# Patient Record
Sex: FEMALE | Race: WHITE | Employment: OTHER | URBAN - METROPOLITAN AREA
[De-identification: names, ages, dates, MRNs, and addresses within clinical notes are randomized per-mention and may not be internally consistent; named-entity substitution may affect disease eponyms.]

---

## 2017-02-01 ENCOUNTER — OFFICE VISIT (OUTPATIENT)
Dept: FAMILY MEDICINE CLINIC | Facility: CLINIC | Age: 62
End: 2017-02-01

## 2017-02-01 ENCOUNTER — APPOINTMENT (OUTPATIENT)
Dept: LAB | Age: 62
End: 2017-02-01
Attending: PHYSICIAN ASSISTANT
Payer: COMMERCIAL

## 2017-02-01 VITALS
HEIGHT: 65 IN | HEART RATE: 81 BPM | WEIGHT: 188 LBS | TEMPERATURE: 98 F | DIASTOLIC BLOOD PRESSURE: 66 MMHG | SYSTOLIC BLOOD PRESSURE: 101 MMHG | BODY MASS INDEX: 31.32 KG/M2

## 2017-02-01 DIAGNOSIS — M54.50 ACUTE LEFT-SIDED LOW BACK PAIN WITHOUT SCIATICA: Primary | ICD-10-CM

## 2017-02-01 DIAGNOSIS — Z86.018 HISTORY OF ADRENAL ADENOMA: ICD-10-CM

## 2017-02-01 DIAGNOSIS — B35.1 ONYCHOMYCOSIS: ICD-10-CM

## 2017-02-01 LAB
ALBUMIN SERPL BCP-MCNC: 4 G/DL (ref 3.5–4.8)
ALBUMIN/GLOB SERPL: 1.3 {RATIO} (ref 1–2)
ALP SERPL-CCNC: 61 U/L (ref 32–100)
ALT SERPL-CCNC: 48 U/L (ref 14–54)
ANION GAP SERPL CALC-SCNC: 10 MMOL/L (ref 0–18)
APPEARANCE: CLEAR
AST SERPL-CCNC: 40 U/L (ref 15–41)
BILIRUB SERPL-MCNC: 0.4 MG/DL (ref 0.3–1.2)
BILIRUB UR QL: NEGATIVE
BILIRUBIN: NEGATIVE
BUN SERPL-MCNC: 10 MG/DL (ref 8–20)
BUN/CREAT SERPL: 12.2 (ref 10–20)
CALCIUM SERPL-MCNC: 9.5 MG/DL (ref 8.5–10.5)
CHLORIDE SERPL-SCNC: 105 MMOL/L (ref 95–110)
CLARITY UR: CLEAR
CO2 SERPL-SCNC: 28 MMOL/L (ref 22–32)
COLOR UR: YELLOW
CREAT SERPL-MCNC: 0.82 MG/DL (ref 0.5–1.5)
GLOBULIN PLAS-MCNC: 3.1 G/DL (ref 2.5–3.7)
GLUCOSE (URINE DIPSTICK): NEGATIVE MG/DL
GLUCOSE SERPL-MCNC: 108 MG/DL (ref 70–99)
GLUCOSE UR-MCNC: NEGATIVE MG/DL
HGB UR QL STRIP.AUTO: NEGATIVE
KETONES (URINE DIPSTICK): NEGATIVE MG/DL
KETONES UR-MCNC: NEGATIVE MG/DL
LEUKOCYTE ESTERASE UR QL STRIP.AUTO: NEGATIVE
LEUKOCYTES: NEGATIVE
MULTISTIX LOT#: NORMAL NUMERIC
NITRITE UR QL STRIP.AUTO: NEGATIVE
NITRITE, URINE: NEGATIVE
OSMOLALITY UR CALC.SUM OF ELEC: 296 MOSM/KG (ref 275–295)
PH UR: 6 [PH] (ref 5–8)
PH, URINE: 6.5 (ref 4.5–8)
POTASSIUM SERPL-SCNC: 4 MMOL/L (ref 3.3–5.1)
PROT SERPL-MCNC: 7.1 G/DL (ref 5.9–8.4)
PROT UR-MCNC: NEGATIVE MG/DL
PROTEIN (URINE DIPSTICK): NEGATIVE MG/DL
SODIUM SERPL-SCNC: 143 MMOL/L (ref 136–144)
SP GR UR STRIP: 1.02 (ref 1–1.03)
SPECIFIC GRAVITY: 1.01 (ref 1–1.03)
UROBILINOGEN UR STRIP-ACNC: <2
UROBILINOGEN,SEMI-QN: 0.2 MG/DL (ref 0–1.9)
VIT C UR-MCNC: NEGATIVE MG/DL

## 2017-02-01 PROCEDURE — G0463 HOSPITAL OUTPT CLINIC VISIT: HCPCS | Performed by: FAMILY MEDICINE

## 2017-02-01 PROCEDURE — 36415 COLL VENOUS BLD VENIPUNCTURE: CPT

## 2017-02-01 PROCEDURE — 80053 COMPREHEN METABOLIC PANEL: CPT

## 2017-02-01 PROCEDURE — 81002 URINALYSIS NONAUTO W/O SCOPE: CPT | Performed by: FAMILY MEDICINE

## 2017-02-01 PROCEDURE — 99214 OFFICE O/P EST MOD 30 MIN: CPT | Performed by: FAMILY MEDICINE

## 2017-02-01 NOTE — PROGRESS NOTES
HPI:    Patient ID: Halley Hanna is a 64year old female. HPI Comments: Pt reports left flank pain Sunday evening pain than migrated to left low back. Reports urine appears darker.  Describes pain as sharp sometimes 10/10, aggravated with movements, Rfl:    BOOSTRIX 5-2.5-18.5 Intramuscular Suspension ADM 0.5ML IM UTD Disp:  Rfl: 0   Levothyroxine Sodium 125 MCG Oral Tab Take 1 tablet (125 mcg total) by mouth before breakfast. Disp: 90 tablet Rfl: 3   carvedilol (COREG) 3.125 MG Oral Tab Take 1 tablet Urinalysis, Routine [E]; Future    2. History of adrenal adenoma  CT to follow adrenal adenoma    - CT ABDOMEN (CPT=74150);  Future    Pt to follow up for worsening of symptoms    Meds This Visit:  No prescriptions requested or ordered in this encounter

## 2017-02-03 RX ORDER — ERGOCALCIFEROL 1.25 MG/1
CAPSULE ORAL
Qty: 12 CAPSULE | Refills: 0 | Status: SHIPPED | OUTPATIENT
Start: 2017-02-03 | End: 2017-05-04

## 2017-02-03 RX ORDER — TERBINAFINE HYDROCHLORIDE 250 MG/1
TABLET ORAL
Qty: 30 TABLET | Refills: 0 | Status: SHIPPED | OUTPATIENT
Start: 2017-02-03 | End: 2017-03-01

## 2017-02-03 RX ORDER — TERBINAFINE HYDROCHLORIDE 250 MG/1
250 TABLET ORAL DAILY
Qty: 30 TABLET | Refills: 0 | Status: CANCELLED | OUTPATIENT
Start: 2017-02-03

## 2017-02-03 NOTE — TELEPHONE ENCOUNTER
Patient is requesting refill for Terbinafine 250 mg daily. LOV 12/27/16 . Last refilled on 12/27/16 for 30 tablets / 0 refills. Please advise.

## 2017-02-03 NOTE — TELEPHONE ENCOUNTER
Pt states she was told she would get a refill of terbinafine after labs for liver function. Refill pended along with vit D. AA please advise.

## 2017-02-03 NOTE — TELEPHONE ENCOUNTER
Rx sent to pharmacy. She had CMP drawn as directed and liver enzymes are normal.  She can follow up in one month.

## 2017-02-03 NOTE — TELEPHONE ENCOUNTER
Refill Protocol Appointment Criteria  · Appointment scheduled in the past 12 months or in the next 3 months  Recent Visits       Provider Department Primary Dx    2 days ago Elizabeth Guaman MD [facility], [facility], [facility] Acute left-sided low b

## 2017-02-13 ENCOUNTER — TELEPHONE (OUTPATIENT)
Dept: FAMILY MEDICINE CLINIC | Facility: CLINIC | Age: 62
End: 2017-02-13

## 2017-02-13 NOTE — TELEPHONE ENCOUNTER
Pt states having a lot of diarrhea  Declined apt-wants to see Dr Cade Valdes, have colonoscopy  Secondary ins is HMO

## 2017-02-13 NOTE — TELEPHONE ENCOUNTER
Recommendations reviewed per Dr. Kyle Tafoya. Pt verbalized understanding and denied any further questions at this time.

## 2017-02-13 NOTE — TELEPHONE ENCOUNTER
Reason for Call/Chief Complaint: Patient states has been having diarrhea for a few days   Onset: 5 days   Nursing Assessment/Associated Symptoms: Patient states for 5 days has been having issues with bowels.  States was constipated last week and took [place] and will call back if needed. Patient verbalizes understanding and agrees with plan.

## 2017-02-15 ENCOUNTER — APPOINTMENT (OUTPATIENT)
Dept: LAB | Age: 62
End: 2017-02-15
Attending: PEDIATRICS
Payer: COMMERCIAL

## 2017-02-15 DIAGNOSIS — R19.7 DIARRHEA, UNSPECIFIED TYPE: ICD-10-CM

## 2017-02-16 ENCOUNTER — HOSPITAL ENCOUNTER (OUTPATIENT)
Dept: CT IMAGING | Facility: HOSPITAL | Age: 62
Discharge: HOME | End: 2017-02-16
Attending: FAMILY MEDICINE
Payer: MEDICARE

## 2017-02-16 ENCOUNTER — APPOINTMENT (OUTPATIENT)
Dept: LAB | Age: 62
End: 2017-02-16
Attending: FAMILY MEDICINE
Payer: MEDICARE

## 2017-02-16 DIAGNOSIS — Z86.018 HISTORY OF ADRENAL ADENOMA: ICD-10-CM

## 2017-02-16 DIAGNOSIS — M54.50 ACUTE LEFT-SIDED LOW BACK PAIN WITHOUT SCIATICA: ICD-10-CM

## 2017-02-16 LAB — C DIFF TOX B STL QL: NEGATIVE

## 2017-02-16 PROCEDURE — 74150 CT ABDOMEN W/O CONTRAST: CPT

## 2017-02-16 PROCEDURE — 87493 C DIFF AMPLIFIED PROBE: CPT

## 2017-03-06 RX ORDER — TERBINAFINE HYDROCHLORIDE 250 MG/1
TABLET ORAL
Qty: 30 TABLET | Refills: 0 | Status: SHIPPED | OUTPATIENT
Start: 2017-03-06 | End: 2017-04-27

## 2017-03-27 ENCOUNTER — TELEPHONE (OUTPATIENT)
Dept: FAMILY MEDICINE CLINIC | Facility: CLINIC | Age: 62
End: 2017-03-27

## 2017-03-27 DIAGNOSIS — G89.29 CHRONIC LOW BACK PAIN WITH SCIATICA, SCIATICA LATERALITY UNSPECIFIED, UNSPECIFIED BACK PAIN LATERALITY: ICD-10-CM

## 2017-03-27 DIAGNOSIS — M51.26 LUMBAR DISC HERNIATION: Primary | ICD-10-CM

## 2017-03-27 DIAGNOSIS — M54.40 CHRONIC LOW BACK PAIN WITH SCIATICA, SCIATICA LATERALITY UNSPECIFIED, UNSPECIFIED BACK PAIN LATERALITY: ICD-10-CM

## 2017-03-27 DIAGNOSIS — Z98.1 S/P LUMBAR FUSION: ICD-10-CM

## 2017-03-27 NOTE — TELEPHONE ENCOUNTER
Pt called in requesting a referral to see Dr. Jose Antonio Grissom at [facility], [facility] for a one year evaluation since her infusion. Pt states he is a neuro surgeon.   Pt requesting a call back once referral is done, she has a scheduled appt to see this doctor on Ap

## 2017-03-31 ENCOUNTER — TELEPHONE (OUTPATIENT)
Dept: ENDOCRINOLOGY CLINIC | Facility: CLINIC | Age: 62
End: 2017-03-31

## 2017-03-31 ENCOUNTER — OFFICE VISIT (OUTPATIENT)
Dept: ENDOCRINOLOGY CLINIC | Facility: CLINIC | Age: 62
End: 2017-03-31

## 2017-03-31 ENCOUNTER — LAB ENCOUNTER (OUTPATIENT)
Dept: LAB | Facility: HOSPITAL | Age: 62
End: 2017-03-31
Attending: INTERNAL MEDICINE
Payer: MEDICARE

## 2017-03-31 VITALS
HEIGHT: 64 IN | BODY MASS INDEX: 31.92 KG/M2 | WEIGHT: 187 LBS | SYSTOLIC BLOOD PRESSURE: 100 MMHG | DIASTOLIC BLOOD PRESSURE: 66 MMHG | HEART RATE: 68 BPM

## 2017-03-31 DIAGNOSIS — E04.2 MULTIPLE THYROID NODULES: ICD-10-CM

## 2017-03-31 DIAGNOSIS — E03.9 HYPOTHYROIDISM, UNSPECIFIED TYPE: Primary | ICD-10-CM

## 2017-03-31 DIAGNOSIS — E66.9 OBESITY (BMI 30.0-34.9): ICD-10-CM

## 2017-03-31 DIAGNOSIS — Z32.00 ENCOUNTER FOR PREGNANCY TEST, RESULT UNKNOWN: ICD-10-CM

## 2017-03-31 DIAGNOSIS — E03.9 HYPOTHYROIDISM, UNSPECIFIED TYPE: ICD-10-CM

## 2017-03-31 PROCEDURE — 84443 ASSAY THYROID STIM HORMONE: CPT

## 2017-03-31 PROCEDURE — 81025 URINE PREGNANCY TEST: CPT

## 2017-03-31 PROCEDURE — G0463 HOSPITAL OUTPT CLINIC VISIT: HCPCS | Performed by: INTERNAL MEDICINE

## 2017-03-31 PROCEDURE — 36415 COLL VENOUS BLD VENIPUNCTURE: CPT

## 2017-03-31 PROCEDURE — 99204 OFFICE O/P NEW MOD 45 MIN: CPT | Performed by: INTERNAL MEDICINE

## 2017-03-31 PROCEDURE — 84439 ASSAY OF FREE THYROXINE: CPT

## 2017-03-31 NOTE — H&P
New Patient Evaluation - History and Physical    CONSULT - Reason for Visit:  Post RENTERIA hypothyroidism, Thyroid nodules, Obesity  Requesting Physician: Fantasma Meng MD    CHIEF COMPLAINT:    Post RENTERIA hypothyroidism  Thyroid nodules  Obesity     HISTORY OF MA arrow angle glaucoma OU; PI Laser OS    OTHER SURGICAL HISTORY      Comment Per NG: Arthrocentesis    OTHER SURGICAL HISTORY      Comment Per NG: arthrocentesis of the knee joint    COLONOSCOPY  10/2005    Comment normal, non precancerous polyps    OSCAR NEE vomiting  Hydrocodone-Ibuprof*        Comment:Other reaction(s): Vomiting  Pregabalin                  Comment:Other reaction(s): Stomach discomforts  Vicodin Tuss [[place]*    Nausea and vomiting, Nausea only    Comment:Other reaction(s): Vomiting    SOCIAL depression, anxiety  Hematology/Lymphatics: Negative for: bruising, lower extremity edema  Endocrine: Negative for: polyuria, polydypsia  Skin: Negative for: rash, blister, cellulitis,      PHYSICAL EXAM:    03/31/17  1013   Height: 5' 4\" (1.626 m)   Celine Niece Increase fiber intake  d) Fat intake that emphasizes on mono unsaturated and poly unsaturated fats and less on saturated and trans fatty acid and cholesterol.    -Exercise  a) Equal to or more than 150 hours of moderate intensity (conversational) exercise.

## 2017-03-31 NOTE — PROGRESS NOTES
New Patient Evaluation - History and Physical    CONSULT - Reason for Visit:  Post RENTERIA hypothyroidism  Requesting Physician: Madelaine Millard MD    CHIEF COMPLAINT:    Post RENTERIA hypothyroidism     HISTORY OF PRESENT ILLNESS:   Susanna Matos is a 64 year o H PRN P Disp:  Rfl: 2   Rosuvastatin Calcium 40 MG Oral Tab Take 1 tablet (40 mg total) by mouth nightly. Disp: 90 tablet Rfl: 1   docusate sodium 100 MG Oral Cap Take 100 mg by mouth. Disp:  Rfl:    ibuprofen 600 MG Oral Tab Take 600 mg by mouth.  Disp:  R Alcohol Use: No              Drug Use: No            Other Topics            Concern  Caffeine Concern        Yes    Comment:3 cups of coffee daily  Exercise                No        FAMILY HISTORY:   Family History   Problem Relation Age of Onset   • Depr affect  EYES:  No proptosis, no ptosis, conjunctiva normal  ENT:  Normocephalic, atraumatic  NECK:  Supple, symmetrical, trachea midline, no adenopathy, thyroid symmetric, not enlarged and no tenderness  HEMATOLOGIC/LYMPHATICS:  no cervical lymphadenopathy

## 2017-04-06 ENCOUNTER — TELEPHONE (OUTPATIENT)
Dept: ENDOCRINOLOGY CLINIC | Facility: CLINIC | Age: 62
End: 2017-04-06

## 2017-04-06 NOTE — TELEPHONE ENCOUNTER
Pt states that her Phychiatrist Dr Jung Bethea has given pt., ok to take Phentermine medication. Pt. States that the doctors name is Dr Jung Bethea phone # [phone],  [facility].

## 2017-04-07 ENCOUNTER — HOSPITAL ENCOUNTER (OUTPATIENT)
Dept: CT IMAGING | Facility: HOSPITAL | Age: 62
Discharge: HOME | End: 2017-04-07
Attending: NEUROLOGICAL SURGERY
Payer: MEDICARE

## 2017-04-07 DIAGNOSIS — M54.9 BACK PAIN: ICD-10-CM

## 2017-04-07 PROCEDURE — 72131 CT LUMBAR SPINE W/O DYE: CPT

## 2017-04-07 RX ORDER — TOPIRAMATE 50 MG/1
50 TABLET, FILM COATED ORAL DAILY
Qty: 90 TABLET | Refills: 0 | Status: SHIPPED | OUTPATIENT
Start: 2017-04-07 | End: 2017-07-07

## 2017-04-07 RX ORDER — PHENTERMINE HYDROCHLORIDE 15 MG/1
15 CAPSULE ORAL DAILY
Qty: 90 CAPSULE | Refills: 0 | Status: SHIPPED | OUTPATIENT
Start: 2017-04-07 | End: 2017-07-07

## 2017-04-07 NOTE — TELEPHONE ENCOUNTER
Topiramate sent to pharmacy. Phentermine prescription on your desk. She should schedule FU in three months. She should call us if she experiences side effects as discussed with her during the apt.    Thx.

## 2017-04-07 NOTE — TELEPHONE ENCOUNTER
Spoke with Cori Allen. Informed her of Dr. Eden Currie message below. She verbalized her understanding and booked an appt 3 months from now. Called in Rx for phentermine to the pharmacy. Pharmacist verbalized his understanding.  Patient will call if she experien

## 2017-04-27 ENCOUNTER — OFFICE VISIT (OUTPATIENT)
Dept: FAMILY MEDICINE CLINIC | Facility: CLINIC | Age: 62
End: 2017-04-27

## 2017-04-27 VITALS
HEART RATE: 83 BPM | BODY MASS INDEX: 30.16 KG/M2 | TEMPERATURE: 98 F | DIASTOLIC BLOOD PRESSURE: 60 MMHG | SYSTOLIC BLOOD PRESSURE: 95 MMHG | WEIGHT: 181 LBS | HEIGHT: 65 IN

## 2017-04-27 DIAGNOSIS — M54.2 NECK PAIN ON LEFT SIDE: ICD-10-CM

## 2017-04-27 DIAGNOSIS — M54.2 CHRONIC NECK AND BACK PAIN: Primary | ICD-10-CM

## 2017-04-27 DIAGNOSIS — I42.9 PRIMARY CARDIOMYOPATHY (HCC): ICD-10-CM

## 2017-04-27 DIAGNOSIS — G89.29 CHRONIC NECK AND BACK PAIN: Primary | ICD-10-CM

## 2017-04-27 DIAGNOSIS — E78.00 HYPERCHOLESTEREMIA: ICD-10-CM

## 2017-04-27 DIAGNOSIS — M54.9 CHRONIC NECK AND BACK PAIN: Primary | ICD-10-CM

## 2017-04-27 PROCEDURE — G0463 HOSPITAL OUTPT CLINIC VISIT: HCPCS | Performed by: FAMILY MEDICINE

## 2017-04-27 PROCEDURE — 99214 OFFICE O/P EST MOD 30 MIN: CPT | Performed by: FAMILY MEDICINE

## 2017-04-27 RX ORDER — TOPIRAMATE 50 MG/1
TABLET, FILM COATED ORAL
COMMUNITY
Start: 2017-04-07 | End: 2017-04-27

## 2017-04-27 RX ORDER — CARVEDILOL 3.12 MG/1
3.12 TABLET ORAL 2 TIMES DAILY WITH MEALS
Qty: 60 TABLET | Refills: 0 | Status: SHIPPED | OUTPATIENT
Start: 2017-04-27 | End: 2017-05-11

## 2017-04-27 RX ORDER — PHENTERMINE HYDROCHLORIDE 15 MG/1
CAPSULE ORAL
COMMUNITY
Start: 2017-04-07 | End: 2017-04-27

## 2017-04-27 RX ORDER — ERGOCALCIFEROL 1.25 MG/1
CAPSULE ORAL
COMMUNITY
Start: 2017-02-03 | End: 2017-04-27

## 2017-04-27 NOTE — PROGRESS NOTES
HPI:    Patient ID: Fady Rizo is a 64year old female. HPI     HX OF CHRONIC BACK AND NECK PAIN. HAS HAD NECK INJECTIONS.   PAIN HAS BEEN HAVING WORSENING OF NECK PAIN SINCE 2-3 MONTHS  TAKING IBUPROFEN 600MG THREE TIMES A DAY AS NEEDED FOR YEARS Oral Tab TAKE 1 TABLET(3.125 MG) BY MOUTH TWICE DAILY Disp: 60 tablet Rfl: 0   Phentermine HCl 15 MG Oral Cap Take 1 capsule (15 mg total) by mouth daily. Disp: 90 capsule Rfl: 0   diazepam 10 MG Oral Tab as needed.    Disp:  Rfl: 0     Allergies:  Demerol Imaging & Referrals:  OP REFERRAL PAIN MANGEMENT  CARDIO - INTERNAL       SB#[id number]

## 2017-05-05 RX ORDER — ERGOCALCIFEROL 1.25 MG/1
CAPSULE ORAL
Qty: 12 CAPSULE | Refills: 0 | Status: SHIPPED | OUTPATIENT
Start: 2017-05-05 | End: 2017-07-28

## 2017-05-05 NOTE — TELEPHONE ENCOUNTER
Refill Protocol Appointment Criteria  · Appointment scheduled in the past 12 months or in the next 3 months  Recent Visits       Provider Department Primary Dx    1 week ago Bryson Khan MD [facility], [facility], [address] [place] Chronic neck and back

## 2017-05-19 NOTE — PROGRESS NOTES
HPI   HX OF CHRONIC BACK AND NECK PAIN. HAS HAD NECK INJECTIONS. PAIN HAS BEEN HAVING WORSENING OF NECK PAIN SINCE 2-3 MONTHS  TAKING IBUPROFEN 600MG THREE TIMES A DAY AS NEEDED FOR YEARS. TRAMADOL HAS NOT WORKED.   FEELS NECK TIGHTNESS WHEN TURNING TO TH

## 2017-05-24 ENCOUNTER — OFFICE VISIT (OUTPATIENT)
Dept: PAIN CLINIC | Facility: HOSPITAL | Age: 62
End: 2017-05-24
Attending: FAMILY MEDICINE
Payer: MEDICARE

## 2017-05-24 VITALS
DIASTOLIC BLOOD PRESSURE: 68 MMHG | HEIGHT: 65 IN | BODY MASS INDEX: 29.49 KG/M2 | WEIGHT: 177 LBS | SYSTOLIC BLOOD PRESSURE: 100 MMHG

## 2017-05-24 DIAGNOSIS — M54.2 CHRONIC NECK AND BACK PAIN: Primary | ICD-10-CM

## 2017-05-24 DIAGNOSIS — M79.10 MYALGIA: ICD-10-CM

## 2017-05-24 DIAGNOSIS — M54.9 CHRONIC NECK AND BACK PAIN: Primary | ICD-10-CM

## 2017-05-24 DIAGNOSIS — M54.2 NECK PAIN ON LEFT SIDE: ICD-10-CM

## 2017-05-24 DIAGNOSIS — G89.29 CHRONIC NECK AND BACK PAIN: Primary | ICD-10-CM

## 2017-05-24 PROCEDURE — 99201 HC OUTPT EVAL AND MGNT NEW PT LEVEL 1: CPT

## 2017-05-24 NOTE — CHRONIC PAIN
[facility]  Pain Clinic  Follow Up Visit Report       Patient name: Jennifer Irizarry 64year old female  : 10/30/1955  MRN: [id number]  Referring MD: Sue Seen:  Neck pain radiating to left shoulder.     [facility] THE [facility] PAIN SCORE/MED Depression Mother    • Musculo-skelatal Disorder Mother      Per NG: Rheumatoid   • Neurological Disorder Mother      Per NG: Hypothyrodism   • Other[other] [OTHER] Mother    • Arthritis Mother      Per NG: Osteoarthritis   • Depression Maternal Grandfathe mcg total) by mouth before breakfast., Disp: 90 tablet, Rfl: 3  •  Omeprazole 40 MG Oral Capsule Delayed Release, Take 1 capsule (40 mg total) by mouth daily. , Disp: 90 capsule, Rfl: 0  •  gabapentin (NEURONTIN) 800 MG Oral Tab, Take one twice daily, Disp: injected. Tolerated procedure well.

## 2017-05-31 ENCOUNTER — TELEPHONE (OUTPATIENT)
Dept: FAMILY MEDICINE CLINIC | Facility: CLINIC | Age: 62
End: 2017-05-31

## 2017-05-31 NOTE — TELEPHONE ENCOUNTER
Pt was sent to do echo 2d doppler by dr Jaja Gruber and also an MRI by the pain clinic patient was told need referrals for both orders please contact patient when ready

## 2017-06-06 NOTE — TELEPHONE ENCOUNTER
Pt stts this request should be going to the Dr. Dwight Landers   Pt is stilling to get a referral for an MRI that was ordered from Pain clinic   Please advise once approved

## 2017-06-06 NOTE — TELEPHONE ENCOUNTER
In order to generate order, need what kind of MRI is exactly needed and dx and codes.  Who is the ordering doc at the pain clinic so the results go to them

## 2017-06-12 ENCOUNTER — LAB ENCOUNTER (OUTPATIENT)
Dept: LAB | Age: 62
End: 2017-06-12
Attending: FAMILY MEDICINE
Payer: MEDICARE

## 2017-06-12 DIAGNOSIS — E78.00 PURE HYPERCHOLESTEROLEMIA: ICD-10-CM

## 2017-06-12 DIAGNOSIS — I42.9 PRIMARY CARDIOMYOPATHY (HCC): ICD-10-CM

## 2017-06-12 DIAGNOSIS — E78.00 HYPERCHOLESTEREMIA: ICD-10-CM

## 2017-06-12 PROCEDURE — 82248 BILIRUBIN DIRECT: CPT

## 2017-06-12 PROCEDURE — 80061 LIPID PANEL: CPT

## 2017-06-12 PROCEDURE — 36415 COLL VENOUS BLD VENIPUNCTURE: CPT

## 2017-06-12 PROCEDURE — 80053 COMPREHEN METABOLIC PANEL: CPT

## 2017-06-12 PROCEDURE — 85025 COMPLETE CBC W/AUTO DIFF WBC: CPT

## 2017-06-14 ENCOUNTER — OFFICE VISIT (OUTPATIENT)
Dept: FAMILY MEDICINE CLINIC | Facility: CLINIC | Age: 62
End: 2017-06-14

## 2017-06-14 VITALS
TEMPERATURE: 98 F | BODY MASS INDEX: 29.99 KG/M2 | SYSTOLIC BLOOD PRESSURE: 95 MMHG | HEART RATE: 70 BPM | WEIGHT: 180 LBS | DIASTOLIC BLOOD PRESSURE: 66 MMHG | HEIGHT: 65 IN

## 2017-06-14 DIAGNOSIS — M54.16 LUMBAR RADICULAR SYNDROME: ICD-10-CM

## 2017-06-14 DIAGNOSIS — E78.00 PURE HYPERCHOLESTEROLEMIA: ICD-10-CM

## 2017-06-14 DIAGNOSIS — G89.29 CHRONIC BILATERAL THORACIC BACK PAIN: Primary | ICD-10-CM

## 2017-06-14 DIAGNOSIS — E03.9 HYPOTHYROIDISM, UNSPECIFIED TYPE: ICD-10-CM

## 2017-06-14 DIAGNOSIS — M54.6 CHRONIC BILATERAL THORACIC BACK PAIN: Primary | ICD-10-CM

## 2017-06-14 PROCEDURE — G0463 HOSPITAL OUTPT CLINIC VISIT: HCPCS | Performed by: FAMILY MEDICINE

## 2017-06-14 PROCEDURE — 99214 OFFICE O/P EST MOD 30 MIN: CPT | Performed by: FAMILY MEDICINE

## 2017-06-14 RX ORDER — OMEPRAZOLE 40 MG/1
40 CAPSULE, DELAYED RELEASE ORAL DAILY
Qty: 90 CAPSULE | Refills: 0 | Status: SHIPPED | OUTPATIENT
Start: 2017-06-14 | End: 2018-01-12

## 2017-06-14 RX ORDER — DIAZEPAM 5 MG/1
TABLET ORAL
Refills: 0 | COMMUNITY
Start: 2017-06-12 | End: 2017-10-02

## 2017-06-14 RX ORDER — GABAPENTIN 800 MG/1
TABLET ORAL
Qty: 180 TABLET | Refills: 3 | Status: SHIPPED | OUTPATIENT
Start: 2017-06-14 | End: 2018-06-23

## 2017-06-14 RX ORDER — ROSUVASTATIN CALCIUM 40 MG/1
20 TABLET, COATED ORAL DAILY
Qty: 90 TABLET | Refills: 1 | Status: SHIPPED | OUTPATIENT
Start: 2017-06-14 | End: 2018-06-18

## 2017-06-14 NOTE — PROGRESS NOTES
HPI:    Patient ID: Dre Gallagher is a 64year old female. HPI     Patient here for follow up  Chronic low back pain and thoracic back pain. Patient has had back surgery and has had therapy as well for her back.   She also had had multiple injectio mouth before breakfast. Disp: 90 tablet Rfl: 3   Omeprazole 40 MG Oral Capsule Delayed Release Take 1 capsule (40 mg total) by mouth daily.  Disp: 90 capsule Rfl: 0   gabapentin (NEURONTIN) 800 MG Oral Tab Take one twice daily Disp: 180 tablet Rfl: 3   Desv type  Stable      No orders of the defined types were placed in this encounter.        Meds This Visit:  No prescriptions requested or ordered in this encounter       Imaging & Referrals:  None       [id number]

## 2017-06-27 ENCOUNTER — HOSPITAL ENCOUNTER (OUTPATIENT)
Dept: CV DIAGNOSTICS | Facility: HOSPITAL | Age: 62
Discharge: HOME | End: 2017-06-27
Attending: INTERNAL MEDICINE
Payer: MEDICARE

## 2017-06-27 DIAGNOSIS — E78.00 PURE HYPERCHOLESTEROLEMIA: ICD-10-CM

## 2017-06-27 DIAGNOSIS — I42.9 PRIMARY CARDIOMYOPATHY (HCC): ICD-10-CM

## 2017-06-27 PROCEDURE — 93306 TTE W/DOPPLER COMPLETE: CPT | Performed by: INTERNAL MEDICINE

## 2017-06-28 ENCOUNTER — HOSPITAL ENCOUNTER (OUTPATIENT)
Dept: MRI IMAGING | Facility: HOSPITAL | Age: 62
Discharge: HOME | End: 2017-06-28
Attending: ANESTHESIOLOGY
Payer: MEDICARE

## 2017-06-28 DIAGNOSIS — M54.2 NECK PAIN: ICD-10-CM

## 2017-06-28 PROCEDURE — 72141 MRI NECK SPINE W/O DYE: CPT | Performed by: ANESTHESIOLOGY

## 2017-07-07 ENCOUNTER — OFFICE VISIT (OUTPATIENT)
Dept: ENDOCRINOLOGY CLINIC | Facility: CLINIC | Age: 62
End: 2017-07-07

## 2017-07-07 VITALS
HEART RATE: 68 BPM | DIASTOLIC BLOOD PRESSURE: 72 MMHG | HEIGHT: 64 IN | WEIGHT: 178.38 LBS | SYSTOLIC BLOOD PRESSURE: 90 MMHG | BODY MASS INDEX: 30.45 KG/M2

## 2017-07-07 DIAGNOSIS — E66.09 OBESITY DUE TO EXCESS CALORIES, UNSPECIFIED OBESITY SEVERITY: Primary | ICD-10-CM

## 2017-07-07 DIAGNOSIS — E03.9 HYPOTHYROIDISM, UNSPECIFIED TYPE: ICD-10-CM

## 2017-07-07 DIAGNOSIS — E04.2 MULTIPLE THYROID NODULES: ICD-10-CM

## 2017-07-07 PROCEDURE — 99213 OFFICE O/P EST LOW 20 MIN: CPT | Performed by: INTERNAL MEDICINE

## 2017-07-07 PROCEDURE — G0463 HOSPITAL OUTPT CLINIC VISIT: HCPCS | Performed by: INTERNAL MEDICINE

## 2017-07-07 RX ORDER — TOPIRAMATE 50 MG/1
50 TABLET, FILM COATED ORAL DAILY
Qty: 90 TABLET | Refills: 0 | Status: SHIPPED | OUTPATIENT
Start: 2017-07-07 | End: 2018-02-27

## 2017-07-07 RX ORDER — PHENTERMINE HYDROCHLORIDE 30 MG/1
30 CAPSULE ORAL EVERY MORNING
Qty: 30 CAPSULE | Refills: 2 | Status: SHIPPED | OUTPATIENT
Start: 2017-07-07 | End: 2017-08-17

## 2017-07-07 NOTE — PROGRESS NOTES
Return Office Visit     CHIEF COMPLAINT:     Hypothyroidism  Thyroid nodules  Obesity    HISTORY OF PRESENT ILLNESS:  Dre Gallagher is a 64year old female who presents for follow up for for post ablative hypothyroidism.    She was diagnosed with hyperth before breakfast. Disp: 90 tablet Rfl: 3   Desvenlafaxine Succinate ER (PRISTIQ) 100 MG Oral Tablet 24 Hr Take 100 mg by mouth daily. Disp:  Rfl:    temazepam (RESTORIL) 15 MG Oral Cap Take 30 mg by mouth nightly as needed for Sleep.    Disp:  Rfl: appears stated age  PSYCH: normal affect  EYES:  No proptosis, no ptosis, conjunctiva normal  ENT:  Normocephalic, atraumatic  NECK:  Supple, symmetrical, trachea midline, no adenopathy, thyroid symmetric, not enlarged and no tenderness  HEMATOLOGIC/LYMPHA topiramate. Increase phentermine to 30 mg daily. She denies any SE from this medication.    The following was discussed:     Patient understands side effects ( paresthesias, dry mouth, constipation, dysguesia, nasopharyngitis, insomnia) and Contraindica

## 2017-07-19 ENCOUNTER — TELEPHONE (OUTPATIENT)
Dept: PAIN CLINIC | Facility: HOSPITAL | Age: 62
End: 2017-07-19

## 2017-08-01 RX ORDER — ERGOCALCIFEROL 1.25 MG/1
CAPSULE ORAL
Qty: 12 CAPSULE | Refills: 3 | Status: SHIPPED | OUTPATIENT
Start: 2017-08-01 | End: 2018-07-02

## 2017-08-03 ENCOUNTER — TELEPHONE (OUTPATIENT)
Dept: FAMILY MEDICINE CLINIC | Facility: CLINIC | Age: 62
End: 2017-08-03

## 2017-08-03 NOTE — TELEPHONE ENCOUNTER
Pt. wants to know if she is able to get injection for Shingles? Will her Medicare/BC [employer] pay for the inj.?  Pt. States that she is very concerned as she was around her [employer] members and 3 members are positive for having Shingles.  She states that it i

## 2017-08-04 NOTE — TELEPHONE ENCOUNTER
Please advise regarding pt would like to receive shingles vaccine because of exposure at [employer].  Pt is 64 y

## 2017-08-05 NOTE — TELEPHONE ENCOUNTER
Spoke to pt notified of below. Per pt she checked with insurance and is able to get injections in office.    Pt transferred to schedule appt for nurse visit Shingles vaccine

## 2017-08-05 NOTE — TELEPHONE ENCOUNTER
Pt stts that her insurance will cover this vaccination at our office.  Pt is scheduled for this on Monday 08/07

## 2017-08-06 RX ORDER — LEVOTHYROXINE SODIUM 0.12 MG/1
TABLET ORAL
Qty: 90 TABLET | Refills: 0 | Status: CANCELLED | OUTPATIENT
Start: 2017-08-06

## 2017-08-09 RX ORDER — LEVOTHYROXINE SODIUM 0.12 MG/1
125 TABLET ORAL
Qty: 90 TABLET | Refills: 0 | Status: SHIPPED | OUTPATIENT
Start: 2017-08-09 | End: 2017-11-01

## 2017-08-09 NOTE — TELEPHONE ENCOUNTER
Pt called for refill/requesting 3 mos supply:       Current Outpatient Prescriptions:   •  Levothyroxine Sodium 125 MCG Oral Tab, Take 1 tablet (125 mcg total) by mouth before breakfast., Disp: 90 tablet, Rfl: 3

## 2017-08-14 ENCOUNTER — TELEPHONE (OUTPATIENT)
Dept: FAMILY MEDICINE CLINIC | Facility: CLINIC | Age: 62
End: 2017-08-14

## 2017-08-14 DIAGNOSIS — M54.2 NECK PAIN: Primary | ICD-10-CM

## 2017-08-14 NOTE — TELEPHONE ENCOUNTER
Actions Requested: Referral for Dr. Kimani Loja, pain management for epidural injection for neck pain. Problem: Severe ongoing neck pain  Onset and Timing: ongoing  Associated Symptoms: Some weakness of her left arm. Aggravating by:    Alleviated

## 2017-08-14 NOTE — TELEPHONE ENCOUNTER
Patient is requesting for an injection for her neck - for pain clinic - she states she is having a lot of pain on her neck. Please advise.

## 2017-08-17 ENCOUNTER — OFFICE VISIT (OUTPATIENT)
Dept: PAIN CLINIC | Facility: HOSPITAL | Age: 62
End: 2017-08-17
Attending: NURSE PRACTITIONER
Payer: MEDICARE

## 2017-08-17 DIAGNOSIS — M54.2 CHRONIC NECK AND BACK PAIN: Primary | ICD-10-CM

## 2017-08-17 DIAGNOSIS — G89.29 CHRONIC NECK AND BACK PAIN: Primary | ICD-10-CM

## 2017-08-17 DIAGNOSIS — M54.9 CHRONIC NECK AND BACK PAIN: Primary | ICD-10-CM

## 2017-08-17 PROBLEM — M50.30 DDD (DEGENERATIVE DISC DISEASE), CERVICAL: Status: ACTIVE | Noted: 2017-08-17

## 2017-08-17 PROCEDURE — 99211 OFF/OP EST MAY X REQ PHY/QHP: CPT

## 2017-08-17 NOTE — CHRONIC PAIN
Follow-up Note  HISTORY OF PRESENT ILLNESS:  Clara Gonzalez is a 64year old old female, originally referred to the pain clinic by  No ref. provider found, returns to the clinic for follow up.  She reports that she had 70% improvement in neck pain foll Desvenlafaxine Succinate ER (PRISTIQ) 100 MG Oral Tablet 24 Hr Take 100 mg by mouth daily. Disp:  Rfl:    topiramate 50 MG Oral Tab Take 1 tablet (50 mg total) by mouth daily.  Disp: 90 tablet Rfl: 0        REVIEW OF SYSTEMS:   Bowel/Bladder Incontinenc NG: epidral injection x5 per pain mgmt       SURGICAL HISTORY:  Past Surgical History:  No date: APPENDECTOMY  : CATARACT Right  : CATARACT Left  No date:  DELIVERY ONLY  No date: CHOLECYSTECTOMY  10/2005: COLONOSCOPY      Comment: normal, maker.  PHYSICAL EXAMINATION:  There were no vitals filed for this visit.    General: Alert and oriented x3  Affect:  NAD  Eyes: anicteric; no injection  CV: RRR, no m/r/g  Pulmonary: CTAB   Abdomen: soft, non-tender  Gait: Normal;  cane user - No  Spine: N

## 2017-08-17 NOTE — PROGRESS NOTES
Patient presents to [facility] ambulatory for follow up [place] done 7-11. She reports that she had 80% relief for about a month but in the last few weeks the pain has returned in her neck.   Her pain is a 6/10 today and she took motrin this morning and a muscle rel

## 2017-08-18 ENCOUNTER — MED REC SCAN ONLY (OUTPATIENT)
Dept: FAMILY MEDICINE CLINIC | Facility: CLINIC | Age: 62
End: 2017-08-18

## 2017-09-15 ENCOUNTER — OFFICE VISIT (OUTPATIENT)
Dept: PAIN CLINIC | Facility: HOSPITAL | Age: 62
End: 2017-09-15
Attending: ANESTHESIOLOGY
Payer: MEDICARE

## 2017-09-15 VITALS
DIASTOLIC BLOOD PRESSURE: 60 MMHG | HEIGHT: 65 IN | BODY MASS INDEX: 29.49 KG/M2 | SYSTOLIC BLOOD PRESSURE: 98 MMHG | WEIGHT: 177 LBS

## 2017-09-15 DIAGNOSIS — M50.30 DDD (DEGENERATIVE DISC DISEASE), CERVICAL: Primary | ICD-10-CM

## 2017-09-15 DIAGNOSIS — M47.816 LUMBAR FACET ARTHROPATHY: ICD-10-CM

## 2017-09-15 PROCEDURE — 99211 OFF/OP EST MAY X REQ PHY/QHP: CPT

## 2017-09-15 NOTE — CHRONIC PAIN
[facility]  Pain Clinic  Follow Up Visit Report       Patient name: Robert Hale 64year old female  : 10/30/1955  MRN: [id number]  Referring MD: No ref. provider found     COMPLAINT:  Patient presents with:   Follow - Up: POST [place] C Problem Relation Age of Onset   • Depression Mother    • Musculo-skelatal Disorder Mother      Per NG: Rheumatoid   • Neurological Disorder Mother      Per NG: Hypothyrodism   • Arthritis Mother      Per NG: Osteoarthritis   • Other Gwenevere Cooper Mother    • D PLACED, Disp: 180 tablet, Rfl: 3  •  ibuprofen 600 MG Oral Tab, Take 600 mg by mouth as needed. , Disp: , Rfl:   •  Desvenlafaxine Succinate ER (PRISTIQ) 100 MG Oral Tablet 24 Hr, Take 100 mg by mouth daily.   , Disp: , Rfl:   REVIEW OF SYSTEMS:   Bowel/Bl

## 2017-09-18 ENCOUNTER — APPOINTMENT (OUTPATIENT)
Dept: INTEGRATIVE MEDICINE | Facility: HOSPITAL | Age: 62
End: 2017-09-18
Attending: ANESTHESIOLOGY

## 2017-09-19 ENCOUNTER — TELEPHONE (OUTPATIENT)
Dept: OTHER | Age: 62
End: 2017-09-19

## 2017-09-19 ENCOUNTER — TELEPHONE (OUTPATIENT)
Dept: PAIN CLINIC | Facility: HOSPITAL | Age: 62
End: 2017-09-19

## 2017-09-19 DIAGNOSIS — M50.30 DDD (DEGENERATIVE DISC DISEASE), CERVICAL: Primary | ICD-10-CM

## 2017-09-19 NOTE — TELEPHONE ENCOUNTER
Patient calling inquiring about a referral for physical therapy. Patient would like a call back once request is complete.

## 2017-09-19 NOTE — TELEPHONE ENCOUNTER
Dr. Chuy Thompson,    I spoke with pt, she states that she went to PT back in June and has completed those visits. Are you okay with her to continue PT? Pt states that you advise her to have Pain doctor to sign off on additional visits. Please advise.       Thank y

## 2017-10-02 ENCOUNTER — HOSPITAL ENCOUNTER (OUTPATIENT)
Dept: CT IMAGING | Facility: HOSPITAL | Age: 62
Discharge: HOME | End: 2017-10-02
Attending: FAMILY MEDICINE | Admitting: FAMILY MEDICINE
Payer: MEDICARE

## 2017-10-02 ENCOUNTER — OFFICE VISIT (OUTPATIENT)
Dept: FAMILY MEDICINE CLINIC | Facility: CLINIC | Age: 62
End: 2017-10-02

## 2017-10-02 VITALS
HEART RATE: 66 BPM | HEIGHT: 65 IN | DIASTOLIC BLOOD PRESSURE: 64 MMHG | SYSTOLIC BLOOD PRESSURE: 96 MMHG | WEIGHT: 179 LBS | BODY MASS INDEX: 29.82 KG/M2 | TEMPERATURE: 99 F

## 2017-10-02 DIAGNOSIS — M54.2 CHRONIC NECK AND BACK PAIN: ICD-10-CM

## 2017-10-02 DIAGNOSIS — G89.29 CHRONIC NECK AND BACK PAIN: ICD-10-CM

## 2017-10-02 DIAGNOSIS — R51.9 OCCIPITAL HEADACHE: ICD-10-CM

## 2017-10-02 DIAGNOSIS — R51.9 OCCIPITAL HEADACHE: Primary | ICD-10-CM

## 2017-10-02 DIAGNOSIS — M54.9 CHRONIC NECK AND BACK PAIN: ICD-10-CM

## 2017-10-02 PROCEDURE — G0463 HOSPITAL OUTPT CLINIC VISIT: HCPCS | Performed by: FAMILY MEDICINE

## 2017-10-02 PROCEDURE — 99214 OFFICE O/P EST MOD 30 MIN: CPT | Performed by: FAMILY MEDICINE

## 2017-10-02 PROCEDURE — 70450 CT HEAD/BRAIN W/O DYE: CPT | Performed by: FAMILY MEDICINE

## 2017-10-02 RX ORDER — CYCLOBENZAPRINE HCL 5 MG
5 TABLET ORAL 3 TIMES DAILY PRN
Qty: 30 TABLET | Refills: 0 | Status: SHIPPED | OUTPATIENT
Start: 2017-10-02 | End: 2017-11-02

## 2017-10-02 NOTE — PROGRESS NOTES
HPI:    Patient ID: Andrés Thrasher is a 64year old female. HPI    Patient with complains of pain in the back of her head. She states she has had this pain for the last 3 days and feels like it is the worst headache of her life.   She does have a hist Delayed Release Take 1 capsule (40 mg total) by mouth daily.  Disp: 90 capsule Rfl: 0   gabapentin 800 MG Oral Tab Take one twice daily Disp: 180 tablet Rfl: 3   carvedilol 3.125 MG Oral Tab Take 1 tablet (3.125 mg total) by mouth 2 (two) times daily with m experienced this before I would recommend getting a CT of her brain. This will be ordered stat. Patient agrees with the plan. She is also requesting medication for pain. Reviewed medication and she cannot tolerate any medication with codeine.   She has

## 2017-11-01 RX ORDER — LEVOTHYROXINE SODIUM 0.12 MG/1
TABLET ORAL
Qty: 30 TABLET | Refills: 0 | Status: SHIPPED | OUTPATIENT
Start: 2017-11-01 | End: 2017-11-02

## 2017-11-01 NOTE — TELEPHONE ENCOUNTER
LOV 7/7/2017. RTC in 3 months. No appt scheduled yet. Sent letter to [facility] asking patient to make appt and 1 month pended.

## 2017-11-02 ENCOUNTER — OFFICE VISIT (OUTPATIENT)
Dept: FAMILY MEDICINE CLINIC | Facility: CLINIC | Age: 62
End: 2017-11-02

## 2017-11-02 VITALS
SYSTOLIC BLOOD PRESSURE: 100 MMHG | BODY MASS INDEX: 29.99 KG/M2 | DIASTOLIC BLOOD PRESSURE: 66 MMHG | TEMPERATURE: 98 F | HEIGHT: 65 IN | HEART RATE: 80 BPM | WEIGHT: 180 LBS

## 2017-11-02 DIAGNOSIS — Z92.3 H/O RADIOACTIVE IODINE THYROID ABLATION: ICD-10-CM

## 2017-11-02 DIAGNOSIS — E66.9 OBESITY (BMI 30.0-34.9): ICD-10-CM

## 2017-11-02 DIAGNOSIS — F32.A ANXIETY AND DEPRESSION: ICD-10-CM

## 2017-11-02 DIAGNOSIS — G89.29 CHRONIC NECK AND BACK PAIN: ICD-10-CM

## 2017-11-02 DIAGNOSIS — M54.2 CHRONIC NECK AND BACK PAIN: ICD-10-CM

## 2017-11-02 DIAGNOSIS — E55.9 VITAMIN D DEFICIENCY: ICD-10-CM

## 2017-11-02 DIAGNOSIS — E04.1 THYROID NODULE: ICD-10-CM

## 2017-11-02 DIAGNOSIS — M54.16 LUMBAR RADICULAR SYNDROME: ICD-10-CM

## 2017-11-02 DIAGNOSIS — M54.9 CHRONIC NECK AND BACK PAIN: ICD-10-CM

## 2017-11-02 DIAGNOSIS — Z12.11 COLON CANCER SCREENING: ICD-10-CM

## 2017-11-02 DIAGNOSIS — Z23 NEED FOR INFLUENZA VACCINATION: ICD-10-CM

## 2017-11-02 DIAGNOSIS — F41.9 ANXIETY AND DEPRESSION: ICD-10-CM

## 2017-11-02 DIAGNOSIS — I42.9 PRIMARY CARDIOMYOPATHY (HCC): ICD-10-CM

## 2017-11-02 DIAGNOSIS — Z00.00 WELL ADULT EXAM: Primary | ICD-10-CM

## 2017-11-02 DIAGNOSIS — Z12.39 BREAST CANCER SCREENING: ICD-10-CM

## 2017-11-02 DIAGNOSIS — E78.2 MIXED HYPERLIPIDEMIA: ICD-10-CM

## 2017-11-02 DIAGNOSIS — E03.9 ACQUIRED HYPOTHYROIDISM: ICD-10-CM

## 2017-11-02 DIAGNOSIS — M15.9 PRIMARY OSTEOARTHRITIS INVOLVING MULTIPLE JOINTS: ICD-10-CM

## 2017-11-02 PROCEDURE — G0008 ADMIN INFLUENZA VIRUS VAC: HCPCS | Performed by: FAMILY MEDICINE

## 2017-11-02 PROCEDURE — 90686 IIV4 VACC NO PRSV 0.5 ML IM: CPT | Performed by: FAMILY MEDICINE

## 2017-11-02 PROCEDURE — G0438 PPPS, INITIAL VISIT: HCPCS | Performed by: FAMILY MEDICINE

## 2017-11-02 RX ORDER — CYCLOBENZAPRINE HCL 5 MG
5 TABLET ORAL 3 TIMES DAILY PRN
Qty: 30 TABLET | Refills: 3 | Status: SHIPPED | OUTPATIENT
Start: 2017-11-02 | End: 2018-05-07

## 2017-11-02 RX ORDER — LEVOTHYROXINE SODIUM 0.12 MG/1
125 TABLET ORAL
Qty: 90 TABLET | Refills: 1 | Status: SHIPPED | OUTPATIENT
Start: 2017-11-02 | End: 2018-05-01

## 2017-11-02 NOTE — PROGRESS NOTES
HPI:   Adore Lee is a 58year old female who presents for a complete physical exam. Symptoms: is menopausal.   Was busy last few months  Her son and step daughter as well got .   Has lost few lbs but has regained    Flexeril helps with her vincent daily. Disp: 90 capsule Rfl: 0   gabapentin 800 MG Oral Tab Take one twice daily Disp: 180 tablet Rfl: 3   carvedilol 3.125 MG Oral Tab Take 1 tablet (3.125 mg total) by mouth 2 (two) times daily with meals.  WILL FOLLOW UP WITH DR Lyssa To, REFERRAL PLACED Dis Mother      Per NG: Osteoarthritis   • Other Wilhemena Fuel Mother    • Depression Maternal Grandfather    • Other [OTHER] Maternal Grandfather    • Heart Disorder Father 72   • Heart Attack Neg    • Diabetes Neg      Per NG; No diabetes   • Glaucoma Neg      Per (1.651 m)   Wt 180 lb (81.6 kg)   BMI 29.95 kg/m²   Body mass index is 29.95 kg/m².    GENERAL: well developed, well nourished,in no apparent distress  SKIN: no rashes,no suspicious lesions  HEENT: atraumatic, normocephalic,ears and throat are clear  EYES:P skipping meals. Making healthy choices for snacks and also limiting sugary beverages. 5. Lumbar radicular syndrome      6. Primary cardiomyopathy (HCC)  Seeing Dr Dominique Yuan yearly    7. Primary osteoarthritis involving multiple joints      8.  Need for i

## 2017-11-07 ENCOUNTER — TELEPHONE (OUTPATIENT)
Dept: GASTROENTEROLOGY | Facility: CLINIC | Age: 62
End: 2017-11-07

## 2017-11-07 DIAGNOSIS — Z12.11 COLON CANCER SCREENING: Primary | ICD-10-CM

## 2017-11-08 NOTE — TELEPHONE ENCOUNTER
Last seen in office in 2013 by PL    Last Procedure:  6/2/2006  Last Diagnosis:  Screening for colon cancer  Recalled for (years): 10 years  Sedation used previously:  IV sedation  Last Prep Used (if known):    Quality of prep (if known):     Anticoagulants

## 2017-11-08 NOTE — TELEPHONE ENCOUNTER
The patient's chart has been reviewed. Okay to schedule pt for 10 year colonoscopy recall r/t screening for colon cancer with Dr. Christa Alegre. Advise IV [place] with split dose Suprep (eRx) preparation.    May substitute Colyte/TriLyte as necessary     May c

## 2017-11-13 NOTE — TELEPHONE ENCOUNTER
Scheduled for:  Colonoscopy 58[address]  Provider Name: Dr. Carley Orta  Date:  1/25/18  Location:  [facility]  Sedation:  IV  Time:  0800 (pt is aware to arrive at 0700)   Prep:  Suprep, mailed 11/13/17  Meds/Allergies Reconciled?:  Michelle/ILA reviewed     Diagnosis with co

## 2017-11-13 NOTE — TELEPHONE ENCOUNTER
Pt calling for an update regarding britney/cln, pt requesting to speak to RNgary call at:[phone],thanks.

## 2017-12-06 ENCOUNTER — TELEPHONE (OUTPATIENT)
Dept: GASTROENTEROLOGY | Facility: CLINIC | Age: 62
End: 2017-12-06

## 2017-12-06 DIAGNOSIS — Z12.11 SCREENING FOR COLON CANCER: Primary | ICD-10-CM

## 2017-12-06 NOTE — TELEPHONE ENCOUNTER
Scheduled for:  Colonoscopy [address]  Provider Name: Dr. Alonzo Castillo  Date:  FROM: 1/25/18              TO: 2-1-18  Location:  [facility]  Sedation:  IV  Time:  FROM 0800                    TO: 7:30am, arrival 6:30  Prep:  Suprep, mailed 12-6-17 with new date and time  Me

## 2017-12-14 ENCOUNTER — HOSPITAL ENCOUNTER (OUTPATIENT)
Dept: MAMMOGRAPHY | Age: 62
Discharge: HOME | End: 2017-12-14
Attending: FAMILY MEDICINE
Payer: MEDICARE

## 2017-12-14 DIAGNOSIS — Z12.39 BREAST CANCER SCREENING: ICD-10-CM

## 2017-12-14 PROCEDURE — 77067 SCR MAMMO BI INCL CAD: CPT | Performed by: FAMILY MEDICINE

## 2018-01-05 ENCOUNTER — HOSPITAL ENCOUNTER (OUTPATIENT)
Dept: ULTRASOUND IMAGING | Facility: HOSPITAL | Age: 63
Discharge: HOME | End: 2018-01-05
Attending: FAMILY MEDICINE
Payer: MEDICARE

## 2018-01-05 DIAGNOSIS — E03.9 ACQUIRED HYPOTHYROIDISM: ICD-10-CM

## 2018-01-05 DIAGNOSIS — E04.1 THYROID NODULE: ICD-10-CM

## 2018-01-05 PROCEDURE — 76536 US EXAM OF HEAD AND NECK: CPT | Performed by: FAMILY MEDICINE

## 2018-01-16 RX ORDER — OMEPRAZOLE 40 MG/1
CAPSULE, DELAYED RELEASE ORAL
Qty: 90 CAPSULE | Refills: 0 | Status: SHIPPED | OUTPATIENT
Start: 2018-01-16 | End: 2018-11-12

## 2018-01-17 NOTE — TELEPHONE ENCOUNTER
Gastrointestional Medication:  Refill Protocol Appointment Criteria  · Appointment scheduled in the past 6 months or in the next 3 months  Recent Outpatient Visits            2 months ago Well adult exam    [address], [address],

## 2018-01-23 ENCOUNTER — TELEPHONE (OUTPATIENT)
Dept: GASTROENTEROLOGY | Facility: CLINIC | Age: 63
End: 2018-01-23

## 2018-01-23 NOTE — TELEPHONE ENCOUNTER
Using 12/6 encounter, pt contacted. Given 6:30am arrival at [facility] on Feb 1, with directions to [facility] endoscopy. Pt has no further questions.

## 2018-02-01 ENCOUNTER — SURGERY (OUTPATIENT)
Age: 63
End: 2018-02-01

## 2018-02-01 ENCOUNTER — HOSPITAL ENCOUNTER (OUTPATIENT)
Facility: HOSPITAL | Age: 63
Setting detail: HOSPITAL OUTPATIENT SURGERY
Discharge: HOME | End: 2018-02-01
Attending: INTERNAL MEDICINE | Admitting: INTERNAL MEDICINE
Payer: MEDICARE

## 2018-02-01 DIAGNOSIS — Z12.11 COLON CANCER SCREENING: ICD-10-CM

## 2018-02-01 PROCEDURE — 0DBK8ZX EXCISION OF ASCENDING COLON, VIA NATURAL OR ARTIFICIAL OPENING ENDOSCOPIC, DIAGNOSTIC: ICD-10-PCS | Performed by: INTERNAL MEDICINE

## 2018-02-01 PROCEDURE — 45385 COLONOSCOPY W/LESION REMOVAL: CPT | Performed by: INTERNAL MEDICINE

## 2018-02-01 PROCEDURE — G0500 MOD SEDAT ENDO SERVICE >5YRS: HCPCS | Performed by: INTERNAL MEDICINE

## 2018-02-01 RX ORDER — SODIUM CHLORIDE, SODIUM LACTATE, POTASSIUM CHLORIDE, CALCIUM CHLORIDE 600; 310; 30; 20 MG/100ML; MG/100ML; MG/100ML; MG/100ML
INJECTION, SOLUTION INTRAVENOUS CONTINUOUS
Status: DISCONTINUED | OUTPATIENT
Start: 2018-02-01 | End: 2018-02-01

## 2018-02-01 RX ORDER — MIDAZOLAM HYDROCHLORIDE 1 MG/ML
1 INJECTION INTRAMUSCULAR; INTRAVENOUS EVERY 5 MIN PRN
Status: DISCONTINUED | OUTPATIENT
Start: 2018-02-01 | End: 2018-02-01

## 2018-02-01 RX ORDER — SODIUM CHLORIDE 0.9 % (FLUSH) 0.9 %
10 SYRINGE (ML) INJECTION AS NEEDED
Status: DISCONTINUED | OUTPATIENT
Start: 2018-02-01 | End: 2018-02-01

## 2018-02-01 RX ORDER — MIDAZOLAM HYDROCHLORIDE 1 MG/ML
INJECTION INTRAMUSCULAR; INTRAVENOUS
Status: DISCONTINUED | OUTPATIENT
Start: 2018-02-01 | End: 2018-02-01

## 2018-02-01 NOTE — H&P
History & Physical Examination    Patient Name: Andrés Thrasher  MRN: [id number]  [facility]: [id number]  YOB: 1955    Diagnosis: colon screening    Facility-Administered Medications Prior to Admission:  triamcinolone acetonide (KENALOG-10) 10 MG/ (VERSED) 2 MG/2ML injection 1 mg 1 mg Intravenous Q5 Min PRN   fentaNYL citrate (SUBLIMAZE) 0.05 MG/ML injection 25 mcg 25 mcg Intravenous Q5 Min PRN       Allergies:   Demerol [Meperidine]    Nausea and vomiting, Nausea only    Comment:Other reaction(s): the knee joint  Family History   Problem Relation Age of Onset   • Depression Mother    • Neurological Disorder Mother      Per NG: Hypothyrodism   • Arthritis Mother      Per NG: Osteoarthritis   • Other [OTHER] Mother    • Depression Maternal Grandfather

## 2018-02-01 NOTE — OPERATIVE REPORT
[facility] HOSP - [facility] Endoscopy Report      Preoperative Diagnosis:  - colon screening      Postoperative Diagnosis:  - colon polyp x 1  - internal hemorrhoids       Procedure:    Colonoscopy       Surgeon:  Barber Luis M.D.     Anesthesia:  Daniel Kato

## 2018-02-02 VITALS
BODY MASS INDEX: 30.82 KG/M2 | WEIGHT: 185 LBS | OXYGEN SATURATION: 100 % | SYSTOLIC BLOOD PRESSURE: 129 MMHG | HEIGHT: 65 IN | DIASTOLIC BLOOD PRESSURE: 78 MMHG | HEART RATE: 63 BPM | RESPIRATION RATE: 12 BRPM

## 2018-02-05 NOTE — PROGRESS NOTES
I wanted to get back to you with your colonoscopy results. You had one colon polyp removed which was benign. I would advise a repeat colonoscopy in 5 years to make sure no new polyps are forming. You also have internal hemorrhoids.   Please call with

## 2018-02-06 ENCOUNTER — PATIENT MESSAGE (OUTPATIENT)
Dept: FAMILY MEDICINE CLINIC | Facility: CLINIC | Age: 63
End: 2018-02-06

## 2018-02-06 ENCOUNTER — TELEPHONE (OUTPATIENT)
Dept: GASTROENTEROLOGY | Facility: CLINIC | Age: 63
End: 2018-02-06

## 2018-02-06 NOTE — TELEPHONE ENCOUNTER
Pt contacted and given results biopsy/polyps and recall from Dr Ramirez Tapia path report. Dr Rui James, pt states she got an email re CT of abd /pelvis. I cannot see that you or any other of our Dr's have ordered. Please advise. Thanks.      Pt confused, thinking

## 2018-02-06 NOTE — TELEPHONE ENCOUNTER
Spoke to patient explained that I cannot look at her MyChart gave her the phone number and advised to call and discuss issue .

## 2018-02-07 NOTE — TELEPHONE ENCOUNTER
GI, please see Pt's MyChart message. Pt is referring to pathology results. She can see Interpretation was noted as Abnormal, however, Dr. Patrick CokerClearKarma message states benign. Please call Pt and explain results further. Thanks.       Forwarded to GI    Dr. Belén Avila

## 2018-02-07 NOTE — TELEPHONE ENCOUNTER
The patient was contacted and results of the colonoscopy were reviewed. She had questions about a possible CAT scan, I do not know of any CT scan and this was not ordered by myself. I do not see any orders in the patient's chart for a CT scan.   I have ad

## 2018-02-08 NOTE — TELEPHONE ENCOUNTER
msg from Dr. Daniela Mesa noted. The pt seems confused as to the meaning of polyp, adenoma and benign. The pathologist always puts abnormal on adenomatous polyp reports - these polyps are not cancerous and considered \"benign\".   The 5 year recall is because of t

## 2018-02-08 NOTE — TELEPHONE ENCOUNTER
I contacted the pt this morning and all questions answered to her satisfaction. Explained the polyp /adenoma and need for 5 year recall. She understands and thanked me for clarifying. No need for RN to contact pt.

## 2018-02-22 ENCOUNTER — NURSE TRIAGE (OUTPATIENT)
Dept: OTHER | Age: 63
End: 2018-02-22

## 2018-02-22 NOTE — TELEPHONE ENCOUNTER
Action Requested: Summary for Provider     []  Critical Lab, Recommendations Needed  [x] Need Additional Advice  [x]   FYI    []   Need Orders  [] Need Medications Sent to Pharmacy  []  Other     SUMMARY: Note:  Had to use pediatric protocol as no adult pr

## 2018-02-27 ENCOUNTER — TELEPHONE (OUTPATIENT)
Dept: OTHER | Age: 63
End: 2018-02-27

## 2018-02-27 ENCOUNTER — OFFICE VISIT (OUTPATIENT)
Dept: FAMILY MEDICINE CLINIC | Facility: CLINIC | Age: 63
End: 2018-02-27

## 2018-02-27 ENCOUNTER — APPOINTMENT (OUTPATIENT)
Dept: LAB | Age: 63
End: 2018-02-27
Attending: FAMILY MEDICINE
Payer: MEDICARE

## 2018-02-27 VITALS
SYSTOLIC BLOOD PRESSURE: 119 MMHG | HEIGHT: 65 IN | BODY MASS INDEX: 31.2 KG/M2 | WEIGHT: 187.25 LBS | TEMPERATURE: 98 F | HEART RATE: 88 BPM | DIASTOLIC BLOOD PRESSURE: 79 MMHG

## 2018-02-27 DIAGNOSIS — E78.2 MIXED HYPERLIPIDEMIA: ICD-10-CM

## 2018-02-27 DIAGNOSIS — N64.4 BREAST PAIN, LEFT: Primary | ICD-10-CM

## 2018-02-27 DIAGNOSIS — E55.9 VITAMIN D DEFICIENCY: ICD-10-CM

## 2018-02-27 DIAGNOSIS — L65.9 HAIR LOSS: ICD-10-CM

## 2018-02-27 DIAGNOSIS — Z92.3 H/O RADIOACTIVE IODINE THYROID ABLATION: ICD-10-CM

## 2018-02-27 DIAGNOSIS — E03.9 ACQUIRED HYPOTHYROIDISM: ICD-10-CM

## 2018-02-27 LAB
ALBUMIN SERPL BCP-MCNC: 4 G/DL (ref 3.5–4.8)
ALBUMIN/GLOB SERPL: 1.3 {RATIO} (ref 1–2)
ALP SERPL-CCNC: 66 U/L (ref 32–100)
ALT SERPL-CCNC: 24 U/L (ref 14–54)
ANION GAP SERPL CALC-SCNC: 8 MMOL/L (ref 0–18)
AST SERPL-CCNC: 22 U/L (ref 15–41)
BILIRUB SERPL-MCNC: 0.5 MG/DL (ref 0.3–1.2)
BUN SERPL-MCNC: 11 MG/DL (ref 8–20)
BUN/CREAT SERPL: 14.1 (ref 10–20)
CALCIUM SERPL-MCNC: 9.3 MG/DL (ref 8.5–10.5)
CHLORIDE SERPL-SCNC: 99 MMOL/L (ref 95–110)
CHOLEST SERPL-MCNC: 193 MG/DL (ref 110–200)
CO2 SERPL-SCNC: 27 MMOL/L (ref 22–32)
CREAT SERPL-MCNC: 0.78 MG/DL (ref 0.5–1.5)
GLOBULIN PLAS-MCNC: 3.1 G/DL (ref 2.5–3.7)
GLUCOSE SERPL-MCNC: 107 MG/DL (ref 70–99)
HDLC SERPL-MCNC: 68 MG/DL
LDLC SERPL CALC-MCNC: 106 MG/DL (ref 0–99)
NONHDLC SERPL-MCNC: 125 MG/DL
OSMOLALITY UR CALC.SUM OF ELEC: 278 MOSM/KG (ref 275–295)
PATIENT FASTING: YES
POTASSIUM SERPL-SCNC: 3.9 MMOL/L (ref 3.3–5.1)
PROT SERPL-MCNC: 7.1 G/DL (ref 5.9–8.4)
SODIUM SERPL-SCNC: 134 MMOL/L (ref 136–144)
TRIGL SERPL-MCNC: 97 MG/DL (ref 1–149)
TSH SERPL-ACNC: 2.58 UIU/ML (ref 0.45–5.33)

## 2018-02-27 PROCEDURE — 82306 VITAMIN D 25 HYDROXY: CPT

## 2018-02-27 PROCEDURE — 80053 COMPREHEN METABOLIC PANEL: CPT

## 2018-02-27 PROCEDURE — G0463 HOSPITAL OUTPT CLINIC VISIT: HCPCS | Performed by: FAMILY MEDICINE

## 2018-02-27 PROCEDURE — 84443 ASSAY THYROID STIM HORMONE: CPT

## 2018-02-27 PROCEDURE — 99213 OFFICE O/P EST LOW 20 MIN: CPT | Performed by: FAMILY MEDICINE

## 2018-02-27 PROCEDURE — 36415 COLL VENOUS BLD VENIPUNCTURE: CPT

## 2018-02-27 PROCEDURE — 80061 LIPID PANEL: CPT

## 2018-02-27 RX ORDER — LORAZEPAM 1 MG/1
1 TABLET ORAL 2 TIMES DAILY
Refills: 1 | COMMUNITY
Start: 2018-02-25 | End: 2021-12-15

## 2018-02-27 RX ORDER — TEMAZEPAM 15 MG/1
30 CAPSULE ORAL
Refills: 1 | COMMUNITY
Start: 2018-02-25 | End: 2021-12-01

## 2018-02-27 NOTE — PROGRESS NOTES
HPI:    Patient ID: Clara Gonzalez is a 58year old female. HPI    Patient here with complains of having left breast pain for the last 1 month. She states that she thought nothing of it initially. She did have a normal mammogram in December 2017. Allergies:  Demerol [Meperidine]    Nausea and vomiting, Nausea only    Comment:Other reaction(s): Vomiting  Hydrocodone             Nausea and vomiting  Hydrocodone-Ibuprof*        Comment:Other reaction(s): Vomiting  Pregabalin                  Comme

## 2018-02-27 NOTE — TELEPHONE ENCOUNTER
Pt stts central scheduling states she advised she was not allowed to schedule her u/s, stts they told her she needs to see a specialist first.  Stts she was transferred to the ultrasound department.   They told her the u/s will not be beneficial.  Stts the

## 2018-02-28 LAB — 25(OH)D3 SERPL-MCNC: 63.7 NG/ML

## 2018-03-26 NOTE — TELEPHONE ENCOUNTER
Pt called to follow up on referral, states she hasn't received any information yet. Informed pt referral approved, mailed to pt at home address on file.  Pt also asked about referral requested for knee pain, that referral also mailed to pt per her request.

## 2018-04-09 ENCOUNTER — PATIENT MESSAGE (OUTPATIENT)
Dept: RHEUMATOLOGY | Facility: CLINIC | Age: 63
End: 2018-04-09

## 2018-04-10 NOTE — TELEPHONE ENCOUNTER
From: Albino Flores  To: Irene Majano MD  Sent: 4/9/2018 6:57 PM CDT  Subject: Other    Hello . I need an appointment regarding pain on my knees. The left one is worse . Thanks .  Chapo De La Cruz    Phone

## 2018-04-11 NOTE — PROGRESS NOTES
Deedee Thomas is a 20-year-old woman who I last saw May 18th of 2015, with diffuse myofascial pain syndrome, osteoarthritis, tennis elbow, chronic low back pain. Years ago, her left hip was injected for trochanteric bursitis, with success.   An injection of her ri for Pain. Disp:  Rfl:    Omeprazole 40 MG Oral Capsule Delayed Release Take 1 capsule by mouth daily. Disp:  Rfl:    Levothyroxine Sodium (SYNTHROID) 100 MCG Oral Tab Take 1 tablet by mouth daily.  Disp:  Rfl: 1   Desvenlafaxine Succinate ER (PRISTIQ) 100 M arthrocentesis of the knee joint       Social History   Marital Status:   Spouse Name: N/A    Years of Education: N/A  Number of Children: N/A     Occupational History  None on file     Social History Main Topics   Smoking status: Former Smoker adenopathy. Neurological: She is alert and oriented to person, place, and time. Skin: No rash noted. Psychiatric: She has a normal mood and affect. ASSESSMENT/PLAN:     1. Chronic diffuse myofascial pain. 2. Recurrent left knee pain.  Afte

## 2018-04-13 ENCOUNTER — OFFICE VISIT (OUTPATIENT)
Dept: RHEUMATOLOGY | Facility: CLINIC | Age: 63
End: 2018-04-13

## 2018-04-13 VITALS
WEIGHT: 192 LBS | BODY MASS INDEX: 31.99 KG/M2 | HEART RATE: 71 BPM | HEIGHT: 65 IN | DIASTOLIC BLOOD PRESSURE: 70 MMHG | SYSTOLIC BLOOD PRESSURE: 104 MMHG

## 2018-04-13 DIAGNOSIS — G89.29 CHRONIC PAIN OF BOTH KNEES: ICD-10-CM

## 2018-04-13 DIAGNOSIS — M15.9 PRIMARY OSTEOARTHRITIS INVOLVING MULTIPLE JOINTS: Primary | ICD-10-CM

## 2018-04-13 DIAGNOSIS — M25.562 CHRONIC PAIN OF BOTH KNEES: ICD-10-CM

## 2018-04-13 DIAGNOSIS — M25.561 CHRONIC PAIN OF BOTH KNEES: ICD-10-CM

## 2018-04-13 PROCEDURE — 20610 DRAIN/INJ JOINT/BURSA W/O US: CPT | Performed by: INTERNAL MEDICINE

## 2018-04-13 PROCEDURE — 99213 OFFICE O/P EST LOW 20 MIN: CPT | Performed by: INTERNAL MEDICINE

## 2018-04-13 RX ORDER — METHYLPREDNISOLONE ACETATE 40 MG/ML
40 INJECTION, SUSPENSION INTRA-ARTICULAR; INTRALESIONAL; INTRAMUSCULAR; SOFT TISSUE ONCE
Status: COMPLETED | OUTPATIENT
Start: 2018-04-13 | End: 2018-04-13

## 2018-04-13 RX ADMIN — METHYLPREDNISOLONE ACETATE 40 MG: 40 INJECTION, SUSPENSION INTRA-ARTICULAR; INTRALESIONAL; INTRAMUSCULAR; SOFT TISSUE at 13:31:00

## 2018-05-01 RX ORDER — LEVOTHYROXINE SODIUM 0.12 MG/1
TABLET ORAL
Qty: 90 TABLET | Refills: 0 | Status: SHIPPED | OUTPATIENT
Start: 2018-05-01 | End: 2018-06-05 | Stop reason: DRUGHIGH

## 2018-05-10 RX ORDER — CYCLOBENZAPRINE HCL 5 MG
TABLET ORAL
Qty: 30 TABLET | Refills: 2 | Status: SHIPPED | OUTPATIENT
Start: 2018-05-10 | End: 2018-11-12

## 2018-05-10 NOTE — TELEPHONE ENCOUNTER
Please review pended refill request as does not fall under a protocol.      Recent Outpatient Visits            3 weeks ago Primary osteoarthritis involving multiple joints    [facility] for Srinivas Laureano, Oren Alejandro, [place]

## 2018-05-21 ENCOUNTER — OFFICE VISIT (OUTPATIENT)
Dept: DERMATOLOGY CLINIC | Facility: CLINIC | Age: 63
End: 2018-05-21

## 2018-05-21 DIAGNOSIS — L65.8 FEMALE PATTERN ALOPECIA: Primary | ICD-10-CM

## 2018-05-21 PROCEDURE — G0463 HOSPITAL OUTPT CLINIC VISIT: HCPCS | Performed by: DERMATOLOGY

## 2018-05-21 PROCEDURE — 99202 OFFICE O/P NEW SF 15 MIN: CPT | Performed by: DERMATOLOGY

## 2018-05-21 NOTE — PROGRESS NOTES
HPI:     Chief Complaint     Derm Problem        HPI     Derm Problem    Additional comments: NEW PT here for hairloss in different spots on her scalp states that she had some thinning for the last year but at the end of last year she noticed bigger areas TABLET(125 MCG) BY MOUTH BEFORE BREAKFAST Disp: 90 tablet Rfl: 0   LORazepam 1 MG Oral Tab  Disp:  Rfl: 1   temazepam 15 MG Oral Cap  Disp:  Rfl: 1   OMEPRAZOLE 40 MG Oral Capsule Delayed Release TAKE 1 CAPSULE(40 MG) BY MOUTH DAILY Disp: 90 capsule Rfl: 0 DELIVERY ONLY  No date: CHOLECYSTECTOMY  10/2005: COLONOSCOPY      Comment: normal, non precancerous polyps  2/1/2018: COLONOSCOPY N/A      Comment: Procedure: COLONOSCOPY;  Surgeon: Farhana Mcbride MD;  Location: [address] [place] [address] ENDOSCOPY  03/2007: C and oriented and appears their stated age. They are well-nourished. Exam is remarkable for the following-  1. There is some diffuse part widening over the frontal scalp and crown. Small fringe of hair retaining frontal hairline.   2.  Hair more dense at

## 2018-06-04 ENCOUNTER — NURSE TRIAGE (OUTPATIENT)
Dept: OTHER | Age: 63
End: 2018-06-04

## 2018-06-04 NOTE — TELEPHONE ENCOUNTER
Action Requested: Summary for Provider     []  Critical Lab, Recommendations Needed  [x] Need Additional Advice  []   FYI    []   Need Orders  [] Need Medications Sent to Pharmacy  []  Other     SUMMARY: pt states her dentist told her today that her face w

## 2018-06-05 ENCOUNTER — PATIENT MESSAGE (OUTPATIENT)
Dept: FAMILY MEDICINE CLINIC | Facility: CLINIC | Age: 63
End: 2018-06-05

## 2018-06-05 ENCOUNTER — APPOINTMENT (OUTPATIENT)
Dept: LAB | Age: 63
End: 2018-06-05
Attending: FAMILY MEDICINE
Payer: MEDICARE

## 2018-06-05 ENCOUNTER — OFFICE VISIT (OUTPATIENT)
Dept: FAMILY MEDICINE CLINIC | Facility: CLINIC | Age: 63
End: 2018-06-05

## 2018-06-05 ENCOUNTER — LAB ENCOUNTER (OUTPATIENT)
Dept: LAB | Age: 63
End: 2018-06-05
Attending: FAMILY MEDICINE
Payer: MEDICARE

## 2018-06-05 ENCOUNTER — HOSPITAL ENCOUNTER (OUTPATIENT)
Dept: GENERAL RADIOLOGY | Age: 63
Discharge: HOME | End: 2018-06-05
Attending: FAMILY MEDICINE
Payer: MEDICARE

## 2018-06-05 VITALS
BODY MASS INDEX: 32.15 KG/M2 | HEIGHT: 65 IN | DIASTOLIC BLOOD PRESSURE: 68 MMHG | WEIGHT: 193 LBS | TEMPERATURE: 98 F | SYSTOLIC BLOOD PRESSURE: 101 MMHG | HEART RATE: 78 BPM

## 2018-06-05 DIAGNOSIS — L65.9 HAIR LOSS: ICD-10-CM

## 2018-06-05 DIAGNOSIS — R63.5 WEIGHT GAIN: ICD-10-CM

## 2018-06-05 DIAGNOSIS — R06.00 DYSPNEA ON EXERTION: ICD-10-CM

## 2018-06-05 DIAGNOSIS — E66.9 OBESITY (BMI 30.0-34.9): ICD-10-CM

## 2018-06-05 DIAGNOSIS — M79.18 MYOFACIAL MUSCLE PAIN: Primary | ICD-10-CM

## 2018-06-05 DIAGNOSIS — E03.9 ACQUIRED HYPOTHYROIDISM: ICD-10-CM

## 2018-06-05 PROCEDURE — 71046 X-RAY EXAM CHEST 2 VIEWS: CPT | Performed by: FAMILY MEDICINE

## 2018-06-05 PROCEDURE — 84439 ASSAY OF FREE THYROXINE: CPT

## 2018-06-05 PROCEDURE — 36415 COLL VENOUS BLD VENIPUNCTURE: CPT

## 2018-06-05 PROCEDURE — 82728 ASSAY OF FERRITIN: CPT

## 2018-06-05 PROCEDURE — 93010 ELECTROCARDIOGRAM REPORT: CPT | Performed by: FAMILY MEDICINE

## 2018-06-05 PROCEDURE — 85025 COMPLETE CBC W/AUTO DIFF WBC: CPT

## 2018-06-05 PROCEDURE — 99214 OFFICE O/P EST MOD 30 MIN: CPT | Performed by: FAMILY MEDICINE

## 2018-06-05 PROCEDURE — 83880 ASSAY OF NATRIURETIC PEPTIDE: CPT

## 2018-06-05 PROCEDURE — G0463 HOSPITAL OUTPT CLINIC VISIT: HCPCS | Performed by: FAMILY MEDICINE

## 2018-06-05 PROCEDURE — 80053 COMPREHEN METABOLIC PANEL: CPT

## 2018-06-05 PROCEDURE — 93005 ELECTROCARDIOGRAM TRACING: CPT

## 2018-06-05 PROCEDURE — 84480 ASSAY TRIIODOTHYRONINE (T3): CPT

## 2018-06-05 PROCEDURE — 84443 ASSAY THYROID STIM HORMONE: CPT

## 2018-06-05 RX ORDER — LEVOTHYROXINE SODIUM 112 UG/1
112 TABLET ORAL
Qty: 90 TABLET | Refills: 0 | Status: CANCELLED | OUTPATIENT
Start: 2018-06-05

## 2018-06-05 NOTE — PROGRESS NOTES
HPI:    Patient ID: Charity Hameed is a 58year old female.     HPI     Patient presents with:  Swelling: both feet and ankles X 1 month also states she was in the dentist yesterday and was told her face was swollen     Patient states she is here for foll temazepam 15 MG Oral Cap  Disp:  Rfl: 1   OMEPRAZOLE 40 MG Oral Capsule Delayed Release TAKE 1 CAPSULE(40 MG) BY MOUTH DAILY Disp: 90 capsule Rfl: 0   ERGOCALCIFEROL 54466 units Oral Cap TAKE 1 CAPSULE BY MOUTH EVERY WEEK Disp: 12 capsule Rfl: 3   Rosu[place] exercising at least 30-40 minutes 5-6 days a week. Avoid skipping meals. Making healthy choices for snacks and also limiting sugary beverages. 4. Dyspnea on exertion    - XR CHEST PA + LAT CHEST (CPT=71046); Future  - EKG 12-LEAD;  Future  - CBC WI

## 2018-06-05 NOTE — TELEPHONE ENCOUNTER
Make sure this was sent to Dr. Prudy Peabody. I have not seen her since 2013. I am not sure why this came into my in box.

## 2018-06-06 NOTE — TELEPHONE ENCOUNTER
From: Jeannie Simmons  To: Carol Cabrera MD  Sent: 6/5/2018 5:19 PM CDT  Subject: Other    Dr Sarah Handy , could you please sent me the thyroid medication , Farhan flying to [place] tomorrow night . [address] [place] [address] .  Thanks

## 2018-06-17 ENCOUNTER — PATIENT MESSAGE (OUTPATIENT)
Dept: FAMILY MEDICINE CLINIC | Facility: CLINIC | Age: 63
End: 2018-06-17

## 2018-06-17 DIAGNOSIS — M54.5 CHRONIC LOW BACK PAIN, UNSPECIFIED BACK PAIN LATERALITY, WITH SCIATICA PRESENCE UNSPECIFIED: ICD-10-CM

## 2018-06-17 DIAGNOSIS — M54.16 LUMBAR RADICULAR SYNDROME: Primary | ICD-10-CM

## 2018-06-17 DIAGNOSIS — G89.29 CHRONIC LOW BACK PAIN, UNSPECIFIED BACK PAIN LATERALITY, WITH SCIATICA PRESENCE UNSPECIFIED: ICD-10-CM

## 2018-06-19 ENCOUNTER — TELEPHONE (OUTPATIENT)
Dept: FAMILY MEDICINE CLINIC | Facility: CLINIC | Age: 63
End: 2018-06-19

## 2018-06-19 NOTE — TELEPHONE ENCOUNTER
Called pt but no answer wanted to inform her that her referral for physical therapy has been placed.  Wanted to ask her if she wants to pick it up or to be mailed out

## 2018-06-19 NOTE — TELEPHONE ENCOUNTER
From: [facility]  To: Ai Neri MD  Sent: 6/17/2018 4:46 PM CDT  Subject: Other    Dear Dr Pedro Brown . I came from [place] last Wednesday and Thursday morning I went to get up my back cracked and went off .  I have a severe pain and my body is shift

## 2018-06-19 NOTE — TELEPHONE ENCOUNTER
----- Message -----     From: Anthony Barragan Sent: 6/17/2018  4:46 PM CDT       To: Melany Gregory. MD aDmaris  Subject: Other    Dear Dr Dwight Landers . I came from [place] last Wednesday and Thursday morning I went to get up my back cracked and went off .  I have

## 2018-06-19 NOTE — TELEPHONE ENCOUNTER
Per pt, she would like the referral/Order for her Physical Therapy fax to [facility] Tel# [phone] Fax#[phone], Pls advise.

## 2018-06-23 RX ORDER — GABAPENTIN 800 MG/1
TABLET ORAL
Qty: 180 TABLET | Refills: 0 | Status: SHIPPED | OUTPATIENT
Start: 2018-06-23 | End: 2018-08-06

## 2018-06-23 NOTE — TELEPHONE ENCOUNTER
Refill Protocol Appointment Criteria  · Appointment scheduled in the past 6 months or in the next 3 months  Recent Outpatient Visits            5 days ago Primary cardiomyopathy [facility])    Davi Quinones MD    Office Visit

## 2018-06-27 ENCOUNTER — TELEPHONE (OUTPATIENT)
Dept: PAIN CLINIC | Facility: HOSPITAL | Age: 63
End: 2018-06-27

## 2018-06-27 ENCOUNTER — OFFICE VISIT (OUTPATIENT)
Dept: PAIN CLINIC | Facility: HOSPITAL | Age: 63
End: 2018-06-27
Attending: FAMILY MEDICINE
Payer: MEDICARE

## 2018-06-27 DIAGNOSIS — M51.16 LUMBAR DISC HERNIATION WITH RADICULOPATHY: ICD-10-CM

## 2018-06-27 DIAGNOSIS — M79.18 MYOFACIAL MUSCLE PAIN: ICD-10-CM

## 2018-06-27 DIAGNOSIS — M54.16 LUMBAR RADICULAR SYNDROME: ICD-10-CM

## 2018-06-27 DIAGNOSIS — M54.40 LOW BACK PAIN WITH SCIATICA, SCIATICA LATERALITY UNSPECIFIED, UNSPECIFIED BACK PAIN LATERALITY, UNSPECIFIED CHRONICITY: Primary | ICD-10-CM

## 2018-06-27 PROCEDURE — 99213 OFFICE O/P EST LOW 20 MIN: CPT | Performed by: NURSE PRACTITIONER

## 2018-06-27 PROCEDURE — 99211 OFF/OP EST MAY X REQ PHY/QHP: CPT | Performed by: NURSE PRACTITIONER

## 2018-06-27 PROCEDURE — 20552 NJX 1/MLT TRIGGER POINT 1/2: CPT | Performed by: NURSE PRACTITIONER

## 2018-06-27 RX ORDER — METHYLPREDNISOLONE ACETATE 40 MG/ML
40 INJECTION, SUSPENSION INTRA-ARTICULAR; INTRALESIONAL; INTRAMUSCULAR; SOFT TISSUE ONCE
Status: COMPLETED | OUTPATIENT
Start: 2018-06-27 | End: 2018-06-28

## 2018-06-27 RX ORDER — BUPIVACAINE HYDROCHLORIDE 2.5 MG/ML
10 INJECTION, SOLUTION EPIDURAL; INFILTRATION; INTRACAUDAL ONCE
Status: COMPLETED | OUTPATIENT
Start: 2018-06-27 | End: 2018-06-28

## 2018-06-27 NOTE — PROGRESS NOTES
Pt states her back has been hurting for a while again and she was taking ibuprofen but when she came home from [place] about two weeks ago she must have twisted funny and was in terrible pain. She is hoping to get another injections.  States her pain right now i

## 2018-06-27 NOTE — CHRONIC PAIN
[facility]  Pain Clinic  Follow Up Visit Report       Patient name: Dre Gallagher 58year old female  : 10/30/1955  MRN: [id number]  Referring MD: Huan Silver:  Complaining of low back pain radiating down to both buttock  DELIVERY ONLY  No date: CHOLECYSTECTOMY  10/2005: COLONOSCOPY      Comment: normal, non precancerous polyps  2018: COLONOSCOPY N/A      Comment: Procedure: COLONOSCOPY;  Surgeon: Gabriele Biggs MD;  Location: [facility] ENDOSCOPY  0 Outpatient Prescriptions:   •  GABAPENTIN 800 MG Oral Tab, TAKE 1 TABLET BY MOUTH TWICE DAILY, Disp: 180 tablet, Rfl: 0  •  carvedilol 3.125 MG Oral Tab, Take 1 tablet (3.125 mg total) by mouth 2 (two) times daily with meals. , Disp: 180 tablet, Rfl: 0  • obvious deformities noted   Nose: externally grossly within normal limits, no unusual discharge or rhinorrhea   Throat: lips grossly within normal limits by visual exam externally  Neck: supple, trachea midline, no obvious JVD  Chest: S1, S2, RRR, no obvio spent with counseling (nature of discussion centered around pain, therapy, and treatment options), face to face time, time spent reviewing data, obtaining patient information and discussing the care with the patients health care providers.        Procedure

## 2018-06-28 RX ADMIN — BUPIVACAINE HYDROCHLORIDE 10 ML: 2.5 INJECTION, SOLUTION EPIDURAL; INFILTRATION; INTRACAUDAL at 06:53:00

## 2018-06-28 RX ADMIN — METHYLPREDNISOLONE ACETATE 40 MG: 40 INJECTION, SUSPENSION INTRA-ARTICULAR; INTRALESIONAL; INTRAMUSCULAR; SOFT TISSUE at 06:54:00

## 2018-07-02 ENCOUNTER — DOCUMENTATION ONLY (OUTPATIENT)
Dept: PAIN CLINIC | Facility: HOSPITAL | Age: 63
End: 2018-07-02

## 2018-07-02 RX ORDER — ERGOCALCIFEROL 1.25 MG/1
CAPSULE ORAL
Qty: 12 CAPSULE | Refills: 0 | Status: SHIPPED | OUTPATIENT
Start: 2018-07-02 | End: 2018-12-29

## 2018-07-02 NOTE — TELEPHONE ENCOUNTER
Dr Juan Ramon Puckett to advise on pended vit d 50,000 units refill request. What is dose pt should be taking now?   Please reply to pool: EM RN TRIAGE      Refill Protocol Appointment Criteria  · Appointment scheduled in the past 6 months or in the next 3 months  Recent

## 2018-08-06 RX ORDER — GABAPENTIN 800 MG/1
TABLET ORAL
Qty: 180 TABLET | Refills: 0 | Status: SHIPPED | OUTPATIENT
Start: 2018-08-06 | End: 2019-03-15

## 2018-08-06 NOTE — TELEPHONE ENCOUNTER
rx refilled as per written protocol.   Refill Protocol Appointment Criteria  · Appointment scheduled in the past 6 months or in the next 3 months  Recent Outpatient Visits            1 month ago Low back pain with sciatica, sciatica laterality unspecified,

## 2018-08-15 ENCOUNTER — PATIENT MESSAGE (OUTPATIENT)
Dept: FAMILY MEDICINE CLINIC | Facility: CLINIC | Age: 63
End: 2018-08-15

## 2018-08-15 ENCOUNTER — TELEPHONE (OUTPATIENT)
Dept: PAIN CLINIC | Facility: HOSPITAL | Age: 63
End: 2018-08-15

## 2018-08-15 NOTE — TELEPHONE ENCOUNTER
Patient called stating that she would like to be seen because she has severe acute pain on chronic low back pain. She was traveling home from [place] and now notices a very painful \"bump 2 inches above my waist\".   She has been icing and taking ibupro

## 2018-08-16 ENCOUNTER — TELEPHONE (OUTPATIENT)
Dept: OTHER | Age: 63
End: 2018-08-16

## 2018-08-16 NOTE — TELEPHONE ENCOUNTER
----- Message from Girma Howard sent at 8/15/2018 12:14 PM CDT -----  Regarding: Other  Contact: [phone]  Dr Humaira Gonzalez , I need and order for thyroid blood .   Also I have a severe pain on my spine , about 2 inches above the waist. I have been calling

## 2018-08-16 NOTE — TELEPHONE ENCOUNTER
From: [facility]  To: Ai Neri MD  Sent: 8/15/2018 12:14 PM CDT  Subject: Other    Dr Aliana Garcia , I need and order for thyroid blood .   Also I have a severe pain on my spine , about 2 inches above the waist. I have been calling the pain clinic trying

## 2018-08-17 ENCOUNTER — OFFICE VISIT (OUTPATIENT)
Dept: PAIN CLINIC | Facility: HOSPITAL | Age: 63
End: 2018-08-17
Attending: ANESTHESIOLOGY
Payer: MEDICARE

## 2018-08-17 ENCOUNTER — DOCUMENTATION ONLY (OUTPATIENT)
Dept: PAIN CLINIC | Facility: HOSPITAL | Age: 63
End: 2018-08-17

## 2018-08-17 VITALS
HEIGHT: 65 IN | HEART RATE: 75 BPM | RESPIRATION RATE: 18 BRPM | WEIGHT: 199 LBS | DIASTOLIC BLOOD PRESSURE: 75 MMHG | SYSTOLIC BLOOD PRESSURE: 108 MMHG | BODY MASS INDEX: 33.15 KG/M2

## 2018-08-17 DIAGNOSIS — M54.5 CHRONIC LOW BACK PAIN, UNSPECIFIED BACK PAIN LATERALITY, WITH SCIATICA PRESENCE UNSPECIFIED: ICD-10-CM

## 2018-08-17 DIAGNOSIS — G89.29 CHRONIC LOW BACK PAIN, UNSPECIFIED BACK PAIN LATERALITY, WITH SCIATICA PRESENCE UNSPECIFIED: ICD-10-CM

## 2018-08-17 DIAGNOSIS — M51.16 LUMBAR DISC HERNIATION WITH RADICULOPATHY: Primary | ICD-10-CM

## 2018-08-17 DIAGNOSIS — M96.1 FAILED BACK SYNDROME OF LUMBAR SPINE: ICD-10-CM

## 2018-08-17 PROCEDURE — 99211 OFF/OP EST MAY X REQ PHY/QHP: CPT

## 2018-08-17 RX ORDER — MELOXICAM 15 MG/1
15 TABLET ORAL DAILY
Qty: 30 TABLET | Refills: 0 | Status: SHIPPED | OUTPATIENT
Start: 2018-08-17 | End: 2018-09-16

## 2018-08-17 RX ORDER — TRAMADOL HYDROCHLORIDE 50 MG/1
50 TABLET ORAL EVERY 6 HOURS PRN
Qty: 90 TABLET | Refills: 0 | Status: SHIPPED | OUTPATIENT
Start: 2018-08-17 | End: 2018-09-16

## 2018-08-17 NOTE — CHRONIC PAIN
[facility]  Pain Clinic  Follow Up Visit Report       Patient name: John Knight 58year old female  : 10/30/1955  MRN: [id number]  Referring MD:  Emmanuelle Slater MD    COMPLAINT:  Patient presents with:   Other: new area of back pain r APPENDECTOMY  2016: BACK SURGERY      Comment: lumbar L4-L5 fusion  1979:       Comment: 1  : CATARACT Right  : CATARACT Left  No date:  DELIVERY ONLY  No date: CHOLECYSTECTOMY  10/2005: COLONOSCOPY      Comment: normal, non [Hydro*    NAUSEA AND VOMITING, NAUSEA ONLY    Comment:Other reaction(s): Vomiting  Tramadol                NAUSEA ONLY  CURRENT MEDICATIONS:    Current Outpatient Prescriptions:   •  GABAPENTIN 800 MG Oral Tab, TAKE 1 TABLET BY MOUTH TWICE DAILY, Disp: 18 General: Alert and oriented x3, NAD, appears stated age, appropriate disposition and demeanor, answers questions appropriately   Head: normocephalic, atraumatic  Eyes: anicteric; no injection  Ears: no obvious deformities noted   Nose: externally grossly vs. Aggressive measures were discussed at length including pharmacotherapy (eg. Anti- inflammatories, muscle relaxants, neuropathic medications, oral steroids, analgesics), injections, and further testing.  Risks and benefits of all options were discussed a

## 2018-08-17 NOTE — TELEPHONE ENCOUNTER
Spoke with patient and informed her that there is a lab order in the system for the TSH.  Patient confirmed that Dr. Lizzie Che is aware of the pain and has referred her to follow-up with the pain specialist. Patient confirmed she has an appointment pending for t

## 2018-08-18 ENCOUNTER — HOSPITAL ENCOUNTER (OUTPATIENT)
Dept: CT IMAGING | Facility: HOSPITAL | Age: 63
Discharge: HOME | End: 2018-08-18
Attending: ANESTHESIOLOGY
Payer: COMMERCIAL

## 2018-08-18 DIAGNOSIS — G89.29 CHRONIC LOW BACK PAIN, UNSPECIFIED BACK PAIN LATERALITY, WITH SCIATICA PRESENCE UNSPECIFIED: ICD-10-CM

## 2018-08-18 DIAGNOSIS — M54.5 CHRONIC LOW BACK PAIN, UNSPECIFIED BACK PAIN LATERALITY, WITH SCIATICA PRESENCE UNSPECIFIED: ICD-10-CM

## 2018-08-18 PROCEDURE — 72131 CT LUMBAR SPINE W/O DYE: CPT | Performed by: ANESTHESIOLOGY

## 2018-08-22 ENCOUNTER — APPOINTMENT (OUTPATIENT)
Dept: LAB | Facility: HOSPITAL | Age: 63
End: 2018-08-22
Attending: FAMILY MEDICINE
Payer: MEDICARE

## 2018-08-22 DIAGNOSIS — E03.9 ACQUIRED HYPOTHYROIDISM: ICD-10-CM

## 2018-08-22 LAB — TSH SERPL-ACNC: 0.72 UIU/ML (ref 0.45–5.33)

## 2018-08-22 PROCEDURE — 84443 ASSAY THYROID STIM HORMONE: CPT

## 2018-08-22 PROCEDURE — 36415 COLL VENOUS BLD VENIPUNCTURE: CPT

## 2018-08-29 ENCOUNTER — TELEPHONE (OUTPATIENT)
Dept: PAIN CLINIC | Facility: HOSPITAL | Age: 63
End: 2018-08-29

## 2018-08-29 NOTE — TELEPHONE ENCOUNTER
Spoke with pt and advised that appt for procedure scheduled for 9/12 needs to be rescheduled  To 9/18/18. Pt agreeable to date and time change no further needs.

## 2018-09-26 ENCOUNTER — PATIENT MESSAGE (OUTPATIENT)
Dept: FAMILY MEDICINE CLINIC | Facility: CLINIC | Age: 63
End: 2018-09-26

## 2018-09-26 DIAGNOSIS — M51.26 LUMBAR HERNIATED DISC: Primary | ICD-10-CM

## 2018-09-27 ENCOUNTER — PATIENT MESSAGE (OUTPATIENT)
Dept: FAMILY MEDICINE CLINIC | Facility: CLINIC | Age: 63
End: 2018-09-27

## 2018-09-27 ENCOUNTER — TELEPHONE (OUTPATIENT)
Dept: OTHER | Age: 63
End: 2018-09-27

## 2018-09-27 DIAGNOSIS — M51.26 LUMBAR DISC HERNIATION: Primary | ICD-10-CM

## 2018-09-27 NOTE — TELEPHONE ENCOUNTER
Dr Ocampo=Referral pended for your approval,mpleases\check the diagnosis before signing.       Regarding: Other  Contact: [phone]  ----- Message from [employer] sent at 9/27/2018  9:28 AM CDT -----    Dr Case Leach, my appointment at the Pain Clinic is

## 2018-09-27 NOTE — TELEPHONE ENCOUNTER
From: Gurpreet Castillo  To: Mary Rodriguez MD  Sent: 9/26/2018 7:32 PM CDT  Subject: Other    Dr Chuy Thompson , now every time I get an epidural injection, I have to go back to the Pain Center for evaluation.  I had one epidural last week and I most like I have to g

## 2018-10-02 ENCOUNTER — OFFICE VISIT (OUTPATIENT)
Dept: PAIN CLINIC | Facility: HOSPITAL | Age: 63
End: 2018-10-02
Attending: ANESTHESIOLOGY
Payer: MEDICARE

## 2018-10-02 VITALS — BODY MASS INDEX: 29.49 KG/M2 | HEIGHT: 65 IN | WEIGHT: 177 LBS

## 2018-10-02 DIAGNOSIS — M96.1 FAILED BACK SYNDROME OF LUMBAR SPINE: Primary | ICD-10-CM

## 2018-10-02 PROCEDURE — 99211 OFF/OP EST MAY X REQ PHY/QHP: CPT

## 2018-10-02 NOTE — CHRONIC PAIN
[facility]  Pain Clinic  Follow Up Visit Report       Patient name: Rashaun Ruiz 58year old female  : 10/30/1955  MRN: [id number]  Referring MD:  Ana Luisa Foote. MD Damaris    COMPLAINT:  Patient presents with:   Follow - Up: POST LESI 18 date:  DELIVERY ONLY  No date: CHOLECYSTECTOMY  10/2005: COLONOSCOPY      Comment:  normal, non precancerous polyps  2018: COLONOSCOPY; N/A      Comment:  Procedure: COLONOSCOPY;  Surgeon: Patti Campo MD;                Location: [address] [place] [address] Comment:Other reaction(s): Stomach discomforts  Vicodin Tuss [[place]*    NAUSEA AND VOMITING, NAUSEA ONLY    Comment:Other reaction(s): Vomiting  Tramadol                NAUSEA ONLY  CURRENT MEDICATIONS:    Current Outpatient Medications:   •  Le 177 lb (80.3 kg)   BMI 29.45 kg/m²   General: Alert and oriented x3, NAD, appears stated age, appropriate disposition and demeanor, answers questions appropriately   Head: normocephalic, atraumatic  Eyes: anicteric; no injection  Ears: no obvious deformiti

## 2018-10-08 ENCOUNTER — DOCUMENTATION ONLY (OUTPATIENT)
Dept: PAIN CLINIC | Facility: HOSPITAL | Age: 63
End: 2018-10-08

## 2018-10-12 ENCOUNTER — IMMUNIZATION (OUTPATIENT)
Dept: FAMILY MEDICINE CLINIC | Facility: CLINIC | Age: 63
End: 2018-10-12
Payer: MEDICARE

## 2018-10-12 DIAGNOSIS — Z23 NEED FOR VACCINATION: ICD-10-CM

## 2018-10-12 PROCEDURE — G0008 ADMIN INFLUENZA VIRUS VAC: HCPCS | Performed by: FAMILY MEDICINE

## 2018-10-12 PROCEDURE — 90686 IIV4 VACC NO PRSV 0.5 ML IM: CPT | Performed by: FAMILY MEDICINE

## 2018-10-15 ENCOUNTER — DOCUMENTATION ONLY (OUTPATIENT)
Dept: PAIN CLINIC | Facility: HOSPITAL | Age: 63
End: 2018-10-15

## 2018-11-12 ENCOUNTER — TELEPHONE (OUTPATIENT)
Dept: OTHER | Age: 63
End: 2018-11-12

## 2018-11-12 ENCOUNTER — OFFICE VISIT (OUTPATIENT)
Dept: FAMILY MEDICINE CLINIC | Facility: CLINIC | Age: 63
End: 2018-11-12
Payer: MEDICARE

## 2018-11-12 VITALS
DIASTOLIC BLOOD PRESSURE: 75 MMHG | TEMPERATURE: 98 F | BODY MASS INDEX: 30.82 KG/M2 | HEIGHT: 65 IN | WEIGHT: 185 LBS | HEART RATE: 80 BPM | SYSTOLIC BLOOD PRESSURE: 110 MMHG

## 2018-11-12 DIAGNOSIS — M54.2 CHRONIC NECK AND BACK PAIN: Primary | ICD-10-CM

## 2018-11-12 DIAGNOSIS — F32.A ANXIETY AND DEPRESSION: ICD-10-CM

## 2018-11-12 DIAGNOSIS — M54.5 CHRONIC LOW BACK PAIN, UNSPECIFIED BACK PAIN LATERALITY, WITH SCIATICA PRESENCE UNSPECIFIED: ICD-10-CM

## 2018-11-12 DIAGNOSIS — Z01.419 ENCOUNTER FOR GYNECOLOGICAL EXAMINATION: ICD-10-CM

## 2018-11-12 DIAGNOSIS — E03.9 ACQUIRED HYPOTHYROIDISM: ICD-10-CM

## 2018-11-12 DIAGNOSIS — M51.16 LUMBAR DISC HERNIATION WITH RADICULOPATHY: ICD-10-CM

## 2018-11-12 DIAGNOSIS — Z00.00 ENCOUNTER FOR ANNUAL HEALTH EXAMINATION: ICD-10-CM

## 2018-11-12 DIAGNOSIS — Z00.00 MEDICARE ANNUAL WELLNESS VISIT, SUBSEQUENT: Primary | ICD-10-CM

## 2018-11-12 DIAGNOSIS — Z11.59 NEED FOR HEPATITIS C SCREENING TEST: ICD-10-CM

## 2018-11-12 DIAGNOSIS — Z01.411 ENCOUNTER FOR GYNECOLOGICAL EXAMINATION WITH ABNORMAL FINDING: ICD-10-CM

## 2018-11-12 DIAGNOSIS — N89.8 VAGINAL DISCHARGE: ICD-10-CM

## 2018-11-12 DIAGNOSIS — E78.49 OTHER HYPERLIPIDEMIA: ICD-10-CM

## 2018-11-12 DIAGNOSIS — G89.29 CHRONIC LOW BACK PAIN, UNSPECIFIED BACK PAIN LATERALITY, WITH SCIATICA PRESENCE UNSPECIFIED: ICD-10-CM

## 2018-11-12 DIAGNOSIS — E04.1 THYROID NODULE: ICD-10-CM

## 2018-11-12 DIAGNOSIS — F41.9 ANXIETY AND DEPRESSION: ICD-10-CM

## 2018-11-12 DIAGNOSIS — G89.29 CHRONIC NECK AND BACK PAIN: Primary | ICD-10-CM

## 2018-11-12 DIAGNOSIS — M54.9 CHRONIC NECK AND BACK PAIN: Primary | ICD-10-CM

## 2018-11-12 DIAGNOSIS — Z12.31 ENCOUNTER FOR SCREENING MAMMOGRAM FOR BREAST CANCER: ICD-10-CM

## 2018-11-12 PROCEDURE — G0439 PPPS, SUBSEQ VISIT: HCPCS | Performed by: FAMILY MEDICINE

## 2018-11-12 PROCEDURE — G0101 CA SCREEN;PELVIC/BREAST EXAM: HCPCS | Performed by: FAMILY MEDICINE

## 2018-11-12 RX ORDER — CYCLOBENZAPRINE HCL 5 MG
5 TABLET ORAL NIGHTLY PRN
Qty: 30 TABLET | Refills: 2 | Status: SHIPPED | OUTPATIENT
Start: 2018-11-12 | End: 2019-05-03

## 2018-11-12 RX ORDER — OMEPRAZOLE 40 MG/1
40 CAPSULE, DELAYED RELEASE ORAL DAILY
Qty: 90 CAPSULE | Refills: 1 | Status: SHIPPED | OUTPATIENT
Start: 2018-11-12 | End: 2020-01-06

## 2018-11-12 NOTE — PROGRESS NOTES
HPI:   Manny Sampson is a 61year old female who presents for a Medicare Subsequent Annual Wellness visit (Pt already had Initial Annual Wellness).       Her last annual assessment has been over 1 year: Annual Physical due on 11/02/2018     last pap 20 Family/surrogate (if present), and forms available to patient in AVS       She does NOT have a Power of  for [employer] on file in [place].    Advance care planning including the explanation and discussion of advance directives standard forms performe Lab Results   Component Value Date    AST 22 06/05/2018    ALT 18 06/05/2018    CA 9.6 06/05/2018    ALB 3.9 06/05/2018    TSH 0.72 08/22/2018    CREATSERUM 0.70 06/05/2018     (H) 06/05/2018        CBC  (most recent labs)   Lab Results   Componen (Right, ); cataract (Left, ); other surgical history (); other surgical history (); other surgical history; other surgical history; colonoscopy (10/2005);  (); cyst aspiration left (Left, 2007); [facility] needle localization w/ sp 185 lb (83.9 kg). Medicare Hearing Assessment  (Required for AWV/SWV)    Whispered Voice        Visual Acuity                           Physical Exam    Constitutional: She is oriented to person, place, and time. She appears well-developed.    HENT:   Ri ASSESSMENT AND OTHER RELEVANT CHRONIC CONDITIONS:   Lucero Delarosa is a 61year old female who presents for a Medicare Assessment.      PLAN SUMMARY:   Diagnoses and all orders for this visit:    Medicare annual wellness visit, subsequent    Encounter REFLEX TO FREE T4; Future    7. Thyroid nodule  Recheck thyroid us Jan 2019  - US THYROID (WBJ=55773); Future    8. Encounter for screening mammogram for breast cancer    - [facility] SCREENING BILAT (CPT=77067); Future    9.  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 Glaucoma Screening      Ophthalmology Visit Annually: Diabetics, FHx Glaucoma, AA>50, > 65 No flowsheet data found.     Bone Density Screening      Dexascan Every two years Last Dexa Scan:   XR DEXA BONE DENSITOMETRY (CPT=77080) 11/19 anticonvulsants.)    Potassium  Annually Potassium (mmol/L)   Date Value   06/05/2018 4.0     POTASSIUM (P) (mmol/L)   Date Value   08/10/2016 3.9    No flowsheet data found.     Creatinine  Annually Creatinine (mg/dL)   Date Value   06/05/2018 0.70     CRE

## 2018-11-12 NOTE — PATIENT INSTRUCTIONS
[facility] SCREENING SCHEDULE   Tests on this list are recommended by your physician but may not be covered, or covered at this frequency, by your insurer. Please check with your insurance carrier before scheduling to verify coverage.    CHUY Colorectal Cancer Screening  Covered up to Age 76     Colonoscopy Screen   Covered every 10 years- more often if abnormal Colonoscopy due on 02/01/2023 Update Health Maintenance if applicable    Flex Sigmoidoscopy Screen  Covered every 5 years No results any previous visit. Please get once after your 65th birthday    Pneumococcal 23 (Pneumovax)  Covered Once after 65 No orders found for this or any previous visit.  Please get once after your 65th birthday    Hepatitis B for Moderate/High Risk       No order

## 2018-11-26 ENCOUNTER — NURSE TRIAGE (OUTPATIENT)
Dept: FAMILY MEDICINE CLINIC | Facility: CLINIC | Age: 63
End: 2018-11-26

## 2018-11-26 NOTE — TELEPHONE ENCOUNTER
Action Requested: Summary for Provider     []  Critical Lab, Recommendations Needed  [x] Need Additional Advice  [x]   FYI    []   Need Orders  [] Need Medications Sent to Pharmacy  []  Other     SUMMARY: Kayce has pain left shoulder began on Saturday.

## 2018-11-27 ENCOUNTER — OFFICE VISIT (OUTPATIENT)
Dept: FAMILY MEDICINE CLINIC | Facility: CLINIC | Age: 63
End: 2018-11-27
Payer: MEDICARE

## 2018-11-27 VITALS
DIASTOLIC BLOOD PRESSURE: 66 MMHG | HEART RATE: 67 BPM | RESPIRATION RATE: 16 BRPM | SYSTOLIC BLOOD PRESSURE: 98 MMHG | BODY MASS INDEX: 30.66 KG/M2 | HEIGHT: 65 IN | WEIGHT: 184 LBS | TEMPERATURE: 98 F

## 2018-11-27 DIAGNOSIS — G89.29 CHRONIC LEFT SHOULDER PAIN: Primary | ICD-10-CM

## 2018-11-27 DIAGNOSIS — M25.512 CHRONIC LEFT SHOULDER PAIN: Primary | ICD-10-CM

## 2018-11-27 PROCEDURE — G0463 HOSPITAL OUTPT CLINIC VISIT: HCPCS | Performed by: PHYSICIAN ASSISTANT

## 2018-11-27 PROCEDURE — 99213 OFFICE O/P EST LOW 20 MIN: CPT | Performed by: PHYSICIAN ASSISTANT

## 2018-11-27 RX ORDER — METHYLPREDNISOLONE 4 MG/1
TABLET ORAL
Qty: 1 KIT | Refills: 0 | Status: SHIPPED | OUTPATIENT
Start: 2018-11-27 | End: 2019-02-18 | Stop reason: ALTCHOICE

## 2018-11-27 NOTE — PROGRESS NOTES
HPI:     HPI    61year-old female is here in the office complaining of chronic left shoulder pain, it is getting worse for 7 days. Patient has tried Ibuprofen 600 mg with mild relief. Patient has an appointment with Pain Management in 3 days.  Patient rate NAUSEA AND VOMITING, NAUSEA ONLY    Comment:Other reaction(s): Vomiting  Tramadol                NAUSEA ONLY    History:   Mammogram due on 12/14/2018  Annual Depression Screen due on 10/02/2019  Annual Physical due on 11/12/2019  Pap Smear,3 Years due on arthrocentesis of the knee joint       Family History:     Family History   Problem Relation Age of Onset   • Depression Mother    • Neurological Disorder Mother         Per NG: Hypothyrodism   • Arthritis Mother         Per NG: Osteoarthritis   • Other (O a farm: Not Asked        History of tanning: Not Asked        Outdoor occupation: Not Asked        Pt has a pacemaker: No        Pt has a defibrillator: No        Breast feeding: Not Asked        Reaction to local anesthetic: No    Social History Narrative in rotator cuff, biceps tendon, or acromioclavicular joint. Full ROM and strength in shoulder upon adduction, abduction, internal and external rotation. Left shoulder: decreased ROM, and tenderness when ROM.       Assessment and Plan[de-identified]     Problem List Ite

## 2018-11-30 ENCOUNTER — OFFICE VISIT (OUTPATIENT)
Dept: PAIN CLINIC | Facility: HOSPITAL | Age: 63
End: 2018-11-30
Attending: ANESTHESIOLOGY
Payer: MEDICARE

## 2018-11-30 ENCOUNTER — DOCUMENTATION ONLY (OUTPATIENT)
Dept: PAIN CLINIC | Facility: HOSPITAL | Age: 63
End: 2018-11-30

## 2018-11-30 VITALS — SYSTOLIC BLOOD PRESSURE: 107 MMHG | DIASTOLIC BLOOD PRESSURE: 72 MMHG | HEART RATE: 71 BPM

## 2018-11-30 DIAGNOSIS — M54.9 CHRONIC NECK AND BACK PAIN: ICD-10-CM

## 2018-11-30 DIAGNOSIS — M54.2 CHRONIC NECK AND BACK PAIN: ICD-10-CM

## 2018-11-30 DIAGNOSIS — M25.512 CHRONIC LEFT SHOULDER PAIN: Primary | ICD-10-CM

## 2018-11-30 DIAGNOSIS — G89.29 CHRONIC LEFT SHOULDER PAIN: Primary | ICD-10-CM

## 2018-11-30 DIAGNOSIS — G89.29 CHRONIC NECK AND BACK PAIN: ICD-10-CM

## 2018-11-30 DIAGNOSIS — M96.1 FAILED BACK SYNDROME OF LUMBAR SPINE: ICD-10-CM

## 2018-11-30 PROCEDURE — 99211 OFF/OP EST MAY X REQ PHY/QHP: CPT

## 2018-11-30 NOTE — PROGRESS NOTES
11/30/18: Med rene; pt had injection on 10/26 and pt stated \"is feeling 50% better than prior to injection\"

## 2018-12-03 ENCOUNTER — HOSPITAL ENCOUNTER (OUTPATIENT)
Dept: MRI IMAGING | Age: 63
Discharge: HOME | End: 2018-12-03
Attending: PHYSICIAN ASSISTANT
Payer: MEDICARE

## 2018-12-03 ENCOUNTER — HOSPITAL ENCOUNTER (OUTPATIENT)
Dept: MRI IMAGING | Age: 63
Discharge: HOME | End: 2018-12-03
Attending: ANESTHESIOLOGY
Payer: MEDICARE

## 2018-12-03 DIAGNOSIS — G89.29 CHRONIC LEFT SHOULDER PAIN: ICD-10-CM

## 2018-12-03 DIAGNOSIS — M25.512 CHRONIC LEFT SHOULDER PAIN: ICD-10-CM

## 2018-12-03 DIAGNOSIS — G89.29 CHRONIC NECK AND BACK PAIN: ICD-10-CM

## 2018-12-03 DIAGNOSIS — M54.2 CHRONIC NECK AND BACK PAIN: ICD-10-CM

## 2018-12-03 DIAGNOSIS — M54.9 CHRONIC NECK AND BACK PAIN: ICD-10-CM

## 2018-12-03 PROCEDURE — 72141 MRI NECK SPINE W/O DYE: CPT | Performed by: ANESTHESIOLOGY

## 2018-12-03 PROCEDURE — 73221 MRI JOINT UPR EXTREM W/O DYE: CPT | Performed by: PHYSICIAN ASSISTANT

## 2018-12-06 ENCOUNTER — TELEPHONE (OUTPATIENT)
Dept: PAIN CLINIC | Facility: HOSPITAL | Age: 63
End: 2018-12-06

## 2018-12-06 NOTE — TELEPHONE ENCOUNTER
12/6/18 @ 10:03am Frank Toussaint from [facility], [phone] Ref#[id number], Frank Toussaint called to inform that [facility] Referral #[id number] was approved for Epidural Injection for inner neck.

## 2018-12-07 ENCOUNTER — TELEPHONE (OUTPATIENT)
Dept: ORTHOPEDICS CLINIC | Facility: CLINIC | Age: 63
End: 2018-12-07

## 2018-12-07 NOTE — TELEPHONE ENCOUNTER
Call to patient about MRI of shoulder results. Offered to make an appointment with Dr. Evelyn Araiza. Pt refused states she has too much going on Will call back at a later time. Given phone number for call back.

## 2018-12-29 RX ORDER — ERGOCALCIFEROL 1.25 MG/1
CAPSULE ORAL
Qty: 12 CAPSULE | Refills: 0 | Status: SHIPPED | OUTPATIENT
Start: 2018-12-29 | End: 2019-03-15

## 2018-12-29 NOTE — TELEPHONE ENCOUNTER
AMA, pls confirm ok to decline vitD3 rx? Due for lab recheck or OTC supplement?         Notes Recorded by Emmanuelle Slater MD on 3/5/2018 at 8:54 PM CST  Labs including cholesterol good.  Thyroid is good.  Vitamin D level is excellent but would recommend decr

## 2019-01-07 ENCOUNTER — HOSPITAL ENCOUNTER (OUTPATIENT)
Dept: MAMMOGRAPHY | Age: 64
Discharge: HOME | End: 2019-01-07
Attending: FAMILY MEDICINE
Payer: MEDICARE

## 2019-01-07 DIAGNOSIS — Z12.31 ENCOUNTER FOR SCREENING MAMMOGRAM FOR BREAST CANCER: ICD-10-CM

## 2019-01-07 PROCEDURE — 77067 SCR MAMMO BI INCL CAD: CPT | Performed by: FAMILY MEDICINE

## 2019-01-07 PROCEDURE — 77063 BREAST TOMOSYNTHESIS BI: CPT | Performed by: FAMILY MEDICINE

## 2019-01-16 ENCOUNTER — OFFICE VISIT (OUTPATIENT)
Dept: PAIN CLINIC | Facility: HOSPITAL | Age: 64
End: 2019-01-16
Attending: ANESTHESIOLOGY
Payer: MEDICARE

## 2019-01-16 VITALS
SYSTOLIC BLOOD PRESSURE: 101 MMHG | RESPIRATION RATE: 18 BRPM | WEIGHT: 184 LBS | DIASTOLIC BLOOD PRESSURE: 68 MMHG | HEIGHT: 65 IN | BODY MASS INDEX: 30.66 KG/M2

## 2019-01-16 DIAGNOSIS — M96.1 FAILED BACK SYNDROME OF LUMBAR SPINE: Primary | ICD-10-CM

## 2019-01-16 DIAGNOSIS — M79.18 MYOFASCIAL PAIN ON RIGHT SIDE: ICD-10-CM

## 2019-01-16 DIAGNOSIS — M51.37 DDD (DEGENERATIVE DISC DISEASE), LUMBOSACRAL: ICD-10-CM

## 2019-01-16 PROCEDURE — 20552 NJX 1/MLT TRIGGER POINT 1/2: CPT

## 2019-01-16 PROCEDURE — 99213 OFFICE O/P EST LOW 20 MIN: CPT

## 2019-01-16 PROCEDURE — 99211 OFF/OP EST MAY X REQ PHY/QHP: CPT

## 2019-01-16 RX ORDER — BUPIVACAINE HYDROCHLORIDE 2.5 MG/ML
10 INJECTION, SOLUTION EPIDURAL; INFILTRATION; INTRACAUDAL ONCE
Status: ACTIVE | OUTPATIENT
Start: 2019-01-16

## 2019-01-16 RX ORDER — LIDOCAINE HYDROCHLORIDE 10 MG/ML
1 INJECTION, SOLUTION EPIDURAL; INFILTRATION; INTRACAUDAL; PERINEURAL ONCE
Status: ACTIVE | OUTPATIENT
Start: 2019-01-16

## 2019-01-16 RX ORDER — METHYLPREDNISOLONE ACETATE 40 MG/ML
40 INJECTION, SUSPENSION INTRA-ARTICULAR; INTRALESIONAL; INTRAMUSCULAR; SOFT TISSUE ONCE
Status: ACTIVE | OUTPATIENT
Start: 2019-01-16

## 2019-01-16 NOTE — CHRONIC PAIN
Follow-up Note    HISTORY OF PRESENT ILLNESS:  Halley Hanna is a 61year old old female returns to the clinic for follow up of her chronic low back pain.   Pt reports having a twisting injury at home around new years and began having intense pain in the Take 0.5 tablets (20 mg total) by mouth daily. Disp: 90 tablet Rfl: 1   LORazepam 1 MG Oral Tab  Disp:  Rfl: 1   temazepam 15 MG Oral Cap  Disp:  Rfl: 1   ibuprofen 600 MG Oral Tab Take 600 mg by mouth as needed.    Disp:  Rfl:    Desvenlafaxine Succinate E dysfunction 2007   • Musculoskeletal problem     Per NG: injection tendon origin/insertion   • Musculoskeletal problem     Per NG: Arthrocentesis of the left knee joint   • Osteoarthritis    • Radiation 10/23/2010    Per NG: Radiation to thyroid    • Statu level: Not on file    Social Needs      Financial resource strain: Not on file      Food insecurity - worry: Not on file      Food insecurity - inability: Not on file      Transportation needs - medical: Not on file      Transportation needs - non-medical: epidural at this time  - Will offer trigger point injections today  - Continue Gabapentin 800mg po BID     Pt will return to clinic for followup in 4 -6 weeks.     Trigger point Injection  After informed consent was obtained, area of the trigger point ident

## 2019-01-21 NOTE — TELEPHONE ENCOUNTER
Please let the patient know that thyroid labs are normal.   She should c/w same thyroid medication.    We will wait to hear from her once she speaks with her psychiatrist about the weight loss medication ( Phentermine - topiramate)    Thx. room air

## 2019-01-29 ENCOUNTER — NURSE TRIAGE (OUTPATIENT)
Dept: OTHER | Age: 64
End: 2019-01-29

## 2019-01-29 RX ORDER — AZITHROMYCIN 250 MG/1
TABLET, FILM COATED ORAL
Qty: 6 TABLET | Refills: 0 | Status: SHIPPED | OUTPATIENT
Start: 2019-01-29 | End: 2019-02-18 | Stop reason: ALTCHOICE

## 2019-01-29 NOTE — TELEPHONE ENCOUNTER
Please inform patient Z-Joseph sent in. Push fluids. If no better should follow-up in the office when she returns.

## 2019-01-29 NOTE — TELEPHONE ENCOUNTER
Action Requested: Summary for Provider     []  Critical Lab, Recommendations Needed  [x] Need Additional Advice  []   FYI    []   Need Orders  [x] Need Medications Sent to Pharmacy  []  Other     SUMMARY: Patient calling with complaint of cough, sinus pain

## 2019-02-05 ENCOUNTER — TELEPHONE (OUTPATIENT)
Dept: FAMILY MEDICINE CLINIC | Facility: CLINIC | Age: 64
End: 2019-02-05

## 2019-02-05 NOTE — TELEPHONE ENCOUNTER
Dr Cole Curry, please advise. Patient in [place], last week called about sinus issue, finished antibiotic Saturday.  Now, has dry cough with little wheezing, feels chest congestion, little chest tightness, short of breath on stairs and little at rest. Still

## 2019-02-05 NOTE — TELEPHONE ENCOUNTER
Spoke with patient and advised Dr Gina Stewart note and verbalized understanding.       Would recommend going to UC there  May need chest xray

## 2019-02-18 ENCOUNTER — OFFICE VISIT (OUTPATIENT)
Dept: FAMILY MEDICINE CLINIC | Facility: CLINIC | Age: 64
End: 2019-02-18
Payer: MEDICARE

## 2019-02-18 VITALS
OXYGEN SATURATION: 95 % | SYSTOLIC BLOOD PRESSURE: 111 MMHG | TEMPERATURE: 98 F | BODY MASS INDEX: 31 KG/M2 | DIASTOLIC BLOOD PRESSURE: 76 MMHG | HEART RATE: 80 BPM | HEIGHT: 65 IN

## 2019-02-18 DIAGNOSIS — R05.9 COUGH: Primary | ICD-10-CM

## 2019-02-18 DIAGNOSIS — M51.16 LUMBAR DISC HERNIATION WITH RADICULOPATHY: ICD-10-CM

## 2019-02-18 DIAGNOSIS — J01.01 ACUTE RECURRENT MAXILLARY SINUSITIS: ICD-10-CM

## 2019-02-18 PROCEDURE — G0463 HOSPITAL OUTPT CLINIC VISIT: HCPCS | Performed by: FAMILY MEDICINE

## 2019-02-18 PROCEDURE — 99214 OFFICE O/P EST MOD 30 MIN: CPT | Performed by: FAMILY MEDICINE

## 2019-02-18 RX ORDER — FLUTICASONE PROPIONATE 50 MCG
1 SPRAY, SUSPENSION (ML) NASAL 2 TIMES DAILY
Qty: 1 BOTTLE | Refills: 0 | Status: SHIPPED | OUTPATIENT
Start: 2019-02-18 | End: 2019-10-10

## 2019-02-18 RX ORDER — CLARITHROMYCIN 500 MG/1
500 TABLET, COATED ORAL 2 TIMES DAILY
Qty: 28 TABLET | Refills: 0 | Status: SHIPPED | OUTPATIENT
Start: 2019-02-18 | End: 2019-03-04

## 2019-02-18 NOTE — PROGRESS NOTES
HPI:    Patient ID: Clara Gonzalez is a 61year old female. HPI  Patient presents with:  Chest Congestion  Cough: X 3 weeks   Hoarseness    Started with symptoms about 3 1/2 weeks ago  Had cough, sinus pain/ pressure.   Had left ear pain and pressure i Take 0.5 tablets (20 mg total) by mouth daily. Disp: 90 tablet Rfl: 1   LORazepam 1 MG Oral Tab  Disp:  Rfl: 1   temazepam 15 MG Oral Cap  Disp:  Rfl: 1   Desvenlafaxine Succinate ER (PRISTIQ) 100 MG Oral Tablet 24 Hr Take 100 mg by mouth daily.    Disp:  R tablet (500 mg total) by mouth 2 (two) times daily for 14 days. • Fluticasone Propionate 50 MCG/ACT Nasal Suspension 1 Bottle 0     Si spray by Each Nare route 2 (two) times daily.        Imaging & Referrals:  ORTHOPEDIC - INTERNAL       YN#[id number]

## 2019-03-16 RX ORDER — ERGOCALCIFEROL 1.25 MG/1
CAPSULE ORAL
Qty: 12 CAPSULE | Refills: 0 | Status: SHIPPED | OUTPATIENT
Start: 2019-03-16 | End: 2019-06-05

## 2019-03-16 RX ORDER — GABAPENTIN 800 MG/1
TABLET ORAL
Qty: 180 TABLET | Refills: 0 | Status: SHIPPED | OUTPATIENT
Start: 2019-03-16 | End: 2019-06-13

## 2019-03-16 NOTE — TELEPHONE ENCOUNTER
Review pended refill request as it does not fall under a protocol.     Last Rx: 12-19-18  LOV: 2-18-19    Refill Protocol Appointment Criteria  · Appointment scheduled in the past 6 months or in the next 3 months  Recent Outpatient Visits            3 weeks

## 2019-03-21 ENCOUNTER — HOSPITAL ENCOUNTER (OUTPATIENT)
Dept: ULTRASOUND IMAGING | Age: 64
Discharge: HOME | End: 2019-03-21
Attending: FAMILY MEDICINE
Payer: MEDICARE

## 2019-03-21 DIAGNOSIS — E04.1 THYROID NODULE: ICD-10-CM

## 2019-03-21 PROCEDURE — 76536 US EXAM OF HEAD AND NECK: CPT | Performed by: FAMILY MEDICINE

## 2019-04-19 ENCOUNTER — OFFICE VISIT (OUTPATIENT)
Dept: PAIN CLINIC | Facility: HOSPITAL | Age: 64
End: 2019-04-19
Attending: ANESTHESIOLOGY
Payer: MEDICARE

## 2019-04-19 ENCOUNTER — DOCUMENTATION ONLY (OUTPATIENT)
Dept: PAIN CLINIC | Facility: HOSPITAL | Age: 64
End: 2019-04-19

## 2019-04-19 VITALS
DIASTOLIC BLOOD PRESSURE: 62 MMHG | WEIGHT: 184 LBS | HEIGHT: 65 IN | SYSTOLIC BLOOD PRESSURE: 104 MMHG | RESPIRATION RATE: 18 BRPM | HEART RATE: 64 BPM | BODY MASS INDEX: 30.66 KG/M2

## 2019-04-19 DIAGNOSIS — M50.30 DDD (DEGENERATIVE DISC DISEASE), CERVICAL: ICD-10-CM

## 2019-04-19 DIAGNOSIS — G89.29 CHRONIC NECK AND BACK PAIN: ICD-10-CM

## 2019-04-19 DIAGNOSIS — M96.1 FAILED BACK SYNDROME OF LUMBAR SPINE: ICD-10-CM

## 2019-04-19 DIAGNOSIS — M54.9 CHRONIC NECK AND BACK PAIN: ICD-10-CM

## 2019-04-19 DIAGNOSIS — M79.18 MYALGIA OF MUSCLE OF NECK: Primary | ICD-10-CM

## 2019-04-19 DIAGNOSIS — M54.2 CHRONIC NECK AND BACK PAIN: ICD-10-CM

## 2019-04-19 PROCEDURE — 99211 OFF/OP EST MAY X REQ PHY/QHP: CPT

## 2019-04-19 PROCEDURE — 20552 NJX 1/MLT TRIGGER POINT 1/2: CPT

## 2019-04-19 PROCEDURE — 99213 OFFICE O/P EST LOW 20 MIN: CPT

## 2019-04-19 RX ORDER — TRAMADOL HYDROCHLORIDE 50 MG/1
50 TABLET ORAL EVERY 8 HOURS PRN
COMMUNITY
End: 2019-04-19

## 2019-04-19 RX ORDER — METHYLPREDNISOLONE ACETATE 40 MG/ML
40 INJECTION, SUSPENSION INTRA-ARTICULAR; INTRALESIONAL; INTRAMUSCULAR; SOFT TISSUE ONCE
Status: ACTIVE | OUTPATIENT
Start: 2019-04-19

## 2019-04-19 RX ORDER — TRAMADOL HYDROCHLORIDE 50 MG/1
50 TABLET ORAL EVERY 8 HOURS PRN
Qty: 90 TABLET | Refills: 2 | Status: SHIPPED | OUTPATIENT
Start: 2019-04-19 | End: 2019-05-19

## 2019-04-19 RX ORDER — BUPIVACAINE HYDROCHLORIDE 2.5 MG/ML
10 INJECTION, SOLUTION EPIDURAL; INFILTRATION; INTRACAUDAL ONCE
Status: ACTIVE | OUTPATIENT
Start: 2019-04-19

## 2019-04-19 NOTE — PROGRESS NOTES
Submitted PA to [facility] for [address] cervical epidural.  Ref #[id number]. Patient is scheduled for 4-29 with Dr Vandana Carranza. Awaiting response.

## 2019-04-19 NOTE — CHRONIC PAIN
[facility]  Pain Clinic  Follow Up Visit Report       Patient name: Carmine Washburn 61year old female  : 10/30/1955  MRN: [id number]  Referring MD: Ppai Kenny MD    COMPLAINT:  Patient presents with:   Follow - Up: neck & back pain NG:arrow angle glaucoma OD; PI Laser OU   • OTHER SURGICAL HISTORY  1997    Per NG: arrow angle glaucoma OU; PI Laser OS   • OTHER SURGICAL HISTORY      Per NG: Arthrocentesis   • OTHER SURGICAL HISTORY      Per NG: arthrocentesis of the knee joint     FAM times daily. , Disp: 1 Bottle, Rfl: 0  •  Cyclobenzaprine HCl 5 MG Oral Tab, Take 1 tablet (5 mg total) by mouth nightly as needed for Muscle spasms. , Disp: 30 tablet, Rfl: 2  •  Omeprazole 40 MG Oral Capsule Delayed Release, Take 1 capsule (40 mg total) by normal limits by visual exam externally  Neck: supple, trachea midline, no obvious JVD  Chest: S1, S2, RRR, no obvious visual deformity  Respiratory: CTAB  Abdomen: soft, non-tender, bowel sounds present,   Gait: Walking without limping  Spine: Cervical ky of Depo-Medrol were used. Tolerated procedure well post procedure patient had significant improvement in pain. Postprocedure instructions were given.

## 2019-04-26 ENCOUNTER — DOCUMENTATION ONLY (OUTPATIENT)
Dept: PAIN CLINIC | Facility: HOSPITAL | Age: 64
End: 2019-04-26

## 2019-04-26 NOTE — PROGRESS NOTES
Pt left message stating she would like to Cx her procedure for 04/29/19. Pt states she got trigger points at her last office visit and is feeling much better. Pt will call us if she feels like she needs an injection.  This writer advised the surgery center

## 2019-05-03 ENCOUNTER — APPOINTMENT (OUTPATIENT)
Dept: LAB | Age: 64
End: 2019-05-03
Attending: FAMILY MEDICINE
Payer: MEDICARE

## 2019-05-03 DIAGNOSIS — E78.49 OTHER HYPERLIPIDEMIA: ICD-10-CM

## 2019-05-03 DIAGNOSIS — Z11.59 NEED FOR HEPATITIS C SCREENING TEST: ICD-10-CM

## 2019-05-03 DIAGNOSIS — E03.9 ACQUIRED HYPOTHYROIDISM: ICD-10-CM

## 2019-05-03 PROCEDURE — 84443 ASSAY THYROID STIM HORMONE: CPT

## 2019-05-03 PROCEDURE — 80053 COMPREHEN METABOLIC PANEL: CPT

## 2019-05-03 PROCEDURE — 80061 LIPID PANEL: CPT

## 2019-05-03 PROCEDURE — 36415 COLL VENOUS BLD VENIPUNCTURE: CPT

## 2019-05-03 PROCEDURE — 86803 HEPATITIS C AB TEST: CPT

## 2019-05-04 RX ORDER — CYCLOBENZAPRINE HCL 5 MG
TABLET ORAL
Qty: 30 TABLET | Refills: 0 | Status: SHIPPED | OUTPATIENT
Start: 2019-05-04 | End: 2019-06-05

## 2019-05-04 NOTE — TELEPHONE ENCOUNTER
Review pended refill request as it does not fall under a protocol.     Requested Prescriptions     Pending Prescriptions Disp Refills   • CYCLOBENZAPRINE HCL 5 MG Oral Tab [Pharmacy Med Name: CYCLOBENZAPRINE 5MG TABLETS] 30 tablet 0     Sig: TAKE 1 TABLET(5

## 2019-05-06 ENCOUNTER — OFFICE VISIT (OUTPATIENT)
Dept: FAMILY MEDICINE CLINIC | Facility: CLINIC | Age: 64
End: 2019-05-06
Payer: MEDICARE

## 2019-05-06 VITALS
DIASTOLIC BLOOD PRESSURE: 72 MMHG | HEIGHT: 65 IN | TEMPERATURE: 98 F | SYSTOLIC BLOOD PRESSURE: 111 MMHG | BODY MASS INDEX: 33.66 KG/M2 | HEART RATE: 89 BPM | WEIGHT: 202 LBS

## 2019-05-06 DIAGNOSIS — E66.9 OBESITY (BMI 30.0-34.9): ICD-10-CM

## 2019-05-06 DIAGNOSIS — E78.00 HYPERCHOLESTEREMIA: ICD-10-CM

## 2019-05-06 DIAGNOSIS — R22.1 MASS OF LEFT SIDE OF NECK: Primary | ICD-10-CM

## 2019-05-06 PROCEDURE — G0463 HOSPITAL OUTPT CLINIC VISIT: HCPCS | Performed by: FAMILY MEDICINE

## 2019-05-06 PROCEDURE — 99214 OFFICE O/P EST MOD 30 MIN: CPT | Performed by: FAMILY MEDICINE

## 2019-05-06 RX ORDER — ROSUVASTATIN CALCIUM 20 MG/1
20 TABLET, COATED ORAL NIGHTLY
Qty: 90 TABLET | Refills: 1 | Status: SHIPPED | OUTPATIENT
Start: 2019-05-06 | End: 2019-10-21

## 2019-05-06 NOTE — PROGRESS NOTES
HPI:    Patient ID: Sadie Jeong is a 61year old female.     HPI  Patient presents with:  Lump: on left side of neck X 10 years ago started to hurt 2 months ago  comes and goes    She registered for weight watchers today due to weight gain  Will start TABLET(3.125 MG) BY MOUTH TWICE DAILY WITH MEALS Disp: 180 tablet Rfl: 3   Levothyroxine Sodium 112 MCG Oral Tab Take 1 tablet (112 mcg total) by mouth before breakfast. Disp: 90 tablet Rfl: 3   LORazepam 1 MG Oral Tab  Disp:  Rfl: 1   temazepam 15 MG Oral in half so we will prescribe 20 mg to be taken daily. Recheck cholesterol in 3 months. 3. Obesity  Starting weight watchers      No orders of the defined types were placed in this encounter.       Meds This Visit:  Requested Prescriptions     Signed Pre

## 2019-06-04 ENCOUNTER — OFFICE VISIT (OUTPATIENT)
Dept: FAMILY MEDICINE CLINIC | Facility: CLINIC | Age: 64
End: 2019-06-04
Payer: MEDICARE

## 2019-06-04 VITALS
SYSTOLIC BLOOD PRESSURE: 97 MMHG | HEART RATE: 82 BPM | TEMPERATURE: 98 F | WEIGHT: 190 LBS | OXYGEN SATURATION: 95 % | HEIGHT: 65 IN | BODY MASS INDEX: 31.65 KG/M2 | DIASTOLIC BLOOD PRESSURE: 65 MMHG

## 2019-06-04 DIAGNOSIS — J02.9 SORE THROAT: Primary | ICD-10-CM

## 2019-06-04 DIAGNOSIS — R05.9 COUGH: ICD-10-CM

## 2019-06-04 DIAGNOSIS — H92.02 OTALGIA, LEFT: ICD-10-CM

## 2019-06-04 PROCEDURE — 99213 OFFICE O/P EST LOW 20 MIN: CPT | Performed by: PHYSICIAN ASSISTANT

## 2019-06-04 PROCEDURE — G0463 HOSPITAL OUTPT CLINIC VISIT: HCPCS | Performed by: PHYSICIAN ASSISTANT

## 2019-06-04 RX ORDER — AZITHROMYCIN 250 MG/1
TABLET, FILM COATED ORAL
Qty: 6 TABLET | Refills: 0 | Status: SHIPPED | OUTPATIENT
Start: 2019-06-04 | End: 2019-08-26 | Stop reason: ALTCHOICE

## 2019-06-04 NOTE — PROGRESS NOTES
HPI:    Patient ID: Lucero Delarosa is a 61year old female. Patient presents for sore throat times 3 days. She has lost her voice. She also complains of cough, chest congestion, body aches/chills and left ear pain.  Slight sinus pressure and congestion Sodium 112 MCG Oral Tab Take 1 tablet (112 mcg total) by mouth before breakfast. Disp: 90 tablet Rfl: 3   LORazepam 1 MG Oral Tab  Disp:  Rfl: 1   temazepam 15 MG Oral Cap  Disp:  Rfl: 1   Desvenlafaxine Succinate ER (PRISTIQ) 100 MG Oral Tablet 24 Hr Take Skin: Skin is warm and dry. Psychiatric: She has a normal mood and affect. Her behavior is normal. Judgment and thought content normal.              ASSESSMENT/PLAN:   1.  Sore throat  -After discussion with patient, started pt on Z-pack.  -Educated pt

## 2019-06-04 NOTE — PATIENT INSTRUCTIONS
Z-pack  Take 2 tablets together today, then 1 tablet for the next 4 days. You take it for 5 days, but it lasts in your system for 10 days. Hydrate  Steam  Mucinex  Salt gargles. Take warm water, and put as much salt as you can tolerate in it.  Gargle 3-

## 2019-06-06 RX ORDER — ERGOCALCIFEROL 1.25 MG/1
CAPSULE ORAL
Qty: 12 CAPSULE | Refills: 1 | Status: SHIPPED | OUTPATIENT
Start: 2019-06-06 | End: 2019-12-06

## 2019-06-06 RX ORDER — CYCLOBENZAPRINE HCL 5 MG
TABLET ORAL
Qty: 90 TABLET | Refills: 1 | Status: SHIPPED | OUTPATIENT
Start: 2019-06-06 | End: 2021-02-15

## 2019-06-06 NOTE — TELEPHONE ENCOUNTER
Review pended refill request as it does not fall under a protocol.   Requested Prescriptions     Pending Prescriptions Disp Refills   • ERGOCALCIFEROL 94946 units Oral Cap [Pharmacy Med Name: VITAMIN D2 50,000IU (ERGO) CAP RX] 12 capsule 0     Sig: TAKE 1 C

## 2019-06-14 RX ORDER — GABAPENTIN 800 MG/1
TABLET ORAL
Qty: 180 TABLET | Refills: 1 | Status: SHIPPED | OUTPATIENT
Start: 2019-06-14 | End: 2019-09-11

## 2019-08-26 ENCOUNTER — OFFICE VISIT (OUTPATIENT)
Dept: FAMILY MEDICINE CLINIC | Facility: CLINIC | Age: 64
End: 2019-08-26
Payer: MEDICARE

## 2019-08-26 VITALS
SYSTOLIC BLOOD PRESSURE: 115 MMHG | BODY MASS INDEX: 31.65 KG/M2 | TEMPERATURE: 98 F | HEART RATE: 74 BPM | HEIGHT: 65 IN | DIASTOLIC BLOOD PRESSURE: 79 MMHG | WEIGHT: 190 LBS

## 2019-08-26 DIAGNOSIS — E03.9 ACQUIRED HYPOTHYROIDISM: ICD-10-CM

## 2019-08-26 DIAGNOSIS — R10.13 EPIGASTRIC PAIN: ICD-10-CM

## 2019-08-26 DIAGNOSIS — L30.9 ECZEMA, UNSPECIFIED TYPE: ICD-10-CM

## 2019-08-26 DIAGNOSIS — R11.0 NAUSEA: Primary | ICD-10-CM

## 2019-08-26 PROCEDURE — G0463 HOSPITAL OUTPT CLINIC VISIT: HCPCS | Performed by: FAMILY MEDICINE

## 2019-08-26 PROCEDURE — 99214 OFFICE O/P EST MOD 30 MIN: CPT | Performed by: FAMILY MEDICINE

## 2019-08-26 RX ORDER — LEVOTHYROXINE SODIUM 112 UG/1
112 TABLET ORAL
Qty: 90 TABLET | Refills: 3 | Status: SHIPPED | OUTPATIENT
Start: 2019-08-26 | End: 2020-08-24

## 2019-08-26 RX ORDER — CURCUMIN 100 %
POWDER (GRAM) MISCELLANEOUS
COMMUNITY

## 2019-08-26 RX ORDER — TRAMADOL HYDROCHLORIDE 50 MG/1
TABLET ORAL
Refills: 2 | COMMUNITY
Start: 2019-06-13 | End: 2019-11-07

## 2019-08-26 RX ORDER — ONDANSETRON 4 MG/1
4 TABLET, FILM COATED ORAL EVERY 8 HOURS PRN
Qty: 30 TABLET | Refills: 0 | Status: SHIPPED | OUTPATIENT
Start: 2019-08-26 | End: 2021-06-16

## 2019-08-26 NOTE — PROGRESS NOTES
HPI:    Patient ID: Sinan Adamson is a 61year old female.     HPI  Patient presents with:  ER F/U: went to [facility] last Sunday for chest pain and vomiting still feeling upper stomach ache   patient started vomiting last week, and then started CYCLOBENZAPRINE HCL 5 MG Oral Tab TAKE 1 TABLET(5 MG) BY MOUTH EVERY NIGHT AS NEEDED FOR MUSCLE SPASMS Disp: 90 tablet Rfl: 1   Rosuvastatin Calcium (CRESTOR) 20 MG Oral Tab Take 1 tablet (20 mg total) by mouth nightly.  Disp: 90 tablet Rfl: 1   Fluticaso Levothyroxine Sodium 112 MCG Oral Tab; Take 1 tablet (112 mcg total) by mouth before breakfast.  Dispense: 90 tablet; Refill: 3    2. Nausea    - GASTRO - INTERNAL  - Ondansetron HCl (ZOFRAN) 4 mg tablet;  Take 1 tablet (4 mg total) by mouth every 8 (eight)

## 2019-09-11 ENCOUNTER — OFFICE VISIT (OUTPATIENT)
Dept: PAIN CLINIC | Facility: HOSPITAL | Age: 64
End: 2019-09-11
Attending: ANESTHESIOLOGY
Payer: MEDICARE

## 2019-09-11 DIAGNOSIS — M79.10 MYALGIA, UNSPECIFIED SITE: ICD-10-CM

## 2019-09-11 DIAGNOSIS — M96.1 FAILED BACK SYNDROME OF LUMBAR SPINE: Primary | ICD-10-CM

## 2019-09-11 PROCEDURE — 99213 OFFICE O/P EST LOW 20 MIN: CPT

## 2019-09-11 PROCEDURE — 20552 NJX 1/MLT TRIGGER POINT 1/2: CPT

## 2019-09-11 NOTE — CHRONIC PAIN
[facility]  Pain Clinic  Follow Up Visit Report       Patient name: Fady Rizo 61year old female  : 10/30/1955  MRN: [id number]  Referring MD: Mala Walker MD    COMPLAINT:  Patient presents with:   Follow - Up: low back pain   Co Left 03/2007    FNA   • GLAUCOMA SURGERY     • LAMINECTOMY      Per NG: Disc  displacement   • OSCAR NEEDLE LOCALIZATION 1 SITE LEFT (CPT=19281)  03/2007   • OTHER SURGICAL HISTORY  1997    Per NG:arrow angle glaucoma OD; PI Laser OU   • OTHER SURGICAL HISTO Tab, Take 1 tablet (112 mcg total) by mouth before breakfast., Disp: 90 tablet, Rfl: 3  •  Ondansetron HCl (ZOFRAN) 4 mg tablet, Take 1 tablet (4 mg total) by mouth every 8 (eight) hours as needed for Nausea., Disp: 30 tablet, Rfl: 0  •  triamcinolone acet vitals taken for this visit.   General: Alert and oriented x3, NAD, appears stated age, appropriate disposition and demeanor, answers questions appropriately   Head: normocephalic, atraumatic  Eyes: anicteric; no injection  Ears: no obvious deformities note point on the right side little muscle area total 10 cc of 0.25% bupivacaine with the 20 mg of Depo-Medrol were injected.   2 other trigger points were injected using 25-gauge needle one on the right side paraspinal lumbar muscles and lumbar on the left side

## 2019-09-11 NOTE — PROGRESS NOTES
Pt presents to [facility] ambulatory for for low back pain.  Pt started to have pain after being in patient at [facility], she was on bed rest for about 3 days and when she got up from bed she immediately had low back pain mainly on her right buttock with tingling s

## 2019-09-12 RX ORDER — GABAPENTIN 800 MG/1
TABLET ORAL
Qty: 180 TABLET | Refills: 1 | Status: SHIPPED | OUTPATIENT
Start: 2019-09-12 | End: 2020-06-08

## 2019-09-13 NOTE — TELEPHONE ENCOUNTER
Refill passed per [facility], [facility] protocol.   Refill Protocol Appointment Criteria  · Appointment scheduled in the past 6 months or in the next 3 months  Recent Outpatient Visits            Yesterday Failed back syndrome of lumbar spine    [facility] AND [facility]

## 2019-09-19 ENCOUNTER — IMMUNIZATION (OUTPATIENT)
Dept: FAMILY MEDICINE CLINIC | Facility: CLINIC | Age: 64
End: 2019-09-19
Payer: MEDICARE

## 2019-09-19 ENCOUNTER — MED REC SCAN ONLY (OUTPATIENT)
Dept: FAMILY MEDICINE CLINIC | Facility: CLINIC | Age: 64
End: 2019-09-19

## 2019-09-19 DIAGNOSIS — Z23 NEED FOR VACCINATION: ICD-10-CM

## 2019-09-19 PROCEDURE — G0008 ADMIN INFLUENZA VIRUS VAC: HCPCS | Performed by: FAMILY MEDICINE

## 2019-09-19 PROCEDURE — 90686 IIV4 VACC NO PRSV 0.5 ML IM: CPT | Performed by: FAMILY MEDICINE

## 2019-10-01 ENCOUNTER — TELEPHONE (OUTPATIENT)
Dept: GASTROENTEROLOGY | Facility: CLINIC | Age: 64
End: 2019-10-01

## 2019-10-01 ENCOUNTER — OFFICE VISIT (OUTPATIENT)
Dept: GASTROENTEROLOGY | Facility: CLINIC | Age: 64
End: 2019-10-01
Payer: MEDICARE

## 2019-10-01 VITALS
BODY MASS INDEX: 32.32 KG/M2 | DIASTOLIC BLOOD PRESSURE: 66 MMHG | WEIGHT: 194 LBS | HEIGHT: 65 IN | SYSTOLIC BLOOD PRESSURE: 100 MMHG | HEART RATE: 81 BPM

## 2019-10-01 DIAGNOSIS — R10.13 EPIGASTRIC PAIN: Primary | ICD-10-CM

## 2019-10-01 PROCEDURE — 99213 OFFICE O/P EST LOW 20 MIN: CPT | Performed by: INTERNAL MEDICINE

## 2019-10-01 NOTE — TELEPHONE ENCOUNTER
Scheduled for:  EGD - 57961  Provider Name:  Dr. Rehana Walls  Date:  11/14/19  Location:  [facility]  Sedation:  MAC  Time:  10:45 am (pt is aware to arrive at 9:45 am)  Prep:  NPO after midnight, Prep instructions were given to pt in the office, pt verbalized Susanne

## 2019-10-01 NOTE — PATIENT INSTRUCTIONS
Epigastric abdominal pain  - change omeprazole to the morning  - EGD with MAC sedation  - avoid spicy foods and cut down on espresso

## 2019-10-08 ENCOUNTER — PATIENT MESSAGE (OUTPATIENT)
Dept: FAMILY MEDICINE CLINIC | Facility: CLINIC | Age: 64
End: 2019-10-08

## 2019-10-08 DIAGNOSIS — M51.16 LUMBAR DISC HERNIATION WITH RADICULOPATHY: Primary | ICD-10-CM

## 2019-10-08 NOTE — TELEPHONE ENCOUNTER
From: Brooklyn Kong  To: Juno Arndt MD  Sent: 10/8/2019 10:48 AM CDT  Subject: Other    Dear Dr Jaqueline Mann , I need a few referal for the Pain Clinic with Dr Terra Baumgarten , I am having a serie of epidural injections and every time I get one , I have to go b

## 2019-10-08 NOTE — TELEPHONE ENCOUNTER
Dr. Cooley So please see Typo Keyboardshart message.   Attempted to pend referral but unable to find \"anesthesiology\" referral.

## 2019-10-10 RX ORDER — MELOXICAM 15 MG/1
TABLET ORAL
Qty: 30 TABLET | Refills: 0 | OUTPATIENT
Start: 2019-10-10

## 2019-10-11 RX ORDER — FLUTICASONE PROPIONATE 50 MCG
SPRAY, SUSPENSION (ML) NASAL
Qty: 3 BOTTLE | Refills: 1 | Status: SHIPPED | OUTPATIENT
Start: 2019-10-11 | End: 2019-10-16

## 2019-10-11 NOTE — PROGRESS NOTES
Bree Mims is a 61year old female. HPI:   No chief complaint on file. The patient is a 70-year-old female who has a history of cardiomyopathy, anxiety, fibromyalgia who presents in follow-up today.   She was evaluated at [facility] for a Family History   Problem Relation Age of Onset   • Depression Mother    • Neurological Disorder Mother         Per NG: Hypothyrodism   • Arthritis Mother         Per NG: Osteoarthritis   • Other (Other) Mother    • Depression Maternal Grandfather    • Ot Tablet 24 Hr Take 100 mg by mouth daily.    Disp:  Rfl:    ROSUVASTATIN CALCIUM 40 MG Oral Tab TAKE 1/2 TABLET BY MOUTH DAILY Disp: 15 tablet Rfl: 0   CARVEDILOL 3.125 MG Oral Tab TAKE 1 TABLET(3.125 MG) BY MOUTH TWICE DAILY WITH MEALS Disp: 60 tablet Rfl: patient and she agrees to proceed.     Plan  Epigastric abdominal pain  - change omeprazole to the morning  - EGD with MAC sedation  - avoid spicy foods and cut down on espresso          Orders This Visit:  No orders of the defined types were placed in this

## 2019-10-16 ENCOUNTER — OFFICE VISIT (OUTPATIENT)
Dept: FAMILY MEDICINE CLINIC | Facility: CLINIC | Age: 64
End: 2019-10-16
Payer: MEDICARE

## 2019-10-16 ENCOUNTER — LAB ENCOUNTER (OUTPATIENT)
Dept: LAB | Age: 64
End: 2019-10-16
Attending: FAMILY MEDICINE
Payer: COMMERCIAL

## 2019-10-16 VITALS
WEIGHT: 194 LBS | SYSTOLIC BLOOD PRESSURE: 116 MMHG | HEIGHT: 65 IN | HEART RATE: 76 BPM | TEMPERATURE: 98 F | DIASTOLIC BLOOD PRESSURE: 83 MMHG | BODY MASS INDEX: 32.32 KG/M2

## 2019-10-16 DIAGNOSIS — R20.2 BILATERAL LEG PARESTHESIA: ICD-10-CM

## 2019-10-16 DIAGNOSIS — R79.89 ELEVATED D-DIMER: ICD-10-CM

## 2019-10-16 DIAGNOSIS — R20.2 BILATERAL LEG PARESTHESIA: Primary | ICD-10-CM

## 2019-10-16 DIAGNOSIS — M79.605 PAIN IN BOTH LOWER EXTREMITIES: ICD-10-CM

## 2019-10-16 DIAGNOSIS — R25.2 LEG CRAMP: ICD-10-CM

## 2019-10-16 DIAGNOSIS — M51.16 LUMBAR DISC HERNIATION WITH RADICULOPATHY: ICD-10-CM

## 2019-10-16 DIAGNOSIS — M79.604 PAIN IN BOTH LOWER EXTREMITIES: ICD-10-CM

## 2019-10-16 PROCEDURE — 99214 OFFICE O/P EST MOD 30 MIN: CPT | Performed by: FAMILY MEDICINE

## 2019-10-16 PROCEDURE — G0463 HOSPITAL OUTPT CLINIC VISIT: HCPCS | Performed by: FAMILY MEDICINE

## 2019-10-16 PROCEDURE — 82607 VITAMIN B-12: CPT

## 2019-10-16 PROCEDURE — 80053 COMPREHEN METABOLIC PANEL: CPT

## 2019-10-16 PROCEDURE — 36415 COLL VENOUS BLD VENIPUNCTURE: CPT

## 2019-10-16 PROCEDURE — 85379 FIBRIN DEGRADATION QUANT: CPT

## 2019-10-16 RX ORDER — METHYLPREDNISOLONE 4 MG/1
TABLET ORAL
Refills: 0 | COMMUNITY
Start: 2019-07-16 | End: 2019-10-16

## 2019-10-16 RX ORDER — DESVENLAFAXINE 100 MG/1
100 TABLET, EXTENDED RELEASE ORAL
COMMUNITY
End: 2019-10-16

## 2019-10-16 NOTE — PROGRESS NOTES
HPI:    Patient ID: Jeannie Simmons is a 61year old female.     HPI  Patient presents with:  Leg Pain: starts from the bottom and travels thru  the calfs also feels a burning and tingling sensation and worst while sleeping   Sweats: pt states she sweats a WEEK, Disp: 12 capsule, Rfl: 1  CYCLOBENZAPRINE HCL 5 MG Oral Tab, TAKE 1 TABLET(5 MG) BY MOUTH EVERY NIGHT AS NEEDED FOR MUSCLE SPASMS, Disp: 90 tablet, Rfl: 1  Rosuvastatin Calcium (CRESTOR) 20 MG Oral Tab, Take 1 tablet (20 mg total) by mouth nightly. , herniation with radiculopathy    - EMG ([facility] [place])    3. Leg cramp    - D-DIMER;  Future      Orders Placed This Encounter      Comp Metabolic Panel (14) [E]      Vitamin B12 [E]      D-Dimer [E]      Meds This Visit:  Requested Pr

## 2019-10-17 ENCOUNTER — HOSPITAL ENCOUNTER (OUTPATIENT)
Dept: ULTRASOUND IMAGING | Facility: HOSPITAL | Age: 64
Discharge: HOME | End: 2019-10-17
Attending: FAMILY MEDICINE
Payer: MEDICARE

## 2019-10-17 DIAGNOSIS — R79.89 ELEVATED D-DIMER: ICD-10-CM

## 2019-10-17 DIAGNOSIS — M79.604 PAIN IN BOTH LOWER EXTREMITIES: ICD-10-CM

## 2019-10-17 DIAGNOSIS — M79.605 PAIN IN BOTH LOWER EXTREMITIES: ICD-10-CM

## 2019-10-17 DIAGNOSIS — R20.2 BILATERAL LEG PARESTHESIA: ICD-10-CM

## 2019-10-17 DIAGNOSIS — R25.2 LEG CRAMP: ICD-10-CM

## 2019-10-17 PROCEDURE — 93970 EXTREMITY STUDY: CPT | Performed by: FAMILY MEDICINE

## 2019-10-17 NOTE — PROGRESS NOTES
[address] [facility], [address], [phone] , they have appt available 5pm. Patient notified, she will go to diagnostic main at [phone].  Doctor Damaris's instructions to US is to hold patient and call her with results at [phone]

## 2019-10-17 NOTE — PROGRESS NOTES
Dr Mcdaniel Hint with patient (identified name and ), results reviewed and agrees with plan.   Patient has [address] scheduled for 10/21/19

## 2019-11-07 ENCOUNTER — OFFICE VISIT (OUTPATIENT)
Dept: PAIN CLINIC | Facility: HOSPITAL | Age: 64
End: 2019-11-07
Attending: ANESTHESIOLOGY
Payer: MEDICARE

## 2019-11-07 VITALS
SYSTOLIC BLOOD PRESSURE: 124 MMHG | BODY MASS INDEX: 32.32 KG/M2 | WEIGHT: 194 LBS | RESPIRATION RATE: 18 BRPM | HEIGHT: 65 IN | DIASTOLIC BLOOD PRESSURE: 72 MMHG | HEART RATE: 82 BPM

## 2019-11-07 DIAGNOSIS — M96.1 FAILED BACK SYNDROME OF LUMBAR SPINE: Primary | ICD-10-CM

## 2019-11-07 PROCEDURE — 99211 OFF/OP EST MAY X REQ PHY/QHP: CPT

## 2019-11-07 RX ORDER — TRAMADOL HYDROCHLORIDE 50 MG/1
TABLET ORAL
Qty: 90 TABLET | Refills: 2 | Status: SHIPPED | OUTPATIENT
Start: 2019-11-07 | End: 2020-07-29

## 2019-11-07 NOTE — PROGRESS NOTES
11/07/19  PRESENTS AMBULATORY TO CPM;  F/U POST RT TRANS L4/5 9/25/19-EARLENE;  PT REPORTS THE INJECTION DID NOT WORK;   \"I HAVE SO MUCH PAIN NOW,  HE ALSO GAVE TRIGGER POINTS  BUT THAT LASTED 3WKS; \"  SEEN BY DR. Richard Olivia;  REFER TO DICTATION  FOR

## 2019-11-07 NOTE — CHRONIC PAIN
[facility]  Pain Clinic  Follow Up Visit Report       Patient name: Nestor Mai 59year old female  : 10/30/1955  MRN: [id number]  Referring MD: Steve Couch MD    COMPLAINT:  Patient presents with:   Follow - Up: POST RT TRANS L4/5 SURGERY     • LAMINECTOMY      Per NG: Disc  displacement   • OSCAR LOCALIZATION WIRE 1 SITE LEFT (CPT=19281)  03/2007   • OTHER SURGICAL HISTORY  1997    Per NG:arrow angle glaucoma OD; PI Laser OU   • OTHER SURGICAL HISTORY  1997    Per NG: arrow angle gla Oral Tab, Take 1 tablet (20 mg total) by mouth nightly., Disp: 90 tablet, Rfl: 3  •  GABAPENTIN 800 MG Oral Tab, TAKE 1 TABLET BY MOUTH TWICE DAILY (Patient taking differently: 600 mg.  ), Disp: 180 tablet, Rfl: 1  •  Turmeric, Curcuma Longa, (CURCUMIN) Do Alert and oriented x3, NAD, appears stated age, appropriate disposition and demeanor, answers questions appropriately   Head: normocephalic, atraumatic  Eyes: anicteric; no injection  Ears: no obvious deformities noted   Nose: externally grossly within nor

## 2019-11-08 ENCOUNTER — DOCUMENTATION ONLY (OUTPATIENT)
Dept: PAIN CLINIC | Facility: HOSPITAL | Age: 64
End: 2019-11-08

## 2019-11-08 NOTE — PROGRESS NOTES
Procedure code [id number] 12/03/19 APPROVED    PA needed via [facility]      All clinical notes submitted    Pending [facility] auth # [id number]    Once approval has been rcv'd order form will sent to the surgery center.

## 2019-11-14 ENCOUNTER — ANESTHESIA EVENT (OUTPATIENT)
Dept: ENDOSCOPY | Facility: HOSPITAL | Age: 64
End: 2019-11-14
Payer: MEDICARE

## 2019-11-14 ENCOUNTER — ANESTHESIA (OUTPATIENT)
Dept: ENDOSCOPY | Facility: HOSPITAL | Age: 64
End: 2019-11-14
Payer: MEDICARE

## 2019-11-14 ENCOUNTER — DOCUMENTATION ONLY (OUTPATIENT)
Dept: PAIN CLINIC | Facility: HOSPITAL | Age: 64
End: 2019-11-14

## 2019-11-14 ENCOUNTER — HOSPITAL ENCOUNTER (OUTPATIENT)
Facility: HOSPITAL | Age: 64
Setting detail: HOSPITAL OUTPATIENT SURGERY
Discharge: HOME | End: 2019-11-14
Attending: INTERNAL MEDICINE | Admitting: INTERNAL MEDICINE
Payer: MEDICARE

## 2019-11-14 DIAGNOSIS — R10.13 EPIGASTRIC PAIN: ICD-10-CM

## 2019-11-14 PROCEDURE — 43239 EGD BIOPSY SINGLE/MULTIPLE: CPT | Performed by: INTERNAL MEDICINE

## 2019-11-14 PROCEDURE — 0DB68ZX EXCISION OF STOMACH, VIA NATURAL OR ARTIFICIAL OPENING ENDOSCOPIC, DIAGNOSTIC: ICD-10-PCS | Performed by: INTERNAL MEDICINE

## 2019-11-14 PROCEDURE — 0DB78ZX EXCISION OF STOMACH, PYLORUS, VIA NATURAL OR ARTIFICIAL OPENING ENDOSCOPIC, DIAGNOSTIC: ICD-10-PCS | Performed by: INTERNAL MEDICINE

## 2019-11-14 RX ORDER — LIDOCAINE HYDROCHLORIDE 10 MG/ML
INJECTION, SOLUTION EPIDURAL; INFILTRATION; INTRACAUDAL; PERINEURAL AS NEEDED
Status: DISCONTINUED | OUTPATIENT
Start: 2019-11-14 | End: 2019-11-14 | Stop reason: SURG

## 2019-11-14 RX ORDER — SODIUM CHLORIDE, SODIUM LACTATE, POTASSIUM CHLORIDE, CALCIUM CHLORIDE 600; 310; 30; 20 MG/100ML; MG/100ML; MG/100ML; MG/100ML
INJECTION, SOLUTION INTRAVENOUS CONTINUOUS
Status: DISCONTINUED | OUTPATIENT
Start: 2019-11-14 | End: 2019-11-14

## 2019-11-14 RX ORDER — GLYCOPYRROLATE 0.2 MG/ML
INJECTION INTRAMUSCULAR; INTRAVENOUS AS NEEDED
Status: DISCONTINUED | OUTPATIENT
Start: 2019-11-14 | End: 2019-11-14 | Stop reason: SURG

## 2019-11-14 RX ADMIN — GLYCOPYRROLATE 0.2 MG: 0.2 INJECTION INTRAMUSCULAR; INTRAVENOUS at 11:13:00

## 2019-11-14 RX ADMIN — LIDOCAINE HYDROCHLORIDE 100 MG: 10 INJECTION, SOLUTION EPIDURAL; INFILTRATION; INTRACAUDAL; PERINEURAL at 11:12:00

## 2019-11-14 RX ADMIN — SODIUM CHLORIDE, SODIUM LACTATE, POTASSIUM CHLORIDE, CALCIUM CHLORIDE: 600; 310; 30; 20 INJECTION, SOLUTION INTRAVENOUS at 11:26:00

## 2019-11-14 NOTE — H&P
History & Physical Examination    Patient Name: Estephanie Concepcion  MRN: [id number]  [facility]: [id number]  YOB: 1955    Diagnosis: abdominal pain      Bupivacaine HCl (PF) (MARCAINE) 0.25 % injection 10 mL, 10 mL, Injection, Once, Keshawn Blazing daily., Disp: 90 capsule, Rfl: 1, 11/13/2019 at am  LORazepam 1 MG Oral Tab, , Disp: , Rfl: 1, 11/13/2019 at pm  temazepam 15 MG Oral Cap, Take 30 mg by mouth.  , Disp: , Rfl: 1, 11/13/2019 at pm  Desvenlafaxine Succinate ER (PRISTIQ) 100 MG Oral Tablet 24 ASPIRATION LEFT Left 03/2007    FNA   • EGD     • GLAUCOMA SURGERY     • LAMINECTOMY      Per NG: Disc  displacement   • OSCAR LOCALIZATION WIRE 1 SITE LEFT (CPT=19281)  03/2007   • OTHER SURGICAL HISTORY  1997    Per NG:arrow angle glaucoma OD; PI Laser OU

## 2019-11-14 NOTE — OPERATIVE REPORT
[facility] Endoscopy Report      Preoperative Diagnosis:  - abdominal pain      Postoperative Diagnosis:  - gastritis  - hiatal hernia  - reflux esophagitis      Procedure:    Esophagogastroduodenoscopy       Surgeon:  Estephanie Joseph M.D.

## 2019-11-14 NOTE — PROGRESS NOTES
Procedure code [id number] 12/3/19 APPROVED    Auth # [id number]    Valid from 11/08/19--02/06/20    Order form faxed to the surgery center, confirmation rcv'd

## 2019-11-14 NOTE — ANESTHESIA PREPROCEDURE EVALUATION
Anesthesia PreOp Note    HPI:     Brooklyn Kong is a 59year old female who presents for preoperative consultation requested by: Ana Laura De La Cruz MD    Date of Surgery: 11/14/2019    Procedure(s):  ESOPHAGOGASTRODUODENOSCOPY (EGD)  Indication: Epigastr 08/23/2006        Past Medical History:   Diagnosis Date   • Anxiety state, unspecified    • Cardiomyopathy ([facility] [address]    • Disorder of thyroid    • Fibromyalgia    • H/O varicose vein stripping     Per NG: Varicose vein ; vein stripping   • Headache 2010    Pe Tez Bryant MD  methylPREDNISolone acetate (DEPO-medrol) 40 MG/ML injection 40 mg, 40 mg, Intramuscular, Once, Camille Melchor MD    traMADol HCl 50 MG Oral Tab, TK 1 T PO  Q 8  H PRN, Disp: 90 tablet, Rfl: 2, 11/13/2019 at pm  carvedilol 3.125 MG Oral Tab, TAKE 1 file.        Demerol [Meperidine]    NAUSEA AND VOMITING, NAUSEA ONLY    Comment:Other reaction(s): Vomiting  Hydrocodone             NAUSEA AND VOMITING  Hydrocodone-Ibuprof*        Comment:Other reaction(s): Vomiting  Pregabalin                  Comment: together: Not on file        Attends [employer] service: Not on file        Active member of club or organization: Not on file        Attends meetings of clubs or organizations: Not on file        Relationship status: Not on file      Intimate partner viole Dental - normal exam     Pulmonary - normal exam   Cardiovascular - normal exam    ROS comment: cardiomyopathy    Neuro/Psych    (+)  anxiety/panic attacks,        GI/Hepatic/Renal - negative ROS   (+) GERD well controlled,     Endo/Other - negative ROS

## 2019-11-14 NOTE — ANESTHESIA POSTPROCEDURE EVALUATION
Patient: Sinan Adamson    Procedure Summary     Date:  11/14/19 Room / Location:  [facility] ENDOSCOPY 04 / [facility] ENDOSCOPY    Anesthesia Start:  3689 Anesthesia Stop:      Procedure:  ESOPHAGOGASTRODUODENOSCOPY (EGD) (N/A ) Diagnosis:       Epigastric pain

## 2019-11-15 VITALS
WEIGHT: 195 LBS | RESPIRATION RATE: 14 BRPM | SYSTOLIC BLOOD PRESSURE: 110 MMHG | HEIGHT: 64.5 IN | DIASTOLIC BLOOD PRESSURE: 66 MMHG | HEART RATE: 70 BPM | BODY MASS INDEX: 32.89 KG/M2 | OXYGEN SATURATION: 95 %

## 2019-11-21 ENCOUNTER — PROCEDURE VISIT (OUTPATIENT)
Dept: NEUROLOGY | Facility: CLINIC | Age: 64
End: 2019-11-21
Payer: MEDICARE

## 2019-11-21 PROCEDURE — 95910 NRV CNDJ TEST 7-8 STUDIES: CPT | Performed by: OTHER

## 2019-11-21 PROCEDURE — 95886 MUSC TEST DONE W/N TEST COMP: CPT | Performed by: OTHER

## 2019-11-21 NOTE — PROCEDURES
HISTORY:    Alysia Carpio is a 59year old female with a complaint of pain, burning and numbness traveling from the lower back into her legs and even to the feet itself.     PHYSICAL:    Cranial nerves grossly normal. Motor examination showed strength to Ref.     Ref.   ?39      Knee EDB 11.82 5.5 6.87 Knee - Fib Head 10 1.56 64      Ref. Ref.   ?39   R Tibial - AH      Ankle AH 3.70 7.7 5.63 Ankle - AH 8        Ref. ?6.00 ?3.0  Ref. Knee AH 12.19 6.3 6.72 Knee - Ankle 40 8.49 47      Ref. R. Tibialis anterior Deep peroneal (Fibular) L4-L5 N None None None None Normal N N N N   L. Dorsal interossei (pedis) Medial plantar S1-S2 1+ 1+ 1+ None None Normal 3-5 1+ 1+ N   R.  Dorsal interossei (pedis) Medial plantar S1-S2 1+ 1+ 1+ None None Normal

## 2019-11-25 ENCOUNTER — OFFICE VISIT (OUTPATIENT)
Dept: FAMILY MEDICINE CLINIC | Facility: CLINIC | Age: 64
End: 2019-11-25
Payer: MEDICARE

## 2019-11-25 ENCOUNTER — LAB ENCOUNTER (OUTPATIENT)
Dept: LAB | Age: 64
End: 2019-11-25
Attending: FAMILY MEDICINE
Payer: MEDICARE

## 2019-11-25 VITALS
TEMPERATURE: 98 F | SYSTOLIC BLOOD PRESSURE: 107 MMHG | DIASTOLIC BLOOD PRESSURE: 73 MMHG | BODY MASS INDEX: 34.57 KG/M2 | HEIGHT: 64.5 IN | WEIGHT: 205 LBS | HEART RATE: 77 BPM

## 2019-11-25 DIAGNOSIS — M54.16 LUMBAR RADICULAR SYNDROME: Primary | ICD-10-CM

## 2019-11-25 DIAGNOSIS — G62.9 POLYNEUROPATHY: Primary | ICD-10-CM

## 2019-11-25 DIAGNOSIS — R61 EXCESSIVE SWEATING: ICD-10-CM

## 2019-11-25 DIAGNOSIS — Z92.3 H/O RADIOACTIVE IODINE THYROID ABLATION: ICD-10-CM

## 2019-11-25 DIAGNOSIS — F41.9 ANXIETY AND DEPRESSION: ICD-10-CM

## 2019-11-25 DIAGNOSIS — R20.2 BILATERAL LEG PARESTHESIA: ICD-10-CM

## 2019-11-25 DIAGNOSIS — Z98.890 HISTORY OF LUMBAR SURGERY: ICD-10-CM

## 2019-11-25 DIAGNOSIS — F32.A ANXIETY AND DEPRESSION: ICD-10-CM

## 2019-11-25 PROCEDURE — 85025 COMPLETE CBC W/AUTO DIFF WBC: CPT

## 2019-11-25 PROCEDURE — 99214 OFFICE O/P EST MOD 30 MIN: CPT | Performed by: FAMILY MEDICINE

## 2019-11-25 PROCEDURE — 36415 COLL VENOUS BLD VENIPUNCTURE: CPT

## 2019-11-25 PROCEDURE — 84443 ASSAY THYROID STIM HORMONE: CPT

## 2019-11-25 PROCEDURE — 80053 COMPREHEN METABOLIC PANEL: CPT

## 2019-11-25 PROCEDURE — G0463 HOSPITAL OUTPT CLINIC VISIT: HCPCS | Performed by: FAMILY MEDICINE

## 2019-11-25 NOTE — PROGRESS NOTES
HPI:    Patient ID: Brooklyn Kong is a 59year old female. HPI  Patient presents with:  Back Pain: X 1 month was getting injections but feels worst   Referral: neurosugical with DR. Rosetta Barksdale  Sweats: mostly during the nights     Patient here fo (CRESTOR) 20 MG Oral Tab Take 1 tablet (20 mg total) by mouth nightly.  90 tablet 3   • GABAPENTIN 800 MG Oral Tab TAKE 1 TABLET BY MOUTH TWICE DAILY (Patient taking differently: 600 mg.  ) 180 tablet 1   • Turmeric, Curcuma Longa, (CURCUMIN) Does not apply buttock tenderness                ASSESSMENT/PLAN:   Lumbar radicular syndrome  (primary encounter diagnosis)  History of lumbar surgery  Excessive sweating  Anxiety and depression  H/o radioactive iodine thyroid ablation    1.  Lumbar radicular syndrome

## 2019-11-26 ENCOUNTER — TELEPHONE (OUTPATIENT)
Dept: FAMILY MEDICINE CLINIC | Facility: CLINIC | Age: 64
End: 2019-11-26

## 2019-12-06 RX ORDER — ERGOCALCIFEROL 1.25 MG/1
CAPSULE ORAL
Qty: 12 CAPSULE | Refills: 0 | Status: SHIPPED | OUTPATIENT
Start: 2019-12-06 | End: 2020-02-26

## 2019-12-06 NOTE — TELEPHONE ENCOUNTER
Review pended refill request as it does not fall under a protocol.     Last Rx: 12/6/2019  LOV: 11/25/19

## 2019-12-18 ENCOUNTER — OFFICE VISIT (OUTPATIENT)
Dept: PAIN CLINIC | Facility: HOSPITAL | Age: 64
End: 2019-12-18
Attending: ANESTHESIOLOGY
Payer: MEDICARE

## 2019-12-18 ENCOUNTER — NURSE TRIAGE (OUTPATIENT)
Dept: OTHER | Age: 64
End: 2019-12-18

## 2019-12-18 VITALS
RESPIRATION RATE: 18 BRPM | DIASTOLIC BLOOD PRESSURE: 78 MMHG | BODY MASS INDEX: 34.57 KG/M2 | HEART RATE: 78 BPM | SYSTOLIC BLOOD PRESSURE: 118 MMHG | HEIGHT: 64.6 IN | WEIGHT: 205 LBS

## 2019-12-18 DIAGNOSIS — M79.10 MYALGIA, UNSPECIFIED SITE: ICD-10-CM

## 2019-12-18 DIAGNOSIS — G89.29 CHRONIC LOW BACK PAIN WITH SCIATICA, SCIATICA LATERALITY UNSPECIFIED, UNSPECIFIED BACK PAIN LATERALITY: Primary | ICD-10-CM

## 2019-12-18 DIAGNOSIS — M96.1 FAILED BACK SYNDROME OF LUMBAR SPINE: ICD-10-CM

## 2019-12-18 DIAGNOSIS — M54.40 CHRONIC LOW BACK PAIN WITH SCIATICA, SCIATICA LATERALITY UNSPECIFIED, UNSPECIFIED BACK PAIN LATERALITY: Primary | ICD-10-CM

## 2019-12-18 PROCEDURE — 20552 NJX 1/MLT TRIGGER POINT 1/2: CPT

## 2019-12-18 PROCEDURE — 99213 OFFICE O/P EST LOW 20 MIN: CPT

## 2019-12-18 RX ORDER — BUPIVACAINE HYDROCHLORIDE 2.5 MG/ML
10 INJECTION, SOLUTION EPIDURAL; INFILTRATION; INTRACAUDAL ONCE
Status: ACTIVE | OUTPATIENT
Start: 2019-12-18

## 2019-12-18 RX ORDER — METHYLPREDNISOLONE ACETATE 40 MG/ML
40 INJECTION, SUSPENSION INTRA-ARTICULAR; INTRALESIONAL; INTRAMUSCULAR; SOFT TISSUE ONCE
Status: ACTIVE | OUTPATIENT
Start: 2019-12-18

## 2019-12-18 NOTE — TELEPHONE ENCOUNTER
Would push fluids, gargle  Tylenol as needed for pain  If no better by next few days, follow up   No antibiotics indicated at this time

## 2019-12-18 NOTE — PROGRESS NOTES
12/18/19  PRESENTS AMBULATORY TO CPM;  F/U POST RT TRANS L4/5 & T5N6-30/3/19-EARLENE;  PT REPORTS 2 DAYS OF RELIEF, PAIN HAS DECREASED-NOT AS SEVERE BUT ALSO ON & OFF;    SEEN BY DR. Layla Dancer;  REFER TO DICTATION FOR THE CONTINUED POC.

## 2019-12-18 NOTE — TELEPHONE ENCOUNTER
Action Requested: Summary for Provider     []  Critical Lab, Recommendations Needed  [] Need Additional Advice  []   FYI    []   Need Orders  [x] Need Medications Sent to Pharmacy  []  Other     SUMMARY: Pt report sore sore throat x 3 days.  Associate with

## 2019-12-18 NOTE — CHRONIC PAIN
[facility]  Pain Clinic  Follow Up Visit Report       Patient name: Lucero Delarosa 59year old female  : 10/30/1955  MRN: [id number]  Referring MD: Cliff Fritz MD    COMPLAINT:  Patient presents with:   Follow - Up: POST RT TRANS L4/5 11/14/2019    Performed by Jelly Curry MD at [facility] ENDOSCOPY   • GLAUCOMA SURGERY     • LAMINECTOMY      Per NG: Disc  displacement   • OSCAR LOCALIZATION WIRE 1 SITE LEFT (CPT=19281)  03/2007   • OTHER SURGICAL HISTORY  1997    Per NG:arrow angle glauco carvedilol 3.125 MG Oral Tab, TAKE 1 TABLET(3.125 MG) BY MOUTH TWICE DAILY WITH MEALS, Disp: 180 tablet, Rfl: 3  •  Rosuvastatin Calcium (CRESTOR) 20 MG Oral Tab, Take 1 tablet (20 mg total) by mouth nightly., Disp: 90 tablet, Rfl: 3  •  GABAPENTIN 800 MG Wt 205 lb (93 kg)   BMI 34.54 kg/m²   General: Alert and oriented x3, NAD, appears stated age, appropriate disposition and demeanor, answers questions appropriately   Head: normocephalic, atraumatic  Eyes: anicteric; no injection  Ears: no obvious deformi cord stimulator trial.    Procedure note. Trigger point injection therapy. Patient was in left lateral decubitus position. Sterile preparation with alcohol swabs.   25-gauge needle was used to inject 3 trigger point areas right at L455 S1 level paraspina

## 2020-01-01 NOTE — TELEPHONE ENCOUNTER
Refill Protocol Appointment Criteria; last rx 11/2018 for 6 mos supply. Please advise if you would like pt to cpm. Thanks.   · Appointment scheduled in the past 12 months or in the next 3 months  Recent Outpatient Visits            2 weeks ago Chronic low b

## 2020-01-02 NOTE — TELEPHONE ENCOUNTER
Requested Prescriptions     Pending Prescriptions Disp Refills   • OMEPRAZOLE 40 MG Oral Capsule Delayed Release [Pharmacy Med Name: OMEPRAZOLE 40MG CAPSULES] 90 capsule 1     Sig: TAKE 1 CAPSULE(40 MG) BY MOUTH DAILY     Please advise on refill    LOV-11/

## 2020-01-06 RX ORDER — OMEPRAZOLE 40 MG/1
CAPSULE, DELAYED RELEASE ORAL
Qty: 90 CAPSULE | Refills: 1 | Status: SHIPPED | OUTPATIENT
Start: 2020-01-06 | End: 2020-07-07

## 2020-01-13 ENCOUNTER — HOSPITAL ENCOUNTER (OUTPATIENT)
Dept: GENERAL RADIOLOGY | Age: 65
Discharge: HOME | End: 2020-01-13
Payer: MEDICARE

## 2020-01-13 ENCOUNTER — HOSPITAL ENCOUNTER (OUTPATIENT)
Dept: MRI IMAGING | Age: 65
Discharge: HOME | End: 2020-01-13
Payer: MEDICARE

## 2020-01-13 DIAGNOSIS — M51.36 DDD (DEGENERATIVE DISC DISEASE), LUMBAR: ICD-10-CM

## 2020-01-13 DIAGNOSIS — M54.50 CHRONIC LUMBAR PAIN: ICD-10-CM

## 2020-01-13 DIAGNOSIS — M54.9 CHRONIC BACK PAIN: ICD-10-CM

## 2020-01-13 DIAGNOSIS — M54.16 LUMBAR RADICULOPATHY: ICD-10-CM

## 2020-01-13 DIAGNOSIS — M51.36 DEGENERATION OF LUMBAR INTERVERTEBRAL DISC: ICD-10-CM

## 2020-01-13 DIAGNOSIS — M54.50 CHRONIC LOW BACK PAIN: ICD-10-CM

## 2020-01-13 DIAGNOSIS — G89.29 CHRONIC LOW BACK PAIN: ICD-10-CM

## 2020-01-13 DIAGNOSIS — G89.29 CHRONIC LUMBAR PAIN: ICD-10-CM

## 2020-01-13 DIAGNOSIS — G89.29 CHRONIC BACK PAIN: ICD-10-CM

## 2020-01-13 PROCEDURE — 72110 X-RAY EXAM L-2 SPINE 4/>VWS: CPT | Performed by: NURSE PRACTITIONER

## 2020-01-13 PROCEDURE — 72148 MRI LUMBAR SPINE W/O DYE: CPT | Performed by: NURSE PRACTITIONER

## 2020-01-30 ENCOUNTER — TELEPHONE (OUTPATIENT)
Dept: FAMILY MEDICINE CLINIC | Facility: CLINIC | Age: 65
End: 2020-01-30

## 2020-01-30 ENCOUNTER — OFFICE VISIT (OUTPATIENT)
Dept: FAMILY MEDICINE CLINIC | Facility: CLINIC | Age: 65
End: 2020-01-30
Payer: MEDICARE

## 2020-01-30 VITALS
BODY MASS INDEX: 34.24 KG/M2 | WEIGHT: 203 LBS | DIASTOLIC BLOOD PRESSURE: 70 MMHG | HEIGHT: 64.6 IN | HEART RATE: 75 BPM | SYSTOLIC BLOOD PRESSURE: 106 MMHG | TEMPERATURE: 98 F

## 2020-01-30 DIAGNOSIS — M26.622 TMJ TENDERNESS, LEFT: Primary | ICD-10-CM

## 2020-01-30 DIAGNOSIS — E66.9 OBESITY (BMI 30.0-34.9): ICD-10-CM

## 2020-01-30 DIAGNOSIS — Z12.31 ENCOUNTER FOR SCREENING MAMMOGRAM FOR BREAST CANCER: ICD-10-CM

## 2020-01-30 DIAGNOSIS — G50.0 FACIAL PAIN SYNDROME: ICD-10-CM

## 2020-01-30 DIAGNOSIS — M54.41 CHRONIC LOW BACK PAIN WITH RIGHT-SIDED SCIATICA, UNSPECIFIED BACK PAIN LATERALITY: ICD-10-CM

## 2020-01-30 DIAGNOSIS — G89.29 CHRONIC LOW BACK PAIN WITH RIGHT-SIDED SCIATICA, UNSPECIFIED BACK PAIN LATERALITY: ICD-10-CM

## 2020-01-30 PROCEDURE — 99214 OFFICE O/P EST MOD 30 MIN: CPT | Performed by: FAMILY MEDICINE

## 2020-01-30 PROCEDURE — G0463 HOSPITAL OUTPT CLINIC VISIT: HCPCS | Performed by: FAMILY MEDICINE

## 2020-01-30 RX ORDER — FLUTICASONE PROPIONATE 50 MCG
SPRAY, SUSPENSION (ML) NASAL
COMMUNITY
Start: 2020-01-08

## 2020-01-30 NOTE — TELEPHONE ENCOUNTER
Pt brought Echocardiogram Report from [facility]. normal DSE {Dobutamine Stress Echocardiogram Report)

## 2020-01-30 NOTE — PROGRESS NOTES
HPI:    Patient ID: Bree Mims is a 59year old female. HPI  Patient presents with:   Follow - Up: on spine MRI   Other: pain on left side of face has had for a year   Referral: for weight mgmt     Continues to have low back pain  Saw the NP for he TAKE 1 CAPSULE(40 MG) BY MOUTH DAILY 90 capsule 1   • carvedilol 3.125 MG Oral Tab TAKE 1 TABLET(3.125 MG) BY MOUTH TWICE DAILY WITH MEALS 180 tablet 3   • Rosuvastatin Calcium (CRESTOR) 20 MG Oral Tab Take 1 tablet (20 mg total) by mouth nightly.  90 table well-developed and well-nourished.    HENT:   Right Ear: External ear normal.   Left Ear: External ear normal.   Nose: Nose normal.   Mouth/Throat: Oropharynx is clear and moist.   Left tmj tenderness   Cardiovascular: Normal rate, regular rhythm and normal

## 2020-01-30 NOTE — PATIENT INSTRUCTIONS
TMJ Syndrome  The temporomandibular joint (TMJ) is the joint that connects your lower jaw to your head. You can feel it in front of your ears when you open and close your mouth. TMJ disorders involve chronic or recurrent pain in the joint.  When treated, · You may take acetaminophen or ibuprofen for pain, unless you were given a different pain medicine.  (Note: If you have chronic liver or kidney disease or have ever had a stomach ulcer or gastrointestinal bleeding, talk with your healthcare provider before Follow up with your healthcare provider, or as advised. Further testing and additional treatment may be required. If changes to your lifestyle do not improve your symptoms, talk with your healthcare provider about other available therapies.  These include b

## 2020-02-19 ENCOUNTER — PATIENT MESSAGE (OUTPATIENT)
Dept: FAMILY MEDICINE CLINIC | Facility: CLINIC | Age: 65
End: 2020-02-19

## 2020-02-19 DIAGNOSIS — M26.622 TMJ TENDERNESS, LEFT: Primary | ICD-10-CM

## 2020-02-19 DIAGNOSIS — G50.0 FACIAL PAIN SYNDROME: ICD-10-CM

## 2020-02-19 NOTE — TELEPHONE ENCOUNTER
From: Fady Rizo  To: Noel Medellin MD  Sent: 2/19/2020 7:41 AM CST  Subject: Visit Follow-up Question    Dr Alize Medellin , I keep having ear acke w mandibular pain , it is constant , every day and sharp , please advice because this is being for so long .  I

## 2020-02-26 RX ORDER — ERGOCALCIFEROL 1.25 MG/1
CAPSULE ORAL
Qty: 12 CAPSULE | Refills: 0 | Status: SHIPPED | OUTPATIENT
Start: 2020-02-26 | End: 2020-05-26

## 2020-05-26 RX ORDER — ERGOCALCIFEROL 1.25 MG/1
CAPSULE ORAL
Qty: 12 CAPSULE | Refills: 0 | Status: SHIPPED | OUTPATIENT
Start: 2020-05-26 | End: 2020-08-17

## 2020-06-08 RX ORDER — GABAPENTIN 800 MG/1
TABLET ORAL
Qty: 180 TABLET | Refills: 1 | Status: SHIPPED | OUTPATIENT
Start: 2020-06-08 | End: 2020-12-31

## 2020-06-13 NOTE — TELEPHONE ENCOUNTER
Contacted patient to schedule physical appointment, however, patient declined to make an appointment this moment and is requesting orders for blood work first before she makes appointment.

## 2020-06-25 ENCOUNTER — HOSPITAL ENCOUNTER (OUTPATIENT)
Dept: MAMMOGRAPHY | Facility: HOSPITAL | Age: 65
Discharge: HOME | End: 2020-06-25
Attending: FAMILY MEDICINE
Payer: MEDICARE

## 2020-06-25 DIAGNOSIS — Z12.31 ENCOUNTER FOR SCREENING MAMMOGRAM FOR BREAST CANCER: ICD-10-CM

## 2020-06-25 PROCEDURE — 77067 SCR MAMMO BI INCL CAD: CPT | Performed by: FAMILY MEDICINE

## 2020-06-25 PROCEDURE — 77063 BREAST TOMOSYNTHESIS BI: CPT | Performed by: FAMILY MEDICINE

## 2020-07-01 NOTE — CHRONIC PAIN
[address]                      [address], [address] [facility]                                  CONSULTATION    PATIENT NAME: Bud April                      :        06/10/1927  MED REC NO:   [id number]                            ROOM:       R235  ACCOUNT NO:   [de-identified]                           ADMIT DATE: 2020  PROVIDER:     Anthony Church MD    CONSULT DATE:  2020    ENDOCRINE CONSULT    REFERRING PROVIDER:  Verenice Shelby DO    REASON FOR CONSULTATION:  Management of uncontrolled diabetes. CHIEF COMPLAINT AND HISTORY OF PRESENT ILLNESS:  The patient is a  80-year-old female with known history of diabetes on metformin 1000 mg  twice a day, initially admitted for nausea, vomiting, dizziness. She  has had a history of CVA, hypertension, hypercholesterolemia. Initially  admitted to [facility] ED with symptoms of nausea, vomiting, dizziness. According to family members they thought she had TIA. Blood pressure  was very high at the time of admission and initially was discharged home  and had to be readmitted because of persistent symptoms. Blood sugars  have been staying in the high side. The patient has been also seen by  neurologist.  Initial impression was vestibular neuritis. MRI did not  reveal any acute finding. Also, MRI cervical spine shows severe central  spinal stenosis. The patient's blood sugars have been high also due to  use of steroids. The patient is currently on prednisone 10 mg daily  which will be tapered to 5 mg. Her A1c was 8.2. Prior A1cs have been  mostly in the low to upper 7 range. Reviewed labs from yesterday. Sodium was 135, potassium 4.2, chloride 105, CO2 was 17, BUN was 45,  creatinine 1.18. PAST MEDICAL HISTORY:  Significant for type 2 diabetes, coronary artery  disease, osteoarthritis, hypertension, hypercholesteremia, back pain,  arthritis.     PAST SURGICAL HISTORY:  Tonsillectomy, hysterectomy, cystocele [facility]  [facility] Clinic  Follow Up Visit Report       Patient name: Andrés Thrasher 61year old female  : 10/30/1955  MRN: [id number]  Referring MD: Rumaldo Favre, MD    COMPLAINT:  Patient presents with:  Medication Eval  Patient had amina HISTORY:  Past Surgical History:   Procedure Laterality Date   • APPENDECTOMY     • BACK SURGERY  2016    lumbar L4-L5 fusion   •   1979   • CATARACT Right    • CATARACT Left    •  DELIVERY ONLY     • CHOLECYSTECTOMY repair,  cataract surgery, breast biopsy, appendectomy, ankle surgery. FAMILY HISTORY:  Significant for diabetes, heart disease. PERSONAL AND SOCIAL HISTORY:  Denies any smoking, use of alcohol, or  substance abuse. ALLERGIES:  METOCLOPRAMIDE, PANTOPRAZOLE, PENICILLIN, PROTONIX, ULTRAM.    REVIEW OF SYSTEMS:  Other than nausea, vomiting, dizziness, 14-point  review of systems were negative. PHYSICAL EXAMINATION:  GENERAL:  The patient was alert, awake, in no obvious distress. VITAL SIGNS:  Blood pressure was 158/66, pulse rate was 55, respiratory  rate was 15, temperature 97. HEENT:  Normocephalic, atraumatic. Pupils equal and reacting to light. Oral mucosa was moist.  NECK:  Supple. Trachea in midline. CHEST:  Lungs were clear to auscultation bilaterally. No wheezing or  crackles were heard. CARDIOVASCULAR:  Heart sounds show bradycardia. No murmurs or thrills  were present. ABDOMEN:  Soft, slightly obese. Bowel sounds were present. No  organomegaly or tenderness. EXTREMITIES:  Lower extremities reveal 1+ pitting edema in both lower  legs. SKIN:  Intact. MUSCULOSKELETAL:  No joint swelling. NEUROLOGIC:  Cranial nerves I through XII were intact. PSYCHIATRIC:  Normal affect, cognition. LABORATORY DATA:  As above. ASSESSMENT:  Uncontrolled diabetes. Blood sugars have been high due to  steroid use, chronic kidney disease, vestibular neuritis, cervical spine  stenosis, and coronary artery disease. PLAN:  Add Lantus 20 units at night, Humalog 4 units with each meals  plus Humalog medium dose sliding scale coverage. Adjust her insulin  dose based on her blood sugars goal.  Blood sugar goal 140-160 to low  200 range. Avoid hypoglycemia. Continue other supportive measures in  the meantime. Condition also discussed with the patient's son. Thank you for the consult.         Aris Dejesus MD    D: 06/30/2020 21:17:29       T: 06/30/2020 21:22:30     [id number]  Job#: [id number] Doc#: [id number]    CC: reaction(s): Stomach discomforts  Vicodin Tuss [[place]*    NAUSEA AND VOMITING, NAUSEA ONLY    Comment:Other reaction(s): Vomiting  Tramadol                NAUSEA ONLY  CURRENT MEDICATIONS:    Current Outpatient Medications:   •  methylPREDNISolone (MEDROL) depression   PHYSICAL EXAMINATION:  /72   Pulse 71   General: Alert and oriented x3, NAD, appears stated age, appropriate disposition and demeanor, answers questions appropriately   Head: normocephalic, atraumatic  Eyes: anicteric; no injection  Ears referring to orthopedic surgeon.

## 2020-07-07 RX ORDER — OMEPRAZOLE 40 MG/1
CAPSULE, DELAYED RELEASE ORAL
Qty: 90 CAPSULE | Refills: 1 | Status: SHIPPED | OUTPATIENT
Start: 2020-07-07

## 2020-07-07 NOTE — TELEPHONE ENCOUNTER
Requested Prescriptions     Pending Prescriptions Disp Refills   • OMEPRAZOLE 40 MG Oral Capsule Delayed Release [Pharmacy Med Name: OMEPRAZOLE 40MG CAPSULES] 90 capsule 1     Sig: TAKE 1 CAPSULE BY MOUTH EVERY DAY     lov-11/14/19  lr-1/1/20

## 2020-07-29 ENCOUNTER — OFFICE VISIT (OUTPATIENT)
Dept: PAIN CLINIC | Facility: HOSPITAL | Age: 65
End: 2020-07-29
Attending: FAMILY MEDICINE
Payer: MEDICARE

## 2020-07-29 VITALS — SYSTOLIC BLOOD PRESSURE: 113 MMHG | DIASTOLIC BLOOD PRESSURE: 68 MMHG | HEART RATE: 71 BPM

## 2020-07-29 DIAGNOSIS — M79.18 MYALGIA OF MUSCLE OF NECK: ICD-10-CM

## 2020-07-29 DIAGNOSIS — M54.2 NECK PAIN: ICD-10-CM

## 2020-07-29 DIAGNOSIS — M50.30 DDD (DEGENERATIVE DISC DISEASE), CERVICAL: ICD-10-CM

## 2020-07-29 DIAGNOSIS — M96.1 FAILED BACK SYNDROME OF LUMBAR SPINE: ICD-10-CM

## 2020-07-29 DIAGNOSIS — M51.16 LUMBAR DISC HERNIATION WITH RADICULOPATHY: Primary | ICD-10-CM

## 2020-07-29 PROCEDURE — 20552 NJX 1/MLT TRIGGER POINT 1/2: CPT

## 2020-07-29 PROCEDURE — 99212 OFFICE O/P EST SF 10 MIN: CPT

## 2020-07-29 PROCEDURE — 99213 OFFICE O/P EST LOW 20 MIN: CPT

## 2020-07-29 PROCEDURE — 99211 OFF/OP EST MAY X REQ PHY/QHP: CPT

## 2020-07-29 RX ORDER — TRAMADOL HYDROCHLORIDE 50 MG/1
TABLET ORAL
Qty: 90 TABLET | Refills: 2 | Status: SHIPPED | OUTPATIENT
Start: 2020-07-29 | End: 2021-02-15

## 2020-07-29 RX ORDER — IBUPROFEN 200 MG
200 TABLET ORAL EVERY 6 HOURS PRN
COMMUNITY
End: 2021-12-01

## 2020-07-29 NOTE — CHRONIC PAIN
[facility]  [facility] Clinic  Follow Up Visit Report       Patient name: Carol Malin 59year old female  : 10/30/1955  MRN: [id number]  Referring MD: Ursula Morgan MD    COMPLAINT:  Patient presents with:   Follow - Up  Patient is complai ESOPHAGOGASTRODUODENOSCOPY (EGD) N/A 11/14/2019    Performed by Sina Corado MD at [facility] ENDOSCOPY   • GLAUCOMA SURGERY     • LAMINECTOMY      Per NG: Disc  displacement   • OSCAR LOCALIZATION WIRE 1 SITE LEFT (CPT=19281)  03/2007   • OTHER SURGICAL HISTO Disp: 90 tablet, Rfl: 2  •  OMEPRAZOLE 40 MG Oral Capsule Delayed Release, TAKE 1 CAPSULE BY MOUTH EVERY DAY, Disp: 90 capsule, Rfl: 1  •  GABAPENTIN 800 MG Oral Tab, TAKE 1 TABLET BY MOUTH TWICE DAILY, Disp: 180 tablet, Rfl: 1  •  ERGOCALCIFEROL 1.25 MG ( strength or swelling   Neuro: no headache or LOC   Psych: no change in personality, affect, or depression   PHYSICAL EXAMINATION:  /68   Pulse 71   General: Alert and oriented x3, NAD, appears stated age, appropriate disposition and demeanor, answers tramadol every 8 hours as needed. If pain is not improving in 1 week would recommend to schedule for cervical epidural steroid injection. Procedure note. Trigger point injection. Sitting position. Sterile preparation with alcohol swabs.   4 trigger p

## 2020-07-29 NOTE — PROGRESS NOTES
Patient presents to [facility] ambulatory for a follow up. She has chronic neck pain radiating to her left shoulder and arm and chronic low back pain that radiates to her right buttock and lateral thigh. She has hx of FBSS.  She takes advil but states she was to

## 2020-08-17 RX ORDER — ERGOCALCIFEROL 1.25 MG/1
CAPSULE ORAL
Qty: 12 CAPSULE | Refills: 0 | Status: SHIPPED | OUTPATIENT
Start: 2020-08-17 | End: 2020-12-30

## 2020-08-20 ENCOUNTER — PATIENT MESSAGE (OUTPATIENT)
Dept: FAMILY MEDICINE CLINIC | Facility: CLINIC | Age: 65
End: 2020-08-20

## 2020-08-20 NOTE — TELEPHONE ENCOUNTER
From: Kimani Gonzalez  To: Kim Lozada MD  Sent: 8/20/2020 12:41 PM CDT  Subject: Other    I like to make an appoiment with Dr Kayla Najera , please , to check my thyroid and my cholesterol . Thanks .

## 2020-08-20 NOTE — TELEPHONE ENCOUNTER
Called patient and she is not currently having any symptoms. Appointment made for Monday 8/24/20 at 10:45 am with , [facility]. Travel screen completed, patient aware she has to wear mask to appointment.

## 2020-08-24 ENCOUNTER — DOCUMENTATION ONLY (OUTPATIENT)
Dept: PAIN CLINIC | Facility: HOSPITAL | Age: 65
End: 2020-08-24

## 2020-08-24 ENCOUNTER — OFFICE VISIT (OUTPATIENT)
Dept: FAMILY MEDICINE CLINIC | Facility: CLINIC | Age: 65
End: 2020-08-24
Payer: MEDICARE

## 2020-08-24 VITALS
DIASTOLIC BLOOD PRESSURE: 68 MMHG | BODY MASS INDEX: 31.03 KG/M2 | HEIGHT: 64.6 IN | SYSTOLIC BLOOD PRESSURE: 96 MMHG | TEMPERATURE: 97 F | HEART RATE: 75 BPM | WEIGHT: 184 LBS

## 2020-08-24 DIAGNOSIS — E03.9 ACQUIRED HYPOTHYROIDISM: ICD-10-CM

## 2020-08-24 DIAGNOSIS — I42.9 PRIMARY CARDIOMYOPATHY (HCC): Primary | ICD-10-CM

## 2020-08-24 DIAGNOSIS — M79.672 PAIN OF BOTH HEELS: ICD-10-CM

## 2020-08-24 DIAGNOSIS — E78.00 HYPERCHOLESTEREMIA: ICD-10-CM

## 2020-08-24 DIAGNOSIS — M79.671 PAIN OF BOTH HEELS: ICD-10-CM

## 2020-08-24 PROCEDURE — G0463 HOSPITAL OUTPT CLINIC VISIT: HCPCS | Performed by: FAMILY MEDICINE

## 2020-08-24 PROCEDURE — 99214 OFFICE O/P EST MOD 30 MIN: CPT | Performed by: FAMILY MEDICINE

## 2020-08-24 RX ORDER — LEVOTHYROXINE SODIUM 112 UG/1
112 TABLET ORAL
Qty: 30 TABLET | Refills: 0 | Status: SHIPPED | OUTPATIENT
Start: 2020-08-24 | End: 2020-10-02

## 2020-08-24 NOTE — PROGRESS NOTES
Procedure code [facility] needed via [facility]   All clinical notes submitted via [facility] web portal   Once approval has been rckevin'd writer will reach out to pt and schedule procedure     Pending auth # [id number]

## 2020-08-24 NOTE — PROGRESS NOTES
HPI:    Patient ID: Gurpreet Castillo is a 59year old female. HPI  Patient presents with:   Follow - Up: on thyroidism   Referral: for cardiologist   Medication Request  Ankle Pain: and heel pain on and off X 3 months worst on left     Patient comes in t Release TAKE 1 CAPSULE BY MOUTH EVERY DAY 90 capsule 1   • GABAPENTIN 800 MG Oral Tab TAKE 1 TABLET BY MOUTH TWICE DAILY 180 tablet 1   • Fluticasone Propionate 50 MCG/ACT Nasal Suspension SHAKE LQ AND U 1 SPR IEN BID     • carvedilol 3.125 MG Oral Tab CARLOS heard.  Pulmonary/Chest: Effort normal and breath sounds normal. She has no wheezes. Musculoskeletal:         General: No tenderness, deformity or edema. Neurological: She is alert and oriented to person, place, and time. Skin: Skin is warm and dry.

## 2020-08-28 ENCOUNTER — HOSPITAL ENCOUNTER (OUTPATIENT)
Dept: GENERAL RADIOLOGY | Facility: HOSPITAL | Age: 65
Discharge: HOME | End: 2020-08-28
Attending: FAMILY MEDICINE
Payer: MEDICARE

## 2020-08-28 ENCOUNTER — TELEPHONE (OUTPATIENT)
Dept: FAMILY MEDICINE CLINIC | Facility: CLINIC | Age: 65
End: 2020-08-28

## 2020-08-28 ENCOUNTER — LAB ENCOUNTER (OUTPATIENT)
Dept: LAB | Facility: HOSPITAL | Age: 65
End: 2020-08-28
Attending: FAMILY MEDICINE
Payer: MEDICARE

## 2020-08-28 DIAGNOSIS — E03.9 ACQUIRED HYPOTHYROIDISM: ICD-10-CM

## 2020-08-28 DIAGNOSIS — M79.672 PAIN OF BOTH HEELS: ICD-10-CM

## 2020-08-28 DIAGNOSIS — M79.671 PAIN OF BOTH HEELS: ICD-10-CM

## 2020-08-28 DIAGNOSIS — E78.00 HYPERCHOLESTEREMIA: ICD-10-CM

## 2020-08-28 LAB
ALBUMIN SERPL-MCNC: 3.5 G/DL (ref 3.4–5)
ALBUMIN/GLOB SERPL: 0.9 {RATIO} (ref 1–2)
ALP LIVER SERPL-CCNC: 78 U/L (ref 50–130)
ALT SERPL-CCNC: 23 U/L (ref 13–56)
ANION GAP SERPL CALC-SCNC: 5 MMOL/L (ref 0–18)
AST SERPL-CCNC: 17 U/L (ref 15–37)
BILIRUB SERPL-MCNC: 0.3 MG/DL (ref 0.1–2)
BUN BLD-MCNC: 10 MG/DL (ref 7–18)
BUN/CREAT SERPL: 12.7 (ref 10–20)
CALCIUM BLD-MCNC: 9.5 MG/DL (ref 8.5–10.1)
CHLORIDE SERPL-SCNC: 106 MMOL/L (ref 98–112)
CHOLEST SMN-MCNC: 203 MG/DL (ref ?–200)
CO2 SERPL-SCNC: 30 MMOL/L (ref 21–32)
CREAT BLD-MCNC: 0.79 MG/DL (ref 0.55–1.02)
GLOBULIN PLAS-MCNC: 3.9 G/DL (ref 2.8–4.4)
GLUCOSE BLD-MCNC: 108 MG/DL (ref 70–99)
HDLC SERPL-MCNC: 58 MG/DL (ref 40–59)
LDLC SERPL CALC-MCNC: 119 MG/DL (ref ?–100)
M PROTEIN MFR SERPL ELPH: 7.4 G/DL (ref 6.4–8.2)
NONHDLC SERPL-MCNC: 145 MG/DL (ref ?–130)
OSMOLALITY SERPL CALC.SUM OF ELEC: 292 MOSM/KG (ref 275–295)
PATIENT FASTING Y/N/NP: YES
POTASSIUM SERPL-SCNC: 4.2 MMOL/L (ref 3.5–5.1)
SODIUM SERPL-SCNC: 141 MMOL/L (ref 136–145)
T4 FREE SERPL-MCNC: 1 NG/DL (ref 0.8–1.7)
TRIGL SERPL-MCNC: 131 MG/DL (ref 30–149)
TSI SER-ACNC: 1.15 MIU/ML (ref 0.36–3.74)
VLDLC SERPL CALC-MCNC: 26 MG/DL (ref 0–30)

## 2020-08-28 PROCEDURE — 73650 X-RAY EXAM OF HEEL: CPT | Performed by: FAMILY MEDICINE

## 2020-08-28 PROCEDURE — 84443 ASSAY THYROID STIM HORMONE: CPT

## 2020-08-28 PROCEDURE — 80061 LIPID PANEL: CPT

## 2020-08-28 PROCEDURE — 84439 ASSAY OF FREE THYROXINE: CPT

## 2020-08-28 PROCEDURE — 36415 COLL VENOUS BLD VENIPUNCTURE: CPT

## 2020-08-28 PROCEDURE — 80053 COMPREHEN METABOLIC PANEL: CPT

## 2020-08-28 NOTE — TELEPHONE ENCOUNTER
Will call lab. Spoke to [place], [id number]. Will add on CMP to today's specimen. Spoke to patient to inform her lab has been added.

## 2020-08-28 NOTE — TELEPHONE ENCOUNTER
Spoke with patient ( verified)--saw Dr. Jocelyne Rodriguez in office 2020--completed labs this morning. Patient concerned because lab didn't draw all labs ordered at 2020 office visit.  \"I don't know why Dr. Jocelyne Rodriguez ordered it that way, but the Complete Met

## 2020-08-31 DIAGNOSIS — R73.9 HYPERGLYCEMIA: Primary | ICD-10-CM

## 2020-09-01 DIAGNOSIS — M79.671 PAIN OF BOTH HEELS: Primary | ICD-10-CM

## 2020-09-01 DIAGNOSIS — M79.672 PAIN OF BOTH HEELS: Primary | ICD-10-CM

## 2020-09-16 ENCOUNTER — OFFICE VISIT (OUTPATIENT)
Dept: PAIN CLINIC | Facility: HOSPITAL | Age: 65
End: 2020-09-16
Attending: ANESTHESIOLOGY
Payer: MEDICARE

## 2020-09-16 VITALS
BODY MASS INDEX: 31.03 KG/M2 | HEIGHT: 64.6 IN | RESPIRATION RATE: 18 BRPM | SYSTOLIC BLOOD PRESSURE: 116 MMHG | WEIGHT: 184 LBS | DIASTOLIC BLOOD PRESSURE: 68 MMHG

## 2020-09-16 DIAGNOSIS — M51.16 LUMBAR DISC HERNIATION WITH RADICULOPATHY: Primary | ICD-10-CM

## 2020-09-16 DIAGNOSIS — M96.1 FAILED BACK SYNDROME OF LUMBAR SPINE: ICD-10-CM

## 2020-09-16 PROCEDURE — 99211 OFF/OP EST MAY X REQ PHY/QHP: CPT

## 2020-09-16 RX ORDER — MELOXICAM 15 MG/1
15 TABLET ORAL DAILY
Qty: 30 TABLET | Refills: 2 | Status: SHIPPED | OUTPATIENT
Start: 2020-09-16 | End: 2020-09-24

## 2020-09-16 NOTE — PROGRESS NOTES
9/16/2020  Presents ambulatory to [facility];  F/u c/o CHRONIC RT MID LBP RADIATING DOWN TO THE LOWER BACK /BUTTOCK & DOWN RT THIGH;  Pt states it has been \"terrible\";   Would like to discuss a surgical referral-\"I have to do something, I can't keep having this

## 2020-09-16 NOTE — CHRONIC PAIN
[facility]  Pain Clinic  Follow Up Visit Report       Patient name: Kimani Gonzalez 59year old female  : 10/30/1955  MRN: [id number]  Referring MD: Marcos Valdes MD    COMPLAINT:  Patient presents with:   Follow - Up: CHRONIC BACK PAIN Nelly Rodríguez MD at [facility] ENDOSCOPY   • GLAUCOMA SURGERY     • LAMINECTOMY      Per NG: Disc  displacement   • OSCAR LOCALIZATION WIRE 1 SITE LEFT (CPT=19281)  03/2007   • OTHER SURGICAL HISTORY  1997    Per NG:arrow angle glaucoma OD; PI Laser OU   • OTHER SURGI Rfl: 3  •  TURMERIC OR, Take by mouth., Disp: , Rfl:   •  Levothyroxine Sodium 112 MCG Oral Tab, Take 1 tablet (112 mcg total) by mouth before breakfast., Disp: 30 tablet, Rfl: 0  •  ERGOCALCIFEROL 1.25 MG (69903 UT) Oral Cap, TAKE 1 CAPSULE BY MOUTH EVERY Neuro: no headache or LOC   Psych: no change in personality, affect, or depression   PHYSICAL EXAMINATION:  /68   Resp 18   Ht 5' 4.6\" (1.641 m)   Wt 184 lb (83.5 kg)   BMI 31.00 kg/m²   General: Alert and oriented x3, NAD, appears stated age, ahsan

## 2020-09-18 ENCOUNTER — DOCUMENTATION ONLY (OUTPATIENT)
Dept: PAIN CLINIC | Facility: HOSPITAL | Age: 65
End: 2020-09-18

## 2020-09-18 NOTE — PROGRESS NOTES
Procedure code 85684--02/92/81 PENDING APPROVAL     [employer] needed via [facility] web portal   Case started all clinicals submitted via [facility] web portal     Pending case # [id number]    Once approval has been rcv'd writer will fax order form over to the surgery cent

## 2020-09-21 ENCOUNTER — TELEPHONE (OUTPATIENT)
Dept: FAMILY MEDICINE CLINIC | Facility: CLINIC | Age: 65
End: 2020-09-21

## 2020-09-21 NOTE — TELEPHONE ENCOUNTER
Patient requesting to know if [place] [facility] has the flu vaccination yet. Transferred to hospitals to schedule.

## 2020-09-22 ENCOUNTER — MED REC SCAN ONLY (OUTPATIENT)
Dept: FAMILY MEDICINE CLINIC | Facility: CLINIC | Age: 65
End: 2020-09-22

## 2020-09-22 ENCOUNTER — IMMUNIZATION (OUTPATIENT)
Dept: FAMILY MEDICINE CLINIC | Facility: CLINIC | Age: 65
End: 2020-09-22
Payer: MEDICARE

## 2020-09-22 DIAGNOSIS — Z23 NEED FOR VACCINATION: ICD-10-CM

## 2020-09-22 PROCEDURE — G0008 ADMIN INFLUENZA VIRUS VAC: HCPCS | Performed by: FAMILY MEDICINE

## 2020-09-22 PROCEDURE — 90686 IIV4 VACC NO PRSV 0.5 ML IM: CPT | Performed by: FAMILY MEDICINE

## 2020-09-23 ENCOUNTER — NURSE TRIAGE (OUTPATIENT)
Dept: FAMILY MEDICINE CLINIC | Facility: CLINIC | Age: 65
End: 2020-09-23

## 2020-09-23 ENCOUNTER — DOCUMENTATION ONLY (OUTPATIENT)
Dept: PAIN CLINIC | Facility: HOSPITAL | Age: 65
End: 2020-09-23

## 2020-09-23 NOTE — TELEPHONE ENCOUNTER
Action Requested: Summary for Provider     []  Critical Lab, Recommendations Needed  [] Need Additional Advice  []   FYI    []   Need Orders  [] Need Medications Sent to Pharmacy  []  Other     SUMMARY: Per protocol advised : OV   Has appt tomorrow   Futur

## 2020-09-23 NOTE — PROGRESS NOTES
Procedure code 66788--[id number]    [facility]--auth rcv'd from [address] [place] [address] [place] # [id number]  Valid from 09/21/20-12/07/20      Order form faxed to the surgery center, confirmation rcv'd

## 2020-09-24 ENCOUNTER — OFFICE VISIT (OUTPATIENT)
Dept: FAMILY MEDICINE CLINIC | Facility: CLINIC | Age: 65
End: 2020-09-24
Payer: MEDICARE

## 2020-09-24 ENCOUNTER — LAB ENCOUNTER (OUTPATIENT)
Dept: LAB | Age: 65
End: 2020-09-24
Attending: FAMILY MEDICINE
Payer: MEDICARE

## 2020-09-24 VITALS
DIASTOLIC BLOOD PRESSURE: 67 MMHG | TEMPERATURE: 97 F | OXYGEN SATURATION: 97 % | HEIGHT: 64.6 IN | HEART RATE: 70 BPM | SYSTOLIC BLOOD PRESSURE: 101 MMHG | WEIGHT: 184 LBS | BODY MASS INDEX: 31.03 KG/M2

## 2020-09-24 DIAGNOSIS — R73.9 HYPERGLYCEMIA: ICD-10-CM

## 2020-09-24 DIAGNOSIS — M26.609 TMJ (TEMPOROMANDIBULAR JOINT SYNDROME): ICD-10-CM

## 2020-09-24 DIAGNOSIS — H54.7 DECREASED VISION: ICD-10-CM

## 2020-09-24 DIAGNOSIS — R51.9 LEFT-SIDED HEADACHE: ICD-10-CM

## 2020-09-24 DIAGNOSIS — R51.9 LEFT-SIDED HEADACHE: Primary | ICD-10-CM

## 2020-09-24 LAB
BASOPHILS # BLD AUTO: 0.05 X10(3) UL (ref 0–0.2)
BASOPHILS NFR BLD AUTO: 0.8 %
CRP SERPL-MCNC: <0.29 MG/DL (ref ?–0.3)
DEPRECATED RDW RBC AUTO: 44.2 FL (ref 35.1–46.3)
EOSINOPHIL # BLD AUTO: 0.12 X10(3) UL (ref 0–0.7)
EOSINOPHIL NFR BLD AUTO: 1.8 %
ERYTHROCYTE [DISTWIDTH] IN BLOOD BY AUTOMATED COUNT: 14.1 % (ref 11–15)
ERYTHROCYTE [SEDIMENTATION RATE] IN BLOOD: 12 MM/HR
EST. AVERAGE GLUCOSE BLD GHB EST-MCNC: 120 MG/DL (ref 68–126)
HBA1C MFR BLD HPLC: 5.8 % (ref ?–5.7)
HCT VFR BLD AUTO: 41.2 %
HGB BLD-MCNC: 13.5 G/DL
IMM GRANULOCYTES # BLD AUTO: 0.02 X10(3) UL (ref 0–1)
IMM GRANULOCYTES NFR BLD: 0.3 %
LYMPHOCYTES # BLD AUTO: 1.95 X10(3) UL (ref 1–4)
LYMPHOCYTES NFR BLD AUTO: 30 %
MCH RBC QN AUTO: 28.2 PG (ref 26–34)
MCHC RBC AUTO-ENTMCNC: 32.8 G/DL (ref 31–37)
MCV RBC AUTO: 86 FL
MONOCYTES # BLD AUTO: 0.54 X10(3) UL (ref 0.1–1)
MONOCYTES NFR BLD AUTO: 8.3 %
NEUTROPHILS # BLD AUTO: 3.81 X10 (3) UL (ref 1.5–7.7)
NEUTROPHILS # BLD AUTO: 3.81 X10(3) UL (ref 1.5–7.7)
NEUTROPHILS NFR BLD AUTO: 58.8 %
PLATELET # BLD AUTO: 311 10(3)UL (ref 150–450)
RBC # BLD AUTO: 4.79 X10(6)UL
WBC # BLD AUTO: 6.5 X10(3) UL (ref 4–11)

## 2020-09-24 PROCEDURE — 83036 HEMOGLOBIN GLYCOSYLATED A1C: CPT

## 2020-09-24 PROCEDURE — 36415 COLL VENOUS BLD VENIPUNCTURE: CPT

## 2020-09-24 PROCEDURE — 85025 COMPLETE CBC W/AUTO DIFF WBC: CPT

## 2020-09-24 PROCEDURE — 85652 RBC SED RATE AUTOMATED: CPT

## 2020-09-24 PROCEDURE — 99214 OFFICE O/P EST MOD 30 MIN: CPT | Performed by: FAMILY MEDICINE

## 2020-09-24 PROCEDURE — 86140 C-REACTIVE PROTEIN: CPT

## 2020-09-24 PROCEDURE — G0463 HOSPITAL OUTPT CLINIC VISIT: HCPCS | Performed by: FAMILY MEDICINE

## 2020-09-24 RX ORDER — METHYLPREDNISOLONE 4 MG/1
TABLET ORAL
COMMUNITY
Start: 2020-09-08 | End: 2021-02-15

## 2020-09-24 NOTE — PROGRESS NOTES
HPI:    Patient ID: Juice Tran is a 59year old female.     HPI  Patient presents with:  Headache: X 7 days   Referral: for Dr. Johan Rose opthalmologist     Patient feels she is not able to see as well and would like referral to see her ophthalmologist 12 capsule 0   • ibuprofen 200 MG Oral Tab Take 200 mg by mouth every 6 (six) hours as needed for Pain.      • traMADol HCl 50 MG Oral Tab TK 1 T PO  Q 8  H PRN 90 tablet 2   • OMEPRAZOLE 40 MG Oral Capsule Delayed Release TAKE 1 CAPSULE BY MOUTH EVERY DAY Pulmonary/Chest: Effort normal.              ASSESSMENT/PLAN:   Left-sided headache  (primary encounter diagnosis)  Tmj (temporomandibular joint syndrome)  Decreased vision    1.  Left-sided headache  Suspect tmj   - CBC WITH DIFFERENTIAL WITH PLATELET; F

## 2020-10-01 ENCOUNTER — PATIENT MESSAGE (OUTPATIENT)
Dept: FAMILY MEDICINE CLINIC | Facility: CLINIC | Age: 65
End: 2020-10-01

## 2020-10-01 DIAGNOSIS — E03.9 ACQUIRED HYPOTHYROIDISM: ICD-10-CM

## 2020-10-02 NOTE — TELEPHONE ENCOUNTER
From: Ivette Mak  To: Leticia Che MD  Sent: 10/1/2020 7:40 PM CDT  Subject: Prescription Question    Dear Dr Lizzie Che , you only gave me one refill for the thyroid medicine, waiting for the blood result , now I need the prescription for 90 days supply

## 2020-10-02 NOTE — TELEPHONE ENCOUNTER
Doctor, please see pt message below and advise.  Levothyroxine 112 mcg last filled 8/24/2020    Med pended for review

## 2020-10-03 RX ORDER — LEVOTHYROXINE SODIUM 112 UG/1
112 TABLET ORAL
Qty: 90 TABLET | Refills: 3 | Status: SHIPPED | OUTPATIENT
Start: 2020-10-03 | End: 2021-07-01

## 2020-10-27 ENCOUNTER — NURSE TRIAGE (OUTPATIENT)
Dept: FAMILY MEDICINE CLINIC | Facility: CLINIC | Age: 65
End: 2020-10-27

## 2020-10-27 NOTE — TELEPHONE ENCOUNTER
Action Requested: Summary for Provider     []  Critical Lab, Recommendations Needed  [] Need Additional Advice  []   FYI    []   Need Orders  [] Need Medications Sent to Pharmacy  []  Other     SUMMARY: pt c/o nasal congestion for the past 2 weeks.   She in

## 2020-10-28 ENCOUNTER — TELEMEDICINE (OUTPATIENT)
Dept: FAMILY MEDICINE CLINIC | Facility: CLINIC | Age: 65
End: 2020-10-28
Payer: MEDICARE

## 2020-10-28 VITALS — BODY MASS INDEX: 31.03 KG/M2 | WEIGHT: 184 LBS | HEIGHT: 64.6 IN

## 2020-10-28 DIAGNOSIS — J30.2 SEASONAL ALLERGIES: ICD-10-CM

## 2020-10-28 DIAGNOSIS — R09.81 SINUS CONGESTION: Primary | ICD-10-CM

## 2020-10-28 DIAGNOSIS — J45.909 ASTHMA DUE TO SEASONAL ALLERGIES: ICD-10-CM

## 2020-10-28 PROCEDURE — 99213 OFFICE O/P EST LOW 20 MIN: CPT | Performed by: STUDENT IN AN ORGANIZED HEALTH CARE EDUCATION/TRAINING PROGRAM

## 2020-10-28 RX ORDER — AZITHROMYCIN 250 MG/1
500 TABLET, FILM COATED ORAL DAILY
Qty: 6 TABLET | Refills: 0 | Status: SHIPPED | OUTPATIENT
Start: 2020-10-28 | End: 2020-10-31

## 2020-10-28 RX ORDER — ALBUTEROL SULFATE 90 UG/1
2 AEROSOL, METERED RESPIRATORY (INHALATION) EVERY 4 HOURS PRN
Qty: 1 INHALER | Refills: 2 | Status: SHIPPED | OUTPATIENT
Start: 2020-10-28 | End: 2021-06-16

## 2020-10-28 NOTE — PROGRESS NOTES
Virtual Telephone Check-In    Juice Tran verbally consents to a Virtual/Telephone Check-In visit on 10/28/20. Patient has been referred to the [facility] website at [email or web address] to review the yearly Consent to Treat document.     Patient unders are ordered as detailed in the plan of care above. Coding/billing information is submitted for this visit based on complexity of care and/or time spent for the visit. HPI:    Patient ID: Federico Saucedo is a 59year old female.     HPI  Pt presenting v Oral Cap TAKE 1 CAPSULE BY MOUTH EVERY WEEK 12 capsule 0   • OMEPRAZOLE 40 MG Oral Capsule Delayed Release TAKE 1 CAPSULE BY MOUTH EVERY DAY 90 capsule 1   • GABAPENTIN 800 MG Oral Tab TAKE 1 TABLET BY MOUTH TWICE DAILY 180 tablet 1   • Fluticasone Propion reaction(s): Vomiting    10/28/20  0991   Weight: 184 lb (83.5 kg)   Height: 5' 4.6\" (1.641 m)       Body mass index is 31 kg/m².    PHYSICAL EXAM:   Physical Exam   Vitals signs taken at home if available:     Limited examination for this telephone visit Sig: Take 2 tablets (500 mg total) by mouth daily for 3 doses.        Imaging & Referrals:  None         TZ#[id number]

## 2020-11-02 ENCOUNTER — TELEPHONE (OUTPATIENT)
Dept: FAMILY MEDICINE CLINIC | Facility: CLINIC | Age: 65
End: 2020-11-02

## 2020-11-02 ENCOUNTER — APPOINTMENT (OUTPATIENT)
Dept: GENERAL RADIOLOGY | Facility: HOSPITAL | Age: 65
End: 2020-11-02
Payer: MEDICARE

## 2020-11-02 ENCOUNTER — HOSPITAL ENCOUNTER (EMERGENCY)
Facility: HOSPITAL | Age: 65
Discharge: HOME | End: 2020-11-02
Attending: EMERGENCY MEDICINE
Payer: MEDICARE

## 2020-11-02 VITALS
HEART RATE: 73 BPM | OXYGEN SATURATION: 94 % | TEMPERATURE: 99 F | BODY MASS INDEX: 31.32 KG/M2 | WEIGHT: 188 LBS | DIASTOLIC BLOOD PRESSURE: 75 MMHG | HEIGHT: 65 IN | SYSTOLIC BLOOD PRESSURE: 117 MMHG | RESPIRATION RATE: 20 BRPM

## 2020-11-02 DIAGNOSIS — Z20.822 COVID-19 VIRUS TEST RESULT UNKNOWN: ICD-10-CM

## 2020-11-02 DIAGNOSIS — J06.9 VIRAL UPPER RESPIRATORY TRACT INFECTION: Primary | ICD-10-CM

## 2020-11-02 PROCEDURE — 85025 COMPLETE CBC W/AUTO DIFF WBC: CPT | Performed by: EMERGENCY MEDICINE

## 2020-11-02 PROCEDURE — 71045 X-RAY EXAM CHEST 1 VIEW: CPT | Performed by: EMERGENCY MEDICINE

## 2020-11-02 PROCEDURE — 36415 COLL VENOUS BLD VENIPUNCTURE: CPT

## 2020-11-02 PROCEDURE — 93010 ELECTROCARDIOGRAM REPORT: CPT | Performed by: EMERGENCY MEDICINE

## 2020-11-02 PROCEDURE — 93005 ELECTROCARDIOGRAM TRACING: CPT

## 2020-11-02 PROCEDURE — 99284 EMERGENCY DEPT VISIT MOD MDM: CPT

## 2020-11-02 PROCEDURE — 85025 COMPLETE CBC W/AUTO DIFF WBC: CPT

## 2020-11-02 PROCEDURE — 80048 BASIC METABOLIC PNL TOTAL CA: CPT | Performed by: EMERGENCY MEDICINE

## 2020-11-02 PROCEDURE — 80048 BASIC METABOLIC PNL TOTAL CA: CPT

## 2020-11-02 RX ORDER — BENZONATATE 100 MG/1
100 CAPSULE ORAL 3 TIMES DAILY PRN
Qty: 30 CAPSULE | Refills: 0 | Status: SHIPPED | OUTPATIENT
Start: 2020-11-02 | End: 2020-12-02

## 2020-11-02 NOTE — TELEPHONE ENCOUNTER
Advised if any struggle to breath, if chest tight at rest, to go to ER, have someone drive to ER; patient verbalized understanding, will go to [address] [place] [address]. Televisit 10/28/2020. Patient finished antibiotics.  She feels worse, \"bad today\" than 10/28/2020, afte

## 2020-11-02 NOTE — ED INITIAL ASSESSMENT (HPI)
Pt to ED with c/o cough/cold symptom x 1 week, now also c/o fatigue, chest tightness and FUAD. Pt state she's gotten some meds from PMD w/o improvement. Pt has not been tested for COVID yet.

## 2020-11-03 NOTE — TELEPHONE ENCOUNTER
Patient contacted. covid test pending. Patient has dry cough. No fever. Went over home supportive care. Er prescribed benzonatate. Patient to f/u and view mycgoodideazst for results. Call back if symptoms worsen.

## 2020-11-03 NOTE — ED PROVIDER NOTES
Patient Seen in: [facility] AND [facility] Emergency Department    History   Patient presents with:  Fatigue      HPI    Patient presents to the ED complaining of a cough for the past week with associated fatigue, chest tightness, shortness of breath and body ac SURGICAL HISTORY      Per NG: Arthrocentesis   • OTHER SURGICAL HISTORY      Per NG: arthrocentesis of the knee joint         Medications :  (Not in a hospital admission)       Family History   Problem Relation Age of Onset   • Depression Mother    • Neuro infection transmission during my interaction with the patient were taken. The patient was already wearing a droplet mask on my arrival to the room.  Personal protective equipment including droplet mask, eye protection, and gloves were worn throughout the du Please view results for these tests on the individual orders.    RAINBOW DRAW BLUE   RAINBOW DRAW LAVENDER   RAINBOW DRAW LIGHT GREEN   RAINBOW DRAW GOLD   SARS-COV-2 BY PCR ()   CBC W/ DIFFERENTIAL     EKG    Rate, intervals and axes as noted time.    Additional verbal instructions and return precautions were discussed with the patient and/or caregiver.     Condition upon leaving the department: Stable    Disposition and Plan     Clinical Impression:  Viral upper respiratory tract infection  (pr

## 2020-11-04 ENCOUNTER — TELEMEDICINE (OUTPATIENT)
Dept: FAMILY MEDICINE CLINIC | Facility: CLINIC | Age: 65
End: 2020-11-04
Payer: MEDICARE

## 2020-11-04 ENCOUNTER — TELEPHONE (OUTPATIENT)
Dept: FAMILY MEDICINE CLINIC | Facility: CLINIC | Age: 65
End: 2020-11-04

## 2020-11-04 VITALS — HEIGHT: 65 IN | BODY MASS INDEX: 31.32 KG/M2 | WEIGHT: 188 LBS

## 2020-11-04 DIAGNOSIS — R06.02 SHORTNESS OF BREATH: Primary | ICD-10-CM

## 2020-11-04 DIAGNOSIS — J02.9 SORE THROAT: ICD-10-CM

## 2020-11-04 PROCEDURE — 99213 OFFICE O/P EST LOW 20 MIN: CPT | Performed by: STUDENT IN AN ORGANIZED HEALTH CARE EDUCATION/TRAINING PROGRAM

## 2020-11-04 RX ORDER — METHYLPREDNISOLONE 4 MG/1
TABLET ORAL
Qty: 1 KIT | Refills: 0 | Status: SHIPPED | OUTPATIENT
Start: 2020-11-04 | End: 2021-06-16

## 2020-11-04 NOTE — PROGRESS NOTES
Virtual Telephone Check-In    Carol Malin verbally consents to a Virtual/Telephone Check-In visit on 11/04/20. Patient has been referred to the [facility] website at [email or web address] to review the yearly Consent to Treat document.     Patient unders tests are ordered as detailed in the plan of care above. Coding/billing information is submitted for this visit based on complexity of care and/or time spent for the visit. HPI:    Patient ID: Halley Hanna is a 72year old female.     HPI  Pt ginette ibuprofen 200 MG Oral Tab Take 200 mg by mouth every 6 (six) hours as needed for Pain.      • OMEPRAZOLE 40 MG Oral Capsule Delayed Release TAKE 1 CAPSULE BY MOUTH EVERY DAY 90 capsule 1   • GABAPENTIN 800 MG Oral Tab TAKE 1 TABLET BY MOUTH TWICE DAILY 180 coronavirus emergency     Patient was speaking in complete sentences, no increased work of breathing and very coherent and alert on the phone. Alert and oriented x 3  Patient was responding to questions appropriately.   Patient did not appear short of anastasia

## 2020-11-04 NOTE — TELEPHONE ENCOUNTER
Spoke with patient ( verified)--reports she was seen in ER 2020--\"they didn't even look in my throat. I had bronchitis last year so bad, it lasted 6 weeks. My ears are congested and I have a bad cough--I feel miserable.  They didn't give me antibi

## 2020-11-05 ENCOUNTER — TELEPHONE (OUTPATIENT)
Dept: FAMILY MEDICINE CLINIC | Facility: CLINIC | Age: 65
End: 2020-11-05

## 2020-11-05 DIAGNOSIS — J02.9 SORE THROAT: Primary | ICD-10-CM

## 2020-11-05 RX ORDER — AMOXICILLIN 500 MG/1
1000 TABLET, FILM COATED ORAL DAILY
Qty: 14 TABLET | Refills: 0 | Status: SHIPPED | OUTPATIENT
Start: 2020-11-05 | End: 2020-11-12

## 2020-11-05 NOTE — TELEPHONE ENCOUNTER
Pt informed of MD message. Pt verb understanding. Instructed pt to call back if symptoms do not improve or worsen.

## 2020-11-05 NOTE — TELEPHONE ENCOUNTER
Patient stated her COVID-19 test was negative. States she was advised to notify you and that if it was negative, you would prescribe an antibiotic. Please advise.   Thank you

## 2020-12-12 ENCOUNTER — NURSE TRIAGE (OUTPATIENT)
Dept: FAMILY MEDICINE CLINIC | Facility: CLINIC | Age: 65
End: 2020-12-12

## 2020-12-12 NOTE — TELEPHONE ENCOUNTER
Action Requested: Summary for Provider     []  Critical Lab, Recommendations Needed  [] Need Additional Advice  []   FYI    []   Need Orders  [] Need Medications Sent to Pharmacy  []  Other     SUMMARY: left lateral knee pain for 10 days.   Is now having di

## 2020-12-15 ENCOUNTER — HOSPITAL ENCOUNTER (OUTPATIENT)
Dept: ULTRASOUND IMAGING | Facility: HOSPITAL | Age: 65
Discharge: HOME | End: 2020-12-15
Attending: PHYSICIAN ASSISTANT
Payer: COMMERCIAL

## 2020-12-15 ENCOUNTER — OFFICE VISIT (OUTPATIENT)
Dept: FAMILY MEDICINE CLINIC | Facility: CLINIC | Age: 65
End: 2020-12-15
Payer: MEDICARE

## 2020-12-15 VITALS
RESPIRATION RATE: 18 BRPM | WEIGHT: 194 LBS | DIASTOLIC BLOOD PRESSURE: 67 MMHG | HEIGHT: 65 IN | HEART RATE: 77 BPM | SYSTOLIC BLOOD PRESSURE: 104 MMHG | BODY MASS INDEX: 32.32 KG/M2

## 2020-12-15 DIAGNOSIS — M79.605 LEFT LEG PAIN: ICD-10-CM

## 2020-12-15 DIAGNOSIS — M79.605 LEFT LEG PAIN: Primary | ICD-10-CM

## 2020-12-15 PROCEDURE — 3008F BODY MASS INDEX DOCD: CPT | Performed by: PHYSICIAN ASSISTANT

## 2020-12-15 PROCEDURE — 93971 EXTREMITY STUDY: CPT | Performed by: PHYSICIAN ASSISTANT

## 2020-12-15 PROCEDURE — 3074F SYST BP LT 130 MM HG: CPT | Performed by: PHYSICIAN ASSISTANT

## 2020-12-15 PROCEDURE — 99213 OFFICE O/P EST LOW 20 MIN: CPT | Performed by: PHYSICIAN ASSISTANT

## 2020-12-15 PROCEDURE — 3078F DIAST BP <80 MM HG: CPT | Performed by: PHYSICIAN ASSISTANT

## 2020-12-15 NOTE — PROGRESS NOTES
HPI:    Patient ID: Lorraine Sam is a 72year old female. Patient presents for left leg pain for the past 10 days. Pain is located below the knee on the lateral side of the leg. She did have swelling, but that is better now. No fever/chills.  No numb • GABAPENTIN 800 MG Oral Tab TAKE 1 TABLET BY MOUTH TWICE DAILY 180 tablet 1   • Fluticasone Propionate 50 MCG/ACT Nasal Suspension SHAKE LQ AND U 1 SPR IEN BID     • Turmeric, Curcuma Longa, (CURCUMIN) Does not apply Powder Take by mouth.      • Ondansetro Pulmonary/Chest: Effort normal and breath sounds normal. She has no wheezes. She has no rales. Musculoskeletal: Normal range of motion. She exhibits tenderness (left leg medial side from back below knee and wraps around to the front. ).  She exhibits no eff

## 2020-12-18 ENCOUNTER — TELEPHONE (OUTPATIENT)
Dept: FAMILY MEDICINE CLINIC | Facility: CLINIC | Age: 65
End: 2020-12-18

## 2020-12-18 ENCOUNTER — TELEMEDICINE (OUTPATIENT)
Dept: FAMILY MEDICINE CLINIC | Facility: CLINIC | Age: 65
End: 2020-12-18
Payer: MEDICARE

## 2020-12-18 DIAGNOSIS — R09.81 NASAL CONGESTION: ICD-10-CM

## 2020-12-18 DIAGNOSIS — I83.92 VARICOSE VEINS OF LEFT LOWER EXTREMITY, UNSPECIFIED WHETHER COMPLICATED: Primary | ICD-10-CM

## 2020-12-18 PROCEDURE — 99213 OFFICE O/P EST LOW 20 MIN: CPT | Performed by: PHYSICIAN ASSISTANT

## 2020-12-18 NOTE — PROGRESS NOTES
Please note that the following visit was completed using two-way, real-time interactive audio and/or video communication.   This has been done in good anneliese to provide continuity of care in the best interest of the provider-patient relationship, due to the was made aware of where to find [facility] notice of privacy practices, telehealth consent form and other related consent forms and documents. which are located on the [facility] website.  The patient verbally agreed to telehealth consent form, related consents and th Inhalation Aero Soln Inhale 2 puffs into the lungs every 4 (four) hours as needed for Wheezing.  1 Inhaler 2   • Levothyroxine Sodium 112 MCG Oral Tab Take 1 tablet (112 mcg total) by mouth before breakfast. 90 tablet 3   • methylPREDNISolone 4 MG Oral Tabl Vomiting  Hydrocodone             NAUSEA AND VOMITING  Hydrocodone-Ibuprof*        Comment:Other reaction(s): Vomiting  Pregabalin                  Comment:Other reaction(s): Stomach discomforts  Vicodin Tuss [[place]*    NAUSEA AND VOMITING, NAUSEA ONLY (Patient not taking: Reported on 10/28/2020 ), Disp: 30 tablet, Rfl: 0  •  triamcinolone acetonide 0.1 % External Cream, Apply topically 3 (three) times daily.  (Patient not taking: Reported on 10/28/2020 ), Disp: 80 g, Rfl: 0  •  CYCLOBENZAPRINE HCL 5 MG O following:  -Told to see vascular surgery  -Placed referral for patient.   -Pain is likely related to varicose veins  -To call or follow-up with worsening symptoms or concerns.   -Pt was agreeable to plan and will comply with discussion above.      - VASCUL

## 2020-12-18 NOTE — TELEPHONE ENCOUNTER
Patient called, was in office 12/15 and had negative US of left leg    States while laying down with heating pad and after taking Advil, she has no pain   She complains that when up and about, she starts limping.  She would not give number to pain but did s

## 2020-12-30 RX ORDER — ERGOCALCIFEROL 1.25 MG/1
50000 CAPSULE ORAL WEEKLY
Qty: 12 CAPSULE | Refills: 3 | Status: SHIPPED | OUTPATIENT
Start: 2020-12-30 | End: 2021-12-06

## 2020-12-31 RX ORDER — GABAPENTIN 800 MG/1
TABLET ORAL
Qty: 180 TABLET | Refills: 1 | Status: SHIPPED | OUTPATIENT
Start: 2020-12-31 | End: 2021-07-12

## 2021-01-27 ENCOUNTER — TELEPHONE (OUTPATIENT)
Dept: FAMILY MEDICINE CLINIC | Facility: CLINIC | Age: 66
End: 2021-01-27

## 2021-02-03 DIAGNOSIS — Z23 NEED FOR VACCINATION: ICD-10-CM

## 2021-02-08 ENCOUNTER — NURSE TRIAGE (OUTPATIENT)
Dept: FAMILY MEDICINE CLINIC | Facility: CLINIC | Age: 66
End: 2021-02-08

## 2021-02-08 ENCOUNTER — PATIENT MESSAGE (OUTPATIENT)
Dept: FAMILY MEDICINE CLINIC | Facility: CLINIC | Age: 66
End: 2021-02-08

## 2021-02-08 NOTE — TELEPHONE ENCOUNTER
From: Jennifer Irizarry  To: Corbin Vance MD  Sent: 2/8/2021 2:04 PM CST  Subject: Other    Dr Amber Vance , I have being with severe pain in my neck , back and leg , I need and MRI of my neck and a CT scan of my back , before I seen and Specialist . I have not h

## 2021-02-08 NOTE — TELEPHONE ENCOUNTER
Action Requested: Summary for Provider     []  Critical Lab, Recommendations Needed  [x] Need Additional Advice  []   FYI    []   Need Orders  [] Need Medications Sent to Pharmacy  []  Other     SUMMARY: Patient complains of pain in lower back and neck ivone

## 2021-02-08 NOTE — TELEPHONE ENCOUNTER
Routed to the triage pool for RN follow-up.       ----- Message from Rashaun Ayala sent at 2/8/2021  2:04 PM CST -----  Regarding: Other  Contact: [phone]  Dr Erica Schneider , I have being with severe pain in my neck , back and leg , I need and MRI of my ne

## 2021-02-09 NOTE — TELEPHONE ENCOUNTER
Dr Kem Levers, [facility]. Advised patient on Dr. Melba Zhang information and recommendations. Patient verbalized understanding. She wants to talk with Dr Devin Jesus about the Pain Clinic at the appointment, as she said they do not want to give her any more shots.

## 2021-02-10 ENCOUNTER — IMMUNIZATION (OUTPATIENT)
Dept: LAB | Facility: HOSPITAL | Age: 66
End: 2021-02-10
Attending: HOSPITALIST
Payer: MEDICARE

## 2021-02-10 DIAGNOSIS — Z23 NEED FOR VACCINATION: Primary | ICD-10-CM

## 2021-02-10 PROCEDURE — 0011A SARSCOV2 VAC 100MCG/0.5ML IM: CPT

## 2021-02-15 ENCOUNTER — OFFICE VISIT (OUTPATIENT)
Dept: FAMILY MEDICINE CLINIC | Facility: CLINIC | Age: 66
End: 2021-02-15
Payer: MEDICARE

## 2021-02-15 VITALS
OXYGEN SATURATION: 96 % | SYSTOLIC BLOOD PRESSURE: 113 MMHG | HEIGHT: 65 IN | HEART RATE: 81 BPM | WEIGHT: 195 LBS | TEMPERATURE: 97 F | DIASTOLIC BLOOD PRESSURE: 73 MMHG | BODY MASS INDEX: 32.49 KG/M2

## 2021-02-15 DIAGNOSIS — M50.30 DDD (DEGENERATIVE DISC DISEASE), CERVICAL: ICD-10-CM

## 2021-02-15 DIAGNOSIS — M54.2 NECK PAIN ON LEFT SIDE: ICD-10-CM

## 2021-02-15 DIAGNOSIS — R20.2 BILATERAL LEG PARESTHESIA: ICD-10-CM

## 2021-02-15 DIAGNOSIS — M54.2 CHRONIC NECK AND BACK PAIN: ICD-10-CM

## 2021-02-15 DIAGNOSIS — M54.16 LUMBAR RADICULAR SYNDROME: ICD-10-CM

## 2021-02-15 DIAGNOSIS — G89.29 CHRONIC LOW BACK PAIN WITH RIGHT-SIDED SCIATICA, UNSPECIFIED BACK PAIN LATERALITY: Primary | ICD-10-CM

## 2021-02-15 DIAGNOSIS — G89.29 CHRONIC NECK AND BACK PAIN: ICD-10-CM

## 2021-02-15 DIAGNOSIS — M54.9 CHRONIC NECK AND BACK PAIN: ICD-10-CM

## 2021-02-15 DIAGNOSIS — M54.41 CHRONIC LOW BACK PAIN WITH RIGHT-SIDED SCIATICA, UNSPECIFIED BACK PAIN LATERALITY: Primary | ICD-10-CM

## 2021-02-15 PROCEDURE — 99214 OFFICE O/P EST MOD 30 MIN: CPT | Performed by: FAMILY MEDICINE

## 2021-02-15 RX ORDER — AZITHROMYCIN 250 MG/1
TABLET, FILM COATED ORAL
COMMUNITY
Start: 2021-01-05 | End: 2021-02-15 | Stop reason: ALTCHOICE

## 2021-02-15 RX ORDER — TRAMADOL HYDROCHLORIDE 50 MG/1
TABLET ORAL
Qty: 90 TABLET | Refills: 2 | Status: SHIPPED | OUTPATIENT
Start: 2021-02-15 | End: 2021-08-07

## 2021-02-15 NOTE — PROGRESS NOTES
HPI:    Patient ID: Chandrakant Valera is a 72year old female.     HPI  Patient presents with:  Back Pain  Neck Pain: pain starts at the back of the had and also feels a bump on left side of neck   Medication Request: tramadol    Patient here to get an opini for Wheezing.  1 Inhaler 2   • Levothyroxine Sodium 112 MCG Oral Tab Take 1 tablet (112 mcg total) by mouth before breakfast. 90 tablet 3   • carvedilol 3.125 MG Oral Tab TAKE 1 TABLET(3.125 MG) BY MOUTH TWICE DAILY WITH MEALS 180 tablet 3   • Rosuvastatin Pulmonary/Chest: Effort normal and breath sounds normal.   Lymphadenopathy:     She has no cervical adenopathy.               ASSESSMENT/PLAN:   Chronic low back pain with right-sided sciatica, unspecified back pain laterality  (primary encounter diagnosi

## 2021-03-02 ENCOUNTER — HOSPITAL ENCOUNTER (OUTPATIENT)
Dept: ULTRASOUND IMAGING | Age: 66
Discharge: HOME | End: 2021-03-02
Attending: FAMILY MEDICINE
Payer: COMMERCIAL

## 2021-03-02 DIAGNOSIS — M54.2 NECK PAIN ON LEFT SIDE: ICD-10-CM

## 2021-03-02 PROCEDURE — 76536 US EXAM OF HEAD AND NECK: CPT | Performed by: FAMILY MEDICINE

## 2021-03-10 ENCOUNTER — IMMUNIZATION (OUTPATIENT)
Dept: LAB | Facility: HOSPITAL | Age: 66
End: 2021-03-10
Attending: EMERGENCY MEDICINE
Payer: MEDICARE

## 2021-03-10 DIAGNOSIS — Z23 NEED FOR VACCINATION: Primary | ICD-10-CM

## 2021-03-10 PROCEDURE — 0012A SARSCOV2 VAC 100MCG/0.5ML IM: CPT

## 2021-05-08 ENCOUNTER — NURSE TRIAGE (OUTPATIENT)
Dept: FAMILY MEDICINE CLINIC | Facility: CLINIC | Age: 66
End: 2021-05-08

## 2021-05-08 DIAGNOSIS — R10.9 STOMACH ACHE: Primary | ICD-10-CM

## 2021-05-08 NOTE — TELEPHONE ENCOUNTER
Action Requested: Summary for Provider     []  Critical Lab, Recommendations Needed  [] Need Additional Advice  []   FYI    []   Need Orders  [] Need Medications Sent to Pharmacy  []  Other     [facility] appt for eval, requesting referral to see GI

## 2021-05-08 NOTE — TELEPHONE ENCOUNTER
Agree with ER evaluation if she is having severe pain, she would anyway have to see her PCP as it does not appear that this is something that she has discussed and should be evaluated by her provider before a referral can be placed

## 2021-05-10 NOTE — TELEPHONE ENCOUNTER
Agree emergency room eval.  If pain unbearable in ER otherwise schedule office visit for abdominal pain

## 2021-06-16 ENCOUNTER — OFFICE VISIT (OUTPATIENT)
Dept: FAMILY MEDICINE CLINIC | Facility: CLINIC | Age: 66
End: 2021-06-16
Payer: MEDICARE

## 2021-06-16 VITALS
DIASTOLIC BLOOD PRESSURE: 70 MMHG | TEMPERATURE: 99 F | HEART RATE: 86 BPM | SYSTOLIC BLOOD PRESSURE: 107 MMHG | WEIGHT: 205 LBS | BODY MASS INDEX: 34.16 KG/M2 | HEIGHT: 65 IN

## 2021-06-16 DIAGNOSIS — E66.9 OBESITY (BMI 30.0-34.9): ICD-10-CM

## 2021-06-16 DIAGNOSIS — G89.29 CHRONIC LOW BACK PAIN WITH RIGHT-SIDED SCIATICA, UNSPECIFIED BACK PAIN LATERALITY: ICD-10-CM

## 2021-06-16 DIAGNOSIS — Z12.31 ENCOUNTER FOR SCREENING MAMMOGRAM FOR BREAST CANCER: ICD-10-CM

## 2021-06-16 DIAGNOSIS — F32.A ANXIETY AND DEPRESSION: ICD-10-CM

## 2021-06-16 DIAGNOSIS — E78.00 HYPERCHOLESTEREMIA: Primary | ICD-10-CM

## 2021-06-16 DIAGNOSIS — Z23 NEED FOR SHINGLES VACCINE: ICD-10-CM

## 2021-06-16 DIAGNOSIS — F33.9 DEPRESSION, RECURRENT (HCC): ICD-10-CM

## 2021-06-16 DIAGNOSIS — Z23 NEED FOR PNEUMOCOCCAL VACCINATION: ICD-10-CM

## 2021-06-16 DIAGNOSIS — E04.2 MULTINODULAR THYROID: ICD-10-CM

## 2021-06-16 DIAGNOSIS — Z78.0 POSTMENOPAUSAL: ICD-10-CM

## 2021-06-16 DIAGNOSIS — H04.129 DRY EYE: ICD-10-CM

## 2021-06-16 DIAGNOSIS — M54.41 CHRONIC LOW BACK PAIN WITH RIGHT-SIDED SCIATICA, UNSPECIFIED BACK PAIN LATERALITY: ICD-10-CM

## 2021-06-16 DIAGNOSIS — I42.9 PRIMARY CARDIOMYOPATHY (HCC): ICD-10-CM

## 2021-06-16 DIAGNOSIS — R73.03 PREDIABETES: ICD-10-CM

## 2021-06-16 DIAGNOSIS — E03.9 ACQUIRED HYPOTHYROIDISM: ICD-10-CM

## 2021-06-16 DIAGNOSIS — R21 RASH AND NONSPECIFIC SKIN ERUPTION: ICD-10-CM

## 2021-06-16 DIAGNOSIS — M54.16 LUMBAR RADICULAR SYNDROME: ICD-10-CM

## 2021-06-16 DIAGNOSIS — L74.9 SWEATING ABNORMALITY: ICD-10-CM

## 2021-06-16 DIAGNOSIS — F41.9 ANXIETY AND DEPRESSION: ICD-10-CM

## 2021-06-16 DIAGNOSIS — K21.9 GASTROESOPHAGEAL REFLUX DISEASE, UNSPECIFIED WHETHER ESOPHAGITIS PRESENT: ICD-10-CM

## 2021-06-16 PROCEDURE — 90732 PPSV23 VACC 2 YRS+ SUBQ/IM: CPT | Performed by: FAMILY MEDICINE

## 2021-06-16 PROCEDURE — 99214 OFFICE O/P EST MOD 30 MIN: CPT | Performed by: FAMILY MEDICINE

## 2021-06-16 PROCEDURE — G0009 ADMIN PNEUMOCOCCAL VACCINE: HCPCS | Performed by: FAMILY MEDICINE

## 2021-06-16 NOTE — PROGRESS NOTES
HPI:    Patient ID: Pepe Hernandez is a 72year old female. HPI  Patient presents with:   Follow - Up: pt states she is planning to move to [place] in a few months   Other: sweating during the nights   Referral: for cardiologist, optometrist, Jairo Buckley BMI 34.11 kg/m²          Current Outpatient Medications   Medication Sig Dispense Refill   • traMADol HCl 50 MG Oral Tab TK 1 T PO  Q 8  H PRN 90 tablet 2   • GABAPENTIN 800 MG Oral Tab TAKE 1 TABLET BY MOUTH TWICE DAILY 180 tablet 1   • ergocalciferol 1.2 and gastroentorologist     Physical Exam  Constitutional:       Appearance: She is well-developed. Eyes:      Pupils: Pupils are equal, round, and reactive to light. Neck:      Thyroid: No thyromegaly.    Cardiovascular:      Rate and Rhythm: Normal rat weight. Discussed good dietary and eating habits as well as increasing vegetable and fruit intake. Recommending avoiding foods high in fat content. Recommend exercising at least 30-40 minutes 5-6 days a week. Avoid skipping meals.   Making healthy choic

## 2021-06-18 ENCOUNTER — TELEPHONE (OUTPATIENT)
Dept: GASTROENTEROLOGY | Facility: CLINIC | Age: 66
End: 2021-06-18

## 2021-06-18 NOTE — TELEPHONE ENCOUNTER
Pt states this medication is not working for her, she still has pain in her stomach.  Please call  Medication Quantity Refills Start End   OMEPRAZOLE 40 MG Oral Capsule Delayed Release 90 capsule 1 7/7/2020    Sig: Alhaji Tafoya 1 CAPSULE BY MOUTH EVERY DAY     Ro

## 2021-06-18 NOTE — TELEPHONE ENCOUNTER
Dr. Corrie Pena    Patient called with complaints of worsening reflux, abdominal pain (not constant will last 2-3 days and go away), and bloating. She has been taking omeprazole and has made suggested lifestyle changes and it is not helping.   She reports that

## 2021-06-21 RX ORDER — FAMOTIDINE 20 MG/1
20 TABLET ORAL NIGHTLY PRN
Qty: 30 TABLET | Refills: 0 | Status: SHIPPED | OUTPATIENT
Start: 2021-06-21 | End: 2021-12-01

## 2021-06-21 NOTE — TELEPHONE ENCOUNTER
Pt contacted, more GERD /reflux at night in particular  - trial of famotidine before bed  Call with an update in 1 weeks time

## 2021-06-24 ENCOUNTER — OFFICE VISIT (OUTPATIENT)
Dept: OPHTHALMOLOGY | Facility: CLINIC | Age: 66
End: 2021-06-24
Payer: MEDICARE

## 2021-06-24 DIAGNOSIS — H43.391 FLOATERS, RIGHT: ICD-10-CM

## 2021-06-24 DIAGNOSIS — H25.13 AGE-RELATED NUCLEAR CATARACT OF BOTH EYES: Primary | ICD-10-CM

## 2021-06-24 DIAGNOSIS — H04.129 DRY EYE: ICD-10-CM

## 2021-06-24 PROCEDURE — 92004 COMPRE OPH EXAM NEW PT 1/>: CPT | Performed by: OPHTHALMOLOGY

## 2021-06-24 NOTE — PATIENT INSTRUCTIONS
Age-related nuclear cataract of both eyes  Discussed moderate to advancing cataracts in both eyes that are not affecting vision and are not surgical at this time. Patient has new recent glasses.    Reassured patient that other than cataracts, eyes look v

## 2021-06-24 NOTE — ASSESSMENT & PLAN NOTE
Discussed moderate to advancing cataracts in both eyes that are not affecting vision and are not surgical at this time. Patient has new recent glasses.    Reassured patient that other than cataracts, eyes look very healthy; there is no evidence of glauco

## 2021-06-24 NOTE — PROGRESS NOTES
Ivette Mak is a 72year old female. HPI:     HPI     Consult      Additional comments: Consult per Dr. Lizzie Che               Comments     Here for a dilated exam. Pt states that she was last seen at [facility] June 1, 2021 and was Rxd new glasses. (Procedure: COLONOSCOPY;  Surgeon: Bobby Omer MD;  Location: [address] [place] [address] ENDOSCOPY); back surgery (04/07/2016) (lumbar L4-L5 fusion); egd; cyst aspiration right; Yag Iridotomy - OD - Right Eye (Right, 10/10/1997) (MICHELA); and Yag Iridotomy - OS - Left Eye Jay Cohen ibuprofen 200 MG Oral Tab Take 200 mg by mouth every 6 (six) hours as needed for Pain.      • OMEPRAZOLE 40 MG Oral Capsule Delayed Release TAKE 1 CAPSULE BY MOUTH EVERY DAY 90 capsule 1   • Fluticasone Propionate 50 MCG/ACT Nasal Suspension SHAKE LQ AND U Meibomian gland dysfunction Dermatochalasis, Meibomian gland dysfunction    Conjunctiva/Sclera Normal Normal    Cornea 2+ Arcus 2+ Arcus    Anterior Chamber Deep and quiet Deep and quiet    Iris PI at 11 o'clock  PI at 11 o'clock     Lens 2-3+ Nuclear scle dry eye symptoms. No orders of the defined types were placed in this encounter.       Meds This Visit:  Requested Prescriptions      No prescriptions requested or ordered in this encounter        Follow up instructions:  Return in about 1 year (Meseret Palomares

## 2021-06-28 ENCOUNTER — NURSE TRIAGE (OUTPATIENT)
Dept: FAMILY MEDICINE CLINIC | Facility: CLINIC | Age: 66
End: 2021-06-28

## 2021-06-28 ENCOUNTER — PATIENT MESSAGE (OUTPATIENT)
Dept: FAMILY MEDICINE CLINIC | Facility: CLINIC | Age: 66
End: 2021-06-28

## 2021-06-28 NOTE — TELEPHONE ENCOUNTER
From: Charity Hameed  To: Vanessa Burns MD  Sent: 6/28/2021 1:29 PM CDT  Subject: Other    Dr Shreya Burns , when I saw you I forgot to tell you that I was having a clear vaginal discharge. And it is being bad last couple a days .  I like to either see you and ch

## 2021-06-28 NOTE — TELEPHONE ENCOUNTER
----- Message from Apryl Shelton sent at 6/28/2021  1:29 PM CDT -----  Regarding: Other  Contact: [phone]  Dr Chelsie Padilla , when I saw you I forgot to tell you that I was having a clear vaginal discharge. And it is being bad last couple a days .  I like t

## 2021-06-28 NOTE — TELEPHONE ENCOUNTER
Please see pt mchart message below. Pt was called and she stated that its a clear vaginal discharge and she has no itchiness or pain. Pt will like to be seen. Pt was informed that her last pap was 11/12/2018 and she is due for one this year in 11/2021.  Pt

## 2021-06-29 ENCOUNTER — LAB ENCOUNTER (OUTPATIENT)
Dept: LAB | Age: 66
End: 2021-06-29
Attending: FAMILY MEDICINE
Payer: COMMERCIAL

## 2021-06-29 ENCOUNTER — OFFICE VISIT (OUTPATIENT)
Dept: FAMILY MEDICINE CLINIC | Facility: CLINIC | Age: 66
End: 2021-06-29
Payer: MEDICARE

## 2021-06-29 VITALS
DIASTOLIC BLOOD PRESSURE: 67 MMHG | HEART RATE: 79 BPM | BODY MASS INDEX: 33.82 KG/M2 | HEIGHT: 65 IN | OXYGEN SATURATION: 96 % | SYSTOLIC BLOOD PRESSURE: 102 MMHG | RESPIRATION RATE: 18 BRPM | WEIGHT: 203 LBS

## 2021-06-29 DIAGNOSIS — E78.00 HYPERCHOLESTEREMIA: ICD-10-CM

## 2021-06-29 DIAGNOSIS — L74.9 SWEATING ABNORMALITY: ICD-10-CM

## 2021-06-29 DIAGNOSIS — E03.9 ACQUIRED HYPOTHYROIDISM: ICD-10-CM

## 2021-06-29 DIAGNOSIS — R73.03 PREDIABETES: ICD-10-CM

## 2021-06-29 DIAGNOSIS — M54.41 ACUTE RIGHT-SIDED LOW BACK PAIN WITH RIGHT-SIDED SCIATICA: ICD-10-CM

## 2021-06-29 DIAGNOSIS — N89.8 VAGINAL DISCHARGE: Primary | ICD-10-CM

## 2021-06-29 LAB
ALBUMIN SERPL-MCNC: 3.5 G/DL (ref 3.4–5)
ALBUMIN/GLOB SERPL: 0.9 {RATIO} (ref 1–2)
ALP LIVER SERPL-CCNC: 79 U/L
ALT SERPL-CCNC: 21 U/L
ANION GAP SERPL CALC-SCNC: 6 MMOL/L (ref 0–18)
AST SERPL-CCNC: 15 U/L (ref 15–37)
BASOPHILS # BLD AUTO: 0.07 X10(3) UL (ref 0–0.2)
BASOPHILS NFR BLD AUTO: 1.1 %
BILIRUB SERPL-MCNC: 0.3 MG/DL (ref 0.1–2)
BUN BLD-MCNC: 10 MG/DL (ref 7–18)
BUN/CREAT SERPL: 13.5 (ref 10–20)
CALCIUM BLD-MCNC: 9.6 MG/DL (ref 8.5–10.1)
CHLORIDE SERPL-SCNC: 106 MMOL/L (ref 98–112)
CHOLEST SMN-MCNC: 190 MG/DL (ref ?–200)
CO2 SERPL-SCNC: 27 MMOL/L (ref 21–32)
CREAT BLD-MCNC: 0.74 MG/DL
DEPRECATED RDW RBC AUTO: 41.4 FL (ref 35.1–46.3)
EOSINOPHIL # BLD AUTO: 0.19 X10(3) UL (ref 0–0.7)
EOSINOPHIL NFR BLD AUTO: 2.9 %
ERYTHROCYTE [DISTWIDTH] IN BLOOD BY AUTOMATED COUNT: 13.3 % (ref 11–15)
EST. AVERAGE GLUCOSE BLD GHB EST-MCNC: 117 MG/DL (ref 68–126)
GLOBULIN PLAS-MCNC: 4.1 G/DL (ref 2.8–4.4)
GLUCOSE BLD-MCNC: 102 MG/DL (ref 70–99)
HBA1C MFR BLD HPLC: 5.7 % (ref ?–5.7)
HCT VFR BLD AUTO: 41.4 %
HDLC SERPL-MCNC: 58 MG/DL (ref 40–59)
HGB BLD-MCNC: 13.7 G/DL
IMM GRANULOCYTES # BLD AUTO: 0.02 X10(3) UL (ref 0–1)
IMM GRANULOCYTES NFR BLD: 0.3 %
LDLC SERPL CALC-MCNC: 106 MG/DL (ref ?–100)
LYMPHOCYTES # BLD AUTO: 2.11 X10(3) UL (ref 1–4)
LYMPHOCYTES NFR BLD AUTO: 32.1 %
M PROTEIN MFR SERPL ELPH: 7.6 G/DL (ref 6.4–8.2)
MCH RBC QN AUTO: 28.2 PG (ref 26–34)
MCHC RBC AUTO-ENTMCNC: 33.1 G/DL (ref 31–37)
MCV RBC AUTO: 85.2 FL
MONOCYTES # BLD AUTO: 0.62 X10(3) UL (ref 0.1–1)
MONOCYTES NFR BLD AUTO: 9.4 %
NEUTROPHILS # BLD AUTO: 3.57 X10 (3) UL (ref 1.5–7.7)
NEUTROPHILS # BLD AUTO: 3.57 X10(3) UL (ref 1.5–7.7)
NEUTROPHILS NFR BLD AUTO: 54.2 %
NONHDLC SERPL-MCNC: 132 MG/DL (ref ?–130)
OSMOLALITY SERPL CALC.SUM OF ELEC: 287 MOSM/KG (ref 275–295)
PATIENT FASTING Y/N/NP: YES
PATIENT FASTING Y/N/NP: YES
PLATELET # BLD AUTO: 308 10(3)UL (ref 150–450)
POTASSIUM SERPL-SCNC: 3.8 MMOL/L (ref 3.5–5.1)
RBC # BLD AUTO: 4.86 X10(6)UL
SODIUM SERPL-SCNC: 139 MMOL/L (ref 136–145)
T4 FREE SERPL-MCNC: 0.9 NG/DL (ref 0.8–1.7)
TRIGL SERPL-MCNC: 147 MG/DL (ref 30–149)
TSI SER-ACNC: 4.28 MIU/ML (ref 0.36–3.74)
VLDLC SERPL CALC-MCNC: 25 MG/DL (ref 0–30)
WBC # BLD AUTO: 6.6 X10(3) UL (ref 4–11)

## 2021-06-29 PROCEDURE — 80053 COMPREHEN METABOLIC PANEL: CPT

## 2021-06-29 PROCEDURE — 99213 OFFICE O/P EST LOW 20 MIN: CPT | Performed by: PHYSICIAN ASSISTANT

## 2021-06-29 PROCEDURE — 84443 ASSAY THYROID STIM HORMONE: CPT

## 2021-06-29 PROCEDURE — 84439 ASSAY OF FREE THYROXINE: CPT

## 2021-06-29 PROCEDURE — 85025 COMPLETE CBC W/AUTO DIFF WBC: CPT

## 2021-06-29 PROCEDURE — 83036 HEMOGLOBIN GLYCOSYLATED A1C: CPT

## 2021-06-29 PROCEDURE — 36415 COLL VENOUS BLD VENIPUNCTURE: CPT

## 2021-06-29 PROCEDURE — 80061 LIPID PANEL: CPT

## 2021-06-29 RX ORDER — FLUCONAZOLE 150 MG/1
150 TABLET ORAL DAILY
Qty: 2 TABLET | Refills: 0 | Status: SHIPPED | OUTPATIENT
Start: 2021-06-29 | End: 2021-12-01

## 2021-06-29 RX ORDER — PREDNISONE 20 MG/1
TABLET ORAL
Qty: 12 TABLET | Refills: 0 | Status: SHIPPED | OUTPATIENT
Start: 2021-06-29 | End: 2021-09-20 | Stop reason: ALTCHOICE

## 2021-06-29 NOTE — PROGRESS NOTES
HPI:    Patient ID: Jeannie Simmons is a 72year old female. Patient presents with a vaginal discharge for the past 3 weeks. Clear in color. No odor. Has used a vaginal douche with no relief. No abdominal pain. No pelvic pain.  She does not have vaginal Oral Tab TAKE 1 TABLET(3.125 MG) BY MOUTH TWICE DAILY WITH MEALS 180 tablet 3   • Rosuvastatin Calcium (CRESTOR) 20 MG Oral Tab Take 1 tablet (20 mg total) by mouth nightly. 90 tablet 3   • TURMERIC OR Take by mouth.      • ibuprofen 200 MG Oral Tab Take 20 is no abdominal tenderness. There is no guarding. Genitourinary:     Vagina: Vaginal discharge (thin white discharge) present. Musculoskeletal:         General: Tenderness (lower right side back into buttocks) present. No deformity or signs of injury.

## 2021-06-30 ENCOUNTER — HOSPITAL ENCOUNTER (OUTPATIENT)
Dept: MAMMOGRAPHY | Age: 66
Discharge: HOME | End: 2021-06-30
Attending: FAMILY MEDICINE
Payer: MEDICARE

## 2021-06-30 ENCOUNTER — HOSPITAL ENCOUNTER (OUTPATIENT)
Dept: BONE DENSITY | Age: 66
Discharge: HOME | End: 2021-06-30
Attending: FAMILY MEDICINE
Payer: MEDICARE

## 2021-06-30 DIAGNOSIS — E03.9 ACQUIRED HYPOTHYROIDISM: ICD-10-CM

## 2021-06-30 DIAGNOSIS — Z78.0 POSTMENOPAUSAL: ICD-10-CM

## 2021-06-30 DIAGNOSIS — Z12.31 ENCOUNTER FOR SCREENING MAMMOGRAM FOR BREAST CANCER: ICD-10-CM

## 2021-06-30 PROCEDURE — 77063 BREAST TOMOSYNTHESIS BI: CPT | Performed by: FAMILY MEDICINE

## 2021-06-30 PROCEDURE — 77067 SCR MAMMO BI INCL CAD: CPT | Performed by: FAMILY MEDICINE

## 2021-06-30 PROCEDURE — 77080 DXA BONE DENSITY AXIAL: CPT | Performed by: FAMILY MEDICINE

## 2021-07-01 LAB
GENITAL VAGINOSIS SCREEN: NEGATIVE
TRICHOMONAS SCREEN: NEGATIVE

## 2021-07-01 RX ORDER — LEVOTHYROXINE SODIUM 112 UG/1
TABLET ORAL
Qty: 90 TABLET | Refills: 3 | Status: SHIPPED | OUTPATIENT
Start: 2021-07-01 | End: 2021-07-14

## 2021-07-06 ENCOUNTER — HOSPITAL ENCOUNTER (OUTPATIENT)
Dept: CV DIAGNOSTICS | Age: 66
Discharge: HOME | End: 2021-07-06
Attending: INTERNAL MEDICINE
Payer: MEDICARE

## 2021-07-06 DIAGNOSIS — E78.00 HYPERCHOLESTEREMIA: ICD-10-CM

## 2021-07-06 DIAGNOSIS — I42.9 PRIMARY CARDIOMYOPATHY (HCC): ICD-10-CM

## 2021-07-06 PROCEDURE — 93306 TTE W/DOPPLER COMPLETE: CPT | Performed by: INTERNAL MEDICINE

## 2021-07-12 RX ORDER — GABAPENTIN 800 MG/1
TABLET ORAL
Qty: 180 TABLET | Refills: 1 | Status: SHIPPED | OUTPATIENT
Start: 2021-07-12 | End: 2021-12-06

## 2021-07-14 DIAGNOSIS — E03.9 ACQUIRED HYPOTHYROIDISM: ICD-10-CM

## 2021-07-14 RX ORDER — LEVOTHYROXINE SODIUM 112 UG/1
112 TABLET ORAL
Qty: 90 TABLET | Refills: 1 | Status: SHIPPED | OUTPATIENT
Start: 2021-07-14 | End: 2021-09-20 | Stop reason: DRUGHIGH

## 2021-07-29 ENCOUNTER — NURSE TRIAGE (OUTPATIENT)
Dept: FAMILY MEDICINE CLINIC | Facility: CLINIC | Age: 66
End: 2021-07-29

## 2021-07-29 ENCOUNTER — TELEMEDICINE (OUTPATIENT)
Dept: FAMILY MEDICINE CLINIC | Facility: CLINIC | Age: 66
End: 2021-07-29
Payer: MEDICARE

## 2021-07-29 DIAGNOSIS — J02.9 SORE THROAT: Primary | ICD-10-CM

## 2021-07-29 PROCEDURE — 99441 PHONE E/M BY PHYS 5-10 MIN: CPT | Performed by: PHYSICIAN ASSISTANT

## 2021-07-29 RX ORDER — AZITHROMYCIN 250 MG/1
TABLET, FILM COATED ORAL
Qty: 6 TABLET | Refills: 0 | Status: SHIPPED | OUTPATIENT
Start: 2021-07-29 | End: 2021-08-03

## 2021-07-29 NOTE — TELEPHONE ENCOUNTER
Action Requested: Summary for Provider     []  Critical Lab, Recommendations Needed  [] Need Additional Advice  []   FYI    []   Need Orders  [] Need Medications Sent to Pharmacy  []  Other     SUMMARY: virtual visit today with Femi Robles for sore throat

## 2021-07-29 NOTE — PROGRESS NOTES
Please note that the following visit was completed using two-way, real-time interactive audio and/or video communication.   This has been done in good anneliese to provide continuity of care in the best interest of the provider-patient relationship, due to the Tab TAKE 1 TABLET BY MOUTH TWICE DAILY 180 tablet 1   • fluconazole (DIFLUCAN) 150 MG Oral Tab Take 1 tablet (150 mg total) by mouth daily.  2 tablet 0   • predniSONE 20 MG Oral Tab To take 2 tablets by oral route for 3 days then 1 tablet by oral route for (ZITHROMAX Z-YOCASTA) 250 MG Oral Tab, Take 2 tablets (500 mg total) by mouth daily for 1 day, THEN 1 tablet (250 mg total) daily for 4 days. , Disp: 6 tablet, Rfl: 0  •  Levothyroxine Sodium 112 MCG Oral Tab, Take 1 tablet (112 mcg total) by mouth before break Succinate ER (PRISTIQ) 100 MG Oral Tablet 24 Hr, Take 100 mg by mouth daily.   , Disp: , Rfl:     Past Medical History:   Diagnosis Date   • Age-related nuclear cataract of both eyes 6/24/2021   • Anxiety state, unspecified    • Cardiomyopathy [facility])    • Blessing Suárez best of my ability. Patient to call back if any change/ worsening of symptoms. No orders of the defined types were placed in this encounter.       Meds This Visit:  Requested Prescriptions     Signed Prescriptions Disp Refills   • azithromycin (Juan Pablo Weaver

## 2021-08-05 DIAGNOSIS — G89.29 CHRONIC NECK AND BACK PAIN: ICD-10-CM

## 2021-08-05 DIAGNOSIS — M54.2 CHRONIC NECK AND BACK PAIN: ICD-10-CM

## 2021-08-05 DIAGNOSIS — M54.9 CHRONIC NECK AND BACK PAIN: ICD-10-CM

## 2021-08-07 RX ORDER — TRAMADOL HYDROCHLORIDE 50 MG/1
TABLET ORAL
Qty: 90 TABLET | Refills: 0 | Status: SHIPPED | OUTPATIENT
Start: 2021-08-07 | End: 2021-10-02

## 2021-08-16 ENCOUNTER — TELEPHONE (OUTPATIENT)
Dept: PAIN CLINIC | Facility: HOSPITAL | Age: 66
End: 2021-08-16

## 2021-08-16 NOTE — TELEPHONE ENCOUNTER
Patient left voicemail informing Pain dept that she needed to cancel her appt today with Dr Aroldo Villalobos as she woke up with a fever and sore throat. She will call back to reschedule once she is feeling better.

## 2021-09-07 ENCOUNTER — TELEPHONE (OUTPATIENT)
Dept: FAMILY MEDICINE CLINIC | Facility: CLINIC | Age: 66
End: 2021-09-07

## 2021-09-08 NOTE — TELEPHONE ENCOUNTER
----- Message from Krystyna Breen sent at 9/7/2021  4:57 PM CDT -----  Regarding: Visit Follow-up Question  Contact: [phone]  Dr Thania Durbin , I have called the clinic today and  after waiting on the phone for  a while , I got disconnected and they never

## 2021-09-08 NOTE — TELEPHONE ENCOUNTER
Pt contacted. States feeling better today, denies vomiting, abdominal pain, fever. Pt states she was not feeling well, Friday, Saturday, and Sunday. Pt states she is slowly eating bland food. Encouraged to push fluids and eat small meals.     Pt states sh

## 2021-09-11 RX ORDER — PREDNISONE 20 MG/1
TABLET ORAL
Qty: 12 TABLET | Refills: 0 | Status: CANCELLED | OUTPATIENT
Start: 2021-09-11

## 2021-09-13 ENCOUNTER — NURSE TRIAGE (OUTPATIENT)
Dept: FAMILY MEDICINE CLINIC | Facility: CLINIC | Age: 66
End: 2021-09-13

## 2021-09-13 DIAGNOSIS — G89.29 CHRONIC KNEE PAIN, UNSPECIFIED LATERALITY: ICD-10-CM

## 2021-09-13 DIAGNOSIS — M25.569 CHRONIC KNEE PAIN, UNSPECIFIED LATERALITY: ICD-10-CM

## 2021-09-13 DIAGNOSIS — M15.9 PRIMARY OSTEOARTHRITIS INVOLVING MULTIPLE JOINTS: Primary | ICD-10-CM

## 2021-09-13 RX ORDER — PREDNISONE 10 MG/1
20 TABLET ORAL DAILY
Qty: 10 TABLET | Refills: 0 | Status: SHIPPED | OUTPATIENT
Start: 2021-09-13 | End: 2021-09-18

## 2021-09-13 RX ORDER — PREDNISONE 20 MG/1
TABLET ORAL
Qty: 12 TABLET | Refills: 0 | OUTPATIENT
Start: 2021-09-13

## 2021-09-13 NOTE — TELEPHONE ENCOUNTER
Action Requested: Summary for Provider     []  Critical Lab, Recommendations Needed  [x] Need Additional Advice  []   FYI    []   Need Orders  [x] Need Medications Sent to Pharmacy  []  Other     SUMMARY: Patient requesting script for Prednisone for knee p

## 2021-09-13 NOTE — TELEPHONE ENCOUNTER
Patient advised of recommendations, agreed with plan for prednisone.  Patient is unable to find a ride today for appt, rescheduled for Mon 9/20 for follow up on her knee, patient also requesting referral for Rheum, reports in the past they gave injections f

## 2021-09-13 NOTE — TELEPHONE ENCOUNTER
Prescription sent in. Patient may take 1 to 2 tablets daily for the next 4 to 5 days. Recommend taking prescription with food.

## 2021-09-20 ENCOUNTER — LAB ENCOUNTER (OUTPATIENT)
Dept: LAB | Age: 66
End: 2021-09-20
Attending: FAMILY MEDICINE
Payer: COMMERCIAL

## 2021-09-20 ENCOUNTER — OFFICE VISIT (OUTPATIENT)
Dept: FAMILY MEDICINE CLINIC | Facility: CLINIC | Age: 66
End: 2021-09-20
Payer: MEDICARE

## 2021-09-20 VITALS
TEMPERATURE: 99 F | SYSTOLIC BLOOD PRESSURE: 95 MMHG | WEIGHT: 204 LBS | DIASTOLIC BLOOD PRESSURE: 65 MMHG | HEART RATE: 87 BPM | BODY MASS INDEX: 33.99 KG/M2 | HEIGHT: 65 IN

## 2021-09-20 DIAGNOSIS — E03.9 ACQUIRED HYPOTHYROIDISM: ICD-10-CM

## 2021-09-20 DIAGNOSIS — H61.23 EXCESSIVE CERUMEN IN BOTH EAR CANALS: ICD-10-CM

## 2021-09-20 DIAGNOSIS — R11.2 NAUSEA AND VOMITING, INTRACTABILITY OF VOMITING NOT SPECIFIED, UNSPECIFIED VOMITING TYPE: ICD-10-CM

## 2021-09-20 DIAGNOSIS — R22.1 MASS OF LEFT SIDE OF NECK: ICD-10-CM

## 2021-09-20 DIAGNOSIS — E03.9 ACQUIRED HYPOTHYROIDISM: Primary | ICD-10-CM

## 2021-09-20 DIAGNOSIS — M54.2 NECK PAIN ON LEFT SIDE: ICD-10-CM

## 2021-09-20 DIAGNOSIS — R42 DIZZINESS: ICD-10-CM

## 2021-09-20 DIAGNOSIS — R42 DIZZINESS: Primary | ICD-10-CM

## 2021-09-20 LAB
ALBUMIN SERPL-MCNC: 3.7 G/DL (ref 3.4–5)
ALBUMIN/GLOB SERPL: 0.9 {RATIO} (ref 1–2)
ALP LIVER SERPL-CCNC: 84 U/L
ALT SERPL-CCNC: 32 U/L
ANION GAP SERPL CALC-SCNC: 4 MMOL/L (ref 0–18)
AST SERPL-CCNC: 16 U/L (ref 15–37)
BASOPHILS # BLD AUTO: 0.06 X10(3) UL (ref 0–0.2)
BASOPHILS NFR BLD AUTO: 0.7 %
BILIRUB SERPL-MCNC: 0.3 MG/DL (ref 0.1–2)
BUN BLD-MCNC: 13 MG/DL (ref 7–18)
BUN/CREAT SERPL: 15.3 (ref 10–20)
CALCIUM BLD-MCNC: 9.7 MG/DL (ref 8.5–10.1)
CHLORIDE SERPL-SCNC: 103 MMOL/L (ref 98–112)
CO2 SERPL-SCNC: 32 MMOL/L (ref 21–32)
CREAT BLD-MCNC: 0.85 MG/DL
DEPRECATED RDW RBC AUTO: 45 FL (ref 35.1–46.3)
EOSINOPHIL # BLD AUTO: 0.18 X10(3) UL (ref 0–0.7)
EOSINOPHIL NFR BLD AUTO: 2.2 %
ERYTHROCYTE [DISTWIDTH] IN BLOOD BY AUTOMATED COUNT: 14.1 % (ref 11–15)
GLOBULIN PLAS-MCNC: 4 G/DL (ref 2.8–4.4)
GLUCOSE BLD-MCNC: 100 MG/DL (ref 70–99)
HCT VFR BLD AUTO: 44.8 %
HGB BLD-MCNC: 14.3 G/DL
IMM GRANULOCYTES # BLD AUTO: 0.08 X10(3) UL (ref 0–1)
IMM GRANULOCYTES NFR BLD: 1 %
LYMPHOCYTES # BLD AUTO: 2.15 X10(3) UL (ref 1–4)
LYMPHOCYTES NFR BLD AUTO: 26 %
MCH RBC QN AUTO: 27.8 PG (ref 26–34)
MCHC RBC AUTO-ENTMCNC: 31.9 G/DL (ref 31–37)
MCV RBC AUTO: 87.2 FL
MONOCYTES # BLD AUTO: 0.7 X10(3) UL (ref 0.1–1)
MONOCYTES NFR BLD AUTO: 8.5 %
NEUTROPHILS # BLD AUTO: 5.09 X10 (3) UL (ref 1.5–7.7)
NEUTROPHILS # BLD AUTO: 5.09 X10(3) UL (ref 1.5–7.7)
NEUTROPHILS NFR BLD AUTO: 61.6 %
OSMOLALITY SERPL CALC.SUM OF ELEC: 288 MOSM/KG (ref 275–295)
PATIENT FASTING Y/N/NP: NO
PLATELET # BLD AUTO: 334 10(3)UL (ref 150–450)
POTASSIUM SERPL-SCNC: 5.2 MMOL/L (ref 3.5–5.1)
PROT SERPL-MCNC: 7.7 G/DL (ref 6.4–8.2)
RBC # BLD AUTO: 5.14 X10(6)UL
SODIUM SERPL-SCNC: 139 MMOL/L (ref 136–145)
T4 FREE SERPL-MCNC: 1.1 NG/DL (ref 0.8–1.7)
TSI SER-ACNC: 6.48 MIU/ML (ref 0.36–3.74)
WBC # BLD AUTO: 8.3 X10(3) UL (ref 4–11)

## 2021-09-20 PROCEDURE — 85025 COMPLETE CBC W/AUTO DIFF WBC: CPT

## 2021-09-20 PROCEDURE — 84443 ASSAY THYROID STIM HORMONE: CPT

## 2021-09-20 PROCEDURE — 99214 OFFICE O/P EST MOD 30 MIN: CPT | Performed by: FAMILY MEDICINE

## 2021-09-20 PROCEDURE — 80053 COMPREHEN METABOLIC PANEL: CPT

## 2021-09-20 PROCEDURE — 90662 IIV NO PRSV INCREASED AG IM: CPT | Performed by: FAMILY MEDICINE

## 2021-09-20 PROCEDURE — 36415 COLL VENOUS BLD VENIPUNCTURE: CPT

## 2021-09-20 PROCEDURE — 93010 ELECTROCARDIOGRAM REPORT: CPT | Performed by: FAMILY MEDICINE

## 2021-09-20 PROCEDURE — 93005 ELECTROCARDIOGRAM TRACING: CPT

## 2021-09-20 PROCEDURE — 84439 ASSAY OF FREE THYROXINE: CPT

## 2021-09-20 PROCEDURE — G0008 ADMIN INFLUENZA VIRUS VAC: HCPCS | Performed by: FAMILY MEDICINE

## 2021-09-20 RX ORDER — ONDANSETRON 4 MG/1
4 TABLET, FILM COATED ORAL EVERY 8 HOURS PRN
Qty: 30 TABLET | Refills: 0 | Status: SHIPPED | OUTPATIENT
Start: 2021-09-20 | End: 2021-12-01

## 2021-09-20 RX ORDER — LEVOTHYROXINE SODIUM 0.12 MG/1
125 TABLET ORAL
Qty: 90 TABLET | Refills: 0 | Status: SHIPPED | OUTPATIENT
Start: 2021-09-20 | End: 2021-12-01

## 2021-09-20 NOTE — PROGRESS NOTES
HPI:    Patient ID: Nestor Mai is a 72year old female.     HPI  Patient presents with:  Nausea: for two weeks during the mornings and dizziness feeling better   Knee Pain: right knee hard to bend   Bump: on left side of neck has had it  for years constipation and diarrhea. Neurological: Positive for dizziness. Negative for tremors, seizures, syncope, facial asymmetry, speech difficulty, weakness, light-headedness, numbness and headaches.        BP 95/65   Pulse 87   Temp 98.6 °F (37 °C) (Temporal) MG Oral Tablet 24 Hr Take 100 mg by mouth daily.          Allergies:  Demerol [Meperidine]    NAUSEA AND VOMITING, NAUSEA ONLY    Comment:Other reaction(s): Vomiting  Hydrocodone             NAUSEA AND VOMITING  Hydrocodone-Ibuprof*        Comment:Other shine needed for Nausea. Dispense: 30 tablet; Refill: 0  - CBC WITH DIFFERENTIAL WITH PLATELET; Future  - COMP METABOLIC PANEL (14); Future    3. Neck pain on left side  CT neck  Reviewed last one from 2012  - ENT - INTERNAL    4.  Acquired hypothyroidism    - T

## 2021-09-26 ENCOUNTER — HOSPITAL ENCOUNTER (OUTPATIENT)
Dept: CT IMAGING | Facility: HOSPITAL | Age: 66
Discharge: HOME | End: 2021-09-26
Attending: FAMILY MEDICINE
Payer: MEDICARE

## 2021-09-26 DIAGNOSIS — R22.1 MASS OF LEFT SIDE OF NECK: ICD-10-CM

## 2021-09-26 PROCEDURE — 70491 CT SOFT TISSUE NECK W/DYE: CPT | Performed by: FAMILY MEDICINE

## 2021-09-30 ENCOUNTER — TELEPHONE (OUTPATIENT)
Dept: OTOLARYNGOLOGY | Facility: CLINIC | Age: 66
End: 2021-09-30

## 2021-09-30 ENCOUNTER — PATIENT MESSAGE (OUTPATIENT)
Dept: FAMILY MEDICINE CLINIC | Facility: CLINIC | Age: 66
End: 2021-09-30

## 2021-09-30 ENCOUNTER — OFFICE VISIT (OUTPATIENT)
Dept: OTOLARYNGOLOGY | Facility: CLINIC | Age: 66
End: 2021-09-30
Payer: MEDICARE

## 2021-09-30 VITALS — HEIGHT: 65 IN | BODY MASS INDEX: 33.99 KG/M2 | WEIGHT: 204 LBS

## 2021-09-30 DIAGNOSIS — M54.2 NECK PAIN ON LEFT SIDE: Primary | ICD-10-CM

## 2021-09-30 DIAGNOSIS — M54.2 NECK PAIN: Primary | ICD-10-CM

## 2021-09-30 DIAGNOSIS — H61.23 BILATERAL IMPACTED CERUMEN: ICD-10-CM

## 2021-09-30 PROCEDURE — 99203 OFFICE O/P NEW LOW 30 MIN: CPT | Performed by: OTOLARYNGOLOGY

## 2021-09-30 PROCEDURE — 69210 REMOVE IMPACTED EAR WAX UNI: CPT | Performed by: OTOLARYNGOLOGY

## 2021-09-30 NOTE — TELEPHONE ENCOUNTER
pt. states that Michelle in [facility] has not received Rx for Anti-inflammatory and muscle relaxant from our office. I tried to reach the nurse but no answer.

## 2021-09-30 NOTE — PROGRESS NOTES
Jennifer Irizarry is a 72year old female. Patient presents with:  Mass: pt was reffered over by dr Federico Lorenzana due to mass in left side of the neck. pt is feeling well with no sxs. Ear Problem: pt c/o states has allot of wax on the left ear.        HISTORY OF P Depression Maternal Grandfather    • Other (Other) Maternal Grandfather    • Heart Disorder Father 72   • Heart Attack Neg    • Diabetes Neg         Per NG; No diabetes   • Glaucoma Neg         Per NG: No glaucoma histroy   • Cancer Neg    • Hypertension N weight loss. ENMT Negative Drooling. Eyes Negative Blurred vision and vision changes. Respiratory Negative Dyspnea and wheezing. Cardio Negative Chest pain, irregular heartbeat/palpitations and syncope. GI Negative Abdominal pain and diarrhea. •  ondansetron (ZOFRAN) 4 mg tablet, Take 1 tablet (4 mg total) by mouth every 8 (eight) hours as needed for Nausea., Disp: 30 tablet, Rfl: 0  •  levothyroxine 125 MCG Oral Tab, Take 1 tablet (125 mcg total) by mouth before breakfast., Disp: 90 tablet, R side results in no pain or discomfort.   Palpation of the soft tissues overlying the sternocleidomastoid elicit mild discomfort but actual palpation of the sternocleidomastoid on the left elicits significant pain discomfort along the length of the upper por

## 2021-10-01 DIAGNOSIS — M54.2 CHRONIC NECK AND BACK PAIN: ICD-10-CM

## 2021-10-01 DIAGNOSIS — G89.29 CHRONIC NECK AND BACK PAIN: ICD-10-CM

## 2021-10-01 DIAGNOSIS — M54.9 CHRONIC NECK AND BACK PAIN: ICD-10-CM

## 2021-10-01 RX ORDER — CYCLOBENZAPRINE HCL 5 MG
5 TABLET ORAL NIGHTLY
Qty: 30 TABLET | Refills: 0 | Status: SHIPPED | OUTPATIENT
Start: 2021-10-01 | End: 2021-12-01

## 2021-10-01 RX ORDER — MELOXICAM 15 MG/1
15 TABLET ORAL NIGHTLY
Qty: 30 TABLET | Refills: 0 | Status: SHIPPED | OUTPATIENT
Start: 2021-10-01 | End: 2021-12-01

## 2021-10-01 NOTE — TELEPHONE ENCOUNTER
From: [facility]  To: Queen Álvaro Brown MD  Sent: 9/30/2021 8:06 PM CDT  Subject: Referral for Dr Kirsten Silverman     Dear Dr Pedro Brown , I saw Dr Kirsten Silverman today , September 30 , 2021 , He order some antiflamatory medecine and muscle relaxer and He wants to see me in on

## 2021-10-01 NOTE — TELEPHONE ENCOUNTER
Informed patient of Rx  Meloxicam 15 mg nightly  And cyclobenzaprine 5 mg nightly sent to pharmacy and verbalized understanding.

## 2021-10-02 RX ORDER — TRAMADOL HYDROCHLORIDE 50 MG/1
TABLET ORAL
Qty: 90 TABLET | Refills: 0 | Status: SHIPPED | OUTPATIENT
Start: 2021-10-02 | End: 2021-12-06

## 2021-10-02 NOTE — TELEPHONE ENCOUNTER
Please review. Protocol failed/ No protocol.     Requested Prescriptions   Pending Prescriptions Disp Refills    TRAMADOL 50 MG Oral Tab [Pharmacy Med Name: TRAMADOL 50MG TABLETS] 90 tablet 0     Sig: TAKE 1 TABLET BY MOUTH EVERY 8 HOURS AS NEEDED        Th

## 2021-10-04 NOTE — TELEPHONE ENCOUNTER
From   Palak Cleary MD To   Esvin [place] and Delivered   10/2/2021  8:43 AM   Last Read in MyChart   10/2/2021 12:17 PM by Fredrick Tesfaye

## 2021-10-27 ENCOUNTER — OFFICE VISIT (OUTPATIENT)
Dept: PAIN CLINIC | Facility: HOSPITAL | Age: 66
End: 2021-10-27
Attending: ANESTHESIOLOGY
Payer: COMMERCIAL

## 2021-10-27 VITALS — HEART RATE: 83 BPM | DIASTOLIC BLOOD PRESSURE: 76 MMHG | OXYGEN SATURATION: 96 % | SYSTOLIC BLOOD PRESSURE: 115 MMHG

## 2021-10-27 DIAGNOSIS — M51.16 LUMBAR DISC DISEASE WITH RADICULOPATHY: ICD-10-CM

## 2021-10-27 DIAGNOSIS — M96.1 FAILED BACK SYNDROME OF LUMBAR SPINE: Primary | ICD-10-CM

## 2021-10-27 PROCEDURE — 99211 OFF/OP EST MAY X REQ PHY/QHP: CPT

## 2021-10-27 NOTE — PROGRESS NOTES
PT presents ambulatory to the [facility].  Pt suffers from chronic low pain back that has become worse over the last year. PT is looking for a CT scan. Dr Zoran Guillermo saw PT to re-establish care. See notes for POC.

## 2021-10-27 NOTE — CHRONIC PAIN
Follow-up Note    Charity Hameed is a 72year old old female, originally referred to the pain clinic by Dr. COOKIE ALVES, with history of Failed back syndrome of lumbar spine  (primary encounter diagnosis)  Lumbar disc disease with radiculopathy returns to the c 1 TABLET BY MOUTH TWICE DAILY 180 tablet 1   • fluconazole (DIFLUCAN) 150 MG Oral Tab Take 1 tablet (150 mg total) by mouth daily. 2 tablet 0   • famoTIDine 20 MG Oral Tab Take 1 tablet (20 mg total) by mouth nightly as needed for Heartburn.  30 tablet 0 Chronic neck and back pain     Obesity (BMI 30.0-34. 9)     Thyroid nodule     Onychomycosis     DDD (degenerative disc disease), cervical     H/O radioactive iodine thyroid ablation     Female pattern alopecia     Myalgia of muscle of neck     Chronic left NG: arthrocentesis of the knee joint   • YAG IRIDOTOMY - OD - RIGHT EYE Right 10/10/1997    RJM   • YAG IRIDOTOMY - OS - LEFT EYE Left 10/16/1997    RJORTEGA       FAMILY HISTORY:  Family History   Problem Relation Age of Onset   • Depression Mother    • Neurol Asked        Pt has a pacemaker: No        Pt has a defibrillator: No        Breast feeding: Not Asked        Reaction to local anesthetic: No    Social History Narrative      Not on file    Social Determinants of Health  Financial Resource Strain:       D visual exam externally  Neck: supple, trachea midline, no obvious JVD  Gait: Walking forward bended  Walking support none   Spine: Thoracic kyphosis  ROM:   Spine: Reduced flexion extension due to pain  MOTOR EXAMINATION:  Lower Extremities: Normal bilater

## 2021-10-28 ENCOUNTER — PATIENT MESSAGE (OUTPATIENT)
Dept: OTOLARYNGOLOGY | Facility: CLINIC | Age: 66
End: 2021-10-28

## 2021-10-28 ENCOUNTER — OFFICE VISIT (OUTPATIENT)
Dept: FAMILY MEDICINE CLINIC | Facility: CLINIC | Age: 66
End: 2021-10-28
Payer: MEDICARE

## 2021-10-28 ENCOUNTER — LAB ENCOUNTER (OUTPATIENT)
Dept: LAB | Age: 66
End: 2021-10-28
Attending: FAMILY MEDICINE
Payer: COMMERCIAL

## 2021-10-28 VITALS
SYSTOLIC BLOOD PRESSURE: 109 MMHG | TEMPERATURE: 99 F | BODY MASS INDEX: 34.66 KG/M2 | HEART RATE: 83 BPM | DIASTOLIC BLOOD PRESSURE: 70 MMHG | WEIGHT: 208 LBS | HEIGHT: 65 IN

## 2021-10-28 DIAGNOSIS — R61 SWEATING INCREASE: ICD-10-CM

## 2021-10-28 DIAGNOSIS — Z92.3 H/O RADIOACTIVE IODINE THYROID ABLATION: ICD-10-CM

## 2021-10-28 DIAGNOSIS — E03.9 ACQUIRED HYPOTHYROIDISM: ICD-10-CM

## 2021-10-28 DIAGNOSIS — E04.1 THYROID NODULE: ICD-10-CM

## 2021-10-28 DIAGNOSIS — F41.9 ANXIETY AND DEPRESSION: ICD-10-CM

## 2021-10-28 DIAGNOSIS — M25.50 POLYARTHRALGIA: ICD-10-CM

## 2021-10-28 DIAGNOSIS — M25.50 POLYARTHRALGIA: Primary | ICD-10-CM

## 2021-10-28 DIAGNOSIS — F32.A ANXIETY AND DEPRESSION: ICD-10-CM

## 2021-10-28 PROCEDURE — 84439 ASSAY OF FREE THYROXINE: CPT

## 2021-10-28 PROCEDURE — 85025 COMPLETE CBC W/AUTO DIFF WBC: CPT

## 2021-10-28 PROCEDURE — 84443 ASSAY THYROID STIM HORMONE: CPT

## 2021-10-28 PROCEDURE — 86140 C-REACTIVE PROTEIN: CPT

## 2021-10-28 PROCEDURE — 36415 COLL VENOUS BLD VENIPUNCTURE: CPT

## 2021-10-28 PROCEDURE — 99214 OFFICE O/P EST MOD 30 MIN: CPT | Performed by: FAMILY MEDICINE

## 2021-10-28 PROCEDURE — 80053 COMPREHEN METABOLIC PANEL: CPT

## 2021-10-28 PROCEDURE — 85652 RBC SED RATE AUTOMATED: CPT

## 2021-10-28 RX ORDER — MELOXICAM 15 MG/1
TABLET ORAL
Qty: 30 TABLET | Refills: 0 | OUTPATIENT
Start: 2021-10-28

## 2021-10-28 RX ORDER — QUETIAPINE 50 MG/1
25 TABLET, FILM COATED ORAL NIGHTLY
COMMUNITY
Start: 2021-10-26 | End: 2021-12-01

## 2021-10-28 RX ORDER — CYCLOBENZAPRINE HCL 5 MG
TABLET ORAL
Qty: 30 TABLET | Refills: 0 | OUTPATIENT
Start: 2021-10-28

## 2021-10-28 NOTE — TELEPHONE ENCOUNTER
From: Brooklyn Kong  Sent: 10/28/2021 10:57 AM CDT  To: Em Ent Clinical Staff  Subject: Medication Refill    I did not ask my pharmacy for any refill , I will call the office today , because I have a reacción to those medicines and u stopped.  Thanks

## 2021-10-28 NOTE — PROGRESS NOTES
HPI:    Patient ID: Bree Mims is a 72year old female.     HPI  Patient presents with:  Sweats: during the nights has to chance  her sheets everyday   Joint Pain: for years has a hard time getting out the bed     Wt Readings from Last 6 Encounters: mouth nightly. 30 tablet 0   • ondansetron (ZOFRAN) 4 mg tablet Take 1 tablet (4 mg total) by mouth every 8 (eight) hours as needed for Nausea.  30 tablet 0   • levothyroxine 125 MCG Oral Tab Take 1 tablet (125 mcg total) by mouth before breakfast. 90 table Comment:Other reaction(s): Vomiting   PHYSICAL EXAM:   Patient presents with:  Sweats: during the nights has to chance  her sheets everyday   Joint Pain: for years has a hard time getting out the bed      Physical Exam  Constitutional:       Appearance:  Sh Dragon speech recognition technology.  Please excuse any typos    Meds This Visit:  Requested Prescriptions      No prescriptions requested or ordered in this encounter       Imaging & Referrals:  None       #[id number]

## 2021-10-28 NOTE — TELEPHONE ENCOUNTER
This refill request is being sent to the provider for the following reason:    [x]Patient did not complete follow up recommendations--msg sent to make f/u appt

## 2021-10-29 ENCOUNTER — DOCUMENTATION ONLY (OUTPATIENT)
Dept: PAIN CLINIC | Facility: HOSPITAL | Age: 66
End: 2021-10-29

## 2021-10-29 NOTE — PROGRESS NOTES
Procedure code 5001 SIDNEY. [facility] needed via [address] [facility] submitted  Case is pending in medical review    Pending Ruchi Gomez [id number]  Once approval has been rcv'd writer will reach out to pt and schedule procedure

## 2021-11-01 ENCOUNTER — PATIENT MESSAGE (OUTPATIENT)
Dept: FAMILY MEDICINE CLINIC | Facility: CLINIC | Age: 66
End: 2021-11-01

## 2021-11-01 DIAGNOSIS — E04.1 THYROID NODULE: ICD-10-CM

## 2021-11-01 DIAGNOSIS — E03.9 ACQUIRED HYPOTHYROIDISM: ICD-10-CM

## 2021-11-01 DIAGNOSIS — R61 SWEATING INCREASE: Primary | ICD-10-CM

## 2021-11-01 DIAGNOSIS — Z92.3 H/O RADIOACTIVE IODINE THYROID ABLATION: ICD-10-CM

## 2021-11-01 NOTE — TELEPHONE ENCOUNTER
From: Federico Saucedo  To: Irene Jesus MD  Sent: 11/1/2021 2:37 PM CDT  Subject: Test result     Dr Devin Jesus as I saw in my chart the test were ok but I am not . , still the trench sweat , please sent me to endocrinologist. ,test my lungs for TB ( my grandmo

## 2021-11-01 NOTE — TELEPHONE ENCOUNTER
Called patient and reviewed test results. Recommend decreasing dose of thyroid medication and to also see endocrinologist.  Referral placed. See my result note as well.

## 2021-11-04 ENCOUNTER — OFFICE VISIT (OUTPATIENT)
Dept: OTOLARYNGOLOGY | Facility: CLINIC | Age: 66
End: 2021-11-04
Payer: MEDICARE

## 2021-11-04 VITALS — HEIGHT: 65 IN | BODY MASS INDEX: 34.66 KG/M2 | WEIGHT: 208 LBS

## 2021-11-04 DIAGNOSIS — M54.2 NECK PAIN: Primary | ICD-10-CM

## 2021-11-04 PROCEDURE — 99213 OFFICE O/P EST LOW 20 MIN: CPT | Performed by: OTOLARYNGOLOGY

## 2021-11-04 RX ORDER — MELOXICAM 15 MG/1
15 TABLET ORAL DAILY
Qty: 30 TABLET | Refills: 3 | Status: SHIPPED | OUTPATIENT
Start: 2021-11-04 | End: 2021-12-01

## 2021-11-04 RX ORDER — CYCLOBENZAPRINE HCL 5 MG
5 TABLET ORAL NIGHTLY
Qty: 30 TABLET | Refills: 3 | Status: SHIPPED | OUTPATIENT
Start: 2021-11-04 | End: 2021-12-01

## 2021-11-04 NOTE — PROGRESS NOTES
Adore Lee is a 77year old female. Patient presents with: Follow - Up: pt is here today for a follow up on her neck pain, she started the medications and then she stopped it due to getting excessive sweating. pt is still having the neck pain. coffee daily        Exercise: No        Pt has a pacemaker: No        Pt has a defibrillator: No        Reaction to local anesthetic: No      Family History   Problem Relation Age of Onset   • Depression Mother    • Neurological Disorder Mother         Per (VVF=[id number])  03/2007   • OTHER SURGICAL HISTORY      Per NG: Arthrocentesis   • OTHER SURGICAL HISTORY      Per NG: arthrocentesis of the knee joint   • YAG IRIDOTOMY - OD - RIGHT EYE Right 10/10/1997    MICHELA   • YAG IRIDOTOMY - OS - LEFT EYE Left 10/16/199 Submandibular. Anterior cervical. Posterior cervical. Supraclavicular.         Nose/Mouth/Throat Normal External nose - Normal. Lips/teeth/gums - Normal. Tonsils - Normal. Oropharynx - Normal.   Nose/Mouth/Throat Normal Nares - Right: Normal Left: Normal. S Fluticasone Propionate 50 MCG/ACT Nasal Suspension, SHAKE LQ AND U 1 SPR IEN BID, Disp: , Rfl:   •  Turmeric, Curcuma Longa, (CURCUMIN) Does not apply Powder, Take by mouth., Disp: , Rfl:   •  triamcinolone acetonide 0.1 % External Cream, Apply topically 3

## 2021-11-07 ENCOUNTER — HOSPITAL ENCOUNTER (OUTPATIENT)
Dept: CT IMAGING | Facility: HOSPITAL | Age: 66
Discharge: HOME | End: 2021-11-07
Attending: ANESTHESIOLOGY
Payer: COMMERCIAL

## 2021-11-07 DIAGNOSIS — M51.16 LUMBAR DISC DISEASE WITH RADICULOPATHY: ICD-10-CM

## 2021-11-07 PROCEDURE — 72131 CT LUMBAR SPINE W/O DYE: CPT | Performed by: ANESTHESIOLOGY

## 2021-11-08 ENCOUNTER — TELEPHONE (OUTPATIENT)
Dept: FAMILY MEDICINE CLINIC | Facility: CLINIC | Age: 66
End: 2021-11-08

## 2021-11-08 ENCOUNTER — DOCUMENTATION ONLY (OUTPATIENT)
Dept: PAIN CLINIC | Facility: HOSPITAL | Age: 66
End: 2021-11-08

## 2021-11-08 NOTE — PROGRESS NOTES
Procedure code [id number] APPROVED  Follow up 11/30/2021 at 11am      [facility]-- Auth rcv'd via [facility], order form has been faxed to the surgery center     [facility]# [id number]  Valid from 10/29/2021---1/27/2022

## 2021-11-09 NOTE — TELEPHONE ENCOUNTER
Endo RN group: can get sooner appt. Dr. Jaqueline Mann referred to endo due to increase sweating. LOV 10/28/21 labs done.

## 2021-11-09 NOTE — TELEPHONE ENCOUNTER
----- Message from Krystyna Breen sent at 11/8/2021  5:07 PM CST -----  Regarding: Sweat problem   Dr Thania Durbin , I made the appointment with the Endocrinologist, the  appointment is Dec 21 , in the meantime, I am having this trench sweat  , I don’t think I

## 2021-11-09 NOTE — TELEPHONE ENCOUNTER
Dr. Armando Goes Dr. Maria Luisa Mayfield    Please see below. Can you see patient? Last labs done 10/28.

## 2021-11-10 ENCOUNTER — OFFICE VISIT (OUTPATIENT)
Dept: ENDOCRINOLOGY CLINIC | Facility: CLINIC | Age: 66
End: 2021-11-10
Payer: MEDICARE

## 2021-11-10 VITALS
HEART RATE: 74 BPM | DIASTOLIC BLOOD PRESSURE: 76 MMHG | WEIGHT: 207 LBS | SYSTOLIC BLOOD PRESSURE: 113 MMHG | BODY MASS INDEX: 34 KG/M2

## 2021-11-10 DIAGNOSIS — L74.9 SWEATING ABNORMALITY: Primary | ICD-10-CM

## 2021-11-10 PROCEDURE — 99204 OFFICE O/P NEW MOD 45 MIN: CPT | Performed by: INTERNAL MEDICINE

## 2021-11-10 NOTE — TELEPHONE ENCOUNTER
Patient's consult moved to 11/10/21 - patient agreeable to time/place and will be here to day at 2:45pm

## 2021-11-10 NOTE — H&P
Name: Sadie Jeong  Date: 11/10/2021    Referring Physician: No ref.  provider found    Patient presents with:  Consult: patient reports extreme sweating at night for the past 2 months, on levothyroxine      HISTORY OF PRESENT ILLNESS   Sadie Jeong Musculoskeletal problem     Per NG: Arthrocentesis of the left knee joint   • Osteoarthritis    • Radiation 10/23/2010    Per NG: Radiation to thyroid    • Status post epidural steroid injection     Per NG: epidral injection x5 per pain mgmt       Surgical No      Medications:     Current Outpatient Medications:   •  QUEtiapine 50 MG Oral Tab, Take 25 mg by mouth nightly., Disp: , Rfl:   •  traMADol 50 MG Oral Tab, TAKE 1 TABLET BY MOUTH EVERY 8 HOURS AS NEEDED, Disp: 90 tablet, Rfl: 0  •  cyclobenzaprine 5 not taking: Reported on 11/10/2021), Disp: 30 tablet, Rfl: 0  •  ondansetron (ZOFRAN) 4 mg tablet, Take 1 tablet (4 mg total) by mouth every 8 (eight) hours as needed for Nausea.  (Patient not taking: Reported on 11/10/2021), Disp: 30 tablet, Rfl: 0  •  flu distress, well developed  Eyes: normal conjunctivae, sclera.   Ears/Nose/Mouth/Throat/Neck:  normal hearing, normal speech and normal thyroid gland  Neck: Trachea midline  Neurologic: sensory grossly intact and motor grossly intact  Respiratory:  clear to a from March 21, 2019.          ASSESSMENT/PLAN: This is a 77year-old woman here for evaluation and management of increased sweating that is happening at night.  The various endocrine disorders that we will investigate for are differential for this in this p

## 2021-11-10 NOTE — TELEPHONE ENCOUNTER
Patient calling, identified name and , checking on the status of endo appt to be seen sooner      ENDO please call patient ASAP   See Dr. Dexter Nava note below and thank you       Best call back number: [phone]

## 2021-11-12 ENCOUNTER — TELEPHONE (OUTPATIENT)
Dept: PAIN CLINIC | Facility: HOSPITAL | Age: 66
End: 2021-11-12

## 2021-11-12 NOTE — TELEPHONE ENCOUNTER
Writer spoke with pt, didn't have procedure due to an upcoming test pt will be having.  Pt will call us at a later time to r/s

## 2021-11-14 ENCOUNTER — LAB ENCOUNTER (OUTPATIENT)
Dept: LAB | Facility: HOSPITAL | Age: 66
End: 2021-11-14
Attending: INTERNAL MEDICINE
Payer: COMMERCIAL

## 2021-11-14 DIAGNOSIS — E03.9 ACQUIRED HYPOTHYROIDISM: ICD-10-CM

## 2021-11-14 DIAGNOSIS — L74.9 SWEATING ABNORMALITY: ICD-10-CM

## 2021-11-14 PROCEDURE — 84305 ASSAY OF SOMATOMEDIN: CPT

## 2021-11-14 PROCEDURE — 36415 COLL VENOUS BLD VENIPUNCTURE: CPT

## 2021-11-14 PROCEDURE — 84443 ASSAY THYROID STIM HORMONE: CPT

## 2021-11-14 PROCEDURE — 83835 ASSAY OF METANEPHRINES: CPT

## 2021-11-14 PROCEDURE — 83497 ASSAY OF 5-HIAA: CPT

## 2021-11-19 ENCOUNTER — TELEPHONE (OUTPATIENT)
Dept: ENDOCRINOLOGY CLINIC | Facility: CLINIC | Age: 66
End: 2021-11-19

## 2021-11-19 DIAGNOSIS — E03.9 HYPOTHYROIDISM, UNSPECIFIED TYPE: ICD-10-CM

## 2021-11-19 DIAGNOSIS — L74.9 SWEATING ABNORMALITY: Primary | ICD-10-CM

## 2021-11-19 NOTE — TELEPHONE ENCOUNTER
Spoke to patient regarding Dr. Liv Wolfe result notes below. Patient verbalized understanding, advised patient to get urine tests, and metanephrine testing done again in 6 months, and thyroid blood work drawn again in 3 months.  Patient will also continu

## 2021-11-19 NOTE — TELEPHONE ENCOUNTER
Hi!  Can we please call this patient and let her know that I have reviewed all of her test results. I have noted the elevated plasma metanphrine results.  A clinically significant result would be two times the upper limit of normal (that is, 1.78, which is

## 2021-12-02 ENCOUNTER — PATIENT MESSAGE (OUTPATIENT)
Dept: FAMILY MEDICINE CLINIC | Facility: CLINIC | Age: 66
End: 2021-12-02

## 2021-12-03 NOTE — TELEPHONE ENCOUNTER
From: Sinan Adamson  To: Laurent Mar MD  Sent: 12/2/2021 5:08 PM CST  Subject: [facility]     Dr Annamarie Mar , when can I get the [facility] , please .    Amanuel Gonzalez

## 2021-12-04 DIAGNOSIS — M54.2 CHRONIC NECK AND BACK PAIN: ICD-10-CM

## 2021-12-04 DIAGNOSIS — M54.9 CHRONIC NECK AND BACK PAIN: ICD-10-CM

## 2021-12-04 DIAGNOSIS — G89.29 CHRONIC NECK AND BACK PAIN: ICD-10-CM

## 2021-12-06 RX ORDER — GABAPENTIN 800 MG/1
TABLET ORAL
Qty: 180 TABLET | Refills: 1 | Status: SHIPPED | OUTPATIENT
Start: 2021-12-06

## 2021-12-06 RX ORDER — ERGOCALCIFEROL 1.25 MG/1
CAPSULE ORAL
Qty: 12 CAPSULE | Refills: 3 | Status: SHIPPED | OUTPATIENT
Start: 2021-12-06

## 2021-12-06 RX ORDER — TRAMADOL HYDROCHLORIDE 50 MG/1
TABLET ORAL
Qty: 90 TABLET | Refills: 0 | Status: SHIPPED | OUTPATIENT
Start: 2021-12-06

## 2022-01-06 ENCOUNTER — OFFICE VISIT (OUTPATIENT)
Dept: PAIN CLINIC | Facility: HOSPITAL | Age: 67
End: 2022-01-06
Attending: NURSE PRACTITIONER
Payer: COMMERCIAL

## 2022-01-06 VITALS — OXYGEN SATURATION: 94 % | HEART RATE: 75 BPM | SYSTOLIC BLOOD PRESSURE: 114 MMHG | DIASTOLIC BLOOD PRESSURE: 69 MMHG

## 2022-01-06 DIAGNOSIS — M51.16 LUMBAR DISC HERNIATION WITH RADICULOPATHY: ICD-10-CM

## 2022-01-06 DIAGNOSIS — M50.30 DDD (DEGENERATIVE DISC DISEASE), CERVICAL: ICD-10-CM

## 2022-01-06 DIAGNOSIS — M54.2 NECK PAIN: ICD-10-CM

## 2022-01-06 DIAGNOSIS — M51.16 LUMBAR DISC DISEASE WITH RADICULOPATHY: Primary | ICD-10-CM

## 2022-01-06 DIAGNOSIS — M79.18 MYALGIA OF MUSCLE OF NECK: ICD-10-CM

## 2022-01-06 DIAGNOSIS — M96.1 FAILED BACK SYNDROME OF LUMBAR SPINE: ICD-10-CM

## 2022-01-06 PROCEDURE — 99211 OFF/OP EST MAY X REQ PHY/QHP: CPT

## 2022-01-06 NOTE — PROGRESS NOTES
PT presents ambulatory to the CPM.  Pt reports no improvement. ILA [place] saw PT for injection from 12/23/21 F/U. See notes for POC.

## 2022-01-06 NOTE — CHRONIC PAIN
Follow-up Note    Pravin Hodgson is a 77year old old female, originally referred to the pain clinic by Dr. Hernandez Landrum, with history of Lumbar disc disease with radiculopathy  (primary encounter diagnosis)  Failed back syndrome of lumbar spine  Lumbar disc her Nasal Suspension      • Turmeric, Curcuma Longa, (CURCUMIN) Does not apply Powder Take by mouth.           REVIEW OF SYSTEMS:   General: no weight change, change in appetite, thirst or fever  HEENT: no headache or blurred vision  Cardiopulmonary: no chest p NG: OU   • High cholesterol    • Hypothyroidism    • Meibomian gland dysfunction 2007   • Musculoskeletal problem     Per NG: injection tendon origin/insertion   • Musculoskeletal problem     Per NG: Arthrocentesis of the left knee joint   • Neck pain Not on file      Years of education: Not on file      Highest education level: Not on file    Occupational History      Not on file    Tobacco Use      Smoking status: Former Smoker        Packs/day: 1.00        Years: 27.00        Pack years: [address] no discharge  Nose: externally grossly within normal limits, no unusual discharge or rhinorrhea   Throat: lips grossly within normal limits by visual exam externally  Neck: supple, trachea midline, no obvious JVD  Gait: Walking forward bended  Walking supp Total Time: 21 minutes

## 2022-01-12 ENCOUNTER — PATIENT MESSAGE (OUTPATIENT)
Dept: FAMILY MEDICINE CLINIC | Facility: CLINIC | Age: 67
End: 2022-01-12

## 2022-01-25 ENCOUNTER — TELEPHONE (OUTPATIENT)
Dept: FAMILY MEDICINE CLINIC | Facility: CLINIC | Age: 67
End: 2022-01-25

## 2022-01-25 NOTE — TELEPHONE ENCOUNTER
Patient calling, confirmed name and . Patient states that she has chronic back pain. She was prescribed Prednisone in September and is requesting a refill.     She states that she does not want muscle relaxant refills because she does not like the

## 2022-01-25 NOTE — TELEPHONE ENCOUNTER
Advised patient of Dr. Carmita Lozano note. Patient verbalized understanding and indicated that the pain clinic doesn't want to see her because she no longer wants the injections. The pain clinic can't do anything other than injections.  Patient stated that last in

## 2022-02-08 DIAGNOSIS — G89.29 CHRONIC NECK AND BACK PAIN: ICD-10-CM

## 2022-02-08 DIAGNOSIS — M54.2 CHRONIC NECK AND BACK PAIN: ICD-10-CM

## 2022-02-08 DIAGNOSIS — M54.9 CHRONIC NECK AND BACK PAIN: ICD-10-CM

## 2022-02-08 RX ORDER — TRAMADOL HYDROCHLORIDE 50 MG/1
TABLET ORAL
Qty: 90 TABLET | Refills: 0 | OUTPATIENT
Start: 2022-02-08

## 2022-02-08 NOTE — TELEPHONE ENCOUNTER
Tramadol interacts with remeron (Serotonin syndrome risk)  Patient to touch base with pain management re what pain meds she may use

## 2022-02-08 NOTE — TELEPHONE ENCOUNTER
Please review; protocol failed/no protocol    Requested Prescriptions   Pending Prescriptions Disp Refills    TRAMADOL 50 MG Oral Tab [Pharmacy Med Name: TRAMADOL 50MG TABLETS] 90 tablet 0     Sig: TAKE 1 TABLET BY MOUTH EVERY 8 HOURS AS NEEDED        There is no refill protocol information for this order            Recent Outpatient Visits              1 month ago Lumbar disc disease with radiculopathy    [facility] MIKEL Banks    Office Visit    3 months ago Sweating abnormality    [facility] Renae Foster MD    Office Visit    3 months ago Neck pain    [facility] Rubina Guerin MD    Office Visit    3 months ago Daniel Hankins MD    Office Visit    3 months ago Failed back syndrome of lumbar spine    [facility] Mary Lou Singh MD    Office Visit

## 2022-02-08 NOTE — TELEPHONE ENCOUNTER
Can you help  Not sure what else I can put her on  Would not recommend prednisone for a month  (its given for short bursts occasionally)  Staff can we help her get in to see pain management

## 2022-02-08 NOTE — TELEPHONE ENCOUNTER
The patient stated the pain clinic does not call her back. She stated the pain clinic was to call her back. The patient does not want to do the injection for it did not work last time. The patient clinic stated she needs to see the spine surgeon. The patient stated is in pain and needs something. She is still requesting the prednisone which will relieve her pain for at least a month. The patient was referred to Dr. Alonzo Hernandez who is not in her insurance system. At the present time she does not have Medicare only the [employer] [facility]. Per the patient she needs to see a new spine surgeon. Manage care which spine surgeon can she see.

## 2022-02-09 NOTE — TELEPHONE ENCOUNTER
I called the pain center who stated the patient was offered a injection and spinal cord stimulator which she does not want. I asked about a spinal surgeon and they recommend Dr. Kashmir Jones. Per the pain center the patient wished to see Dr. Massiel Brooks which is not in her network. The pain center will reach out to the patient after speaking to a provider for recommendations on her pain management. I pended the referral for Dr. Kashmir Jones   Please advise is correct.

## 2022-02-15 ENCOUNTER — TELEPHONE (OUTPATIENT)
Dept: PAIN CLINIC | Facility: HOSPITAL | Age: 67
End: 2022-02-15

## 2022-02-15 NOTE — TELEPHONE ENCOUNTER
Spoke with pt all questions answered, advised pt if she had any other questions to please feel free to reach out to me.

## 2022-02-16 ENCOUNTER — LAB ENCOUNTER (OUTPATIENT)
Dept: LAB | Age: 67
End: 2022-02-16
Attending: INTERNAL MEDICINE
Payer: COMMERCIAL

## 2022-02-16 ENCOUNTER — OFFICE VISIT (OUTPATIENT)
Dept: FAMILY MEDICINE CLINIC | Facility: CLINIC | Age: 67
End: 2022-02-16
Payer: COMMERCIAL

## 2022-02-16 VITALS
WEIGHT: 211 LBS | HEART RATE: 88 BPM | DIASTOLIC BLOOD PRESSURE: 70 MMHG | HEIGHT: 65 IN | SYSTOLIC BLOOD PRESSURE: 103 MMHG | BODY MASS INDEX: 35.16 KG/M2

## 2022-02-16 DIAGNOSIS — E03.9 HYPOTHYROIDISM, UNSPECIFIED TYPE: ICD-10-CM

## 2022-02-16 DIAGNOSIS — M96.1 FAILED BACK SYNDROME OF LUMBAR SPINE: ICD-10-CM

## 2022-02-16 DIAGNOSIS — Z98.890 HISTORY OF LUMBAR SURGERY: ICD-10-CM

## 2022-02-16 DIAGNOSIS — F32.A ANXIETY AND DEPRESSION: ICD-10-CM

## 2022-02-16 DIAGNOSIS — F41.9 ANXIETY AND DEPRESSION: ICD-10-CM

## 2022-02-16 DIAGNOSIS — M54.16 LUMBAR RADICULAR SYNDROME: Primary | ICD-10-CM

## 2022-02-16 LAB
T4 FREE SERPL-MCNC: 1.1 NG/DL (ref 0.8–1.7)
TSI SER-ACNC: 1.33 MIU/ML (ref 0.36–3.74)

## 2022-02-16 PROCEDURE — 84443 ASSAY THYROID STIM HORMONE: CPT

## 2022-02-16 PROCEDURE — 84439 ASSAY OF FREE THYROXINE: CPT

## 2022-02-16 PROCEDURE — 3078F DIAST BP <80 MM HG: CPT | Performed by: FAMILY MEDICINE

## 2022-02-16 PROCEDURE — 36415 COLL VENOUS BLD VENIPUNCTURE: CPT

## 2022-02-16 PROCEDURE — 99214 OFFICE O/P EST MOD 30 MIN: CPT | Performed by: FAMILY MEDICINE

## 2022-02-16 PROCEDURE — 3074F SYST BP LT 130 MM HG: CPT | Performed by: FAMILY MEDICINE

## 2022-02-16 PROCEDURE — 3008F BODY MASS INDEX DOCD: CPT | Performed by: FAMILY MEDICINE

## 2022-02-16 RX ORDER — TEMAZEPAM 15 MG/1
CAPSULE ORAL
COMMUNITY
Start: 2022-02-14 | End: 2022-03-16

## 2022-02-16 RX ORDER — MIRTAZAPINE 30 MG/1
30 TABLET, FILM COATED ORAL NIGHTLY
COMMUNITY
Start: 2022-01-15 | End: 2022-02-16 | Stop reason: DRUGHIGH

## 2022-02-16 RX ORDER — GABAPENTIN 800 MG/1
800 TABLET ORAL 2 TIMES DAILY
Qty: 180 TABLET | Refills: 1 | Status: SHIPPED | OUTPATIENT
Start: 2022-02-16

## 2022-03-23 ENCOUNTER — HOSPITAL ENCOUNTER (OUTPATIENT)
Dept: GENERAL RADIOLOGY | Facility: HOSPITAL | Age: 67
Discharge: HOME | End: 2022-03-23
Attending: INTERNAL MEDICINE
Payer: COMMERCIAL

## 2022-03-23 ENCOUNTER — LAB ENCOUNTER (OUTPATIENT)
Dept: LAB | Facility: HOSPITAL | Age: 67
End: 2022-03-23
Attending: INTERNAL MEDICINE
Payer: COMMERCIAL

## 2022-03-23 ENCOUNTER — OFFICE VISIT (OUTPATIENT)
Dept: RHEUMATOLOGY | Facility: CLINIC | Age: 67
End: 2022-03-23
Payer: COMMERCIAL

## 2022-03-23 VITALS
SYSTOLIC BLOOD PRESSURE: 94 MMHG | BODY MASS INDEX: 34.49 KG/M2 | DIASTOLIC BLOOD PRESSURE: 63 MMHG | HEART RATE: 80 BPM | WEIGHT: 207 LBS | HEIGHT: 65 IN

## 2022-03-23 DIAGNOSIS — M25.562 CHRONIC PAIN OF LEFT KNEE: ICD-10-CM

## 2022-03-23 DIAGNOSIS — G89.29 CHRONIC PAIN OF LEFT KNEE: ICD-10-CM

## 2022-03-23 DIAGNOSIS — M25.50 POLYARTHRALGIA: ICD-10-CM

## 2022-03-23 DIAGNOSIS — M25.50 POLYARTHRALGIA: Primary | ICD-10-CM

## 2022-03-23 LAB
CRP SERPL-MCNC: 4.29 MG/DL (ref ?–0.3)
ERYTHROCYTE [SEDIMENTATION RATE] IN BLOOD: 36 MM/HR
RHEUMATOID FACT SERPL-ACNC: <10 IU/ML (ref ?–15)

## 2022-03-23 PROCEDURE — 86200 CCP ANTIBODY: CPT | Performed by: INTERNAL MEDICINE

## 2022-03-23 PROCEDURE — 86140 C-REACTIVE PROTEIN: CPT | Performed by: INTERNAL MEDICINE

## 2022-03-23 PROCEDURE — 86431 RHEUMATOID FACTOR QUANT: CPT | Performed by: INTERNAL MEDICINE

## 2022-03-23 PROCEDURE — 3074F SYST BP LT 130 MM HG: CPT | Performed by: INTERNAL MEDICINE

## 2022-03-23 PROCEDURE — 3008F BODY MASS INDEX DOCD: CPT | Performed by: INTERNAL MEDICINE

## 2022-03-23 PROCEDURE — 99244 OFF/OP CNSLTJ NEW/EST MOD 40: CPT | Performed by: INTERNAL MEDICINE

## 2022-03-23 PROCEDURE — 3078F DIAST BP <80 MM HG: CPT | Performed by: INTERNAL MEDICINE

## 2022-03-23 PROCEDURE — 73560 X-RAY EXAM OF KNEE 1 OR 2: CPT | Performed by: INTERNAL MEDICINE

## 2022-03-23 PROCEDURE — 20610 DRAIN/INJ JOINT/BURSA W/O US: CPT | Performed by: INTERNAL MEDICINE

## 2022-03-23 PROCEDURE — 85652 RBC SED RATE AUTOMATED: CPT | Performed by: INTERNAL MEDICINE

## 2022-03-23 PROCEDURE — 36415 COLL VENOUS BLD VENIPUNCTURE: CPT | Performed by: INTERNAL MEDICINE

## 2022-03-23 RX ORDER — TRIAMCINOLONE ACETONIDE 40 MG/ML
40 INJECTION, SUSPENSION INTRA-ARTICULAR; INTRAMUSCULAR ONCE
Status: COMPLETED | OUTPATIENT
Start: 2022-03-23 | End: 2022-03-23

## 2022-03-23 RX ADMIN — TRIAMCINOLONE ACETONIDE 40 MG: 40 INJECTION, SUSPENSION INTRA-ARTICULAR; INTRAMUSCULAR at 11:15:00

## 2022-03-23 NOTE — PATIENT INSTRUCTIONS
You were seen today for joint pain involving your hands and your left knee  This is all from osteoarthritis which is wear-and-tear in your joints  We will be getting x-rays of your knees and blood work  Today we injected your left knee with cortisone, hopefully it helps  Continue Aleve or Advil for your pain  We will try to get you approved for the gel injections

## 2022-03-25 LAB — CCP IGG SERPL-ACNC: 1.3 U/ML (ref 0–6.9)

## 2022-03-28 ENCOUNTER — HOSPITAL ENCOUNTER (OUTPATIENT)
Dept: GENERAL RADIOLOGY | Age: 67
Discharge: HOME | End: 2022-03-28
Attending: NEUROLOGICAL SURGERY
Payer: COMMERCIAL

## 2022-03-28 ENCOUNTER — TELEPHONE (OUTPATIENT)
Dept: FAMILY MEDICINE CLINIC | Facility: CLINIC | Age: 67
End: 2022-03-28

## 2022-03-28 ENCOUNTER — HOSPITAL ENCOUNTER (OUTPATIENT)
Dept: MRI IMAGING | Age: 67
Discharge: HOME | End: 2022-03-28
Attending: NEUROLOGICAL SURGERY
Payer: COMMERCIAL

## 2022-03-28 DIAGNOSIS — M96.1 POSTLAMINECTOMY SYNDROME: ICD-10-CM

## 2022-03-28 PROCEDURE — 72148 MRI LUMBAR SPINE W/O DYE: CPT | Performed by: NEUROLOGICAL SURGERY

## 2022-03-28 PROCEDURE — 72110 X-RAY EXAM L-2 SPINE 4/>VWS: CPT | Performed by: NEUROLOGICAL SURGERY

## 2022-03-28 NOTE — TELEPHONE ENCOUNTER
Pt states that she saw Dr. Janine Boo last week and has to have a couple of f/u appts due to some abnormal lab work.   Please advise on pended referral.

## 2022-03-30 ENCOUNTER — OFFICE VISIT (OUTPATIENT)
Dept: RHEUMATOLOGY | Facility: CLINIC | Age: 67
End: 2022-03-30
Payer: COMMERCIAL

## 2022-03-30 ENCOUNTER — HOSPITAL ENCOUNTER (OUTPATIENT)
Dept: GENERAL RADIOLOGY | Facility: HOSPITAL | Age: 67
Discharge: HOME | End: 2022-03-30
Attending: INTERNAL MEDICINE
Payer: COMMERCIAL

## 2022-03-30 ENCOUNTER — LAB ENCOUNTER (OUTPATIENT)
Dept: LAB | Facility: HOSPITAL | Age: 67
End: 2022-03-30
Attending: INTERNAL MEDICINE
Payer: COMMERCIAL

## 2022-03-30 VITALS
BODY MASS INDEX: 34.66 KG/M2 | HEART RATE: 66 BPM | WEIGHT: 208 LBS | SYSTOLIC BLOOD PRESSURE: 103 MMHG | DIASTOLIC BLOOD PRESSURE: 66 MMHG | HEIGHT: 65 IN

## 2022-03-30 DIAGNOSIS — M25.512 CHRONIC LEFT SHOULDER PAIN: ICD-10-CM

## 2022-03-30 DIAGNOSIS — M25.50 POLYARTHRALGIA: ICD-10-CM

## 2022-03-30 DIAGNOSIS — G89.29 CHRONIC LEFT SHOULDER PAIN: ICD-10-CM

## 2022-03-30 DIAGNOSIS — M25.512 CHRONIC LEFT SHOULDER PAIN: Primary | ICD-10-CM

## 2022-03-30 DIAGNOSIS — G89.29 CHRONIC LEFT SHOULDER PAIN: Primary | ICD-10-CM

## 2022-03-30 LAB
CRP SERPL-MCNC: 0.5 MG/DL (ref ?–0.3)
ERYTHROCYTE [SEDIMENTATION RATE] IN BLOOD: 24 MM/HR

## 2022-03-30 PROCEDURE — 3078F DIAST BP <80 MM HG: CPT | Performed by: INTERNAL MEDICINE

## 2022-03-30 PROCEDURE — 73110 X-RAY EXAM OF WRIST: CPT | Performed by: INTERNAL MEDICINE

## 2022-03-30 PROCEDURE — 81374 HLA I TYPING 1 ANTIGEN LR: CPT

## 2022-03-30 PROCEDURE — 3008F BODY MASS INDEX DOCD: CPT | Performed by: INTERNAL MEDICINE

## 2022-03-30 PROCEDURE — 99214 OFFICE O/P EST MOD 30 MIN: CPT | Performed by: INTERNAL MEDICINE

## 2022-03-30 PROCEDURE — 86140 C-REACTIVE PROTEIN: CPT | Performed by: INTERNAL MEDICINE

## 2022-03-30 PROCEDURE — 3074F SYST BP LT 130 MM HG: CPT | Performed by: INTERNAL MEDICINE

## 2022-03-30 PROCEDURE — 85652 RBC SED RATE AUTOMATED: CPT | Performed by: INTERNAL MEDICINE

## 2022-03-30 PROCEDURE — 20610 DRAIN/INJ JOINT/BURSA W/O US: CPT | Performed by: INTERNAL MEDICINE

## 2022-03-30 PROCEDURE — 36415 COLL VENOUS BLD VENIPUNCTURE: CPT | Performed by: INTERNAL MEDICINE

## 2022-03-30 RX ORDER — TRIAMCINOLONE ACETONIDE 40 MG/ML
40 INJECTION, SUSPENSION INTRA-ARTICULAR; INTRAMUSCULAR ONCE
Status: COMPLETED | OUTPATIENT
Start: 2022-03-30 | End: 2022-03-30

## 2022-03-30 RX ADMIN — TRIAMCINOLONE ACETONIDE 40 MG: 40 INJECTION, SUSPENSION INTRA-ARTICULAR; INTRAMUSCULAR at 10:55:00

## 2022-03-30 NOTE — PATIENT INSTRUCTIONS
You were seen today for joint pain in your wrists and even your left shoulder  Your left knee pain is improved after the injection  We injected your left shoulder with cortisone  Get blood work and x-rays of the wrist today

## 2022-04-01 LAB — HLA-B27: NEGATIVE

## 2022-05-17 ENCOUNTER — LAB ENCOUNTER (OUTPATIENT)
Dept: LAB | Age: 67
End: 2022-05-17
Attending: INTERNAL MEDICINE
Payer: COMMERCIAL

## 2022-05-17 ENCOUNTER — PATIENT MESSAGE (OUTPATIENT)
Dept: FAMILY MEDICINE CLINIC | Facility: CLINIC | Age: 67
End: 2022-05-17

## 2022-05-17 DIAGNOSIS — L74.9 SWEATING ABNORMALITY: ICD-10-CM

## 2022-05-17 DIAGNOSIS — E03.9 HYPOTHYROIDISM, UNSPECIFIED TYPE: ICD-10-CM

## 2022-05-17 LAB
CREAT UR-SCNC: 0.93 G/24 HR (ref 0.6–1.8)
SPECIMEN VOL UR: 3100 ML
T4 FREE SERPL-MCNC: 1.6 NG/DL (ref 0.8–1.7)
TSI SER-ACNC: 2.19 MIU/ML (ref 0.36–3.74)

## 2022-05-17 PROCEDURE — 82570 ASSAY OF URINE CREATININE: CPT

## 2022-05-17 PROCEDURE — 83835 ASSAY OF METANEPHRINES: CPT

## 2022-05-17 PROCEDURE — 84439 ASSAY OF FREE THYROXINE: CPT

## 2022-05-17 PROCEDURE — 36415 COLL VENOUS BLD VENIPUNCTURE: CPT

## 2022-05-17 PROCEDURE — 84443 ASSAY THYROID STIM HORMONE: CPT

## 2022-05-17 NOTE — TELEPHONE ENCOUNTER
From: Ana Mcclendon  To: Sony Dyer MD  Sent: 5/17/2022 9:51 AM CDT  Subject: Prescription     Dear Dr Milena Dyer , I am going to need a prescription of Busporine 5 mg 60 pills ( two a days ) . Remember that we talked about this in my last visit since I am not seeing Dr Jeremy Leach anymore . My appointment at [facility] is on June 13 and my medication runs out April 24 . I still have enough Temazepan until I get to my appointment on June 13 . Thank you.   Jeffery Jimenez

## 2022-05-18 RX ORDER — BUSPIRONE HYDROCHLORIDE 5 MG/1
10 TABLET ORAL 2 TIMES DAILY
Qty: 60 TABLET | Refills: 0 | Status: SHIPPED | OUTPATIENT
Start: 2022-05-18

## 2022-05-18 NOTE — TELEPHONE ENCOUNTER
Pt calling back, requesting rx, stated she will be out by May 24th but have apt 6/13th at [facility]   Asking for enough rx to last

## 2022-05-20 LAB
CREATININE, URINE - PER 24H: 992 MG/D
CREATININE, URINE - PER VOLUME: 32 MG/DL
HOURS COLLECTED: 24 HR
METANEPHRINE, UR- RATIO TO CRT: 53 UG/G CRT
METANEPHRINE, URINE - PER 24H: 53 UG/D
METANEPHRINE, URINE-PER VOLUME: 17 UG/L
NORMETANEPHRINE, UR-PER VOLUME: 50 UG/L
NORMETANEPHRINE, URINE - RATIO: 156 UG/G CRT
NORMETANEPHRINE, URINE-PER 24H: 155 UG/D
TOTAL VOLUME: 3100 ML

## 2022-05-21 LAB
METANEPHRINE: 0.12 NMOL/L
NORMETANEPHRINE: 0.31 NMOL/L

## 2022-05-29 ENCOUNTER — TELEPHONE (OUTPATIENT)
Dept: ENDOCRINOLOGY CLINIC | Facility: CLINIC | Age: 67
End: 2022-05-29

## 2022-05-31 ENCOUNTER — PATIENT MESSAGE (OUTPATIENT)
Dept: ENDOCRINOLOGY CLINIC | Facility: CLINIC | Age: 67
End: 2022-05-31

## 2022-05-31 ENCOUNTER — PATIENT MESSAGE (OUTPATIENT)
Dept: FAMILY MEDICINE CLINIC | Facility: CLINIC | Age: 67
End: 2022-05-31

## 2022-05-31 DIAGNOSIS — I42.9 PRIMARY CARDIOMYOPATHY (HCC): ICD-10-CM

## 2022-05-31 DIAGNOSIS — Z12.31 ENCOUNTER FOR SCREENING MAMMOGRAM FOR BREAST CANCER: Primary | ICD-10-CM

## 2022-05-31 NOTE — TELEPHONE ENCOUNTER
From: Gely Colunga  To: Barrington Ocampo MD  Sent: 5/31/2022 10:14 AM CDT  Subject: Referals    Dr Mendoza  .  I need a referral for the cardiologist Dr Per Regalado , I see Him once a year , an a Mammogram .   Thank you   Radha Jordan

## 2022-06-01 ENCOUNTER — PATIENT MESSAGE (OUTPATIENT)
Dept: FAMILY MEDICINE CLINIC | Facility: CLINIC | Age: 67
End: 2022-06-01

## 2022-06-01 DIAGNOSIS — E03.9 HYPOTHYROIDISM, UNSPECIFIED TYPE: Primary | ICD-10-CM

## 2022-06-03 DIAGNOSIS — M54.16 LUMBAR RADICULAR SYNDROME: ICD-10-CM

## 2022-06-03 DIAGNOSIS — M96.1 FAILED BACK SYNDROME OF LUMBAR SPINE: ICD-10-CM

## 2022-06-03 DIAGNOSIS — Z98.890 HISTORY OF LUMBAR SURGERY: ICD-10-CM

## 2022-06-03 RX ORDER — GABAPENTIN 800 MG/1
800 TABLET ORAL 2 TIMES DAILY
Qty: 180 TABLET | Refills: 1 | Status: SHIPPED | OUTPATIENT
Start: 2022-06-03 | End: 2023-06-05

## 2022-06-03 NOTE — TELEPHONE ENCOUNTER
Refill passed per [place] protocol.   Requested Prescriptions   Pending Prescriptions Disp Refills    GABAPENTIN 800 MG Oral Tab [Pharmacy Med Name: GABAPENTIN 800MG TABLETS] 180 tablet 1     Sig: TAKE 1 TABLET(800 MG) BY MOUTH TWICE DAILY        Neurology Medications Passed - 6/3/2022  9:33 AM        Passed - Appointment in the past 6 or next 3 months             Recent Outpatient Visits              1 month ago Anxiety and depression    Kelsey Myrick MD    Office Visit    2 months ago Chronic left shoulder pain    [facility], [address], Codi Pruitt MD    Office Visit    2 months ago [facility] [address], [address], Jesus Lizarraga MD    Office Visit    3 months ago Lumbar radicular syndrome    Kelsey Myrick MD    Office Visit    4 months ago Lumbar disc disease with radiculopathy    [facility] MIKEL Pavon    Office Visit           Future Appointments         Provider Department Appt Notes    In 3 weeks Rachel Bernard MD [facility] Endocrinology follow up / informed of debi

## 2022-06-13 PROBLEM — F33.40 MDD (RECURRENT MAJOR DEPRESSIVE DISORDER) IN REMISSION: Status: ACTIVE | Noted: 2022-06-13

## 2022-06-13 PROBLEM — F33.40 MDD (RECURRENT MAJOR DEPRESSIVE DISORDER) IN REMISSION (HCC): Status: ACTIVE | Noted: 2022-06-13

## 2022-06-13 PROBLEM — F41.1 GENERALIZED ANXIETY DISORDER: Status: ACTIVE | Noted: 2022-06-13

## 2022-06-27 ENCOUNTER — OFFICE VISIT (OUTPATIENT)
Dept: ENDOCRINOLOGY CLINIC | Facility: CLINIC | Age: 67
End: 2022-06-27
Payer: COMMERCIAL

## 2022-06-27 VITALS
WEIGHT: 190 LBS | HEART RATE: 65 BPM | SYSTOLIC BLOOD PRESSURE: 107 MMHG | HEIGHT: 65 IN | RESPIRATION RATE: 16 BRPM | BODY MASS INDEX: 31.65 KG/M2 | DIASTOLIC BLOOD PRESSURE: 52 MMHG

## 2022-06-27 DIAGNOSIS — E03.9 HYPOTHYROIDISM, UNSPECIFIED TYPE: Primary | ICD-10-CM

## 2022-06-27 PROCEDURE — 3074F SYST BP LT 130 MM HG: CPT | Performed by: INTERNAL MEDICINE

## 2022-06-27 PROCEDURE — 3008F BODY MASS INDEX DOCD: CPT | Performed by: INTERNAL MEDICINE

## 2022-06-27 PROCEDURE — 3078F DIAST BP <80 MM HG: CPT | Performed by: INTERNAL MEDICINE

## 2022-06-27 PROCEDURE — 99214 OFFICE O/P EST MOD 30 MIN: CPT | Performed by: INTERNAL MEDICINE

## 2022-06-27 RX ORDER — LEVOTHYROXINE SODIUM 112 UG/1
112 TABLET ORAL
Qty: 90 TABLET | Refills: 0 | Status: SHIPPED | OUTPATIENT
Start: 2022-06-27 | End: 2022-09-25

## 2022-06-29 ENCOUNTER — NURSE TRIAGE (OUTPATIENT)
Dept: FAMILY MEDICINE CLINIC | Facility: CLINIC | Age: 67
End: 2022-06-29

## 2022-06-30 ENCOUNTER — OFFICE VISIT (OUTPATIENT)
Dept: FAMILY MEDICINE CLINIC | Facility: CLINIC | Age: 67
End: 2022-06-30
Payer: COMMERCIAL

## 2022-06-30 VITALS
HEART RATE: 60 BPM | WEIGHT: 191 LBS | HEIGHT: 65 IN | TEMPERATURE: 97 F | RESPIRATION RATE: 16 BRPM | DIASTOLIC BLOOD PRESSURE: 62 MMHG | SYSTOLIC BLOOD PRESSURE: 97 MMHG | BODY MASS INDEX: 31.82 KG/M2

## 2022-06-30 DIAGNOSIS — R21 RASH: Primary | ICD-10-CM

## 2022-06-30 PROCEDURE — 99213 OFFICE O/P EST LOW 20 MIN: CPT | Performed by: FAMILY MEDICINE

## 2022-06-30 PROCEDURE — 3078F DIAST BP <80 MM HG: CPT | Performed by: FAMILY MEDICINE

## 2022-06-30 PROCEDURE — 3074F SYST BP LT 130 MM HG: CPT | Performed by: FAMILY MEDICINE

## 2022-06-30 PROCEDURE — 3008F BODY MASS INDEX DOCD: CPT | Performed by: FAMILY MEDICINE

## 2022-06-30 RX ORDER — METHYLPREDNISOLONE 4 MG/1
TABLET ORAL
Qty: 1 EACH | Refills: 0 | Status: SHIPPED | OUTPATIENT
Start: 2022-06-30

## 2022-07-15 ENCOUNTER — TELEPHONE (OUTPATIENT)
Dept: FAMILY MEDICINE CLINIC | Facility: CLINIC | Age: 67
End: 2022-07-15

## 2022-07-15 DIAGNOSIS — S42.002D: Primary | ICD-10-CM
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
## 2022-07-15 DIAGNOSIS — M79.89 LEFT ARM SWELLING: ICD-10-CM

## 2022-07-15 NOTE — TELEPHONE ENCOUNTER
Patient states she's currently in [place], and had a fall down a flight of stairs. Patient taken to ER there and sustained a fracture to her left clavicle. Also reports her left arm is swollen, but not broken. Patient is flying home on Wednesday and requesting an order for Ortho. As Ortho dept is pretty backed up, unsure if patient able to see another Ortho doc with her insurance. When calling patient back to inform, please only call her on her cell phone as she does not want family members to know.

## 2022-07-16 NOTE — TELEPHONE ENCOUNTER
Will place orthopedics referral    When calling patient back to inform, please only call her on her cell phone as she does not want family members to know

## 2022-07-18 ENCOUNTER — TELEPHONE (OUTPATIENT)
Dept: ORTHOPEDICS CLINIC | Facility: CLINIC | Age: 67
End: 2022-07-18

## 2022-07-18 NOTE — TELEPHONE ENCOUNTER
Pt states that she has a L collar bone fracture, which happed this past Thursday night. Pt. States that she is in [place] and will be returning on Wed, so pt is requesting to be seen right away this Thursday for consult and pt states that she does have disc. - Traumatic fracture of clavicle with routine healing, left  M79.89 (ICD-10-CM) - 729.81 (ICD-9-CM) - Left arm swelling  W10. 9XXA (ICD-10-CM) - E880.9 (ICD-9-CM) - Fall from stairs

## 2022-07-18 NOTE — TELEPHONE ENCOUNTER
Clavicle fracture patient is returning to [place] Wednesday. Okay to add on Thursday or Friday?

## 2022-07-21 ENCOUNTER — OFFICE VISIT (OUTPATIENT)
Dept: ORTHOPEDICS CLINIC | Facility: CLINIC | Age: 67
End: 2022-07-21
Payer: COMMERCIAL

## 2022-07-21 DIAGNOSIS — S42.032A CLOSED DISPLACED FRACTURE OF ACROMIAL END OF LEFT CLAVICLE, INITIAL ENCOUNTER: Primary | ICD-10-CM

## 2022-07-21 PROCEDURE — 99244 OFF/OP CNSLTJ NEW/EST MOD 40: CPT | Performed by: ORTHOPAEDIC SURGERY

## 2022-07-21 PROCEDURE — 23500 CLTX CLAVICULAR FX W/O MNPJ: CPT | Performed by: ORTHOPAEDIC SURGERY

## 2022-07-22 ENCOUNTER — PATIENT MESSAGE (OUTPATIENT)
Dept: GASTROENTEROLOGY | Facility: CLINIC | Age: 67
End: 2022-07-22

## 2022-07-25 RX ORDER — OMEPRAZOLE 40 MG/1
40 CAPSULE, DELAYED RELEASE ORAL DAILY
Qty: 90 CAPSULE | Refills: 0 | Status: SHIPPED | OUTPATIENT
Start: 2022-07-25

## 2022-07-25 NOTE — TELEPHONE ENCOUNTER
Dr. Kristen Shah    Patient does not meet criteria for med refill protocol. Would you be willing to refill omeprazole given below information? I sent her a message informing her that she needs an appointment for refills in the future. Thank you.     LOV  10/1/2019  LR 7/7/2020

## 2022-07-27 ENCOUNTER — TELEMEDICINE (OUTPATIENT)
Dept: FAMILY MEDICINE CLINIC | Facility: CLINIC | Age: 67
End: 2022-07-27
Payer: COMMERCIAL

## 2022-07-27 DIAGNOSIS — R21 RASH AND NONSPECIFIC SKIN ERUPTION: Primary | ICD-10-CM

## 2022-07-27 DIAGNOSIS — S42.002D: ICD-10-CM

## 2022-07-27 PROCEDURE — 99213 OFFICE O/P EST LOW 20 MIN: CPT | Performed by: FAMILY MEDICINE

## 2022-08-11 ENCOUNTER — OFFICE VISIT (OUTPATIENT)
Dept: ORTHOPEDICS CLINIC | Facility: CLINIC | Age: 67
End: 2022-08-11
Payer: COMMERCIAL

## 2022-08-11 ENCOUNTER — HOSPITAL ENCOUNTER (OUTPATIENT)
Dept: GENERAL RADIOLOGY | Facility: HOSPITAL | Age: 67
Discharge: HOME | End: 2022-08-11
Attending: ORTHOPAEDIC SURGERY
Payer: COMMERCIAL

## 2022-08-11 DIAGNOSIS — S42.032P CLOSED DISPLACED FRACTURE OF ACROMIAL END OF LEFT CLAVICLE WITH MALUNION, SUBSEQUENT ENCOUNTER: Primary | ICD-10-CM

## 2022-08-11 DIAGNOSIS — Z47.89 ORTHOPEDIC AFTERCARE: ICD-10-CM

## 2022-08-11 PROCEDURE — 73000 X-RAY EXAM OF COLLAR BONE: CPT | Performed by: ORTHOPAEDIC SURGERY

## 2022-08-11 PROCEDURE — 99024 POSTOP FOLLOW-UP VISIT: CPT | Performed by: ORTHOPAEDIC SURGERY

## 2022-08-15 ENCOUNTER — LAB ENCOUNTER (OUTPATIENT)
Dept: LAB | Age: 67
End: 2022-08-15
Attending: FAMILY MEDICINE
Payer: COMMERCIAL

## 2022-08-15 ENCOUNTER — OFFICE VISIT (OUTPATIENT)
Dept: FAMILY MEDICINE CLINIC | Facility: CLINIC | Age: 67
End: 2022-08-15
Payer: COMMERCIAL

## 2022-08-15 VITALS
BODY MASS INDEX: 32.15 KG/M2 | HEART RATE: 73 BPM | SYSTOLIC BLOOD PRESSURE: 100 MMHG | HEIGHT: 65 IN | DIASTOLIC BLOOD PRESSURE: 67 MMHG | WEIGHT: 193 LBS

## 2022-08-15 DIAGNOSIS — R73.9 HYPERGLYCEMIA: ICD-10-CM

## 2022-08-15 DIAGNOSIS — S42.002D: Primary | ICD-10-CM

## 2022-08-15 DIAGNOSIS — R21 RASH AND NONSPECIFIC SKIN ERUPTION: ICD-10-CM

## 2022-08-15 LAB
ALBUMIN SERPL-MCNC: 3.5 G/DL (ref 3.4–5)
ALBUMIN/GLOB SERPL: 0.9 {RATIO} (ref 1–2)
ALP LIVER SERPL-CCNC: 101 U/L
ALT SERPL-CCNC: 28 U/L
ANION GAP SERPL CALC-SCNC: 6 MMOL/L (ref 0–18)
AST SERPL-CCNC: 19 U/L (ref 15–37)
BASOPHILS # BLD AUTO: 0.05 X10(3) UL (ref 0–0.2)
BASOPHILS NFR BLD AUTO: 0.6 %
BILIRUB SERPL-MCNC: 0.3 MG/DL (ref 0.1–2)
BUN BLD-MCNC: 14 MG/DL (ref 7–18)
BUN/CREAT SERPL: 18.9 (ref 10–20)
CALCIUM BLD-MCNC: 9.7 MG/DL (ref 8.5–10.1)
CHLORIDE SERPL-SCNC: 104 MMOL/L (ref 98–112)
CO2 SERPL-SCNC: 29 MMOL/L (ref 21–32)
CREAT BLD-MCNC: 0.74 MG/DL
DEPRECATED RDW RBC AUTO: 47.5 FL (ref 35.1–46.3)
EOSINOPHIL # BLD AUTO: 0.16 X10(3) UL (ref 0–0.7)
EOSINOPHIL NFR BLD AUTO: 2 %
ERYTHROCYTE [DISTWIDTH] IN BLOOD BY AUTOMATED COUNT: 14.8 % (ref 11–15)
FASTING STATUS PATIENT QL REPORTED: NO
GFR SERPLBLD BASED ON 1.73 SQ M-ARVRAT: 89 ML/MIN/1.73M2 (ref 60–?)
GLOBULIN PLAS-MCNC: 4 G/DL (ref 2.8–4.4)
GLUCOSE BLD-MCNC: 100 MG/DL (ref 70–99)
HCT VFR BLD AUTO: 40.8 %
HGB BLD-MCNC: 13.1 G/DL
IMM GRANULOCYTES # BLD AUTO: 0.02 X10(3) UL (ref 0–1)
IMM GRANULOCYTES NFR BLD: 0.2 %
LYMPHOCYTES # BLD AUTO: 1.75 X10(3) UL (ref 1–4)
LYMPHOCYTES NFR BLD AUTO: 21.5 %
MCH RBC QN AUTO: 28.1 PG (ref 26–34)
MCHC RBC AUTO-ENTMCNC: 32.1 G/DL (ref 31–37)
MCV RBC AUTO: 87.6 FL
MONOCYTES # BLD AUTO: 0.51 X10(3) UL (ref 0.1–1)
MONOCYTES NFR BLD AUTO: 6.3 %
NEUTROPHILS # BLD AUTO: 5.66 X10 (3) UL (ref 1.5–7.7)
NEUTROPHILS # BLD AUTO: 5.66 X10(3) UL (ref 1.5–7.7)
NEUTROPHILS NFR BLD AUTO: 69.4 %
OSMOLALITY SERPL CALC.SUM OF ELEC: 289 MOSM/KG (ref 275–295)
PLATELET # BLD AUTO: 287 10(3)UL (ref 150–450)
POTASSIUM SERPL-SCNC: 3.8 MMOL/L (ref 3.5–5.1)
PROT SERPL-MCNC: 7.5 G/DL (ref 6.4–8.2)
RBC # BLD AUTO: 4.66 X10(6)UL
SODIUM SERPL-SCNC: 139 MMOL/L (ref 136–145)
WBC # BLD AUTO: 8.2 X10(3) UL (ref 4–11)

## 2022-08-15 PROCEDURE — 83036 HEMOGLOBIN GLYCOSYLATED A1C: CPT

## 2022-08-15 PROCEDURE — 85025 COMPLETE CBC W/AUTO DIFF WBC: CPT

## 2022-08-15 PROCEDURE — 3008F BODY MASS INDEX DOCD: CPT | Performed by: FAMILY MEDICINE

## 2022-08-15 PROCEDURE — 99214 OFFICE O/P EST MOD 30 MIN: CPT | Performed by: FAMILY MEDICINE

## 2022-08-15 PROCEDURE — 3074F SYST BP LT 130 MM HG: CPT | Performed by: FAMILY MEDICINE

## 2022-08-15 PROCEDURE — 80053 COMPREHEN METABOLIC PANEL: CPT

## 2022-08-15 PROCEDURE — 36415 COLL VENOUS BLD VENIPUNCTURE: CPT

## 2022-08-15 PROCEDURE — 3078F DIAST BP <80 MM HG: CPT | Performed by: FAMILY MEDICINE

## 2022-08-17 DIAGNOSIS — R73.9 HYPERGLYCEMIA: Primary | ICD-10-CM

## 2022-08-17 LAB
EST. AVERAGE GLUCOSE BLD GHB EST-MCNC: 114 MG/DL (ref 68–126)
HBA1C MFR BLD: 5.6 % (ref ?–5.7)

## 2022-08-26 ENCOUNTER — TELEPHONE (OUTPATIENT)
Dept: GASTROENTEROLOGY | Facility: CLINIC | Age: 67
End: 2022-08-26

## 2022-08-26 NOTE — TELEPHONE ENCOUNTER
Pt states that she is having a lot of burning stomach pain and would like to know what she can take. Please call.

## 2022-08-26 NOTE — TELEPHONE ENCOUNTER
Patient contacted, she has been on NSAIDs for a broken collarbone after a fall in [place], she fell down 7 flights of stairs. She denies any black stools. She had been taking omeprazole once in a while but over the last couple days to start taking it daily. The burning in her epigastric region is actually improving. Agree with starting on famotidine at night. She stopped taking the NSAIDs and is feeling better. [place] diet    ER if black stools or vomiting blood or worsening symptoms. Agree with follow up office.

## 2022-08-26 NOTE — TELEPHONE ENCOUNTER
Dr. Delgado McBain    Patient complains of burning pain in stomach. Denies any other pain/symptoms. Denies any black stools. She broke her collar bone 8 weeks ago and has been taking advil because she has been in a lot of pain. Aware this (NSAIDs) can aggravate her stomach so she only took 2 advil yesterday. Wants to know what she can do for this pain she is already taking omeprazole 40 mg daily, she has tried pepcid and mylanta as well which did not help her. She only took the pepcid for a few days-recommended to try the omeprazole in the am and pepcid before dinner so she has coverage at night. I also recommended below lifestyle changes. You had a cancellation on 9/6 so I got her scheduled for a follow up appointment since not seen in over 2 years. Wants to know what you would recommend since she is very uncomfortable.     Thank you    Lifestyle changes reviewed - Avoid:  - Laying down after meals (sit upright 2-3 hours after eating  - Large meals  - Spicy/greasy/acidic foods (fast food, orange/lime/lemon/red sauces)  - Excessive caffeine  - Alcohol  - Tight fitting clothing    Try: smaller more frequent meals, elevate head of bed 30 degrees    Your Appointments       Tuesday September 06, 2022  1:30 PM  Follow Up Visit with Veena Casey MD  [facility], [facility], [address] [place] [address],[address], [place] [facility] [address]) [address] [facility]  [phone]

## 2022-09-01 ENCOUNTER — HOSPITAL ENCOUNTER (OUTPATIENT)
Dept: GENERAL RADIOLOGY | Facility: HOSPITAL | Age: 67
Discharge: HOME | End: 2022-09-01
Attending: ORTHOPAEDIC SURGERY
Payer: COMMERCIAL

## 2022-09-01 ENCOUNTER — OFFICE VISIT (OUTPATIENT)
Dept: ORTHOPEDICS CLINIC | Facility: CLINIC | Age: 67
End: 2022-09-01
Payer: COMMERCIAL

## 2022-09-01 DIAGNOSIS — S42.032P CLOSED DISPLACED FRACTURE OF ACROMIAL END OF LEFT CLAVICLE WITH MALUNION, SUBSEQUENT ENCOUNTER: Primary | ICD-10-CM

## 2022-09-01 DIAGNOSIS — Z47.89 ORTHOPEDIC AFTERCARE: ICD-10-CM

## 2022-09-01 PROCEDURE — 73000 X-RAY EXAM OF COLLAR BONE: CPT | Performed by: ORTHOPAEDIC SURGERY

## 2022-09-01 PROCEDURE — 99024 POSTOP FOLLOW-UP VISIT: CPT | Performed by: ORTHOPAEDIC SURGERY

## 2022-09-06 ENCOUNTER — OFFICE VISIT (OUTPATIENT)
Dept: GASTROENTEROLOGY | Facility: CLINIC | Age: 67
End: 2022-09-06
Payer: COMMERCIAL

## 2022-09-06 ENCOUNTER — TELEPHONE (OUTPATIENT)
Dept: GASTROENTEROLOGY | Facility: CLINIC | Age: 67
End: 2022-09-06

## 2022-09-06 VITALS
WEIGHT: 195 LBS | SYSTOLIC BLOOD PRESSURE: 110 MMHG | HEIGHT: 65 IN | BODY MASS INDEX: 32.49 KG/M2 | DIASTOLIC BLOOD PRESSURE: 72 MMHG

## 2022-09-06 DIAGNOSIS — R10.13 EPIGASTRIC PAIN: Primary | ICD-10-CM

## 2022-09-06 PROCEDURE — 3078F DIAST BP <80 MM HG: CPT | Performed by: INTERNAL MEDICINE

## 2022-09-06 PROCEDURE — 3074F SYST BP LT 130 MM HG: CPT | Performed by: INTERNAL MEDICINE

## 2022-09-06 PROCEDURE — 3008F BODY MASS INDEX DOCD: CPT | Performed by: INTERNAL MEDICINE

## 2022-09-06 PROCEDURE — 99213 OFFICE O/P EST LOW 20 MIN: CPT | Performed by: INTERNAL MEDICINE

## 2022-09-06 RX ORDER — FAMOTIDINE 20 MG/1
20 TABLET, FILM COATED ORAL NIGHTLY
Qty: 30 TABLET | Refills: 2 | Status: SHIPPED | OUTPATIENT
Start: 2022-09-06

## 2022-09-06 NOTE — TELEPHONE ENCOUNTER
Scheduled for:  EGD 46231  Provider Name:  Dr. Sam Bennett  Date:  9/12/2022  Location:  [address] [place] [address] 1  Sedation:  MAC  Time:  2:00 pm, arrival 1:00 pm  Prep:  NPO after midnight  Meds/Allergies Reconciled?:  Physician reviewed  Diagnosis with codes:  Epigastric pain R10.13  Was patient informed to call insurance with codes (Y/N):  Yes  Referral sent?:  Referral was sent at the time of electronic surgical scheduling. [address] [place] [address] or [facility] notified?:  I sent an electronic request to Endo Scheduling and received a confirmation today. Medication Orders:  N/A  Misc Orders:  Patient was informed that they will need a COVID 19 test prior to their procedure. Patient verbally understood & will await a phone call from [facility] to schedule. Further instructions given by staff:  I discussed the prep instructions with the patient which she verbally understood and provided her with prep instruction sheet.

## 2022-09-06 NOTE — TELEPHONE ENCOUNTER
Managed Care -    Patient is scheduled for EGD this coming Monday, 9/12/2022. Please obtain referral, thank you!

## 2022-09-06 NOTE — TELEPHONE ENCOUNTER
Per Endo, not enough time on Monday, 9/12 for procedure. Contacted patient and rescheduled to 9/16/2022 at 3pm with the arrival time at 2pm. She verbalized understanding and agreed with new date & time. Case change request was sent to Endo.      Scheduled for:  EGD 62450  Provider Name:  Dr. Zachariah Zhu  Date: FROM: 9/12/2022 TO: 9/16/2022  Location:  [address] [place] [address]  Sedation:  MAC  Time: FROM: 2:00 pm TO: 3:00 pm

## 2022-09-06 NOTE — PATIENT INSTRUCTIONS
Epigastric pain  - limit advil   - omeprazole in morning  - famotidine ( Pepcid ) before bed  - EGD with MAC sedation to evaluate for ulcer

## 2022-09-13 ENCOUNTER — LAB ENCOUNTER (OUTPATIENT)
Dept: LAB | Facility: HOSPITAL | Age: 67
End: 2022-09-13
Attending: INTERNAL MEDICINE
Payer: COMMERCIAL

## 2022-09-13 DIAGNOSIS — Z01.818 PRE-OP TESTING: ICD-10-CM

## 2022-09-13 DIAGNOSIS — E03.9 HYPOTHYROIDISM, UNSPECIFIED TYPE: ICD-10-CM

## 2022-09-13 LAB
SARS-COV-2 RNA RESP QL NAA+PROBE: NOT DETECTED
T4 FREE SERPL-MCNC: 1.3 NG/DL (ref 0.8–1.7)
TSI SER-ACNC: 1.27 MIU/ML (ref 0.36–3.74)

## 2022-09-13 PROCEDURE — 36415 COLL VENOUS BLD VENIPUNCTURE: CPT

## 2022-09-13 PROCEDURE — 84443 ASSAY THYROID STIM HORMONE: CPT

## 2022-09-13 PROCEDURE — 84439 ASSAY OF FREE THYROXINE: CPT

## 2022-09-14 ENCOUNTER — PATIENT MESSAGE (OUTPATIENT)
Dept: ENDOCRINOLOGY CLINIC | Facility: CLINIC | Age: 67
End: 2022-09-14

## 2022-09-14 NOTE — TELEPHONE ENCOUNTER
Dr. Rai Coronado    Please advise. Patient has also attached pictures. See other message.  LOV 6/27/22    Component      Latest Ref Rng & Units 9/13/2022   T4,Free (Direct)      0.8 - 1.7 ng/dL 1.3   TSH      0.358 - 3.740 mIU/mL 1.270

## 2022-09-14 NOTE — TELEPHONE ENCOUNTER
From: Pravin Hodgson  To: Estefanía Rodriguez MD  Sent: 9/14/2022 11:59 AM CDT  Subject: [place] hair loss     Dear Dr Daryl Rodriguez . Since the end of July my hair is falling out extremely, to the point I have bold spot , my question is could I take supplement for this problem like  Viviscal  Nutrafol   Please advice .  Thanks   Pooja Orantes   [facility] [phone]

## 2022-09-16 ENCOUNTER — ANESTHESIA EVENT (OUTPATIENT)
Dept: ENDOSCOPY | Age: 67
End: 2022-09-16
Payer: COMMERCIAL

## 2022-09-16 ENCOUNTER — ANESTHESIA (OUTPATIENT)
Dept: ENDOSCOPY | Age: 67
End: 2022-09-16
Payer: COMMERCIAL

## 2022-09-16 ENCOUNTER — HOSPITAL ENCOUNTER (OUTPATIENT)
Age: 67
Setting detail: HOSPITAL OUTPATIENT SURGERY
Discharge: HOME | End: 2022-09-16
Attending: INTERNAL MEDICINE | Admitting: INTERNAL MEDICINE
Payer: COMMERCIAL

## 2022-09-16 VITALS
TEMPERATURE: 98 F | HEIGHT: 65 IN | RESPIRATION RATE: 18 BRPM | OXYGEN SATURATION: 97 % | HEART RATE: 61 BPM | WEIGHT: 190 LBS | SYSTOLIC BLOOD PRESSURE: 119 MMHG | DIASTOLIC BLOOD PRESSURE: 64 MMHG | BODY MASS INDEX: 31.65 KG/M2

## 2022-09-16 DIAGNOSIS — R10.13 EPIGASTRIC PAIN: ICD-10-CM

## 2022-09-16 DIAGNOSIS — Z01.818 PRE-OP TESTING: Primary | ICD-10-CM

## 2022-09-16 RX ORDER — SODIUM CHLORIDE, SODIUM LACTATE, POTASSIUM CHLORIDE, CALCIUM CHLORIDE 600; 310; 30; 20 MG/100ML; MG/100ML; MG/100ML; MG/100ML
INJECTION, SOLUTION INTRAVENOUS CONTINUOUS
OUTPATIENT
Start: 2022-09-16

## 2022-09-16 RX ORDER — NALOXONE HYDROCHLORIDE 0.4 MG/ML
80 INJECTION, SOLUTION INTRAMUSCULAR; INTRAVENOUS; SUBCUTANEOUS AS NEEDED
OUTPATIENT
Start: 2022-09-16 | End: 2022-09-16

## 2022-09-16 RX ORDER — LIDOCAINE HYDROCHLORIDE 10 MG/ML
INJECTION, SOLUTION EPIDURAL; INFILTRATION; INTRACAUDAL; PERINEURAL AS NEEDED
Status: DISCONTINUED | OUTPATIENT
Start: 2022-09-16 | End: 2022-09-16 | Stop reason: SURG

## 2022-09-16 RX ORDER — SODIUM CHLORIDE, SODIUM LACTATE, POTASSIUM CHLORIDE, CALCIUM CHLORIDE 600; 310; 30; 20 MG/100ML; MG/100ML; MG/100ML; MG/100ML
INJECTION, SOLUTION INTRAVENOUS CONTINUOUS
Status: DISCONTINUED | OUTPATIENT
Start: 2022-09-16 | End: 2022-09-16

## 2022-09-16 RX ADMIN — SODIUM CHLORIDE, SODIUM LACTATE, POTASSIUM CHLORIDE, CALCIUM CHLORIDE: 600; 310; 30; 20 INJECTION, SOLUTION INTRAVENOUS at 13:23:00

## 2022-09-16 RX ADMIN — LIDOCAINE HYDROCHLORIDE 50 MG: 10 INJECTION, SOLUTION EPIDURAL; INFILTRATION; INTRACAUDAL; PERINEURAL at 13:11:00

## 2022-09-16 RX ADMIN — SODIUM CHLORIDE, SODIUM LACTATE, POTASSIUM CHLORIDE, CALCIUM CHLORIDE: 600; 310; 30; 20 INJECTION, SOLUTION INTRAVENOUS at 13:09:00

## 2022-09-22 NOTE — TELEPHONE ENCOUNTER
Hi!    Please let the patient know that I have heard good things about these supplements, but considering that she has significant hair loss, I think that it would be a good idea for her to be evaluated by a dermatologist before taking either the Viviscal or the Nutrafol. I am including a list of dermatologists that specialize in this. In addition, her TSH is at a good level and she can continue the current dose that she is on. Please ask her if she needs refills. Thank you! Dr. Brooks Heller [facility]  [phone]    Dr. Aaron Mar  [email or web address]    Dr. Katalina Cabrera  [phone]  [email or web address]    Dr. Carmel Bryant  [phone]  [email or web address]    Dr. Amanda James  [phone]  [email or web address]. [email or web address]    Dr. Libra Jimenes  [phone]  [email or web address]    Dr. Verona Johnson  [phone]  [email or web address]    Dr. Floreen Bence. [facility]  [facility]. [facility]

## 2022-10-06 RX ORDER — OMEPRAZOLE 40 MG/1
CAPSULE, DELAYED RELEASE ORAL
Qty: 90 CAPSULE | Refills: 1 | Status: SHIPPED | OUTPATIENT
Start: 2022-10-06

## 2022-10-07 ENCOUNTER — OFFICE VISIT (OUTPATIENT)
Dept: ORTHOPEDICS CLINIC | Facility: CLINIC | Age: 67
End: 2022-10-07
Payer: COMMERCIAL

## 2022-10-07 ENCOUNTER — HOSPITAL ENCOUNTER (OUTPATIENT)
Dept: GENERAL RADIOLOGY | Facility: HOSPITAL | Age: 67
Discharge: HOME | End: 2022-10-07
Attending: ORTHOPAEDIC SURGERY
Payer: COMMERCIAL

## 2022-10-07 DIAGNOSIS — S42.032P CLOSED DISPLACED FRACTURE OF ACROMIAL END OF LEFT CLAVICLE WITH MALUNION, SUBSEQUENT ENCOUNTER: Primary | ICD-10-CM

## 2022-10-07 DIAGNOSIS — Z47.89 ORTHOPEDIC AFTERCARE: ICD-10-CM

## 2022-10-07 PROCEDURE — 99024 POSTOP FOLLOW-UP VISIT: CPT | Performed by: ORTHOPAEDIC SURGERY

## 2022-10-07 PROCEDURE — 73000 X-RAY EXAM OF COLLAR BONE: CPT | Performed by: ORTHOPAEDIC SURGERY

## 2022-10-12 ENCOUNTER — TELEPHONE (OUTPATIENT)
Dept: PAIN CLINIC | Facility: HOSPITAL | Age: 67
End: 2022-10-12

## 2022-10-12 NOTE — TELEPHONE ENCOUNTER
Returned patient's voicemail left on 10/12/22 at 9:56am. Patient was not available at time of call, no voicemail available, phone just rang and rang.

## 2022-10-17 ENCOUNTER — IMMUNIZATION (OUTPATIENT)
Dept: FAMILY MEDICINE CLINIC | Facility: CLINIC | Age: 67
End: 2022-10-17
Payer: COMMERCIAL

## 2022-10-17 DIAGNOSIS — Z23 NEED FOR VACCINATION: Primary | ICD-10-CM

## 2022-10-17 PROCEDURE — 90662 IIV NO PRSV INCREASED AG IM: CPT | Performed by: FAMILY MEDICINE

## 2022-10-17 PROCEDURE — 90471 IMMUNIZATION ADMIN: CPT | Performed by: FAMILY MEDICINE

## 2022-10-18 ENCOUNTER — OFFICE VISIT (OUTPATIENT)
Dept: OPHTHALMOLOGY | Facility: CLINIC | Age: 67
End: 2022-10-18
Payer: COMMERCIAL

## 2022-10-18 DIAGNOSIS — H43.391 FLOATERS, RIGHT: ICD-10-CM

## 2022-10-18 DIAGNOSIS — H25.13 AGE-RELATED NUCLEAR CATARACT OF BOTH EYES: Primary | ICD-10-CM

## 2022-10-18 PROCEDURE — 92014 COMPRE OPH EXAM EST PT 1/>: CPT | Performed by: OPHTHALMOLOGY

## 2022-10-18 PROCEDURE — 92015 DETERMINE REFRACTIVE STATE: CPT | Performed by: OPHTHALMOLOGY

## 2022-10-18 NOTE — PATIENT INSTRUCTIONS
Age-related nuclear cataract of both eyes  Discussed with patient that cataracts in both eyes are advanced enough at this time to consider surgery; it would be patient's choice. Discussed options such as surgery or change of glasses RX. Discussed surgical risks, benefits, alternatives and recovery. Patient understands that changing glasses will not significantly improve vision and elects to have surgery. As Dr. Desmond Gil is no longer performing cataract surgery, a referral to [facility] was offered. Patient agrees to be referred, so we will send complete exam and information to them and the patient will be contacted. Information on [address] [place] [address] ophthalmology given and reviewed with the patient. Patient told that she has to request a referral from her primary care for cataract surgery. Floaters, right  No treatment.

## 2022-10-19 ENCOUNTER — TELEPHONE (OUTPATIENT)
Dept: FAMILY MEDICINE CLINIC | Facility: CLINIC | Age: 67
End: 2022-10-19

## 2022-10-19 DIAGNOSIS — H25.10 AGE-RELATED NUCLEAR CATARACT, UNSPECIFIED LATERALITY: Primary | ICD-10-CM

## 2022-10-19 NOTE — TELEPHONE ENCOUNTER
Please see OPH note from Dr. Virgilio Floyd 10/18. Needs referral to [facility] ophthalmology for cataract surgery. Pended for signature.

## 2022-11-14 ENCOUNTER — OFFICE VISIT (OUTPATIENT)
Dept: PAIN CLINIC | Facility: HOSPITAL | Age: 67
End: 2022-11-14
Attending: ANESTHESIOLOGY
Payer: COMMERCIAL

## 2022-11-14 VITALS
SYSTOLIC BLOOD PRESSURE: 109 MMHG | RESPIRATION RATE: 18 BRPM | HEART RATE: 91 BPM | BODY MASS INDEX: 32.49 KG/M2 | WEIGHT: 195 LBS | HEIGHT: 65 IN | DIASTOLIC BLOOD PRESSURE: 72 MMHG

## 2022-11-14 DIAGNOSIS — M96.1 FAILED BACK SYNDROME OF LUMBAR SPINE: Primary | ICD-10-CM

## 2022-11-14 DIAGNOSIS — M54.41 CHRONIC LOW BACK PAIN WITH RIGHT-SIDED SCIATICA, UNSPECIFIED BACK PAIN LATERALITY: ICD-10-CM

## 2022-11-14 DIAGNOSIS — G89.29 CHRONIC LOW BACK PAIN WITH RIGHT-SIDED SCIATICA, UNSPECIFIED BACK PAIN LATERALITY: ICD-10-CM

## 2022-11-14 PROBLEM — R11.10 INTRACTABLE VOMITING: Status: ACTIVE | Noted: 2019-08-19

## 2022-11-14 PROCEDURE — 99211 OFF/OP EST MAY X REQ PHY/QHP: CPT

## 2022-11-14 NOTE — PROGRESS NOTES
11/14/2022-presents ambulatory to [facility] accompanied by her mother Mert Torre; returns today stating her lbp and neck pain has returned-\"it has been terrible and we have waited patiently for the appointment with Dr. Kimberly Boles"; Rates her pain today 5/10; Is interested in having an injection again;  Seen by Dr. Kimberly Boles; refer to dictation for the plan of care.

## 2022-11-17 ENCOUNTER — LAB ENCOUNTER (OUTPATIENT)
Dept: LAB | Age: 67
End: 2022-11-17
Attending: FAMILY MEDICINE
Payer: COMMERCIAL

## 2022-11-17 ENCOUNTER — OFFICE VISIT (OUTPATIENT)
Dept: FAMILY MEDICINE CLINIC | Facility: CLINIC | Age: 67
End: 2022-11-17
Payer: COMMERCIAL

## 2022-11-17 VITALS
TEMPERATURE: 98 F | WEIGHT: 198 LBS | DIASTOLIC BLOOD PRESSURE: 64 MMHG | SYSTOLIC BLOOD PRESSURE: 101 MMHG | BODY MASS INDEX: 32.99 KG/M2 | HEART RATE: 71 BPM | HEIGHT: 65 IN

## 2022-11-17 DIAGNOSIS — M96.1 FAILED BACK SYNDROME OF LUMBAR SPINE: ICD-10-CM

## 2022-11-17 DIAGNOSIS — Z12.11 COLON CANCER SCREENING: ICD-10-CM

## 2022-11-17 DIAGNOSIS — E03.9 ACQUIRED HYPOTHYROIDISM: ICD-10-CM

## 2022-11-17 DIAGNOSIS — E55.9 VITAMIN D DEFICIENCY: ICD-10-CM

## 2022-11-17 DIAGNOSIS — Z00.00 WELL ADULT EXAM: ICD-10-CM

## 2022-11-17 DIAGNOSIS — Z00.00 WELL ADULT EXAM: Primary | ICD-10-CM

## 2022-11-17 DIAGNOSIS — F41.1 GENERALIZED ANXIETY DISORDER: ICD-10-CM

## 2022-11-17 DIAGNOSIS — M51.16 LUMBAR DISC HERNIATION WITH RADICULOPATHY: ICD-10-CM

## 2022-11-17 DIAGNOSIS — E66.9 OBESITY (BMI 30.0-34.9): ICD-10-CM

## 2022-11-17 DIAGNOSIS — M15.9 PRIMARY OSTEOARTHRITIS INVOLVING MULTIPLE JOINTS: ICD-10-CM

## 2022-11-17 DIAGNOSIS — F41.9 ANXIETY AND DEPRESSION: ICD-10-CM

## 2022-11-17 DIAGNOSIS — F32.A ANXIETY AND DEPRESSION: ICD-10-CM

## 2022-11-17 DIAGNOSIS — E78.00 HYPERCHOLESTEREMIA: ICD-10-CM

## 2022-11-17 DIAGNOSIS — K21.9 GASTROESOPHAGEAL REFLUX DISEASE, UNSPECIFIED WHETHER ESOPHAGITIS PRESENT: ICD-10-CM

## 2022-11-17 LAB
ALBUMIN SERPL-MCNC: 3.6 G/DL (ref 3.4–5)
ALBUMIN/GLOB SERPL: 0.9 {RATIO} (ref 1–2)
ALP LIVER SERPL-CCNC: 90 U/L
ALT SERPL-CCNC: 30 U/L
ANION GAP SERPL CALC-SCNC: 5 MMOL/L (ref 0–18)
AST SERPL-CCNC: 22 U/L (ref 15–37)
BASOPHILS # BLD AUTO: 0.04 X10(3) UL (ref 0–0.2)
BASOPHILS NFR BLD AUTO: 0.7 %
BILIRUB SERPL-MCNC: 0.3 MG/DL (ref 0.1–2)
BUN BLD-MCNC: 9 MG/DL (ref 7–18)
BUN/CREAT SERPL: 12 (ref 10–20)
CALCIUM BLD-MCNC: 9.7 MG/DL (ref 8.5–10.1)
CHLORIDE SERPL-SCNC: 105 MMOL/L (ref 98–112)
CHOLEST SERPL-MCNC: 182 MG/DL (ref ?–200)
CO2 SERPL-SCNC: 29 MMOL/L (ref 21–32)
CREAT BLD-MCNC: 0.75 MG/DL
DEPRECATED RDW RBC AUTO: 42.8 FL (ref 35.1–46.3)
EOSINOPHIL # BLD AUTO: 0.1 X10(3) UL (ref 0–0.7)
EOSINOPHIL NFR BLD AUTO: 1.7 %
ERYTHROCYTE [DISTWIDTH] IN BLOOD BY AUTOMATED COUNT: 13.8 % (ref 11–15)
FASTING PATIENT LIPID ANSWER: YES
FASTING STATUS PATIENT QL REPORTED: YES
GFR SERPLBLD BASED ON 1.73 SQ M-ARVRAT: 87 ML/MIN/1.73M2 (ref 60–?)
GLOBULIN PLAS-MCNC: 4.1 G/DL (ref 2.8–4.4)
GLUCOSE BLD-MCNC: 98 MG/DL (ref 70–99)
HCT VFR BLD AUTO: 42.4 %
HDLC SERPL-MCNC: 63 MG/DL (ref 40–59)
HGB BLD-MCNC: 13.7 G/DL
IMM GRANULOCYTES # BLD AUTO: 0.01 X10(3) UL (ref 0–1)
IMM GRANULOCYTES NFR BLD: 0.2 %
LDLC SERPL CALC-MCNC: 99 MG/DL (ref ?–100)
LYMPHOCYTES # BLD AUTO: 1.63 X10(3) UL (ref 1–4)
LYMPHOCYTES NFR BLD AUTO: 28.2 %
MCH RBC QN AUTO: 27.4 PG (ref 26–34)
MCHC RBC AUTO-ENTMCNC: 32.3 G/DL (ref 31–37)
MCV RBC AUTO: 84.8 FL
MONOCYTES # BLD AUTO: 0.46 X10(3) UL (ref 0.1–1)
MONOCYTES NFR BLD AUTO: 7.9 %
NEUTROPHILS # BLD AUTO: 3.55 X10 (3) UL (ref 1.5–7.7)
NEUTROPHILS # BLD AUTO: 3.55 X10(3) UL (ref 1.5–7.7)
NEUTROPHILS NFR BLD AUTO: 61.3 %
NONHDLC SERPL-MCNC: 119 MG/DL (ref ?–130)
OSMOLALITY SERPL CALC.SUM OF ELEC: 287 MOSM/KG (ref 275–295)
PLATELET # BLD AUTO: 308 10(3)UL (ref 150–450)
POTASSIUM SERPL-SCNC: 3.9 MMOL/L (ref 3.5–5.1)
PROT SERPL-MCNC: 7.7 G/DL (ref 6.4–8.2)
RBC # BLD AUTO: 5 X10(6)UL
SODIUM SERPL-SCNC: 139 MMOL/L (ref 136–145)
TRIGL SERPL-MCNC: 113 MG/DL (ref 30–149)
VIT D+METAB SERPL-MCNC: 106.1 NG/ML (ref 30–100)
VLDLC SERPL CALC-MCNC: 19 MG/DL (ref 0–30)
WBC # BLD AUTO: 5.8 X10(3) UL (ref 4–11)

## 2022-11-17 PROCEDURE — 80053 COMPREHEN METABOLIC PANEL: CPT

## 2022-11-17 PROCEDURE — 85025 COMPLETE CBC W/AUTO DIFF WBC: CPT

## 2022-11-17 PROCEDURE — 80061 LIPID PANEL: CPT

## 2022-11-17 PROCEDURE — 99397 PER PM REEVAL EST PAT 65+ YR: CPT | Performed by: FAMILY MEDICINE

## 2022-11-17 PROCEDURE — 36415 COLL VENOUS BLD VENIPUNCTURE: CPT

## 2022-11-17 PROCEDURE — 82306 VITAMIN D 25 HYDROXY: CPT

## 2022-11-21 ENCOUNTER — TELEPHONE (OUTPATIENT)
Dept: PAIN CLINIC | Facility: HOSPITAL | Age: 67
End: 2022-11-21

## 2022-11-21 NOTE — TELEPHONE ENCOUNTER
Pt scheduled for procedure on 12/06/22    2 wk follow-up appointment scheduled on 12/16/22 at 9:30 am.    Surgery Scheduling Form faxed to [facility] at [phone].

## 2022-11-21 NOTE — TELEPHONE ENCOUNTER
Lumbar LAWRENCE (MD to decide) - Cpt 27219 - Dx M96.1 - Authorized    [facility] PA via Referral Entry, request above is authorized.    Authorization # [id number] effective date: 11/14/22 - 02/28/23

## 2022-11-26 ENCOUNTER — HOSPITAL ENCOUNTER (OUTPATIENT)
Dept: MAMMOGRAPHY | Facility: HOSPITAL | Age: 67
Discharge: HOME | End: 2022-11-26
Payer: COMMERCIAL

## 2022-11-26 DIAGNOSIS — Z12.31 ENCOUNTER FOR SCREENING MAMMOGRAM FOR BREAST CANCER: ICD-10-CM

## 2022-11-26 PROCEDURE — 77067 SCR MAMMO BI INCL CAD: CPT

## 2022-11-26 PROCEDURE — 77063 BREAST TOMOSYNTHESIS BI: CPT

## 2022-12-01 ENCOUNTER — TELEPHONE (OUTPATIENT)
Dept: RHEUMATOLOGY | Facility: CLINIC | Age: 67
End: 2022-12-01

## 2022-12-01 ENCOUNTER — TELEPHONE (OUTPATIENT)
Dept: FAMILY MEDICINE CLINIC | Facility: CLINIC | Age: 67
End: 2022-12-01

## 2022-12-01 ENCOUNTER — OFFICE VISIT (OUTPATIENT)
Dept: RHEUMATOLOGY | Facility: CLINIC | Age: 67
End: 2022-12-01
Payer: COMMERCIAL

## 2022-12-01 VITALS
HEIGHT: 65 IN | WEIGHT: 198 LBS | HEART RATE: 65 BPM | BODY MASS INDEX: 32.99 KG/M2 | SYSTOLIC BLOOD PRESSURE: 120 MMHG | DIASTOLIC BLOOD PRESSURE: 60 MMHG

## 2022-12-01 DIAGNOSIS — M25.561 CHRONIC PAIN OF BOTH KNEES: Primary | ICD-10-CM

## 2022-12-01 DIAGNOSIS — M25.562 CHRONIC PAIN OF BOTH KNEES: Primary | ICD-10-CM

## 2022-12-01 DIAGNOSIS — M25.561 RIGHT KNEE PAIN, UNSPECIFIED CHRONICITY: Primary | ICD-10-CM

## 2022-12-01 DIAGNOSIS — G89.29 CHRONIC PAIN OF BOTH KNEES: Primary | ICD-10-CM

## 2022-12-01 DIAGNOSIS — M17.0 PRIMARY OSTEOARTHRITIS OF BOTH KNEES: Primary | ICD-10-CM

## 2022-12-01 PROCEDURE — 3078F DIAST BP <80 MM HG: CPT | Performed by: INTERNAL MEDICINE

## 2022-12-01 PROCEDURE — 99213 OFFICE O/P EST LOW 20 MIN: CPT | Performed by: INTERNAL MEDICINE

## 2022-12-01 PROCEDURE — 3074F SYST BP LT 130 MM HG: CPT | Performed by: INTERNAL MEDICINE

## 2022-12-01 PROCEDURE — 3008F BODY MASS INDEX DOCD: CPT | Performed by: INTERNAL MEDICINE

## 2022-12-01 PROCEDURE — 20610 DRAIN/INJ JOINT/BURSA W/O US: CPT | Performed by: INTERNAL MEDICINE

## 2022-12-01 RX ORDER — TRIAMCINOLONE ACETONIDE 40 MG/ML
40 INJECTION, SUSPENSION INTRA-ARTICULAR; INTRAMUSCULAR
Status: COMPLETED | OUTPATIENT
Start: 2022-12-01 | End: 2022-12-01

## 2022-12-01 RX ADMIN — TRIAMCINOLONE ACETONIDE 40 MG: 40 INJECTION, SUSPENSION INTRA-ARTICULAR; INTRAMUSCULAR at 13:45:00

## 2022-12-01 RX ADMIN — TRIAMCINOLONE ACETONIDE 40 MG: 40 INJECTION, SUSPENSION INTRA-ARTICULAR; INTRAMUSCULAR at 13:46:00

## 2022-12-01 NOTE — TELEPHONE ENCOUNTER
Patient asking for rheumatology referral, for possible cortisone injection in right knee    States yesterday she went to stand up, and couldn't walk due to right knee pain. Denies new falls or trauma. Has been trying Advil, ice/heat    Has seen Dr. Claritaz Buchanan in the past, for left knee pain.   Had been discussing possible gel therapy

## 2022-12-01 NOTE — TELEPHONE ENCOUNTER
Patient scheduled with Dr. Shannon Chapman for 12/1/22 at [facility] and has 1 visit remaining on approved referral on file.

## 2022-12-05 ENCOUNTER — TELEPHONE (OUTPATIENT)
Facility: CLINIC | Age: 67
End: 2022-12-05

## 2022-12-05 NOTE — TELEPHONE ENCOUNTER
Pt contacted and advised she can schedule her Synvisc One injections when she is ready. Her referral was approved. She stated she is doing fine right now.

## 2022-12-05 NOTE — TELEPHONE ENCOUNTER
----- Message from Carlito Vallecillo RN sent at 2/7/2018  6:00 PM CST -----  Regarding: colonoscopy recall  Recall colonoscopy with Dr Ellie Fontaine 5 yrs--due 2/1/2023

## 2022-12-12 ENCOUNTER — NURSE TRIAGE (OUTPATIENT)
Dept: FAMILY MEDICINE CLINIC | Facility: CLINIC | Age: 67
End: 2022-12-12

## 2023-01-03 ENCOUNTER — PATIENT MESSAGE (OUTPATIENT)
Dept: ENDOCRINOLOGY CLINIC | Facility: CLINIC | Age: 68
End: 2023-01-03

## 2023-01-03 ENCOUNTER — OFFICE VISIT (OUTPATIENT)
Dept: ENDOCRINOLOGY CLINIC | Facility: CLINIC | Age: 68
End: 2023-01-03
Payer: COMMERCIAL

## 2023-01-03 VITALS
SYSTOLIC BLOOD PRESSURE: 108 MMHG | WEIGHT: 201 LBS | BODY MASS INDEX: 33 KG/M2 | DIASTOLIC BLOOD PRESSURE: 67 MMHG | HEART RATE: 69 BPM

## 2023-01-03 DIAGNOSIS — E66.09 CLASS 1 OBESITY DUE TO EXCESS CALORIES WITH SERIOUS COMORBIDITY AND BODY MASS INDEX (BMI) OF 33.0 TO 33.9 IN ADULT: Primary | ICD-10-CM

## 2023-01-03 DIAGNOSIS — E03.9 HYPOTHYROIDISM, UNSPECIFIED TYPE: ICD-10-CM

## 2023-01-03 PROBLEM — E66.811 CLASS 1 OBESITY DUE TO EXCESS CALORIES WITH SERIOUS COMORBIDITY AND BODY MASS INDEX (BMI) OF 33.0 TO 33.9 IN ADULT: Status: ACTIVE | Noted: 2023-01-03

## 2023-01-03 LAB — CARTRIDGE LOT#: NORMAL NUMERIC

## 2023-01-03 PROCEDURE — 3078F DIAST BP <80 MM HG: CPT | Performed by: INTERNAL MEDICINE

## 2023-01-03 PROCEDURE — 3074F SYST BP LT 130 MM HG: CPT | Performed by: INTERNAL MEDICINE

## 2023-01-03 PROCEDURE — 99214 OFFICE O/P EST MOD 30 MIN: CPT | Performed by: INTERNAL MEDICINE

## 2023-01-03 PROCEDURE — 83036 HEMOGLOBIN GLYCOSYLATED A1C: CPT | Performed by: INTERNAL MEDICINE

## 2023-01-03 RX ORDER — LEVOTHYROXINE SODIUM 112 UG/1
112 TABLET ORAL
Qty: 90 TABLET | Refills: 0 | Status: SHIPPED | OUTPATIENT
Start: 2023-01-03 | End: 2023-04-03

## 2023-01-03 RX ORDER — TIRZEPATIDE 2.5 MG/.5ML
2.5 INJECTION, SOLUTION SUBCUTANEOUS WEEKLY
Qty: 2 ML | Refills: 0 | Status: SHIPPED | OUTPATIENT
Start: 2023-01-03 | End: 2023-01-04

## 2023-01-04 RX ORDER — SEMAGLUTIDE 1.34 MG/ML
0.25 INJECTION, SOLUTION SUBCUTANEOUS WEEKLY
Qty: 1.5 ML | Refills: 0 | Status: SHIPPED | OUTPATIENT
Start: 2023-01-04 | End: 2023-02-03

## 2023-01-04 NOTE — TELEPHONE ENCOUNTER
From: Estevan Mejia  To: Estefanía Currie MD  Sent: 1/3/2023 8:59 PM CST  Subject: Heidi Rank denied by insurance     Dear Dr Alberta Currie. My insurance denied Heidi Rank , could you please try to order Ozempic to see if they cover .  Thank you   Kaylee Ugalde

## 2023-01-06 ENCOUNTER — TELEPHONE (OUTPATIENT)
Dept: ENDOCRINOLOGY CLINIC | Facility: CLINIC | Age: 68
End: 2023-01-06

## 2023-01-06 NOTE — TELEPHONE ENCOUNTER
Not on Med List:    Mounjaro 2.5 MG/0.5ML Pen-Injectors    Per pharmacy a prior auth is required. [email or web address]  Marin: More Flannery

## 2023-01-07 ENCOUNTER — TELEPHONE (OUTPATIENT)
Dept: ENDOCRINOLOGY CLINIC | Facility: CLINIC | Age: 68
End: 2023-01-07

## 2023-01-07 DIAGNOSIS — E66.09 CLASS 1 OBESITY DUE TO EXCESS CALORIES WITH SERIOUS COMORBIDITY AND BODY MASS INDEX (BMI) OF 33.0 TO 33.9 IN ADULT: Primary | ICD-10-CM

## 2023-01-07 NOTE — TELEPHONE ENCOUNTER
Refer to 1/3/23 Mychart encounter. Pt mentioned that mounjaro is denied.   Refer to 1/7/23 TE for ozempic PA request.

## 2023-01-07 NOTE — TELEPHONE ENCOUNTER
PA for ozempic starter dose initiated via Epic and currently waiting for determination. Based on the clinical questions presented - dx, t/f metformin and other insulin, this will most likely be denied. Pt will be using medication for obesity. Based on [address] [place] [address] and [employer] (PBM for this paitent),  it does not appear that this patient has coverage for weight loss medications either as an alternative in case Ozempic PA is denied. Will await for insurance's determination.     Waiting for Payer Response    The payer has not yet made a decision on the prior authorization request.   Case ID: [id number]      Payer:  [facility]  [facility]  [phone]

## 2023-01-11 NOTE — TELEPHONE ENCOUNTER
Received fax from [address], attached is the denial for Ozempic 0.25 or 0.5 mg. States the following: \"the request for Ozempic. .. has not been approved for benefits. .. criteria must be met. You must have diabetes type 2. Msut have met one of the followin) have tried and had a poor response to a drug containing metformin or insulin, 2) have a sie affect or allergy to metformin or insulin, 3) have a Food and Drug Administration (FDA) lavbeled contraindication. .. to metformin and insulin, 4) have taken a preferred drug within the last 90 days, or 5) have (ASCVD), (CHF), or (CKD). .. The preferred drugs are Bydureon, Ozempic, Rybeslus, Trulicity, and Victoza. \"     Fax placed in Dr Roxana Osborne folder for further review. Dr Pool Burr-- see above. Pt denied Ozempic and has already been denied Mounjaro prior to this PA. Please advise.

## 2023-01-12 RX ORDER — SEMAGLUTIDE 0.25 MG/.5ML
0.25 INJECTION, SOLUTION SUBCUTANEOUS WEEKLY
Qty: 4 EACH | Refills: 0 | Status: SHIPPED | OUTPATIENT
Start: 2023-01-12 | End: 2023-01-31

## 2023-01-12 NOTE — TELEPHONE ENCOUNTER
Hi!    Let us try and prescribe [facility]DONNELL BRENNAN for patient. The alternatives would be to refer patient to FERNANDO POSADA [facility]. Thank you!

## 2023-01-27 NOTE — TELEPHONE ENCOUNTER
Received fax from [address], attached is the PA denial for [facility]U stating the following: \"The requested drug must be covered under your pharmacy benefit. \" Denial rationale placed in provider folder for further review. Dr Cathi Guidry-- see denial rationale above. Pt has already been denied Mounjaro and Ozempic, now [facility]. Please advise.

## 2023-01-31 RX ORDER — LIRAGLUTIDE 6 MG/ML
INJECTION, SOLUTION SUBCUTANEOUS
Qty: 24 ML | Refills: 0 | Status: SHIPPED | OUTPATIENT
Start: 2023-01-31 | End: 2023-03-30

## 2023-01-31 RX ORDER — PEN NEEDLE, DIABETIC 30 GX3/16"
1 NEEDLE, DISPOSABLE MISCELLANEOUS DAILY
Qty: 90 EACH | Refills: 0 | Status: SHIPPED | OUTPATIENT
Start: 2023-01-31 | End: 2023-05-01

## 2023-01-31 NOTE — TELEPHONE ENCOUNTER
Hi Dr. Sherin Holm    I looked up patient's pharmacy benefit and it looks like the Jannet Fernandez is covered--however not sure if it will require a PA. Please advise on dose.

## 2023-02-01 NOTE — TELEPHONE ENCOUNTER
Called pharmacy and was told figueroa needs PA.   PA was submitted electronically via Epic    The payer has not yet made a decision on the prior authorization request.   Case ID: [id number]      Payer:  [facility]  [phone]      Faxed chart note

## 2023-02-10 NOTE — TELEPHONE ENCOUNTER
Received JAVIER morrison rationale for Saxenda from [address], attached states the following: \"The requested drug must be covered under your pharmacy benefit. We received a request for a weight loss drug to treat weight loss, which is not covered under your pharmacy benefit. \"     Denial placed in brown folder in triage room. Dr Rich Dan-- see statement above. Ana Burnadelita is not covered under pt pharmacy benefit plan. Please advise.

## 2023-02-21 NOTE — TELEPHONE ENCOUNTER
Hi!  Can we ask patient find out from her insurance which medications are permissible for weight loss? I would be happy to look them over and prescribe any one of them if I found them to be suitable. Tachuck you!

## 2023-02-27 ENCOUNTER — TELEPHONE (OUTPATIENT)
Dept: FAMILY MEDICINE CLINIC | Facility: CLINIC | Age: 68
End: 2023-02-27

## 2023-02-27 NOTE — TELEPHONE ENCOUNTER
EMMANUEL Taveras I received a call from pt and she wanted to let you know that she had cataract surgery on Jan 24, 2023 and Feb 21, 2023 and she is having some inflammation on both eyes. Yesterday pt saw 's partner( on-call)  and she will be seeing  on Wed. Pt just wanted to let you know what is going on. Pt will let us know if she will be needing a referral for more visits.

## 2023-03-14 ENCOUNTER — TELEPHONE (OUTPATIENT)
Facility: CLINIC | Age: 68
End: 2023-03-14

## 2023-03-14 NOTE — TELEPHONE ENCOUNTER
Patient calling to schedule her recall colonoscopy. States she continues to experience abdominal pain on and off and blood in stool 2 months ago. Taking Omeprazole and Famotidine with relief. Appointment scheduled with HASMUKH Pineda on 03/22/2023 at 11 am at [facility]. Patient advised to arrive 15 minutes prior to appointment and address given. Patient voiced understanding.

## 2023-03-14 NOTE — TELEPHONE ENCOUNTER
Pt is calling to request to schedule for her Recall CLN per letter she received. Pt states that she is experiencing some issues with her stomach as a symptom.

## 2023-03-21 ENCOUNTER — OFFICE VISIT (OUTPATIENT)
Dept: FAMILY MEDICINE CLINIC | Facility: CLINIC | Age: 68
End: 2023-03-21

## 2023-03-21 VITALS
HEIGHT: 65 IN | TEMPERATURE: 97 F | SYSTOLIC BLOOD PRESSURE: 100 MMHG | WEIGHT: 209 LBS | DIASTOLIC BLOOD PRESSURE: 66 MMHG | BODY MASS INDEX: 34.82 KG/M2 | HEART RATE: 69 BPM

## 2023-03-21 DIAGNOSIS — F32.A ANXIETY AND DEPRESSION: ICD-10-CM

## 2023-03-21 DIAGNOSIS — Z12.11 COLON CANCER SCREENING: ICD-10-CM

## 2023-03-21 DIAGNOSIS — F41.9 ANXIETY AND DEPRESSION: ICD-10-CM

## 2023-03-21 DIAGNOSIS — E03.9 ACQUIRED HYPOTHYROIDISM: Primary | ICD-10-CM

## 2023-03-21 DIAGNOSIS — E66.9 OBESITY (BMI 30.0-34.9): ICD-10-CM

## 2023-03-21 DIAGNOSIS — R93.89 ABNORMAL COMPUTED TOMOGRAPHY OF SOFT TISSUE OF NECK: ICD-10-CM

## 2023-03-21 PROCEDURE — 3078F DIAST BP <80 MM HG: CPT | Performed by: FAMILY MEDICINE

## 2023-03-21 PROCEDURE — 3074F SYST BP LT 130 MM HG: CPT | Performed by: FAMILY MEDICINE

## 2023-03-21 PROCEDURE — 99214 OFFICE O/P EST MOD 30 MIN: CPT | Performed by: FAMILY MEDICINE

## 2023-03-21 PROCEDURE — 3008F BODY MASS INDEX DOCD: CPT | Performed by: FAMILY MEDICINE

## 2023-03-21 RX ORDER — METHYLPREDNISOLONE 4 MG/1
4 TABLET ORAL AS DIRECTED
COMMUNITY
Start: 2023-03-04 | End: 2023-03-21 | Stop reason: ALTCHOICE

## 2023-03-21 RX ORDER — OFLOXACIN 3 MG/ML
SOLUTION/ DROPS OPHTHALMIC
COMMUNITY
Start: 2023-02-14

## 2023-03-21 RX ORDER — DIFLUPREDNATE OPHTHALMIC 0.5 MG/ML
1 EMULSION OPHTHALMIC 4 TIMES DAILY
COMMUNITY
Start: 2023-03-17

## 2023-03-21 RX ORDER — TIRZEPATIDE 2.5 MG/.5ML
2.5 INJECTION, SOLUTION SUBCUTANEOUS WEEKLY
COMMUNITY
Start: 2023-03-15

## 2023-03-21 RX ORDER — PREDNISOLONE ACETATE 10 MG/ML
1 SUSPENSION/ DROPS OPHTHALMIC 4 TIMES DAILY
COMMUNITY
Start: 2023-02-27

## 2023-03-22 ENCOUNTER — TELEPHONE (OUTPATIENT)
Facility: CLINIC | Age: 68
End: 2023-03-22

## 2023-03-22 ENCOUNTER — OFFICE VISIT (OUTPATIENT)
Facility: CLINIC | Age: 68
End: 2023-03-22

## 2023-03-22 VITALS
HEIGHT: 65 IN | SYSTOLIC BLOOD PRESSURE: 104 MMHG | BODY MASS INDEX: 34.82 KG/M2 | HEART RATE: 77 BPM | WEIGHT: 209 LBS | DIASTOLIC BLOOD PRESSURE: 72 MMHG

## 2023-03-22 DIAGNOSIS — Z86.010 PERSONAL HISTORY OF COLONIC POLYPS: ICD-10-CM

## 2023-03-22 DIAGNOSIS — K62.5 RECTAL BLEEDING: Primary | ICD-10-CM

## 2023-03-22 DIAGNOSIS — Z86.010 HISTORY OF COLON POLYPS: ICD-10-CM

## 2023-03-22 DIAGNOSIS — R12 HEARTBURN: Primary | ICD-10-CM

## 2023-03-22 DIAGNOSIS — K62.5 RECTAL BLEEDING: ICD-10-CM

## 2023-03-22 PROCEDURE — 3008F BODY MASS INDEX DOCD: CPT | Performed by: NURSE PRACTITIONER

## 2023-03-22 PROCEDURE — 99214 OFFICE O/P EST MOD 30 MIN: CPT | Performed by: NURSE PRACTITIONER

## 2023-03-22 PROCEDURE — 3074F SYST BP LT 130 MM HG: CPT | Performed by: NURSE PRACTITIONER

## 2023-03-22 PROCEDURE — 3078F DIAST BP <80 MM HG: CPT | Performed by: NURSE PRACTITIONER

## 2023-03-22 RX ORDER — FAMOTIDINE 20 MG/1
20 TABLET, FILM COATED ORAL DAILY
Qty: 30 TABLET | Refills: 11 | Status: SHIPPED | OUTPATIENT
Start: 2023-03-22

## 2023-03-22 RX ORDER — POLYETHYLENE GLYCOL 3350, SODIUM CHLORIDE, SODIUM BICARBONATE, POTASSIUM CHLORIDE 420; 11.2; 5.72; 1.48 G/4L; G/4L; G/4L; G/4L
POWDER, FOR SOLUTION ORAL
Qty: 1 EACH | Refills: 0 | Status: SHIPPED | OUTPATIENT
Start: 2023-03-22

## 2023-03-22 NOTE — PATIENT INSTRUCTIONS
1. Schedule colonoscopy with MAC w/ Dr Huy Black, PEG prep, rectal bleeding, history of colon polyps    2.  bowel prep from pharmacy (split )    3. Continue all medications prior to procedure    4. Read all bowel prep instructions carefully    5. AVOID seeds, nuts, popcorn, raw fruits and vegetables (cooked is okay) for 2-3 days before procedure    6. You MAY need to go for COVID testing 72 hours before procedure. The testing team will call you a few days before your procedure to discuss with you if testing is required. If you are asked to go for COVID testing and do not completed the test, the procedure cannot be performed. 7. If you start any NEW medication after your visit today, please notify us. Certain medications will need to be held before the procedure, or the procedure cannot be performed.     >>>Please note: if you were prescribed Suprep for the bowel prep and it is too expensive or not covered by insurance, it is okay to substitute Trilyte (or any similar generic prep). This can be done by notifying the pharmacy or calling our office.

## 2023-03-22 NOTE — TELEPHONE ENCOUNTER
Scheduled for:  Colonoscopy [address], [facility]  Provider Name:    Date:  6/12/2023  Location:  [facility]   Sedation:  MAC  Time:  2:00 pm, (pt is aware to arrive at 1:00 pm)  Prep: Nulytely   Meds/Allergies Reconciled?:  Physician reviewed  Diagnosis with codes: Rectal Bleeding K62.5, History of Colon Polyps Z86.010    Was patient informed to call insurance with codes (Y/N):  Yes  Referral sent?:  Referral was sent at the time of electronic surgical scheduling. [facility] or [facility]  notified?:  I sent an electronic request to Endo Scheduling and received a confirmation today. Medication Orders: Pt is aware to NOT take iron pills, herbal meds and diet supplements for 7 days before exam. Also to NOT take any form of alcohol, recreational drugs and any forms of ED meds 24 hours before exam.   Misc Orders:  N/A     Further instructions given by staff: I discussed the prep instructions with the patient which she verbally understood. Provided patient instructions at the time of office visit.

## 2023-04-01 ENCOUNTER — PATIENT MESSAGE (OUTPATIENT)
Dept: ENDOCRINOLOGY CLINIC | Facility: CLINIC | Age: 68
End: 2023-04-01

## 2023-04-03 RX ORDER — TIRZEPATIDE 2.5 MG/.5ML
2.5 INJECTION, SOLUTION SUBCUTANEOUS WEEKLY
Qty: 6 ML | Refills: 0 | Status: SHIPPED | OUTPATIENT
Start: 2023-04-03

## 2023-04-03 NOTE — TELEPHONE ENCOUNTER
From: Kyle Osborne  To: Estefanía Borjas MD  Sent: 4/1/2023 3:33 PM CDT  Subject: Rsoy Borjas I stared taking Monjauro with a coupon and out of my packages ,on April 5 this is renewal again , my appointment with you is the 23 of this month . Could you please refill the medication, I change pharmacy, now I am at   [facility] on [address]   Phone no [phone] [address] [facility] [address]  Thank you so much. Looking forward to my appointment the 19 of April.   Sincerely   Tracy Reagan   Moira [phone]

## 2023-04-03 NOTE — TELEPHONE ENCOUNTER
Reviewed chart as there are multiple encounters of [facility], [place], and Ozempic being sent in and insurance issues. Responded to pt to confirm it is the medication Mounjaro and her current dose (med list 3/15 [facility] 2.5mg/weekly).  Additionally, if pt is using coupon for [facility] - it should stay at the same pharmacy (or, was she given free 1mo voucher?)

## 2023-04-10 ENCOUNTER — NURSE TRIAGE (OUTPATIENT)
Dept: FAMILY MEDICINE CLINIC | Facility: CLINIC | Age: 68
End: 2023-04-10

## 2023-04-11 ENCOUNTER — HOSPITAL ENCOUNTER (OUTPATIENT)
Age: 68
Discharge: HOME | End: 2023-04-11
Payer: COMMERCIAL

## 2023-04-11 ENCOUNTER — APPOINTMENT (OUTPATIENT)
Dept: GENERAL RADIOLOGY | Age: 68
End: 2023-04-11
Attending: NURSE PRACTITIONER
Payer: COMMERCIAL

## 2023-04-11 VITALS
HEART RATE: 77 BPM | WEIGHT: 190 LBS | DIASTOLIC BLOOD PRESSURE: 80 MMHG | HEIGHT: 65 IN | BODY MASS INDEX: 31.65 KG/M2 | TEMPERATURE: 99 F | RESPIRATION RATE: 20 BRPM | SYSTOLIC BLOOD PRESSURE: 134 MMHG | OXYGEN SATURATION: 96 %

## 2023-04-11 DIAGNOSIS — Z20.822 ENCOUNTER FOR LABORATORY TESTING FOR COVID-19 VIRUS: ICD-10-CM

## 2023-04-11 DIAGNOSIS — J18.9 PNEUMONIA OF LEFT LOWER LOBE DUE TO INFECTIOUS ORGANISM: ICD-10-CM

## 2023-04-11 DIAGNOSIS — J12.82 PNEUMONIA DUE TO COVID-19 VIRUS: ICD-10-CM

## 2023-04-11 DIAGNOSIS — U07.1 PNEUMONIA DUE TO COVID-19 VIRUS: ICD-10-CM

## 2023-04-11 DIAGNOSIS — U07.1 COVID-19: Primary | ICD-10-CM

## 2023-04-11 DIAGNOSIS — R05.1 ACUTE COUGH: ICD-10-CM

## 2023-04-11 LAB
S PYO AG THROAT QL: NEGATIVE
SARS-COV-2 RNA RESP QL NAA+PROBE: DETECTED

## 2023-04-11 PROCEDURE — 71046 X-RAY EXAM CHEST 2 VIEWS: CPT | Performed by: NURSE PRACTITIONER

## 2023-04-11 RX ORDER — BENZONATATE 200 MG/1
200 CAPSULE ORAL 3 TIMES DAILY PRN
Qty: 30 CAPSULE | Refills: 0 | Status: SHIPPED | OUTPATIENT
Start: 2023-04-11

## 2023-04-11 RX ORDER — ACETAMINOPHEN 325 MG/1
650 TABLET ORAL ONCE
Status: COMPLETED | OUTPATIENT
Start: 2023-04-11 | End: 2023-04-11

## 2023-04-11 RX ORDER — NIRMATRELVIR AND RITONAVIR 300-100 MG
KIT ORAL
Qty: 30 TABLET | Refills: 0 | Status: SHIPPED | OUTPATIENT
Start: 2023-04-11 | End: 2023-04-16

## 2023-04-11 NOTE — DISCHARGE INSTRUCTIONS
You tested positive for COVID 19 today  Paxlovid as prescribed, stop Crestor for 8 days  Please quarantine for 5 days, beginning tomorrow.  After the 5 days, you can break quarantine as long as you can wear a mask at all times for an additional 5 days  Push fluids  Tylenol 2 tablets every 6 hours as needed for fever  Tessalon Perles tablet every 8 hours as needed for the cough  For severe chest pain or shortness of breath, please go to the emergency room  Please follow-up with your doctor if no better in 5 days

## 2023-04-11 NOTE — ED INITIAL ASSESSMENT (HPI)
Patient c/o body aches, fatigue, chills since Friday. States sore throat since Sunday.  + HA +  cough with dark yellow sputum

## 2023-04-17 ENCOUNTER — TELEPHONE (OUTPATIENT)
Dept: ENDOCRINOLOGY CLINIC | Facility: CLINIC | Age: 68
End: 2023-04-17

## 2023-04-17 NOTE — TELEPHONE ENCOUNTER
Pt called to find out if she can do a video visit for her 4/19/23 appt. Pt tested positive for Covid on 4/11/23. Please call.

## 2023-04-19 ENCOUNTER — PATIENT MESSAGE (OUTPATIENT)
Dept: ENDOCRINOLOGY CLINIC | Facility: CLINIC | Age: 68
End: 2023-04-19

## 2023-04-19 ENCOUNTER — TELEMEDICINE (OUTPATIENT)
Dept: ENDOCRINOLOGY CLINIC | Facility: CLINIC | Age: 68
End: 2023-04-19

## 2023-04-19 DIAGNOSIS — E66.09 CLASS 1 OBESITY DUE TO EXCESS CALORIES WITH SERIOUS COMORBIDITY AND BODY MASS INDEX (BMI) OF 33.0 TO 33.9 IN ADULT: ICD-10-CM

## 2023-04-19 DIAGNOSIS — E03.9 HYPOTHYROIDISM, UNSPECIFIED TYPE: Primary | ICD-10-CM

## 2023-04-19 PROCEDURE — 99214 OFFICE O/P EST MOD 30 MIN: CPT | Performed by: INTERNAL MEDICINE

## 2023-04-20 ENCOUNTER — TELEPHONE (OUTPATIENT)
Dept: FAMILY MEDICINE CLINIC | Facility: CLINIC | Age: 68
End: 2023-04-20

## 2023-04-20 NOTE — TELEPHONE ENCOUNTER
Dr Corin Wills 5mg/weekly 90 days    LOV 4/19/2023:  \"She is doing very well on Mounjaro 2.5mg qweekly.  She will let me know if I can increase to 5mg qweekly\"

## 2023-04-20 NOTE — TELEPHONE ENCOUNTER
From: Julio Antonio  To: Estefanía Baker MD  Sent: 4/19/2023 1:51 PM CDT  Subject: Medication Mounjaro     Dr Toya Baker is ok to refill for 90 days supply and you can rise to 5 mg when ever you want to . Thank you.   Sincerely   Julio Antonio

## 2023-04-20 NOTE — TELEPHONE ENCOUNTER
Patient called, verified Name and . She states she tested positive for Covid on . Most of her symptoms resolved except for some mild cough however, she has been feeling weak since then, \"no energy\". Denies any other complaints at this time. Patient wants to follow up with PCP. Appointment scheduled.      Future Appointments   Date Time Provider Zhanna Del Rio   2023  3:45 PM Francisco Javier Sanderson MD [facility]

## 2023-04-24 RX ORDER — TIRZEPATIDE 5 MG/.5ML
5 INJECTION, SOLUTION SUBCUTANEOUS
Qty: 6 ML | Refills: 0 | Status: SHIPPED | OUTPATIENT
Start: 2023-04-24

## 2023-05-02 NOTE — PROGRESS NOTES
-- DO NOT REPLY / DO NOT REPLY ALL --  -- Message is from [facility] ([facility]) --    General Patient Message: Regina called in because patient had a decline for his heel wound and she  want to know if a surgical shoe will be beneficial . Please call Regina      Caller Information       Type Contact Phone/Fax    05/02/2023 12:22 PM CDT Phone (Incoming) regina [phone]     [facility]        Alternative phone number: [phone]    Can a detailed message be left? Yes    Message Turnaround: [place]:    Refer to site's KB page for routing instructions    Please give this turnaround time to the caller:   \"You can expect to receive a response 1-3 business days after your provider's clinical team reviews the message\"               Results letter mailed to patient.

## 2023-05-09 ENCOUNTER — OFFICE VISIT (OUTPATIENT)
Dept: PAIN CLINIC | Facility: HOSPITAL | Age: 68
End: 2023-05-09
Attending: NURSE PRACTITIONER
Payer: COMMERCIAL

## 2023-05-09 ENCOUNTER — TELEPHONE (OUTPATIENT)
Facility: CLINIC | Age: 68
End: 2023-05-09

## 2023-05-09 VITALS — OXYGEN SATURATION: 96 % | SYSTOLIC BLOOD PRESSURE: 117 MMHG | HEART RATE: 71 BPM | DIASTOLIC BLOOD PRESSURE: 77 MMHG

## 2023-05-09 DIAGNOSIS — M51.16 LUMBAR DISC HERNIATION WITH RADICULOPATHY: ICD-10-CM

## 2023-05-09 DIAGNOSIS — G89.29 CHRONIC LOW BACK PAIN WITH RIGHT-SIDED SCIATICA, UNSPECIFIED BACK PAIN LATERALITY: ICD-10-CM

## 2023-05-09 DIAGNOSIS — M96.1 FAILED BACK SYNDROME OF LUMBAR SPINE: Primary | ICD-10-CM

## 2023-05-09 DIAGNOSIS — M50.30 DDD (DEGENERATIVE DISC DISEASE), CERVICAL: ICD-10-CM

## 2023-05-09 DIAGNOSIS — M54.41 CHRONIC LOW BACK PAIN WITH RIGHT-SIDED SCIATICA, UNSPECIFIED BACK PAIN LATERALITY: ICD-10-CM

## 2023-05-09 PROCEDURE — 99211 OFF/OP EST MAY X REQ PHY/QHP: CPT

## 2023-05-09 NOTE — PROGRESS NOTES
PT presents ambulatory to the CPM.  PT states  6/10 pain and would like to discuss another injection. Increased LBP with Right sided sciatica. ILA Contreras saw PT for med eval.  See notes for POC.

## 2023-05-12 ENCOUNTER — TELEPHONE (OUTPATIENT)
Dept: PAIN CLINIC | Facility: HOSPITAL | Age: 68
End: 2023-05-12

## 2023-05-12 NOTE — TELEPHONE ENCOUNTER
Right Transforaminal LAWRENCE L4/5 - Cpt 39613 - Dx M54.16, M51.36, M48.06 - NO AUTH PAULA Gage online, request above is authorized.    Authorization # [id number] effective date: 5/10/23 - 6/9/23

## 2023-05-12 NOTE — TELEPHONE ENCOUNTER
Scheduled procedure for: 6/6/23    Procedure follow-up for: 7/18/23 at 9:30 am.    Surgery Scheduling Form faxed to [facility] at [phone]

## 2023-06-02 DIAGNOSIS — E55.9 VITAMIN D DEFICIENCY: Primary | ICD-10-CM

## 2023-06-02 RX ORDER — ERGOCALCIFEROL 1.25 MG/1
50000 CAPSULE ORAL WEEKLY
Qty: 12 CAPSULE | Refills: 3 | OUTPATIENT
Start: 2023-06-02

## 2023-06-02 NOTE — TELEPHONE ENCOUNTER
Please review; protocol failed. Or has no protocol    Requested Prescriptions   Pending Prescriptions Disp Refills    ergocalciferol 1.25 MG (52997 UT) Oral Cap 12 capsule 3     Sig: Take 1 capsule (50,000 Units total) by mouth once a week.        There is no refill protocol information for this order           Recent Outpatient Visits              3 weeks ago Failed back syndrome of lumbar spine    [facility] MIKEL Lee    Office Visit    1 month ago Hypothyroidism, unspecified type    [facility], [address] [facility], Kristine Diana MD    Telemedicine    2 months ago Heartburn    [facility], [address], [place]    Office Visit    2 months ago Acquired hypothyroidism    [facility], [address] [facility], Virginie Sherman MD    Office Visit    5 months ago Class 1 obesity due to excess calories with serious comorbidity and body mass index (BMI) of 33.0 to 33.9 in adult    [address] [place],[address], [address] [facility], Kristine Diana MD    Office Visit           Future Appointments         Provider Department Appt Notes    In 1 week [facility], [address], [address], [place] Colonoscopy/Egd w/mac @[facility]    In 1 month Derrek Charles MD [facility] procedure f/u: Rt Trans LAWRENCE L4/5 6/6/23    In 4 months Emanuel Benítez MD [facility], [address], [place] ep ee *policy informed

## 2023-06-05 DIAGNOSIS — M96.1 FAILED BACK SYNDROME OF LUMBAR SPINE: ICD-10-CM

## 2023-06-05 DIAGNOSIS — M54.16 LUMBAR RADICULAR SYNDROME: ICD-10-CM

## 2023-06-05 DIAGNOSIS — E03.9 HYPOTHYROIDISM, UNSPECIFIED TYPE: ICD-10-CM

## 2023-06-05 DIAGNOSIS — Z98.890 HISTORY OF LUMBAR SURGERY: ICD-10-CM

## 2023-06-05 RX ORDER — LEVOTHYROXINE SODIUM 112 UG/1
TABLET ORAL
Qty: 90 TABLET | Refills: 1 | Status: SHIPPED | OUTPATIENT
Start: 2023-06-05

## 2023-06-05 RX ORDER — GABAPENTIN 800 MG/1
800 TABLET ORAL 2 TIMES DAILY
Qty: 180 TABLET | Refills: 3 | Status: SHIPPED | OUTPATIENT
Start: 2023-06-05

## 2023-06-05 NOTE — TELEPHONE ENCOUNTER
LOV: 4/19/2023    RTC: 6 Months    FU: No FU Appt Scheduled    Last Refill: 1/3/2023    3 Month Supply Pending
Statement Selected

## 2023-06-06 NOTE — TELEPHONE ENCOUNTER
Refill passed per [place] [employer] protocol.       Requested Prescriptions   Pending Prescriptions Disp Refills    GABAPENTIN 800 MG Oral Tab [Pharmacy Med Name: GABAPENTIN 800 MG TABLET] 180 tablet 1     Sig: TAKE 1 TABLET BY MOUTH TWICE A DAY       Neurology Medications Passed - 6/5/2023  3:49 PM        Passed - In person appointment or virtual visit in the past 6 mos or appointment in next 3 mos     Recent Outpatient Visits              3 weeks ago Failed back syndrome of lumbar spine    [facility] MIKEL Jean    Office Visit    1 month ago Hypothyroidism, unspecified type    [facility]    [facility]    2 months ago Heartburn    [facility], [address], [place]    Office Visit    2 months ago Acquired hypothyroidism    [facility], [address], Helen Beyer MD    Office Visit    5 months ago Class 1 obesity due to excess calories with serious comorbidity and body mass index (BMI) of 33.0 to 33.9 in adult    [facility], [address] [facility], Everett Taylor MD    Office Visit          Future Appointments         Provider Department Appt Notes    In 1 week [facility], [address], [address], [place] Colonoscopy/Egd w/mac @em    In 1 month Erick Call MD [facility] procedure f/u: Rt Trans LAWRENCE L4/5 6/6/23    In 4 months Estell Koyanagi, MD Emmit Cristal, [address], [place] ep ee *policy informed                      Future Appointments         Provider Department Appt Notes    In 1 week [facility], [address], [address], [place] Colonoscopy/Egd w/mac @em    In 1 month Erick Call MD [facility] procedure f/u: Rt Trans LAWRENCE L4/5 6/6/23    In 4 months Vahid Mariscal MD [facility], [address], [place] ep ee *policy informed             Recent Outpatient Visits              3 weeks ago Failed back syndrome of lumbar spine    [facility] Leona Wynn, MIKEL    Office Visit    1 month ago Hypothyroidism, unspecified type    [facility], [address] [facility], Viviane Essex, MD    Telemedicine    2 months ago Heartburn    [facility], [address], [place] ILA Duke    Office Visit    2 months ago Acquired hypothyroidism    Fadumo Ann MD    Office Visit    5 months ago Class 1 obesity due to excess calories with serious comorbidity and body mass index (BMI) of 33.0 to 33.9 in adult    [address] [place],[address], [address] [facility], Dalia Plunkett MD    Office Visit

## 2023-06-06 NOTE — TELEPHONE ENCOUNTER
Refill passed per [place] [employer] protocol.       Requested Prescriptions   Pending Prescriptions Disp Refills    GABAPENTIN 800 MG Oral Tab [Pharmacy Med Name: GABAPENTIN 800 MG TABLET] 180 tablet 1     Sig: TAKE 1 TABLET BY MOUTH TWICE A DAY       Neurology Medications Passed - 6/5/2023  3:49 PM        Passed - In person appointment or virtual visit in the past 6 mos or appointment in next 3 mos     Recent Outpatient Visits              3 weeks ago Failed back syndrome of lumbar spine    [facility] MIKEL Peng    Office Visit    1 month ago Hypothyroidism, unspecified type    [facility], [address] [facility] MD Pedro    Telemedicine    2 months ago Heartburn    [facility], [address], [place] Darby    Office Visit    2 months ago Acquired hypothyroidism    [facility], [address] [place]    Office Visit    5 months ago Class 1 obesity due to excess calories with serious comorbidity and body mass index (BMI) of 33.0 to 33.9 in adult    Keagan Mata, [address] [facility] [place], Michael Vaughn MD    Office Visit          Future Appointments         Provider Department Appt Notes    In 1 week [facility], [address], [address], [place] Colonoscopy/Egd w/mac @em    In 1 month Sarath Pastor MD [facility] procedure f/u: Rt Trans LAWRENCE L4/5 6/6/23    In 4 months Amalia Smith MD [facility] [place], [address], [place] ep ee *policy informed                      Future Appointments         Provider Department Appt Notes    In 1 week [facility], [address], [address], [place] Colonoscopy/Egd w/mac @em    In 1 month Sarath Pastor MD [facility] procedure f/u: Rt Trans LAWRENCE L4/5 6/6/23    In 4 months Lorna Wilder MD [facility], [address], [place] ep ee *policy informed             Recent Outpatient Visits              3 weeks ago Failed back syndrome of lumbar spine    [facility] MIKEL Winslow    Office Visit    1 month ago Hypothyroidism, unspecified type    [facility], [address] [facility], Kinsey Augustin MD    Telemedicine    2 months ago Heartburn    [facility], [address], [place] ILA Williamson    Office Visit    2 months ago Acquired hypothyroidism    [facility]el[facility] Eisenmenger, Davy Nab, MD    Office Visit    5 months ago Class 1 obesity due to excess calories with serious comorbidity and body mass index (BMI) of 33.0 to 33.9 in adult    [address] [place], [address] [facility], Meryl Ruvalcaba MD    Office Visit

## 2023-06-12 ENCOUNTER — ANESTHESIA (OUTPATIENT)
Dept: ENDOSCOPY | Age: 68
End: 2023-06-12
Payer: COMMERCIAL

## 2023-06-12 ENCOUNTER — HOSPITAL ENCOUNTER (OUTPATIENT)
Age: 68
Setting detail: HOSPITAL OUTPATIENT SURGERY
Discharge: HOME | End: 2023-06-12
Attending: INTERNAL MEDICINE | Admitting: INTERNAL MEDICINE
Payer: COMMERCIAL

## 2023-06-12 ENCOUNTER — ANESTHESIA EVENT (OUTPATIENT)
Dept: ENDOSCOPY | Age: 68
End: 2023-06-12
Payer: COMMERCIAL

## 2023-06-12 VITALS
OXYGEN SATURATION: 100 % | HEART RATE: 52 BPM | DIASTOLIC BLOOD PRESSURE: 76 MMHG | RESPIRATION RATE: 16 BRPM | SYSTOLIC BLOOD PRESSURE: 136 MMHG | WEIGHT: 195 LBS | BODY MASS INDEX: 32.49 KG/M2 | HEIGHT: 65 IN

## 2023-06-12 DIAGNOSIS — E66.9 OBESITY (BMI 30.0-34.9): Primary | ICD-10-CM

## 2023-06-12 DIAGNOSIS — Z86.010 HISTORY OF COLON POLYPS: ICD-10-CM

## 2023-06-12 DIAGNOSIS — K62.5 RECTAL BLEEDING: ICD-10-CM

## 2023-06-12 PROCEDURE — 88305 TISSUE EXAM BY PATHOLOGIST: CPT | Performed by: INTERNAL MEDICINE

## 2023-06-12 RX ORDER — NALOXONE HYDROCHLORIDE 0.4 MG/ML
80 INJECTION, SOLUTION INTRAMUSCULAR; INTRAVENOUS; SUBCUTANEOUS AS NEEDED
OUTPATIENT
Start: 2023-06-12 | End: 2023-06-12

## 2023-06-12 RX ORDER — SODIUM CHLORIDE, SODIUM LACTATE, POTASSIUM CHLORIDE, CALCIUM CHLORIDE 600; 310; 30; 20 MG/100ML; MG/100ML; MG/100ML; MG/100ML
INJECTION, SOLUTION INTRAVENOUS CONTINUOUS
Status: DISCONTINUED | OUTPATIENT
Start: 2023-06-12 | End: 2023-06-12

## 2023-06-12 RX ORDER — SODIUM CHLORIDE, SODIUM LACTATE, POTASSIUM CHLORIDE, CALCIUM CHLORIDE 600; 310; 30; 20 MG/100ML; MG/100ML; MG/100ML; MG/100ML
INJECTION, SOLUTION INTRAVENOUS CONTINUOUS
OUTPATIENT
Start: 2023-06-12

## 2023-06-12 RX ADMIN — SODIUM CHLORIDE, SODIUM LACTATE, POTASSIUM CHLORIDE, CALCIUM CHLORIDE: 600; 310; 30; 20 INJECTION, SOLUTION INTRAVENOUS at 14:14:00

## 2023-06-12 RX ADMIN — SODIUM CHLORIDE, SODIUM LACTATE, POTASSIUM CHLORIDE, CALCIUM CHLORIDE: 600; 310; 30; 20 INJECTION, SOLUTION INTRAVENOUS at 15:34:00

## 2023-06-12 RX ADMIN — SODIUM CHLORIDE, SODIUM LACTATE, POTASSIUM CHLORIDE, CALCIUM CHLORIDE: 600; 310; 30; 20 INJECTION, SOLUTION INTRAVENOUS at 15:23:00

## 2023-06-12 RX ADMIN — SODIUM CHLORIDE, SODIUM LACTATE, POTASSIUM CHLORIDE, CALCIUM CHLORIDE: 600; 310; 30; 20 INJECTION, SOLUTION INTRAVENOUS at 15:22:00

## 2023-06-12 NOTE — OPERATIVE REPORT
[facility] Endoscopy Report      Preoperative Diagnosis:  - colon cancer screening  - history of colon polyps   - GERD and hiatal hernia    Postoperative Diagnosis:  - colon polyps x 5  - diverticulosis   - small internal hemorrhoids  - hiatal hernia   - reflux esophagitis       Procedure:    Colonoscopy       Surgeon:  Pato Jesus M.D. Anesthesia:  MAC     Technique:  After informed consent, the patient was placed in the left lateral recumbent position. Digital rectal examination revealed no palpable intraluminal abnormalities. An Olympus variable stiffness 190 series HD colonoscope was inserted into the rectum and advanced under direct vision by following the lumen to the cecum-the patient had some looping in the left colon, gentle pressure was placed, position changes such as supine position and then back to the left lateral decubitus were utilized to maneuver the scope to the cecum. .  The colon was examined upon withdrawal in the left lateral position. Following colonoscopy, an Olympus adult HD gastroscope was inserted into the hypopharynx and advanced under direct vision into the esophagus, stomach and duodenum. The endoscope was withdrawn to the stomach where retroflexion of the annulus, body, cardia and fundus was performed. The instrument was straightened, insufflated air and fluid were suctioned and the endoscope was withdrawn. The procedures were well tolerated without immediate complication. Findings:  The preparation of the colon was adequate-liquid stool noted in the descending and sigmoid colon, this was washed and suctioned for adequate visualization. .  The terminal ileum was examined for 2 cm and visually normal.  The ileocecal valve was well preserved. The visualized colonic mucosa from the cecum to the anal verge was normal with an intact vascular pattern.     Colon polyps x5 removed as follows;  -Cecum x1, diminutive removed by cold forceps technique.  -Ascending x2, first polyp was serrated adenomatous appearing 1.8 cm in size removed by piecemeal fashion cold snare, APC with right colon settings were used to treat a small area that was not able to be removed with snare. Endo Clip x1 clip was placed across portion of the mucosal defect.  -Descending x2, first polyp had a serrated adenomatous appearance was approximately 1.2 cm in size removed by cold snare piecemeal fashion. Two clips were placed across this mucosal defect. Smaller 4 mm size polyp was removed by cold snare as well. All polypectomy sites were inspected and found to be free of bleeding and specimens retrieved and sent for analysis. Diverticulosis located in the sigmoid colon, no diverticulitis. Small internal hemorrhoids noted on retroflexed view. The esophagus showed esophagitis changes at the GE junction, no significant erosions noted. The GE junction and diaphragmatic impression were at 36 cm and 39 cm for a 3 cm hiatal hernia. The stomach distended appropriately with insufflated air. The mucosa of the stomach including cardia, fundus, gastric body and antrum were normal.  The duodenal bulb and post bulbar regions were normal.    Estimated blood loss-insignificant  Specimens-see above      Impression:  - colon polyps x 5  - diverticulosis   - small internal hemorrhoids  - hiatal hernia   - reflux esophagitis     Recommendations:  - Post polypectomy instructions given  - Repeat colonoscopy in 1- 3 years  - High fiber diet for diverticular disease  - Symptomatic treatment of hemorrhoids  - Antireflux dietary and behavioral changes given hiatal hernia, continue on PPI therapy. Seda Rodriguez.  Onelia Spain MD  6/12/2023  3:37 PM

## 2023-06-12 NOTE — ANESTHESIA POSTPROCEDURE EVALUATION
Patient: Hernan Yu    Procedure Summary     Date: 06/12/23 Room / Location: [facility] hospital ENDOSCOPY 01 / [facility] ENDO    Anesthesia Start: 3492 Anesthesia Stop: 7396    Procedures:       COLONOSCOPY      ESOPHAGOGASTRODUODENOSCOPY (EGD) Diagnosis:       Rectal bleeding      History of colon polyps      (polyps, hemorrhoids, diverticulosis, hiatal hernia)    Surgeons: Ortiz Calix MD Anesthesiologist:     Anesthesia Type: MAC ASA Status: 3          Anesthesia Type: MAC    Vitals Value Taken Time   /73 06/12/23 1537   Temp  06/12/23 1537   Pulse 57 06/12/23 1537   Resp 14 06/12/23 1537   SpO2 100 % 06/12/23 1537   Vitals shown include unvalidated device data. [facility] AN Post Evaluation:   Patient Evaluated in Patient location: Endo recovery. Patient Participation: complete - patient participated  Level of Consciousness: awake  Pain Management: adequate  Airway Patency:patent  Dental exam unchanged from preop  Yes    Cardiovascular Status: acceptable and hemodynamically stable  Respiratory Status: acceptable, nasal cannula, nonlabored ventilation and spontaneous ventilation  Postoperative Hydration acceptable  Comments: Upper front caps examined after bite block removal, all intact.        Adithya Smith MD  6/12/2023 3:37 PM

## 2023-06-12 NOTE — DISCHARGE INSTRUCTIONS

## 2023-06-16 ENCOUNTER — MED REC SCAN ONLY (OUTPATIENT)
Facility: CLINIC | Age: 68
End: 2023-06-16

## 2023-06-21 ENCOUNTER — TELEPHONE (OUTPATIENT)
Facility: CLINIC | Age: 68
End: 2023-06-21

## 2023-06-22 NOTE — TELEPHONE ENCOUNTER
----- Message from Sammy Arellano MD sent at 6/20/2023  1:06 PM CDT -----  I wanted to get back to you with your colonoscopy and EGD results. You had 5 colon polyps removed which were benign. I would advise a repeat colonoscopy in 1 year to make sure no new polyps are forming. You also have internal hemorrhoids and diverticulosis. The upper endoscopy showed a hiatal hernia and acid reflux (heartburn) on the esophagus. Please avoid eating 3 hours before bed.

## 2023-06-23 ENCOUNTER — OFFICE VISIT (OUTPATIENT)
Dept: OTOLARYNGOLOGY | Facility: CLINIC | Age: 68
End: 2023-06-23

## 2023-06-23 VITALS — BODY MASS INDEX: 32 KG/M2 | WEIGHT: 195 LBS

## 2023-06-23 DIAGNOSIS — M54.2 NECK PAIN: Primary | ICD-10-CM

## 2023-06-23 DIAGNOSIS — H61.23 BILATERAL IMPACTED CERUMEN: ICD-10-CM

## 2023-06-23 PROCEDURE — 69210 REMOVE IMPACTED EAR WAX UNI: CPT | Performed by: OTOLARYNGOLOGY

## 2023-06-23 PROCEDURE — 99213 OFFICE O/P EST LOW 20 MIN: CPT | Performed by: OTOLARYNGOLOGY

## 2023-06-23 RX ORDER — MELOXICAM 15 MG/1
15 TABLET ORAL DAILY
Qty: 30 TABLET | Refills: 3 | Status: SHIPPED | OUTPATIENT
Start: 2023-06-23

## 2023-06-26 ENCOUNTER — OFFICE VISIT (OUTPATIENT)
Dept: PAIN CLINIC | Facility: HOSPITAL | Age: 68
End: 2023-06-26
Attending: ANESTHESIOLOGY
Payer: COMMERCIAL

## 2023-06-26 VITALS — HEART RATE: 61 BPM | DIASTOLIC BLOOD PRESSURE: 74 MMHG | SYSTOLIC BLOOD PRESSURE: 115 MMHG | OXYGEN SATURATION: 93 %

## 2023-06-26 DIAGNOSIS — M96.1 FAILED BACK SYNDROME OF LUMBAR SPINE: Primary | ICD-10-CM

## 2023-06-26 PROCEDURE — 99211 OFF/OP EST MAY X REQ PHY/QHP: CPT

## 2023-06-26 NOTE — PROGRESS NOTES
PT presents ambulatory to the CPM.  Pt reports minimal relief. 4/10 pain that increases to 9-10/10 with sitting, ADl's and activity. Pain also wakes her up at night. \" I need to get better quality of life\"   Dr. Venice Batista saw PT for R Trans L4/5 F/U. Refer to dictation for POC.

## 2023-06-26 NOTE — CHRONIC PAIN
Follow-up Note    Vilma eKrn is a 79year old old female, originally referred to the pain clinic by Dr. Quentin De MD, with history of Failed back syndrome of lumbar spine  (primary encounter diagnosis) returns to the clinic for follow up. Patient is complaining of low back pain radiating to both lower extremities to thighs. Today pain is 4 out of 10. Range of pain is from 4 to 8 out of 10. For pain control patient presently is not using pain medicines. No new symptoms. Patient has no change in bowel/bladder function. Patient had transforaminal epidural steroid  injection which gave her 30% improvement. Injection was done on June 6, 2023. She still feels in a lot of pain for that reason he would like to have another injection. ALLERGIES:    Demerol [Meperidine]    NAUSEA AND VOMITING, NAUSEA ONLY    Comment:Other reaction(s): Vomiting  Hydrocodone             NAUSEA AND VOMITING  Pregabalin                  Comment:Other reaction(s): Stomach discomforts  Vicodin Tuss [[place]*    NAUSEA AND VOMITING, NAUSEA ONLY    Comment:Other reaction(s): Vomiting    MEDICATION LIST:  Current Outpatient Medications   Medication Sig Dispense Refill    Meloxicam 15 MG Oral Tab Take 1 tablet (15 mg total) by mouth daily. 30 tablet 3    LEVOTHYROXINE 112 MCG Oral Tab TAKE 1 TABLET BY MOUTH EVERY DAY BEFORE BREAKFAST 90 tablet 1    gabapentin 800 MG Oral Tab Take 1 tablet (800 mg total) by mouth 2 (two) times daily. 180 tablet 3    busPIRone 15 MG Oral Tab Take one tablet (15mg) in the morning and one tablet (15mg) before bedtime 180 tablet 0    FLUoxetine 20 MG Oral Cap TAKE 1 CAPSULE(20 MG) BY MOUTH DAILY WITH ONE 40 MG CAPSULE TO EQUAL 60 MG 90 capsule 0    Tirzepatide (MOUNJARO) 5 MG/0.5ML Subcutaneous Solution Pen-injector Inject 5 mg into the skin every 7 days. 6 mL 0    FLUoxetine HCl (PROZAC) 40 MG Oral Cap Take 1 capsule (40 mg total) by mouth every morning.  90 capsule 0    temazepam 30 MG Oral Cap Take 1 capsule (30 mg total) by mouth nightly as needed. 30 capsule 2    benzonatate 200 MG Oral Cap Take 1 capsule (200 mg total) by mouth 3 (three) times daily as needed for cough. 30 capsule 0    PEG 3350-KCl-Na Bicarb-NaCl 420 g Oral Recon Soln Split dose 1 each 0    famotidine 20 MG Oral Tab Take 1 tablet (20 mg total) by mouth daily. 30 tablet 11    MOUNJARO 2.5 MG/0.5ML Subcutaneous Solution Pen-injector Inject 2.5 mg into the skin once a week. prednisoLONE 1 % Ophthalmic Suspension Place 1 drop into the left eye 4 (four) times daily. (Patient not taking: Reported on 6/23/2023)      ofloxacin 0.3 % Ophthalmic Solution       difluprednate 0.05 % Ophthalmic Emulsion Place 1 drop into both eyes 4 (four) times daily. LORazepam 1 MG Oral Tab Take 1/2 to 1 tablet (1 mg) twice a day as needed for anxiety 60 tablet 1    OMEPRAZOLE 40 MG Oral Capsule Delayed Release TAKE 1 CAPSULE(40 MG) BY MOUTH DAILY BEFORE BREAKFAST 90 capsule 1    Melatonin 10 MG Oral Cap Take 5 mg by mouth nightly as needed. ERGOCALCIFEROL 1.25 MG (76528 UT) Oral Cap TAKE 1 CAPSULE BY MOUTH 1 TIME A WEEK 12 capsule 3    carvedilol 3.125 MG Oral Tab TAKE 1 TABLET(3.125 MG) BY MOUTH TWICE DAILY WITH MEALS 180 tablet 3    rosuvastatin (CRESTOR) 20 MG Oral Tab Take 1 tablet (20 mg total) by mouth nightly. 90 tablet 3        REVIEW OF SYSTEMS:   General: no weight change, change in appetite, thirst or fever  HEENT: no headache or blurred vision  Cardiopulmonary: no chest pain, palpitations, or shortness of breath  GI: no anorexia, nausea or vomiting, or bowel incontinence   : no dysuria, or pyuria. Endo: no goiter, lethargy, or heat/cold intolerance  Heme/Onc: no pallor, bruising, or bleeding.     Musculoskeletal:  no new problems  Neuro:  no new problems  Psych:  no new problems    MEDICAL HISTORY:  Patient Active Problem List:     Primary cardiomyopathy ([facility]     Hypercholesteremia     Failed back syndrome of lumbar spine     Lumbago Lumbar facet arthropathy     Lumbar disc herniation with radiculopathy     Lumbar radicular syndrome     Primary osteoarthritis involving multiple joints     Chronic low back pain     Anxiety and depression     Thyroid activity decreased     Acquired hypothyroidism     Chronic neck and back pain     Obesity (BMI 30.0-34. 9)     Thyroid nodule     Onychomycosis     DDD (degenerative disc disease), cervical     H/O radioactive iodine thyroid ablation     Female pattern alopecia     Myalgia of muscle of neck     Chronic left shoulder pain     Myalgia, unspecified site     History of lumbar surgery     Bilateral leg paresthesia     Polyneuropathy     Multinodular thyroid     Depression, recurrent (HCC)     Age-related nuclear cataract of both eyes     Floaters, right     Dry eye     Sweating abnormality     Generalized anxiety disorder     MDD (recurrent major depressive disorder) in remission ([address]     Acute maxillary sinusitis     Anal or rectal pain     Anxiety states     Benign neoplasm of eyelid     Bronchitis     Congenital anomaly of nails     Cough     Diarrhea     Diarrhea of presumed infectious origin     Lump or mass in breast     Disorder of lipid metabolism     Essential hypertension     Female stress incontinence     Fibroadenosis of breast     Fibrosclerosis of breast     Gastroesophageal reflux disease     Hemorrhoids     Hordeolum internum     Astigmatism     Hypopotassemia     Injury of knee, leg, ankle and foot     Injury, finger     Intestinal infection due to Clostridium difficile     Intractable vomiting     Lateral epicondylitis of elbow     Neoplasm of uncertain behavior of connective and other soft tissue     Noninfectious gastroenteritis and colitis     Nonspecific reaction to tuberculin skin test     Arthropathy     Other specified disorders of adrenal glands     Overweight     Pain in joint, pelvic region and thigh     Pinguecula     Polycystic ovaries     Reflux esophagitis     Senile cataract     Senile osteoporosis     Solitary cyst of breast     Spinal stenosis in cervical region     Symptomatic menopausal or female climacteric states     Throat pain     Toxic diffuse goiter     Toxic uninodular goiter     Absence of bladder continence     Uterine prolapse     Visual disturbance     Vitamin D deficiency     Class 1 obesity due to excess calories with serious comorbidity and body mass index (BMI) of 33.0 to 33.9 in adult     Hypothyroidism    Past Medical History:   Diagnosis Date    Age-related nuclear cataract of both eyes 2021    Anxiety state, unspecified     Back pain     chronic    Cardiomyopathy ([address]     Disorder of thyroid     Dry eye 2021    Essential hypertension     Fibromyalgia     Floaters, right 2021    H/O varicose vein stripping     Per NG: Varicose vein ; vein stripping    Headache     Per NG: OU    High cholesterol     Hypothyroidism     Meibomian gland dysfunction     Musculoskeletal problem     Per NG: injection tendon origin/insertion    Musculoskeletal problem     Per NG: Arthrocentesis of the left knee joint    Neck pain     chronic    Osteoarthritis     Radiation 10/23/2010    Per NG: Radiation to thyroid     Status post epidural steroid injection     Per NG: epidral injection x5 per pain mgmt       SURGICAL HISTORY:  Past Surgical History:   Procedure Laterality Date    APPENDECTOMY      BACK SURGERY  2016    lumbar L4-L5 fusion      1979    1    CATARACT  2023    Right eye    CATARACT EXTRACTION W/  INTRAOCULAR LENS IMPLANT Left 2023    Dr. Nat Vincent @ [facility]    CATARACT EXTRACTION W/ INTRAOCULAR LENS IMPLANT Right 2023    Dr. Angelina Caldwell  10/2005    normal, non precancerous polyps    COLONOSCOPY N/A 2018    Procedure: COLONOSCOPY;  Surgeon: Adela Palacios MD;  Location: [address] [place] [address] ENDOSCOPY    COLONOSCOPY N/A 2023    Procedure: COLONOSCOPY;  Surgeon: Franklin Chapa MD;  Location: [facility] ENDO    CYST ASPIRATION LEFT Left 2007    FNA    CYST ASPIRATION RIGHT      EGD      LAMINECTOMY      Per NG: Disc  displacement x2 and spinal fusion 2016    [facility] LOCALIZATION WIRE 1 SITE LEFT (CPT=19281)  2007    OTHER SURGICAL HISTORY      Per NG: Arthrocentesis    OTHER SURGICAL HISTORY      Per NG: arthrocentesis of the knee joint    YAG IRIDOTOMY - OD - RIGHT EYE Right 10/10/1997    RJM    YAG IRIDOTOMY - OS - LEFT EYE Left 10/16/1997    RJM       FAMILY HISTORY:  Family History   Problem Relation Age of Onset    Depression Mother     Neurological Disorder Mother         Per NG: Hypothyrodism    Arthritis Mother         Per NG: Osteoarthritis    Other (Other) Mother     Depression Maternal Grandfather     Other (Other) Maternal Grandfather     Heart Disorder Father 72    Heart Attack Neg     Diabetes Neg         Per NG; No diabetes    Glaucoma Neg         Per NG: No glaucoma histroy    Cancer Neg     Hypertension Neg     Lipids Neg        SOCIAL HISTORY:  Social History    Socioeconomic History      Marital status:       Spouse name: Not on file      Number of children: Not on file      Years of education: Not on file      Highest education level: Not on file    Occupational History      Not on file    Tobacco Use      Smoking status: Former        Packs/day: 1.00        Years: 27.00        Pack years: 27        Types: Cigarettes        Quit date: 2005        Years since quittin.4      Smokeless tobacco: Never      Tobacco comments: 7 years ago    Vaping Use      Vaping Use: Never used    Substance and Sexual Activity      Alcohol use: No      Drug use: No      Sexual activity: Not on file    Other Topics      Concerns:         Service: Not Asked        Blood Transfusions: Not Asked        Caffeine Concern: Yes          3 cups of coffee daily        Occupational Exposure: Not Asked        Hobby Hazards: Not Asked Sleep Concern: Not Asked        Stress Concern: Not Asked        Weight Concern: Not Asked        Special Diet: Not Asked        Back Care: Not Asked        Exercise: No        Bike Helmet: Not Asked        Seat Belt: Not Asked        Self-Exams: Not Asked        Grew up on a farm: Not Asked        History of tanning: Not Asked        Outdoor occupation: Not Asked        Pt has a pacemaker: No        Pt has a defibrillator: No        Breast feeding: Not Asked        Reaction to local anesthetic: No    Social History Narrative      Not on file    Social Determinants of Health  Financial Resource Strain: Not on file  Food Insecurity: Not on file  Transportation Needs: Not on file  Physical Activity: Not on file  Stress: Not on file  Social Connections: Not on file  Housing Stability: Not on file  PHYSICAL EXAMINATION:   06/26/23  1141   BP: 115/74   Pulse: 61      General: Alert and oriented x3, NAD, appears stated age, appropriate disposition and demeanor, answers questions appropriately   Head: normocephalic, atraumatic  Eyes: anicteric; no injection  Ears: normal;  no discharge  Nose: externally grossly within normal limits, no unusual discharge or rhinorrhea   Throat: lips grossly within normal limits by visual exam externally  Neck: supple, trachea midline, no obvious JVD  Gait: Walking without limping  Spine: Postsurgical lower lumbar scar well-healed without signs of infection or inflammation  ROM:   Lumbar Spine: Limited extension due to pain normal flexion  MOTOR EXAMINATION:  Lower Extremities: Normal bilaterally  REFLEXES:  Lower Extremities: Depressed knee reflexes bilaterally  SENSATION (light touch/pinprick/temperature):    Lower Extremities: Normal bilaterally  SLR: Positive bilaterally at 60 degrees  SIJ tenderness: Negative bilaterally  Isis's test: Negative bilaterally  TPs: Not identified    IMAGING:  No new imaging available    ASSESSMENT AND PLAN:    Ayad Vital is a 79year old  female, with Failed back syndrome of lumbar spine  (primary encounter diagnosis). Since patient has now bilateral radiating pain I would recommend lumbar epidural steroid injection injecting above the scar. Pt will return to clinic 2-3 weeks postinjection. Conservative vs. Interventional pain treatment options were discussed at length including pharmacotherapy (eg. Anti- inflammatories, muscle relaxants, neuropathic medications, oral steroids, analgesics), injections, and further testing. Risks and benefits of all options were discussed to patients satisfaction. All questions were answered. Patient agreeable to treatment plan. Greater than 50% of the time was spent with counseling (nature of discussion centered around pain, therapy, and treatment options), face to face time, time spent reviewing data, obtaining patient information and discussing the care with the patients health care providers.      Total Time: 25 minutes

## 2023-06-28 ENCOUNTER — TELEPHONE (OUTPATIENT)
Dept: PAIN CLINIC | Facility: HOSPITAL | Age: 68
End: 2023-06-28

## 2023-08-07 ENCOUNTER — OFFICE VISIT (OUTPATIENT)
Dept: PAIN CLINIC | Facility: HOSPITAL | Age: 68
End: 2023-08-07
Attending: ANESTHESIOLOGY
Payer: COMMERCIAL

## 2023-08-07 ENCOUNTER — LAB ENCOUNTER (OUTPATIENT)
Dept: LAB | Facility: HOSPITAL | Age: 68
End: 2023-08-07
Attending: ANESTHESIOLOGY
Payer: COMMERCIAL

## 2023-08-07 VITALS — DIASTOLIC BLOOD PRESSURE: 73 MMHG | HEART RATE: 71 BPM | SYSTOLIC BLOOD PRESSURE: 106 MMHG | OXYGEN SATURATION: 95 %

## 2023-08-07 DIAGNOSIS — M96.1 FAILED BACK SYNDROME OF LUMBAR SPINE: ICD-10-CM

## 2023-08-07 DIAGNOSIS — E55.9 VITAMIN D DEFICIENCY: ICD-10-CM

## 2023-08-07 DIAGNOSIS — E03.9 HYPOTHYROIDISM, UNSPECIFIED TYPE: ICD-10-CM

## 2023-08-07 DIAGNOSIS — M96.1 POST LAMINECTOMY SYNDROME: Primary | ICD-10-CM

## 2023-08-07 DIAGNOSIS — M79.10 MYALGIA, UNSPECIFIED SITE: ICD-10-CM

## 2023-08-07 LAB
T4 FREE SERPL-MCNC: 1.3 NG/DL (ref 0.8–1.7)
TSI SER-ACNC: 1.78 MIU/ML (ref 0.36–3.74)
VIT D+METAB SERPL-MCNC: 63.2 NG/ML (ref 30–100)

## 2023-08-07 PROCEDURE — 84439 ASSAY OF FREE THYROXINE: CPT

## 2023-08-07 PROCEDURE — 36415 COLL VENOUS BLD VENIPUNCTURE: CPT

## 2023-08-07 PROCEDURE — 82306 VITAMIN D 25 HYDROXY: CPT

## 2023-08-07 PROCEDURE — 99211 OFF/OP EST MAY X REQ PHY/QHP: CPT

## 2023-08-07 PROCEDURE — 84443 ASSAY THYROID STIM HORMONE: CPT

## 2023-08-07 RX ORDER — TIZANIDINE 4 MG/1
4 TABLET ORAL NIGHTLY
Qty: 30 TABLET | Refills: 0 | Status: SHIPPED | OUTPATIENT
Start: 2023-08-07

## 2023-08-07 RX ORDER — MELOXICAM 15 MG/1
15 TABLET ORAL DAILY
Qty: 30 TABLET | Refills: 2 | Status: SHIPPED | OUTPATIENT
Start: 2023-08-07

## 2023-08-07 NOTE — PROGRESS NOTES
PT presents ambulatory to the C[facility].  Pt reports about 60% improvement. 6/10 pain today. PT still wakes up in pain. \"When my body is crooked I  take a shower and it feels better\". Pain increases by the end of the day. Dr. Sonia Joshi saw PT for LESI L2/3 F/U. Refer to dictation for POC.

## 2023-08-07 NOTE — CHRONIC PAIN
Follow-up Note    Richard Ramírez is a 79year old old female, originally referred to the pain clinic by Dr. Abhay Frank MD, with history of Post laminectomy syndrome  (primary encounter diagnosis)  Failed back syndrome of lumbar spine  Myalgia, unspecified site returns to the clinic for follow up. Patient is complaining of low back pain radiating down to both lower extremities. Today pain is 5 out of 10. Range of pain is from 3 to 7 out of 10. For pain control patient presently is using Aleve. Pain Meds are reducing his pain by 20-30%. Tolerating well. No new symptoms. Patient has no change in bowel/bladder function. Patient had lumbar epidurals injection on July 11. Injection gave her 60% improvement. Still in pain but not so severe cannot sleep during the night. ALLERGIES:    Demerol [Meperidine]    NAUSEA AND VOMITING, NAUSEA ONLY    Comment:Other reaction(s): Vomiting  Hydrocodone             NAUSEA AND VOMITING  Pregabalin                  Comment:Other reaction(s): Stomach discomforts  Vicodin Tuss [Benton*    NAUSEA AND VOMITING, NAUSEA ONLY    Comment:Other reaction(s): Vomiting    MEDICATION LIST:  Current Outpatient Medications   Medication Sig Dispense Refill    temazepam 30 MG Oral Cap Take 1 capsule (30 mg total) by mouth nightly as needed. 30 capsule 2    FLUoxetine 20 MG Oral Cap TAKE 1 CAPSULE BY MOUTH EVERY DAY 90 capsule 0    busPIRone 15 MG Oral Tab TAKE 1 TABLET BY MOUTH TWICE A  tablet 0    FLUOXETINE HCL 40 MG Oral Cap TAKE 1 CAPSULE (40 MG TOTAL) BY MOUTH EVERY MORNING. 90 capsule 0    Meloxicam 15 MG Oral Tab Take 1 tablet (15 mg total) by mouth daily. 30 tablet 3    LEVOTHYROXINE 112 MCG Oral Tab TAKE 1 TABLET BY MOUTH EVERY DAY BEFORE BREAKFAST 90 tablet 1    gabapentin 800 MG Oral Tab Take 1 tablet (800 mg total) by mouth 2 (two) times daily. 180 tablet 3    Tirzepatide (MOUNJARO) 5 MG/0.5ML Subcutaneous Solution Pen-injector Inject 5 mg into the skin every 7 days.  6 mL 0 benzonatate 200 MG Oral Cap Take 1 capsule (200 mg total) by mouth 3 (three) times daily as needed for cough. 30 capsule 0    PEG 3350-KCl-Na Bicarb-NaCl 420 g Oral Recon Soln Split dose 1 each 0    famotidine 20 MG Oral Tab Take 1 tablet (20 mg total) by mouth daily. 30 tablet 11    MOUNJARO 2.5 MG/0.5ML Subcutaneous Solution Pen-injector Inject 2.5 mg into the skin once a week. prednisoLONE 1 % Ophthalmic Suspension Place 1 drop into the left eye 4 (four) times daily. (Patient not taking: Reported on 6/23/2023)      ofloxacin 0.3 % Ophthalmic Solution       difluprednate 0.05 % Ophthalmic Emulsion Place 1 drop into both eyes 4 (four) times daily. LORazepam 1 MG Oral Tab Take 1/2 to 1 tablet (1 mg) twice a day as needed for anxiety 60 tablet 1    OMEPRAZOLE 40 MG Oral Capsule Delayed Release TAKE 1 CAPSULE(40 MG) BY MOUTH DAILY BEFORE BREAKFAST 90 capsule 1    Melatonin 10 MG Oral Cap Take 5 mg by mouth nightly as needed. ERGOCALCIFEROL 1.25 MG (63228 UT) Oral Cap TAKE 1 CAPSULE BY MOUTH 1 TIME A WEEK 12 capsule 3    carvedilol 3.125 MG Oral Tab TAKE 1 TABLET(3.125 MG) BY MOUTH TWICE DAILY WITH MEALS 180 tablet 3    rosuvastatin (CRESTOR) 20 MG Oral Tab Take 1 tablet (20 mg total) by mouth nightly. 90 tablet 3        REVIEW OF SYSTEMS:   General: no weight change, change in appetite, thirst or fever  HEENT: no headache or blurred vision  Cardiopulmonary: no chest pain, palpitations, or shortness of breath  GI: no anorexia, nausea or vomiting, or bowel incontinence   : no dysuria, or pyuria. Endo: no goiter, lethargy, or heat/cold intolerance  Heme/Onc: no pallor, bruising, or bleeding.     Musculoskeletal:  no new problems  Neuro:  no new problems  Psych:  no new problems    MEDICAL HISTORY:  Patient Active Problem List:     Primary cardiomyopathy ([facility] [address]     Hypercholesteremia     Failed back syndrome of lumbar spine     Lumbago     Lumbar facet arthropathy     Lumbar disc herniation with radiculopathy     Lumbar radicular syndrome     Primary osteoarthritis involving multiple joints     Chronic low back pain     Anxiety and depression     Thyroid activity decreased     Acquired hypothyroidism     Chronic neck and back pain     Obesity (BMI 30.0-34. 9)     Thyroid nodule     Onychomycosis     DDD (degenerative disc disease), cervical     H/O radioactive iodine thyroid ablation     Female pattern alopecia     Myalgia of muscle of neck     Chronic left shoulder pain     Myalgia, unspecified site     History of lumbar surgery     Bilateral leg paresthesia     Polyneuropathy     Multinodular thyroid     Depression, recurrent (HCC)     Age-related nuclear cataract of both eyes     Floaters, right     Dry eye     Sweating abnormality     Generalized anxiety disorder     MDD (recurrent major depressive disorder) in remission ([facility] [address]     Acute maxillary sinusitis     Anal or rectal pain     Anxiety states     Benign neoplasm of eyelid     Bronchitis     Congenital anomaly of nails     Cough     Diarrhea     Diarrhea of presumed infectious origin     Lump or mass in breast     Disorder of lipid metabolism     Essential hypertension     Female stress incontinence     Fibroadenosis of breast     Fibrosclerosis of breast     Gastroesophageal reflux disease     Hemorrhoids     Hordeolum internum     Astigmatism     Hypopotassemia     Injury of knee, leg, ankle and foot     Injury, finger     Intestinal infection due to Clostridium difficile     Intractable vomiting     Lateral epicondylitis of elbow     Neoplasm of uncertain behavior of connective and other soft tissue     Noninfectious gastroenteritis and colitis     Nonspecific reaction to tuberculin skin test     Arthropathy     Other specified disorders of adrenal glands     Overweight     Pain in joint, pelvic region and thigh     Pinguecula     Polycystic ovaries     Reflux esophagitis     Senile cataract     Senile osteoporosis     Solitary cyst of breast Spinal stenosis in cervical region     Symptomatic menopausal or female climacteric states     Throat pain     Toxic diffuse goiter     Toxic uninodular goiter     Absence of bladder continence     Uterine prolapse     Visual disturbance     Vitamin D deficiency     Class 1 obesity due to excess calories with serious comorbidity and body mass index (BMI) of 33.0 to 33.9 in adult     Hypothyroidism    Past Medical History:   Diagnosis Date    Age-related nuclear cataract of both eyes 2021    Anxiety state, unspecified     Back pain     chronic    Cardiomyopathy ([address]     Disorder of thyroid     Dry eye 2021    Essential hypertension     Fibromyalgia     Floaters, right 2021    H/O varicose vein stripping     Per NG: Varicose vein ; vein stripping    Headache     Per NG: OU    High cholesterol     Hypothyroidism     Meibomian gland dysfunction     Musculoskeletal problem     Per NG: injection tendon origin/insertion    Musculoskeletal problem     Per NG: Arthrocentesis of the left knee joint    Neck pain     chronic    Osteoarthritis     Radiation 10/23/2010    Per NG: Radiation to thyroid     Status post epidural steroid injection     Per NG: epidral injection x5 per pain mgmt       SURGICAL HISTORY:  Past Surgical History:   Procedure Laterality Date    APPENDECTOMY      BACK SURGERY  2016    lumbar L4-L5 fusion      1979    1    CATARACT  2023    Right eye    CATARACT EXTRACTION W/  INTRAOCULAR LENS IMPLANT Left 2023    Dr. Funmilayo Meyers @ [facility]    CATARACT EXTRACTION W/ INTRAOCULAR LENS IMPLANT Right 2023    Dr. Sebastián Joiner  10/2005    normal, non precancerous polyps    COLONOSCOPY N/A 2018    Procedure: COLONOSCOPY;  Surgeon: Huma Crain MD;  Location: [address] [place] [address] ENDOSCOPY    COLONOSCOPY N/A 2023    Procedure: COLONOSCOPY;  Surgeon: Huma Crain MD;  Location: [facility] CYST ASPIRATION LEFT Left 2007    FNA    CYST ASPIRATION RIGHT      EGD      LAMINECTOMY      Per NG: Disc  displacement x2 and spinal fusion 2016    [facility] LOCALIZATION WIRE 1 SITE LEFT (CPT=19281)  2007    OTHER SURGICAL HISTORY      Per NG: Arthrocentesis    OTHER SURGICAL HISTORY      Per NG: arthrocentesis of the knee joint    YAG IRIDOTOMY - OD - RIGHT EYE Right 10/10/1997    RJM    YAG IRIDOTOMY - OS - LEFT EYE Left 10/16/1997    RJM       FAMILY HISTORY:  Family History   Problem Relation Age of Onset    Depression Mother     Neurological Disorder Mother         Per NG: Hypothyrodism    Arthritis Mother         Per NG: Osteoarthritis    Other (Other) Mother     Depression Maternal Grandfather     Other (Other) Maternal Grandfather     Heart Disorder Father 72    Heart Attack Neg     Diabetes Neg         Per NG; No diabetes    Glaucoma Neg         Per NG: No glaucoma histroy    Cancer Neg     Hypertension Neg     Lipids Neg        SOCIAL HISTORY:  Social History    Socioeconomic History      Marital status:       Spouse name: Not on file      Number of children: Not on file      Years of education: Not on file      Highest education level: Not on file    Occupational History      Not on file    Tobacco Use      Smoking status: Former        Packs/day: 1.00        Years: 27.00        Pack years: 27        Types: Cigarettes        Quit date: 2005        Years since quittin.6      Smokeless tobacco: Never      Tobacco comments: 7 years ago    Vaping Use      Vaping Use: Never used    Substance and Sexual Activity      Alcohol use: No      Drug use: No      Sexual activity: Not on file    Other Topics      Concerns:         Service: Not Asked        Blood Transfusions: Not Asked        Caffeine Concern: Yes          3 cups of coffee daily        Occupational Exposure: Not Asked        Hobby Hazards: Not Asked        Sleep Concern: Not Asked        Stress Concern: Not Asked        Weight Concern: Not Asked        Special Diet: Not Asked        Back Care: Not Asked        Exercise: No        Bike Helmet: Not Asked        Seat Belt: Not Asked        Self-Exams: Not Asked        Grew up on a farm: Not Asked        History of tanning: Not Asked        Outdoor occupation: Not Asked        Pt has a pacemaker: No        Pt has a defibrillator: No        Breast feeding: Not Asked        Reaction to local anesthetic: No    Social History Narrative      Not on file    Social Determinants of Health  Financial Resource Strain: Not on file  Food Insecurity: Not on file  Transportation Needs: Not on file  Physical Activity: Not on file  Stress: Not on file  Social Connections: Not on file  Housing Stability: Not on file  PHYSICAL EXAMINATION:   08/07/23  0939   BP: 106/73   Pulse: 71      General: Alert and oriented x3, NAD, appears stated age, appropriate disposition and demeanor, answers questions appropriately   Head: normocephalic, atraumatic  Eyes: anicteric; no injection  Ears: normal;  no discharge  Nose: externally grossly within normal limits, no unusual discharge or rhinorrhea   Throat: lips grossly within normal limits by visual exam externally  Neck: supple, trachea midline, no obvious JVD  Gait: Walking without limping  Spine: Flattened lumbar lordosis. ROM:   Lumbar Spine: Limited flexion due to pain  MOTOR EXAMINATION:  Lower Extremities: Normal bilaterally  REFLEXES:  Lower Extremities: Depressed knee reflexes bilaterally  SENSATION (light touch/pinprick/temperature): Lower Extremities: Normal bilaterally  SLR: Negative bilaterally  SIJ tenderness: Negative bilaterally  Isis's test: Negative bilaterally  TPs: Not identified    IMAGING:  No new imaging available    ASSESSMENT AND PLAN:    Ryann Bui is a 79year old  female, with   Post laminectomy syndrome  (primary encounter diagnosis)  Failed back syndrome of lumbar spine  Myalgia, unspecified site.   Would recommend physical therapy, instead of Aleve we will give her Mobic 15 mg 1 tablet p.o. daily, and for muscle spasms we will give tizanidine 4 mg 1 tablet p.o. q. hours to sleep. Pt will return to clinic as needed. .  Conservative vs. Interventional pain treatment options were discussed at length including pharmacotherapy (eg. Anti- inflammatories, muscle relaxants, neuropathic medications, oral steroids, analgesics), injections, and further testing. Risks and benefits of all options were discussed to patients satisfaction. All questions were answered. Patient agreeable to treatment plan. Greater than 50% of the time was spent with counseling (nature of discussion centered around pain, therapy, and treatment options), face to face time, time spent reviewing data, obtaining patient information and discussing the care with the patients health care providers.      Total Time: 25 minutes

## 2023-08-17 ENCOUNTER — TELEPHONE (OUTPATIENT)
Dept: FAMILY MEDICINE CLINIC | Facility: CLINIC | Age: 68
End: 2023-08-17

## 2023-08-17 NOTE — TELEPHONE ENCOUNTER
Patient calling the office for her mother (who is also a patient) but also wants to clarify date and time of upcoming appointment on 8/28/23   The notes says \"Time\" - RN asked if this appointment is a follow-up or about a specific symptom/health issues. Patient says it's about new health conditions to discuss with Dr. Ilir Clarke. Patient is upset at the moment, unable to triage. RN offered sooner appointment, patient declines because of her schedule for her mother's appointments.        Future Appointments   Date Time Provider Zhanna Del Rio   8/28/2023 10:45 AM Carlene Ocampo MD [facility]   10/19/2023 10:15 AM Brennan Galarza MD [address] [facility] [address]   11/13/2023  9:00 AM MIKEL Newberry [facility] ([facility]) KARLENE Kamara

## 2023-08-28 ENCOUNTER — OFFICE VISIT (OUTPATIENT)
Dept: FAMILY MEDICINE CLINIC | Facility: CLINIC | Age: 68
End: 2023-08-28

## 2023-08-28 VITALS
WEIGHT: 205 LBS | HEART RATE: 64 BPM | BODY MASS INDEX: 34.16 KG/M2 | HEIGHT: 65 IN | TEMPERATURE: 98 F | DIASTOLIC BLOOD PRESSURE: 62 MMHG | SYSTOLIC BLOOD PRESSURE: 97 MMHG

## 2023-08-28 DIAGNOSIS — Z12.31 ENCOUNTER FOR SCREENING MAMMOGRAM FOR BREAST CANCER: ICD-10-CM

## 2023-08-28 DIAGNOSIS — E78.00 HYPERCHOLESTEREMIA: ICD-10-CM

## 2023-08-28 DIAGNOSIS — M25.561 CHRONIC PAIN OF BOTH KNEES: Primary | ICD-10-CM

## 2023-08-28 DIAGNOSIS — G89.29 CHRONIC PAIN OF BOTH KNEES: Primary | ICD-10-CM

## 2023-08-28 DIAGNOSIS — M25.562 CHRONIC PAIN OF BOTH KNEES: Primary | ICD-10-CM

## 2023-08-28 DIAGNOSIS — Z23 NEED FOR PNEUMOCOCCAL 20-VALENT CONJUGATE VACCINATION: ICD-10-CM

## 2023-08-28 DIAGNOSIS — I42.9 PRIMARY CARDIOMYOPATHY (HCC): ICD-10-CM

## 2023-08-28 DIAGNOSIS — L65.9 HAIR LOSS: ICD-10-CM

## 2023-08-28 PROCEDURE — 3078F DIAST BP <80 MM HG: CPT | Performed by: FAMILY MEDICINE

## 2023-08-28 PROCEDURE — 3008F BODY MASS INDEX DOCD: CPT | Performed by: FAMILY MEDICINE

## 2023-08-28 PROCEDURE — 3074F SYST BP LT 130 MM HG: CPT | Performed by: FAMILY MEDICINE

## 2023-08-28 PROCEDURE — 99214 OFFICE O/P EST MOD 30 MIN: CPT | Performed by: FAMILY MEDICINE

## 2023-08-30 ENCOUNTER — TELEPHONE (OUTPATIENT)
Dept: RHEUMATOLOGY | Facility: CLINIC | Age: 68
End: 2023-08-30

## 2023-09-12 ENCOUNTER — OFFICE VISIT (OUTPATIENT)
Dept: DERMATOLOGY CLINIC | Facility: CLINIC | Age: 68
End: 2023-09-12

## 2023-09-12 DIAGNOSIS — L64.9 ANDROGENETIC ALOPECIA: ICD-10-CM

## 2023-09-12 DIAGNOSIS — L65.0 TELOGEN EFFLUVIUM: Primary | ICD-10-CM

## 2023-09-12 PROCEDURE — 99203 OFFICE O/P NEW LOW 30 MIN: CPT | Performed by: STUDENT IN AN ORGANIZED HEALTH CARE EDUCATION/TRAINING PROGRAM

## 2023-09-13 ENCOUNTER — OFFICE VISIT (OUTPATIENT)
Dept: RHEUMATOLOGY | Facility: CLINIC | Age: 68
End: 2023-09-13

## 2023-09-13 VITALS
BODY MASS INDEX: 33.99 KG/M2 | HEART RATE: 59 BPM | HEIGHT: 65 IN | SYSTOLIC BLOOD PRESSURE: 122 MMHG | DIASTOLIC BLOOD PRESSURE: 78 MMHG | WEIGHT: 204 LBS

## 2023-09-13 DIAGNOSIS — M25.512 CHRONIC PAIN OF BOTH SHOULDERS: ICD-10-CM

## 2023-09-13 DIAGNOSIS — G89.29 CHRONIC PAIN OF BOTH SHOULDERS: ICD-10-CM

## 2023-09-13 DIAGNOSIS — M25.511 CHRONIC PAIN OF BOTH SHOULDERS: ICD-10-CM

## 2023-09-13 DIAGNOSIS — M17.0 PRIMARY OSTEOARTHRITIS OF BOTH KNEES: Primary | ICD-10-CM

## 2023-09-13 PROCEDURE — 3008F BODY MASS INDEX DOCD: CPT | Performed by: INTERNAL MEDICINE

## 2023-09-13 PROCEDURE — 99214 OFFICE O/P EST MOD 30 MIN: CPT | Performed by: INTERNAL MEDICINE

## 2023-09-13 PROCEDURE — 3078F DIAST BP <80 MM HG: CPT | Performed by: INTERNAL MEDICINE

## 2023-09-13 PROCEDURE — 20610 DRAIN/INJ JOINT/BURSA W/O US: CPT | Performed by: INTERNAL MEDICINE

## 2023-09-13 PROCEDURE — 3074F SYST BP LT 130 MM HG: CPT | Performed by: INTERNAL MEDICINE

## 2023-09-13 RX ORDER — CELECOXIB 200 MG/1
200 CAPSULE ORAL 2 TIMES DAILY
Qty: 60 CAPSULE | Refills: 0 | Status: SHIPPED | OUTPATIENT
Start: 2023-09-13

## 2023-09-13 RX ORDER — TRIAMCINOLONE ACETONIDE 40 MG/ML
40 INJECTION, SUSPENSION INTRA-ARTICULAR; INTRAMUSCULAR
Status: COMPLETED | OUTPATIENT
Start: 2023-09-13 | End: 2023-09-13

## 2023-10-13 RX ORDER — CELECOXIB 200 MG/1
200 CAPSULE ORAL 2 TIMES DAILY
Qty: 60 CAPSULE | Refills: 0 | Status: SHIPPED | OUTPATIENT
Start: 2023-10-13

## 2023-10-13 NOTE — TELEPHONE ENCOUNTER
Requested Prescriptions     Pending Prescriptions Disp Refills    celecoxib 200 MG Oral Cap 60 capsule 0     Sig: Take 1 capsule (200 mg total) by mouth 2 (two) times daily.      LF: 9/13/23 #60 CAP W/ 0 RF  LOV:  9/13/23   Future Appointments   Date Time Provider Zhanna Del Rio   10/23/2023 10:00 AM Jesse Izquierdo MD [facility]   11/13/2023  9:00 AM MIKEL [place] [facility] ([facility] KARLENE Kamara   11/14/2023 10:30 AM Severa Asper, MD [facility]   11/28/2023 11:20 AM ADO OSCAR RM1 ADO OSCAR EM [place]     Labs:   Component      Latest Ref Rng 11/17/2022   WBC      4.0 - 11.0 x10(3) uL 5.8    RBC      3.80 - 5.30 x10(6)uL 5.00    Hemoglobin      12.0 - 16.0 g/dL 13.7    Hematocrit      35.0 - 48.0 % 42.4    MCV      80.0 - 100.0 fL 84.8    MCH      26.0 - 34.0 pg 27.4    MCHC      31.0 - 37.0 g/dL 32.3    RDW-SD      35.1 - 46.3 fL 42.8    RDW      11.0 - 15.0 % 13.8    Platelet Count      642.4 - 450.0 10(3)uL 308.0    Prelim Neutrophil Abs      1.50 - 7.70 x10 (3) uL 3.55    Neutrophils Absolute      1.50 - 7.70 x10(3) uL 3.55    Lymphocytes Absolute      1.00 - 4.00 x10(3) uL 1.63    Monocytes Absolute      0.10 - 1.00 x10(3) uL 0.46    Eosinophils Absolute      0.00 - 0.70 x10(3) uL 0.10    Basophils Absolute      0.00 - 0.20 x10(3) uL 0.04    Immature Granulocyte Absolute      0.00 - 1.00 x10(3) uL 0.01    Neutrophils %      % 61.3    Lymphocytes %      % 28.2    Monocytes %      % 7.9    Eosinophils %      % 1.7    Basophils %      % 0.7    Immature Granulocyte %      % 0.2    Glucose      70 - 99 mg/dL 98    Sodium      136 - 145 mmol/L 139    Potassium      3.5 - 5.1 mmol/L 3.9    Chloride      98 - 112 mmol/L 105    Carbon Dioxide, Total      21.0 - 32.0 mmol/L 29.0    ANION GAP      0 - 18 mmol/L 5    BUN      7 - 18 mg/dL 9    CREATININE      0.55 - 1.02 mg/dL 0.75    BUN/CREATININE RATIO      10.0 - 20.0  12.0    CALCIUM      8.5 - 10.1 mg/dL 9.7    CALCULATED OSMOLALITY      275 - 295 mOsm/kg 287    EGFR      >=60 mL/min/1.73m2 87    ALT (SGPT)      13 - 56 U/L 30    AST (SGOT)      15 - 37 U/L 22    ALKALINE PHOSPHATASE      55 - 142 U/L 90    Total Bilirubin      0.1 - 2.0 mg/dL 0.3    PROTEIN, TOTAL      6.4 - 8.2 g/dL 7.7    Albumin      3.4 - 5.0 g/dL 3.6    Globulin      2.8 - 4.4 g/dL 4.1    A/G Ratio      1.0 - 2.0  0.9 (L)    Patient Fasting for CMP?  Yes       Legend:  (L) Low  You were seen today for joint pain  We injected both knees with cortisone, hopefully it helps  For your shoulder pain I sent you to physical therapy  Try Celebrex once or twice a day for your pain  Do not take with Aleve or Motrin, Advil or ibuprofen

## 2023-10-21 ENCOUNTER — LAB ENCOUNTER (OUTPATIENT)
Dept: LAB | Facility: HOSPITAL | Age: 68
End: 2023-10-21
Attending: FAMILY MEDICINE

## 2023-10-21 DIAGNOSIS — E78.00 HYPERCHOLESTEREMIA: ICD-10-CM

## 2023-10-21 DIAGNOSIS — E03.9 HYPOTHYROIDISM, UNSPECIFIED TYPE: ICD-10-CM

## 2023-10-21 DIAGNOSIS — L65.9 HAIR LOSS: ICD-10-CM

## 2023-10-21 LAB
ALBUMIN SERPL-MCNC: 3.4 G/DL (ref 3.4–5)
ALBUMIN/GLOB SERPL: 0.9 {RATIO} (ref 1–2)
ALP LIVER SERPL-CCNC: 75 U/L
ALT SERPL-CCNC: 21 U/L
ANION GAP SERPL CALC-SCNC: 7 MMOL/L (ref 0–18)
AST SERPL-CCNC: 17 U/L (ref 15–37)
BASOPHILS # BLD AUTO: 0.08 X10(3) UL (ref 0–0.2)
BASOPHILS NFR BLD AUTO: 1.3 %
BILIRUB SERPL-MCNC: 0.4 MG/DL (ref 0.1–2)
BUN BLD-MCNC: 8 MG/DL (ref 7–18)
BUN/CREAT SERPL: 9.1 (ref 10–20)
CALCIUM BLD-MCNC: 9.2 MG/DL (ref 8.5–10.1)
CHLORIDE SERPL-SCNC: 110 MMOL/L (ref 98–112)
CHOLEST SERPL-MCNC: 186 MG/DL (ref ?–200)
CO2 SERPL-SCNC: 27 MMOL/L (ref 21–32)
CREAT BLD-MCNC: 0.88 MG/DL
DEPRECATED HBV CORE AB SER IA-ACNC: 72.1 NG/ML
DEPRECATED RDW RBC AUTO: 45 FL (ref 35.1–46.3)
EGFRCR SERPLBLD CKD-EPI 2021: 72 ML/MIN/1.73M2 (ref 60–?)
EOSINOPHIL # BLD AUTO: 0.11 X10(3) UL (ref 0–0.7)
EOSINOPHIL NFR BLD AUTO: 1.8 %
ERYTHROCYTE [DISTWIDTH] IN BLOOD BY AUTOMATED COUNT: 14.3 % (ref 11–15)
FASTING PATIENT LIPID ANSWER: YES
FASTING STATUS PATIENT QL REPORTED: YES
GLOBULIN PLAS-MCNC: 3.6 G/DL (ref 2.8–4.4)
GLUCOSE BLD-MCNC: 95 MG/DL (ref 70–99)
HCT VFR BLD AUTO: 41.6 %
HDLC SERPL-MCNC: 85 MG/DL (ref 40–59)
HGB BLD-MCNC: 13.3 G/DL
IMM GRANULOCYTES # BLD AUTO: 0.03 X10(3) UL (ref 0–1)
IMM GRANULOCYTES NFR BLD: 0.5 %
LDLC SERPL CALC-MCNC: 86 MG/DL (ref ?–100)
LYMPHOCYTES # BLD AUTO: 1.48 X10(3) UL (ref 1–4)
LYMPHOCYTES NFR BLD AUTO: 24.3 %
MCH RBC QN AUTO: 27.5 PG (ref 26–34)
MCHC RBC AUTO-ENTMCNC: 32 G/DL (ref 31–37)
MCV RBC AUTO: 86.1 FL
MONOCYTES # BLD AUTO: 0.65 X10(3) UL (ref 0.1–1)
MONOCYTES NFR BLD AUTO: 10.7 %
NEUTROPHILS # BLD AUTO: 3.73 X10 (3) UL (ref 1.5–7.7)
NEUTROPHILS # BLD AUTO: 3.73 X10(3) UL (ref 1.5–7.7)
NEUTROPHILS NFR BLD AUTO: 61.4 %
NONHDLC SERPL-MCNC: 101 MG/DL (ref ?–130)
OSMOLALITY SERPL CALC.SUM OF ELEC: 296 MOSM/KG (ref 275–295)
PLATELET # BLD AUTO: 269 10(3)UL (ref 150–450)
POTASSIUM SERPL-SCNC: 3.9 MMOL/L (ref 3.5–5.1)
PROT SERPL-MCNC: 7 G/DL (ref 6.4–8.2)
RBC # BLD AUTO: 4.83 X10(6)UL
SODIUM SERPL-SCNC: 144 MMOL/L (ref 136–145)
TRIGL SERPL-MCNC: 83 MG/DL (ref 30–149)
VIT B12 SERPL-MCNC: 379 PG/ML (ref 193–986)
VLDLC SERPL CALC-MCNC: 13 MG/DL (ref 0–30)
WBC # BLD AUTO: 6.1 X10(3) UL (ref 4–11)

## 2023-10-21 PROCEDURE — 36415 COLL VENOUS BLD VENIPUNCTURE: CPT

## 2023-10-21 PROCEDURE — 85025 COMPLETE CBC W/AUTO DIFF WBC: CPT

## 2023-10-21 PROCEDURE — 80053 COMPREHEN METABOLIC PANEL: CPT

## 2023-10-21 PROCEDURE — 82607 VITAMIN B-12: CPT

## 2023-10-21 PROCEDURE — 80061 LIPID PANEL: CPT

## 2023-10-21 PROCEDURE — 82728 ASSAY OF FERRITIN: CPT

## 2023-10-23 NOTE — TELEPHONE ENCOUNTER
LOV: 04/19/2023    RTC: 6  Months    FU:01/24/2024    Last Refill: 06/05/2023     3 Month Supply Pending      Last labs 08/03/2023    Pt requesting labs before appt    Please refill, if approprIate

## 2023-10-24 ENCOUNTER — NURSE TRIAGE (OUTPATIENT)
Dept: FAMILY MEDICINE CLINIC | Facility: CLINIC | Age: 68
End: 2023-10-24

## 2023-10-24 NOTE — TELEPHONE ENCOUNTER
Patient (name and  verified) called back to cancel today's appt at 11am with PCP and change to tomorrow. Patient is not feeling well and unable to drive herself also states that she is taking care of her mother and cannot come to the appt today. Patient is requesting an appt for tomorrow.     Future Appointments   Date Time Provider Zhanna Del Rio   10/25/2023  1:45 PM MIKEL Monteiro [facility]   2023  9:00 AM MIKEL [facility] KARLENE Kamara   2023 10:30 AM Olivia Mazariegos MD [facility]   2023 11:20 AM FRANCES MOORE RM1 FRANCES MOORE EM [place]   2024  2:00 PM Vivienne Bourgeois MD [facility] Gregg LINDER

## 2023-10-24 NOTE — TELEPHONE ENCOUNTER
Please reply to pool: EM RN TRIAGE  Action Requested: Summary for Provider     []  Critical Lab, Recommendations Needed  [] Need Additional Advice  [x]   FYI    []   Need Orders  [] Need Medications Sent to Pharmacy  []  Other     SUMMARY: Patient contacts clinic reporting head and chest congestion, cough and sore throat that began over night. Denies fever. She is fatigued. Some shortness of breath with cough. Took covid test 3 days ago which was negative but symptoms progressed last night. Denies chest pain, lightheadedness or dizziness. Denies abdominal symptoms. Patient has appointment scheduled today. I advised she keep the appointment for assessment. Advised to aida mask prior to entering building. She verbalized understanding and compliance.     Reason for call: Acute  Onset: Data Unavailable                       Reason for Disposition   Continuous (nonstop) coughing interferes with work or school and no improvement using cough treatment per Care Advice    Protocols used: Cough-A-OH

## 2023-10-25 ENCOUNTER — OFFICE VISIT (OUTPATIENT)
Dept: FAMILY MEDICINE CLINIC | Facility: CLINIC | Age: 68
End: 2023-10-25

## 2023-10-25 VITALS
RESPIRATION RATE: 20 BRPM | BODY MASS INDEX: 34.99 KG/M2 | TEMPERATURE: 98 F | WEIGHT: 210 LBS | OXYGEN SATURATION: 97 % | DIASTOLIC BLOOD PRESSURE: 68 MMHG | HEART RATE: 76 BPM | HEIGHT: 65 IN | SYSTOLIC BLOOD PRESSURE: 106 MMHG

## 2023-10-25 DIAGNOSIS — J01.90 ACUTE NON-RECURRENT SINUSITIS, UNSPECIFIED LOCATION: ICD-10-CM

## 2023-10-25 DIAGNOSIS — Z20.822 SUSPECTED COVID-19 VIRUS INFECTION: Primary | ICD-10-CM

## 2023-10-25 DIAGNOSIS — R05.1 ACUTE COUGH: ICD-10-CM

## 2023-10-25 LAB
COVID19 BINAX NOW ANTIGEN: NOT DETECTED
OPERATOR ID: NORMAL

## 2023-10-25 PROCEDURE — 3074F SYST BP LT 130 MM HG: CPT

## 2023-10-25 PROCEDURE — 3008F BODY MASS INDEX DOCD: CPT

## 2023-10-25 PROCEDURE — 99213 OFFICE O/P EST LOW 20 MIN: CPT

## 2023-10-25 PROCEDURE — 3078F DIAST BP <80 MM HG: CPT

## 2023-10-25 RX ORDER — AZITHROMYCIN 250 MG/1
TABLET, FILM COATED ORAL
Qty: 6 TABLET | Refills: 0 | Status: SHIPPED | OUTPATIENT
Start: 2023-10-25 | End: 2023-10-30

## 2023-10-25 RX ORDER — METHYLPREDNISOLONE 4 MG/1
TABLET ORAL
COMMUNITY
Start: 2023-07-17

## 2023-10-25 RX ORDER — LEVOTHYROXINE SODIUM 112 UG/1
112 TABLET ORAL
Qty: 90 TABLET | Refills: 1 | Status: SHIPPED | OUTPATIENT
Start: 2023-10-25

## 2023-10-25 RX ORDER — CEPHALEXIN 500 MG/1
500 CAPSULE ORAL 3 TIMES DAILY
COMMUNITY
Start: 2023-09-13

## 2023-10-25 RX ORDER — AZITHROMYCIN 250 MG/1
250 TABLET, FILM COATED ORAL DAILY
COMMUNITY
Start: 2023-07-17 | End: 2023-10-25

## 2023-10-25 RX ORDER — ALBUTEROL SULFATE 90 UG/1
2 AEROSOL, METERED RESPIRATORY (INHALATION) EVERY 4 HOURS PRN
Qty: 18 G | Refills: 0 | Status: SHIPPED | OUTPATIENT
Start: 2023-10-25

## 2023-10-27 ENCOUNTER — TELEPHONE (OUTPATIENT)
Dept: FAMILY MEDICINE CLINIC | Facility: CLINIC | Age: 68
End: 2023-10-27

## 2023-10-27 NOTE — TELEPHONE ENCOUNTER
Patient seen for cough 10/25 and prescribed albuterol inhaler and z pack. Was told to call today with update. Patient feels she is wheezing, using inhaler 4 times per day and taking abx as prescribed. Dyspnea with light exertion. Was told to call for possible prednisone rx. Please advise. Patient advised IC if symptoms progress before she is called back. Verbalized understanding.

## 2023-10-28 RX ORDER — METHYLPREDNISOLONE 4 MG/1
TABLET ORAL
Qty: 1 EACH | Refills: 0 | Status: SHIPPED | OUTPATIENT
Start: 2023-10-28

## 2023-10-28 NOTE — TELEPHONE ENCOUNTER
Patient calls back also requesting rx for benzonatate which she has taken in the past with relief. Please advise. Discussed with Jeb Bach, patient is to take steroid and use inhaler before prescribing benzonatate. Patient to call Monday if symptoms not improved. Patient notified with understanding.

## 2023-10-28 NOTE — TELEPHONE ENCOUNTER
Medrol dose pack sent to pharmacy. Continue Albuterol inhaler as needed. Finish Z-pack as prescribed. Go to ER for worsening shortness of breath or difficulty breathing.  Follow-up in office if symptoms not improving

## 2023-10-28 NOTE — TELEPHONE ENCOUNTER
Advised patient of Naveed Ellis note. Patient verbalized understanding. Pharmacy    [address], [address] [place] [address]. [phone], [phone]      Disp Refills Start End    methylPREDNISolone (MEDROL) 4 MG Oral Tablet Therapy Pack 1 each 0 10/28/2023     Sig: As directed.     Sent to pharmacy as: methylPREDNISolone 4 MG Oral Tablet Therapy Pack (Medrol)    E-Prescribing Status: Receipt confirmed by pharmacy (10/28/2023 10:26 AM CDT)

## 2023-10-30 ENCOUNTER — TELEPHONE (OUTPATIENT)
Dept: FAMILY MEDICINE CLINIC | Facility: CLINIC | Age: 68
End: 2023-10-30

## 2023-10-30 ENCOUNTER — HOSPITAL ENCOUNTER (OUTPATIENT)
Dept: GENERAL RADIOLOGY | Facility: HOSPITAL | Age: 68
Discharge: HOME | End: 2023-10-30
Payer: COMMERCIAL

## 2023-10-30 ENCOUNTER — TELEMEDICINE (OUTPATIENT)
Dept: FAMILY MEDICINE CLINIC | Facility: CLINIC | Age: 68
End: 2023-10-30
Payer: COMMERCIAL

## 2023-10-30 DIAGNOSIS — R05.1 ACUTE COUGH: Primary | ICD-10-CM

## 2023-10-30 DIAGNOSIS — R05.1 ACUTE COUGH: ICD-10-CM

## 2023-10-30 DIAGNOSIS — R61 DIAPHORESIS: ICD-10-CM

## 2023-10-30 PROCEDURE — 71046 X-RAY EXAM CHEST 2 VIEWS: CPT

## 2023-10-30 RX ORDER — BENZONATATE 100 MG/1
100 CAPSULE ORAL 3 TIMES DAILY PRN
Qty: 15 CAPSULE | Refills: 0 | Status: SHIPPED | OUTPATIENT
Start: 2023-10-30

## 2023-10-30 NOTE — PROGRESS NOTES
This visit is conducted using Telemedicine with live, interactive video and audio during this Coronavirus pandemic. Please note that the following visit was completed using two-way, real-time interactive audio and/or video communication. This has been done in good anneliese to provide continuity of care in the best interest of the provider-patient relationship, due to the ongoing public health crisis/national emergency and because of restrictions of visitation. There are limitations of this visit as no physical exam could be performed. Every conscious effort was taken to allow for sufficient and adequate time. This billing was spent on reviewing labs, medications, radiology tests and decision making. Appropriate medical decision-making and tests are ordered as detailed in the plan of care below    Remedios Tohmson or legal guardian   verbally consents to a Virtual/Telephone/ VideoCheck-In service on 10/30/2023  Patient understands and accepts financial responsibility for any deductible, co-insurance and/or co-pays associated with this service. HPI:   HPI  Twyla Hu is a 76year old female. Patient scheduled video visit for follow-up. Was seen on 10/25/23 and diagnosed with acute sinusitis and treated with Z-pack. Also given script for Albuterol inhaler for cough (did not help). Was given medrol dose pack on 10/28/23, caused increased sweating. Continues to report mild shortness of breath and decreased appetite. Currently using Albuterol inhaler four times a day. Requesting chest x-ray, concerned due to mother having pneumonia (assisting with care). ROS  A comprehensive 10 point review of systems was completed. Pertinent positives and negatives noted in the the HPI. Review of Systems   Constitutional:  Positive for appetite change and diaphoresis. Negative for fever. HENT: Negative. Eyes: Negative. Respiratory:  Positive for cough and shortness of breath.     Cardiovascular: Negative. Gastrointestinal: Negative. Musculoskeletal: Negative. Skin: Negative. Neurological: Negative. Negative for dizziness, light-headedness and headaches. Psychiatric/Behavioral: Negative. PATIENTS DENIES ANY KNOWN EXPOSURE TO ANYONE CONFIRMED FOR COVID 19. Patient Active Problem List   Diagnosis    Primary cardiomyopathy ([facility] [address]    Hypercholesteremia    Failed back syndrome of lumbar spine    Lumbago    Lumbar facet arthropathy    Lumbar disc herniation with radiculopathy    Lumbar radicular syndrome    Primary osteoarthritis involving multiple joints    Chronic low back pain    Anxiety and depression    Thyroid activity decreased    Acquired hypothyroidism    Chronic neck and back pain    Obesity (BMI 30.0-34. 9)    Thyroid nodule    Onychomycosis    DDD (degenerative disc disease), cervical    H/O radioactive iodine thyroid ablation    Female pattern alopecia    Myalgia of muscle of neck    Chronic left shoulder pain    Myalgia, unspecified site    History of lumbar surgery    Bilateral leg paresthesia    Polyneuropathy    Multinodular thyroid    Depression, recurrent (HCC)    Age-related nuclear cataract of both eyes    Floaters, right    Dry eye    Sweating abnormality    Generalized anxiety disorder    MDD (recurrent major depressive disorder) in remission ([facility] [address]    Acute maxillary sinusitis    Anal or rectal pain    Anxiety states    Benign neoplasm of eyelid    Bronchitis    Congenital anomaly of nails    Cough    Diarrhea    Diarrhea of presumed infectious origin    Lump or mass in breast    Disorder of lipid metabolism    Essential hypertension    Female stress incontinence    Fibroadenosis of breast    Fibrosclerosis of breast    Gastroesophageal reflux disease    Hemorrhoids    Hordeolum internum    Astigmatism    Hypopotassemia    Injury of knee, leg, ankle and foot    Injury, finger    Intestinal infection due to Clostridium difficile    Intractable vomiting    Lateral epicondylitis of elbow    Neoplasm of uncertain behavior of connective and other soft tissue    Noninfectious gastroenteritis and colitis    Nonspecific reaction to tuberculin skin test    Arthropathy    Other specified disorders of adrenal glands    Overweight    Pain in joint, pelvic region and thigh    Pinguecula    Polycystic ovaries    Reflux esophagitis    Senile cataract    Senile osteoporosis    Solitary cyst of breast    Spinal stenosis in cervical region    Symptomatic menopausal or female climacteric states    Throat pain    Toxic diffuse goiter    Toxic uninodular goiter    Absence of bladder continence    Uterine prolapse    Visual disturbance    Vitamin D deficiency    Class 1 obesity due to excess calories with serious comorbidity and body mass index (BMI) of 33.0 to 33.9 in adult    Hypothyroidism       Past Medical History:   Diagnosis Date    Age-related nuclear cataract of both eyes 06/24/2021    Anxiety state, unspecified     Back pain     chronic    Cardiomyopathy ([address]     Cataract     both eyes    Disorder of thyroid     Dry eye 06/24/2021    Essential hypertension     Fibromyalgia     Floaters, right 06/24/2021    H/O varicose vein stripping     Per NG: Varicose vein ; vein stripping    Headache 2010    Per NG: OU    High cholesterol     Hypothyroidism     Meibomian gland dysfunction 2007    Musculoskeletal problem     Per NG: injection tendon origin/insertion    Musculoskeletal problem     Per NG: Arthrocentesis of the left knee joint    Neck pain     chronic    Osteoarthritis     Radiation 10/23/2010    Per NG: Radiation to thyroid     Status post epidural steroid injection     Per NG: epidral injection x5 per pain mgmt           MEDICATION LIST    Current Outpatient Medications:     FLUOXETINE HCL 40 MG Oral Cap, TAKE 1 CAPSULE (40 MG TOTAL) BY MOUTH EVERY MORNING., Disp: 90 capsule, Rfl: 0    benzonatate 100 MG Oral Cap, Take 1 capsule (100 mg total) by mouth 3 (three) times daily as needed for cough. , Disp: 15 capsule, Rfl: 0    temazepam 30 MG Oral Cap, Take 1 capsule (30 mg total) by mouth nightly as needed. , Disp: 30 capsule, Rfl: 0    methylPREDNISolone (MEDROL) 4 MG Oral Tablet Therapy Pack, As directed., Disp: 1 each, Rfl: 0    levothyroxine 112 MCG Oral Tab, Take 1 tablet (112 mcg total) by mouth before breakfast., Disp: 90 tablet, Rfl: 1    cephalexin 500 MG Oral Cap, Take 1 capsule (500 mg total) by mouth 3 (three) times daily. , Disp: , Rfl:     methylPREDNISolone 4 MG Oral Tablet Therapy Pack, TAKE 6 TABLETS ON DAY 1 AS DIRECTED ON PACKAGE AND DECREASE BY 1 TAB EACH DAY FOR A TOTAL OF 6 DAYS, Disp: , Rfl:     albuterol 108 (90 Base) MCG/ACT Inhalation Aero Soln, Inhale 2 puffs into the lungs every 4 (four) hours as needed. , Disp: 18 g, Rfl: 0    Spacer/Aero-Holding Chambers Does not apply Device, Use with albuterol as needed, Disp: 1 each, Rfl: 0    celecoxib 200 MG Oral Cap, Take 1 capsule (200 mg total) by mouth 2 (two) times daily. , Disp: 60 capsule, Rfl: 0    tiZANidine 4 MG Oral Tab, TAKE 1 TABLET BY MOUTH EVERY DAY NIGHTLY, Disp: 30 tablet, Rfl: 2    busPIRone 15 MG Oral Tab, TAKE 1 TABLET BY MOUTH TWICE A DAY (Patient taking differently: Take 0.5 tablets (7.5 mg total) by mouth in the morning and 0.5 tablets (7.5 mg total) before bedtime.), Disp: 180 tablet, Rfl: 0    gabapentin 800 MG Oral Tab, Take 1 tablet (800 mg total) by mouth 2 (two) times daily. , Disp: 180 tablet, Rfl: 3    famotidine 20 MG Oral Tab, Take 1 tablet (20 mg total) by mouth daily. , Disp: 30 tablet, Rfl: 11    OMEPRAZOLE 40 MG Oral Capsule Delayed Release, TAKE 1 CAPSULE(40 MG) BY MOUTH DAILY BEFORE BREAKFAST, Disp: 90 capsule, Rfl: 1    Melatonin 10 MG Oral Cap, Take 5 mg by mouth nightly as needed. , Disp: , Rfl:     carvedilol 3.125 MG Oral Tab, TAKE 1 TABLET(3.125 MG) BY MOUTH TWICE DAILY WITH MEALS, Disp: 180 tablet, Rfl: 3    rosuvastatin (CRESTOR) 20 MG Oral Tab, Take 1 tablet (20 mg total) by mouth nightly., Disp: 90 tablet, Rfl: 3    ALLERGY LIST  Allergies   Allergen Reactions    Demerol [Meperidine] NAUSEA AND VOMITING and NAUSEA ONLY     Other reaction(s): Vomiting    Hydrocodone NAUSEA AND VOMITING    Pregabalin      Other reaction(s): Stomach discomforts    Vicodin Tuss [Hydrocodone-Guaifenesin] NAUSEA AND VOMITING and NAUSEA ONLY     Other reaction(s): Vomiting       Allergies: Allergies   Allergen Reactions    Demerol [Meperidine] NAUSEA AND VOMITING and NAUSEA ONLY     Other reaction(s): Vomiting    Hydrocodone NAUSEA AND VOMITING    Pregabalin      Other reaction(s): Stomach discomforts    Vicodin Tuss [Hydrocodone-Guaifenesin] NAUSEA AND VOMITING and NAUSEA ONLY     Other reaction(s): Vomiting       PHYSICAL EXAM:     Vitals signs taken at home if available:  Physical Exam  Constitutional:       General: She is not in acute distress. Appearance: Normal appearance. She is diaphoretic. Pulmonary:      Effort: Pulmonary effort is normal. No respiratory distress. Comments: No acute respiratory distress noted during video visit   Skin:     General: Skin is warm. Findings: No rash. Neurological:      General: No focal deficit present. Mental Status: She is alert and oriented to person, place, and time. Psychiatric:         Mood and Affect: Mood normal.         Behavior: Behavior normal.         Thought Content: Thought content normal.         Judgment: Judgment normal.        There were no vitals taken for this visit. Limited examination for this telephone/ video visit due to the coronavirus emergency      ASSESSMENT/PLAN:     Encounter Diagnoses   Name Primary? Acute cough Yes    Diaphoresis        1. Acute cough  - Continue Albuterol as needed   - Finish medrol dose pack   - Chest x-ray ordered, will await results for further recommendations   - XR CHEST PA + LAT CHEST (CPT=71046); Future    2.  Diaphoresis  - See above  - XR CHEST PA + LAT CHEST (CPT=71046); Future        Patient reminded to practice good health and safety measures including washing hands, social distancing, covering mouth when coughing/ sneezing, avoid social meetings/ gatherings in face of this Covid 19 pandemic. Patient verbalized understanding of plan and all questions answered to the best of my ability. Patient to call back if any change/ worsening of symptoms. If symptoms get worse go to the emergency room. No orders of the defined types were placed in this encounter. Meds This Visit:  Requested Prescriptions     Signed Prescriptions Disp Refills    benzonatate 100 MG Oral Cap 15 capsule 0     Sig: Take 1 capsule (100 mg total) by mouth 3 (three) times daily as needed for cough.        Imaging & Referrals:  None        MIKEL Escalera    10/30/2023  3:24 PM

## 2023-10-30 NOTE — TELEPHONE ENCOUNTER
Patient calling (identified name and ) states she was seen on 10/25 and her symptoms seem to be worse. Has been coughing, sweating, is weak and slightly dizzy. Had completed the Z-pack yesterday and is on day 3 of the medrol dose pack. Appointment advised for an evaluation. Patient scheduled for a video visit. Patient advised to complete the E-check in [employer], if active. Understands to follow the prompts and links to complete the visit.   Best call back:   [phone]       Future Appointments   Date Time Provider Zhanna Del Rio   10/30/2023  2:45 PM MIKEL Torres [facility]   2023  9:00 AM Emeterio Bernheim, APRN [facility] ([facility] KARLENE Kamara   2023 10:30 AM Gerhardt Bad, MD [facility]   2023 11:20 AM FRANCES MOORE RM1 FRANCES PENG [place]   2024  2:00 PM Manuel Tinoco MD [facility]

## 2023-11-15 NOTE — TELEPHONE ENCOUNTER
Current Outpatient Medications   Medication Sig Dispense Refill    celecoxib 200 MG Oral Cap Take 1 capsule (200 mg total) by mouth 2 (two) times daily.  60 capsule 0

## 2023-11-15 NOTE — TELEPHONE ENCOUNTER
LOV: 9/13/23  Last Refilled:#60, 0rfs 10/13/23    Future Appointments   Date Time Provider Zhanna Del Rio   11/28/2023 11:20 AM NAMRATAO OSCAR RM1 FRANCES MOORE EM [place]   1/24/2024  2:00 [facility]   2/5/2024  1:00 PM MIKEL [facility] KARLENE Kamara     ASSESSMENT/PLAN:         Bilateral knee pain secondary to OA  - X-rays do show evidence of moderate osteoarthritis and chondrocalcinosis  - No evidence of swelling to suggest pseudogout  - B/L injected with cortisone in March, helped significantly  - Now having pain again in both knees  - Both knees injected with 1 cc lidocaine and 40 mg of Kenalog in sterile fashion. Patient tolerated procedure well  - She is also approved for gel injections, she will schedule this     Generalized OA, chronic pain  - She will try Celebrex 200 mg twice a day for her pain. Advised to take with food and not with any other NSAID     Left clavicle fracture due to fall- stable  -Still having some pain, I will send to physical therapy     Left shoulder pain  - Left shoulder was injected back in March, it helped her symptoms but then she fell now having left clavicle and shoulder pain  - will send to PT     Elevated inflammatory markers  - Repeat blood work shows normal ESR and CRP     Chronic lower back pain s/p laminectomy and fusion  - She is also going to be following with neurosurgeon Dr. Alexei García for her chronic LBP     Pt will f/u in 2-3 mos for Synvisc injections      Romeo Mckay MD  9/13/2023  5:00 PM                 Please advise.

## 2023-11-16 RX ORDER — CELECOXIB 200 MG/1
200 CAPSULE ORAL 2 TIMES DAILY
Qty: 60 CAPSULE | Refills: 0 | Status: SHIPPED | OUTPATIENT
Start: 2023-11-16

## 2023-11-29 ENCOUNTER — IMMUNIZATION (OUTPATIENT)
Dept: FAMILY MEDICINE CLINIC | Facility: CLINIC | Age: 68
End: 2023-11-29

## 2023-11-29 DIAGNOSIS — Z23 NEED FOR VACCINATION: Primary | ICD-10-CM

## 2023-11-29 PROCEDURE — 90662 IIV NO PRSV INCREASED AG IM: CPT | Performed by: FAMILY MEDICINE

## 2023-11-29 PROCEDURE — 90471 IMMUNIZATION ADMIN: CPT | Performed by: FAMILY MEDICINE

## 2023-12-14 NOTE — TELEPHONE ENCOUNTER
Requested Prescriptions     Pending Prescriptions Disp Refills    celecoxib 200 MG Oral Cap 60 capsule 0     Sig: Take 1 capsule (200 mg total) by mouth 2 (two) times daily.      LF: 11/16/23 #60 CAP W/ 0 RF  LOV:  9/13/23   Future Appointments   Date Time Provider Zhanna Del Rio   1/24/2024  2:00 PM [facility]   2/5/2024  1:00 PM MIKEL Maya [facility]) KARLENE Kamara   2/8/2024 11:00 AM ADO OSCAR RM1 ADO OSCAR EM [place]     Labs:   Component      Latest Ref Rng 10/21/2023   WBC      4.0 - 11.0 x10(3) uL 6.1    RBC      3.80 - 5.30 x10(6)uL 4.83    Hemoglobin      12.0 - 16.0 g/dL 13.3    Hematocrit      35.0 - 48.0 % 41.6    MCV      80.0 - 100.0 fL 86.1    MCH      26.0 - 34.0 pg 27.5    MCHC      31.0 - 37.0 g/dL 32.0    RDW-SD      35.1 - 46.3 fL 45.0    RDW      11.0 - 15.0 % 14.3    Platelet Count      689.9 - 450.0 10(3)uL 269.0    Prelim Neutrophil Abs      1.50 - 7.70 x10 (3) uL 3.73    Neutrophils Absolute      1.50 - 7.70 x10(3) uL 3.73    Lymphocytes Absolute      1.00 - 4.00 x10(3) uL 1.48    Monocytes Absolute      0.10 - 1.00 x10(3) uL 0.65    Eosinophils Absolute      0.00 - 0.70 x10(3) uL 0.11    Basophils Absolute      0.00 - 0.20 x10(3) uL 0.08    Immature Granulocyte Absolute      0.00 - 1.00 x10(3) uL 0.03    Neutrophils %      % 61.4    Lymphocytes %      % 24.3    Monocytes %      % 10.7    Eosinophils %      % 1.8    Basophils %      % 1.3    Immature Granulocyte %      % 0.5    Glucose      70 - 99 mg/dL 95    Sodium      136 - 145 mmol/L 144    Potassium      3.5 - 5.1 mmol/L 3.9    Chloride      98 - 112 mmol/L 110    Carbon Dioxide, Total      21.0 - 32.0 mmol/L 27.0    ANION GAP      0 - 18 mmol/L 7    BUN      7 - 18 mg/dL 8    CREATININE      0.55 - 1.02 mg/dL 0.88    BUN/CREATININE RATIO      10.0 - 20.0  9.1 (L)    CALCIUM      8.5 - 10.1 mg/dL 9.2    CALCULATED OSMOLALITY      275 - 295 mOsm/kg 296 (H)    EGFR      >=60 mL/min/1.73m2 72    ALT (SGPT)      13 - 56 U/L 21    AST (SGOT)      15 - 37 U/L 17    ALKALINE PHOSPHATASE      55 - 142 U/L 75    Total Bilirubin      0.1 - 2.0 mg/dL 0.4    PROTEIN, TOTAL      6.4 - 8.2 g/dL 7.0    Albumin      3.4 - 5.0 g/dL 3.4    Globulin      2.8 - 4.4 g/dL 3.6    A/G Ratio      1.0 - 2.0  0.9 (L)    Patient Fasting for CMP?  Yes       Legend:  (L) Low  (H) High  You were seen today for joint pain  We injected both knees with cortisone, hopefully it helps  For your shoulder pain I sent you to physical therapy  Try Celebrex once or twice a day for your pain  Do not take with Aleve or Motrin, Advil or ibuprofen

## 2023-12-15 RX ORDER — CELECOXIB 200 MG/1
200 CAPSULE ORAL 2 TIMES DAILY
Qty: 60 CAPSULE | Refills: 0 | Status: SHIPPED | OUTPATIENT
Start: 2023-12-15

## 2024-01-16 NOTE — TELEPHONE ENCOUNTER
Requested Prescriptions     Pending Prescriptions Disp Refills    celecoxib 200 MG Oral Cap 60 capsule 0     Sig: Take 1 capsule (200 mg total) by mouth 2 (two) times daily.     LF: 12/15/23 #60 TAB W/ 0 RF  LOV: 9/13/23   Future Appointments   Date Time Provider Department Center   1/17/2024 12:15 PM Nica Ocampo MD [facility] [place]   1/24/2024  2:00 PM Estefanía Rosario MD [facility]   2/5/2024  1:00 PM Dalton Mendoza APRN [facility] [place]   2/8/2024 11:00 AM ADO OSCAR RM1 ADO OSCAR EM [place]     Labs:   Component      Latest Ref Rng 10/21/2023   WBC      4.0 - 11.0 x10(3) uL 6.1    RBC      3.80 - 5.30 x10(6)uL 4.83    Hemoglobin      12.0 - 16.0 g/dL 13.3    Hematocrit      35.0 - 48.0 % 41.6    MCV      80.0 - 100.0 fL 86.1    MCH      26.0 - 34.0 pg 27.5    MCHC      31.0 - 37.0 g/dL 32.0    RDW-SD      35.1 - 46.3 fL 45.0    RDW      11.0 - 15.0 % 14.3    Platelet Count      150.0 - 450.0 10(3)uL 269.0    Prelim Neutrophil Abs      1.50 - 7.70 x10 (3) uL 3.73    Neutrophils Absolute      1.50 - 7.70 x10(3) uL 3.73    Lymphocytes Absolute      1.00 - 4.00 x10(3) uL 1.48    Monocytes Absolute      0.10 - 1.00 x10(3) uL 0.65    Eosinophils Absolute      0.00 - 0.70 x10(3) uL 0.11    Basophils Absolute      0.00 - 0.20 x10(3) uL 0.08    Immature Granulocyte Absolute      0.00 - 1.00 x10(3) uL 0.03    Neutrophils %      % 61.4    Lymphocytes %      % 24.3    Monocytes %      % 10.7    Eosinophils %      % 1.8    Basophils %      % 1.3    Immature Granulocyte %      % 0.5    Glucose      70 - 99 mg/dL 95    Sodium      136 - 145 mmol/L 144    Potassium      3.5 - 5.1 mmol/L 3.9    Chloride      98 - 112 mmol/L 110    Carbon Dioxide, Total      21.0 - 32.0 mmol/L 27.0    ANION GAP      0 - 18 mmol/L 7    BUN      7 - 18 mg/dL 8    CREATININE      0.55 - 1.02 mg/dL 0.88    BUN/CREATININE RATIO      10.0 - 20.0  9.1 (L)    CALCIUM      8.5 - 10.1 mg/dL 9.2    CALCULATED OSMOLALITY      275 - 295  mOsm/kg 296 (H)    EGFR      >=60 mL/min/1.73m2 72    ALT (SGPT)      13 - 56 U/L 21    AST (SGOT)      15 - 37 U/L 17    ALKALINE PHOSPHATASE      55 - 142 U/L 75    Total Bilirubin      0.1 - 2.0 mg/dL 0.4    PROTEIN, TOTAL      6.4 - 8.2 g/dL 7.0    Albumin      3.4 - 5.0 g/dL 3.4    Globulin      2.8 - 4.4 g/dL 3.6    A/G Ratio      1.0 - 2.0  0.9 (L)    Patient Fasting for CMP? Yes       Legend:  (L) Low  (H) High    You were seen today for joint pain  We injected both knees with cortisone, hopefully it helps  For your shoulder pain I sent you to physical therapy  Try Celebrex once or twice a day for your pain  Do not take with Aleve or Motrin, Advil or ibuprofen

## 2024-01-17 ENCOUNTER — OFFICE VISIT (OUTPATIENT)
Dept: FAMILY MEDICINE CLINIC | Facility: CLINIC | Age: 69
End: 2024-01-17

## 2024-01-17 VITALS
OXYGEN SATURATION: 95 % | WEIGHT: 207 LBS | DIASTOLIC BLOOD PRESSURE: 68 MMHG | HEART RATE: 70 BPM | BODY MASS INDEX: 34.49 KG/M2 | SYSTOLIC BLOOD PRESSURE: 105 MMHG | HEIGHT: 65 IN | TEMPERATURE: 97 F

## 2024-01-17 DIAGNOSIS — J34.89 SINUS PRESSURE: ICD-10-CM

## 2024-01-17 DIAGNOSIS — J32.9 RECURRENT SINUSITIS: ICD-10-CM

## 2024-01-17 DIAGNOSIS — R09.82 POST-NASAL DRIP: Primary | ICD-10-CM

## 2024-01-17 PROCEDURE — 3074F SYST BP LT 130 MM HG: CPT | Performed by: FAMILY MEDICINE

## 2024-01-17 PROCEDURE — 99213 OFFICE O/P EST LOW 20 MIN: CPT | Performed by: FAMILY MEDICINE

## 2024-01-17 PROCEDURE — 3008F BODY MASS INDEX DOCD: CPT | Performed by: FAMILY MEDICINE

## 2024-01-17 PROCEDURE — 3078F DIAST BP <80 MM HG: CPT | Performed by: FAMILY MEDICINE

## 2024-01-17 RX ORDER — LEVOCETIRIZINE DIHYDROCHLORIDE 5 MG/1
5 TABLET, FILM COATED ORAL EVERY EVENING
Qty: 30 TABLET | Refills: 1 | Status: SHIPPED | OUTPATIENT
Start: 2024-01-17

## 2024-01-17 RX ORDER — CELECOXIB 200 MG/1
200 CAPSULE ORAL 2 TIMES DAILY
Qty: 60 CAPSULE | Refills: 0 | Status: SHIPPED | OUTPATIENT
Start: 2024-01-17

## 2024-01-17 RX ORDER — FLUTICASONE PROPIONATE 50 MCG
2 SPRAY, SUSPENSION (ML) NASAL NIGHTLY
Qty: 1 EACH | Refills: 0 | Status: SHIPPED | OUTPATIENT
Start: 2024-01-17 | End: 2025-01-11

## 2024-01-17 NOTE — PROGRESS NOTES
HPI:    Patient ID: Vandana Jefferson is a 68 year old female.      HPI    Chief Complaint   Patient presents with    Sinus Problem     Feels face pressure also been having let ear pain was sick at the end of December still feeling weak feels no energy.     Referral     For pschyatrist        Wt Readings from Last 6 Encounters:   01/17/24 207 lb (93.9 kg)   10/25/23 210 lb (95.3 kg)   09/13/23 204 lb (92.5 kg)   08/28/23 205 lb (93 kg)   06/23/23 195 lb (88.5 kg)   06/01/23 195 lb (88.5 kg)     BP Readings from Last 3 Encounters:   01/17/24 105/68   10/25/23 106/68   09/13/23 122/78     Has been sick since dec 28.  Was in [place] and got sick.  Felt better last week then felt worse after being in the cold. Felt something in her throat and post nasal drip.  Left ear popping.  Pressure left side face.    Has been on prednisone (medrol dose jonh)  in Nov and them had amoxicillin x 10 days about 2 weeks ago and prednisone 50mg x 5 days    Feels she has no energy.    Started flonase recently  Doesn't take antihistamine    Has never seen ent     Review of Systems   Constitutional:  Negative for chills and fever.   HENT:  Positive for congestion, postnasal drip, sinus pressure and voice change. Negative for ear pain, rhinorrhea, sinus pain, sneezing, sore throat, tinnitus and trouble swallowing.    Eyes:  Negative for visual disturbance.   Respiratory:  Positive for cough. Negative for shortness of breath and wheezing.    Cardiovascular:  Negative for chest pain, palpitations and leg swelling.   Gastrointestinal:  Negative for abdominal pain.   Genitourinary:  Negative for decreased urine volume and difficulty urinating.   Neurological:  Negative for dizziness, tremors, seizures, weakness, light-headedness, numbness and headaches.       /68   Pulse 70   Temp 97.4 °F (36.3 °C) (Temporal)   Ht 5' 5\" (1.651 m)   Wt 207 lb (93.9 kg)   SpO2 95%   BMI 34.45 kg/m²          Current Outpatient Medications   Medication  Sig Dispense Refill    fluticasone propionate 50 MCG/ACT Nasal Suspension 2 sprays by Each Nare route nightly. 1 each 0    levocetirizine 5 MG Oral Tab Take 1 tablet (5 mg total) by mouth every evening. 30 tablet 1    celecoxib 200 MG Oral Cap Take 1 capsule (200 mg total) by mouth 2 (two) times daily. 60 capsule 0    temazepam 30 MG Oral Cap Take 1 capsule (30 mg total) by mouth nightly as needed. 30 capsule 2    FLUOXETINE HCL 40 MG Oral Cap TAKE 1 CAPSULE (40 MG TOTAL) BY MOUTH EVERY MORNING. 90 capsule 0    levothyroxine 112 MCG Oral Tab Take 1 tablet (112 mcg total) by mouth before breakfast. 90 tablet 1    albuterol 108 (90 Base) MCG/ACT Inhalation Aero Soln Inhale 2 puffs into the lungs every 4 (four) hours as needed. 18 g 0    Spacer/Aero-Holding Chambers Does not apply Device Use with albuterol as needed 1 each 0    busPIRone 15 MG Oral Tab TAKE 1 TABLET BY MOUTH TWICE A DAY (Patient taking differently: Take 0.5 tablets (7.5 mg total) by mouth in the morning and 0.5 tablets (7.5 mg total) before bedtime.) 180 tablet 0    gabapentin 800 MG Oral Tab Take 1 tablet (800 mg total) by mouth 2 (two) times daily. 180 tablet 3    famotidine 20 MG Oral Tab Take 1 tablet (20 mg total) by mouth daily. 30 tablet 11    OMEPRAZOLE 40 MG Oral Capsule Delayed Release TAKE 1 CAPSULE(40 MG) BY MOUTH DAILY BEFORE BREAKFAST 90 capsule 1    Melatonin 10 MG Oral Cap Take 5 mg by mouth nightly as needed.      carvedilol 3.125 MG Oral Tab TAKE 1 TABLET(3.125 MG) BY MOUTH TWICE DAILY WITH MEALS 180 tablet 3    rosuvastatin (CRESTOR) 20 MG Oral Tab Take 1 tablet (20 mg total) by mouth nightly. 90 tablet 3     Allergies:  Allergies   Allergen Reactions    Demerol [Meperidine] NAUSEA AND VOMITING and NAUSEA ONLY     Other reaction(s): Vomiting    Hydrocodone NAUSEA AND VOMITING    Pregabalin      Other reaction(s): Stomach discomforts    Vicodin Tuss [Hydrocodone-Guaifenesin] NAUSEA AND VOMITING and NAUSEA ONLY     Other reaction(s):  Vomiting      PHYSICAL EXAM:     Chief Complaint   Patient presents with    Sinus Problem     Feels face pressure also been having let ear pain was sick at the end of December still feeling weak feels no energy.     Referral     For pschyatrist       Physical Exam  Vitals reviewed.   HENT:      Right Ear: Tympanic membrane and ear canal normal.      Left Ear: Tympanic membrane and ear canal normal.      Nose: Congestion present. No rhinorrhea.      Comments: Left sided  maxillary sinus pressure     Mouth/Throat:      Pharynx: No oropharyngeal exudate or posterior oropharyngeal erythema.   Eyes:      Comments: Bags under eyes    Cardiovascular:      Rate and Rhythm: Normal rate and regular rhythm.      Pulses: Normal pulses.      Heart sounds: Normal heart sounds.   Pulmonary:      Effort: Pulmonary effort is normal.      Breath sounds: Normal breath sounds.   Musculoskeletal:      Cervical back: Normal range of motion and neck supple. No tenderness.   Lymphadenopathy:      Cervical: No cervical adenopathy.   Neurological:      Mental Status: She is alert.                ASSESSMENT/PLAN:     Encounter Diagnoses   Name Primary?    Post-nasal drip Yes    Sinus pressure     Recurrent sinusitis        1. Post-nasal drip  Take medications as directed. Side effects/ risks discussed and patient verbalized understanding of plan. To follow up if symptoms worsen.    - fluticasone propionate 50 MCG/ACT Nasal Suspension; 2 sprays by Each Nare route nightly.  Dispense: 1 each; Refill: 0  - levocetirizine 5 MG Oral Tab; Take 1 tablet (5 mg total) by mouth every evening.  Dispense: 30 tablet; Refill: 1    2. Sinus pressure    - CT SINUS (CPT=70486); Future    3. Recurrent sinusitis  Hold off antibiotic at this time  Hx of c diff   Follow up ent  Complete ct   - CT SINUS (CPT=70486); Future  - ENT - INTERNAL      No orders of the defined types were placed in this encounter.        The above note was creating using Dragon speech  recognition technology. Please excuse any typos    Meds This Visit:  Requested Prescriptions     Signed Prescriptions Disp Refills    fluticasone propionate 50 MCG/ACT Nasal Suspension 1 each 0     Si sprays by Each Nare route nightly.    levocetirizine 5 MG Oral Tab 30 tablet 1     Sig: Take 1 tablet (5 mg total) by mouth every evening.       Imaging & Referrals:  ENT - INTERNAL  CT SINUS (CPT=70486)       ID#[id number]

## 2024-01-24 ENCOUNTER — OFFICE VISIT (OUTPATIENT)
Dept: ENDOCRINOLOGY CLINIC | Facility: CLINIC | Age: 69
End: 2024-01-24

## 2024-01-24 VITALS
DIASTOLIC BLOOD PRESSURE: 65 MMHG | SYSTOLIC BLOOD PRESSURE: 96 MMHG | WEIGHT: 209 LBS | HEART RATE: 67 BPM | HEIGHT: 65 IN | BODY MASS INDEX: 34.82 KG/M2

## 2024-01-24 DIAGNOSIS — E03.9 HYPOTHYROIDISM, UNSPECIFIED TYPE: Primary | ICD-10-CM

## 2024-01-24 DIAGNOSIS — E04.1 THYROID NODULE: ICD-10-CM

## 2024-01-24 PROCEDURE — 99214 OFFICE O/P EST MOD 30 MIN: CPT | Performed by: INTERNAL MEDICINE

## 2024-01-24 PROCEDURE — 3008F BODY MASS INDEX DOCD: CPT | Performed by: INTERNAL MEDICINE

## 2024-01-24 PROCEDURE — 3074F SYST BP LT 130 MM HG: CPT | Performed by: INTERNAL MEDICINE

## 2024-01-24 PROCEDURE — 3078F DIAST BP <80 MM HG: CPT | Performed by: INTERNAL MEDICINE

## 2024-01-24 RX ORDER — LEVOTHYROXINE SODIUM 112 UG/1
112 TABLET ORAL
Qty: 90 TABLET | Refills: 3 | Status: SHIPPED | OUTPATIENT
Start: 2024-01-24

## 2024-01-24 NOTE — PROGRESS NOTES
Name: Vandana Jefferson  Date: 1/24/24    Referring Physician: No ref. provider found    No chief complaint on file.      HISTORY OF PRESENT ILLNESS   Vandana Jefferson is a 68 year old female who presents for No chief complaint on file.    At the initial visit I had evaluated the patient for increased sweating at night. I had proposed that it may be due to her being on too much levothyroxine, but also checked metanephrines as well tried to rule out carcinoid. When we checked metanephrines, we saw that the normetanephrine was elevated above the upper limit of normal.     We had agreed to recheck her plasma metanephrines as well as decrease her levothyroxine to 112mcg.     In April 2023, she came in follow up and had been feeling well on the 112mcg. She was no longer having any night sweats and felt that she was doing well on her current dose of levothyroxine.     She was concerned about her blood sugars and also about weight gain. She states that her joint pains had been getting worse and she has been trying very hard to lose weight to help with this.     She has also been trying to watch her diet as well, but has increased appetite towards end of day.    We had continued her levothyroxine 112mcg and she has also started Mounjaro and is paying out of pocket because it is not covered by insurance.     She has not been able to take this anymore due to how many times she had been ill.     She now comes due to pain on the left side of her neck.    Medical History:   Past Medical History:   Diagnosis Date    Age-related nuclear cataract of both eyes 06/24/2021    Anxiety state, unspecified     Back pain     chronic    Cardiomyopathy (HCC)     Cataract     both eyes    Disorder of thyroid     Dry eye 06/24/2021    Essential hypertension     Fibromyalgia     Floaters, right 06/24/2021    H/O varicose vein stripping     Per NG: Varicose vein ; vein stripping    Headache 2010    Per NG: OU    High cholesterol      Hypothyroidism     Meibomian gland dysfunction     Musculoskeletal problem     Per NG: injection tendon origin/insertion    Musculoskeletal problem     Per NG: Arthrocentesis of the left knee joint    Neck pain     chronic    Osteoarthritis     Radiation 10/23/2010    Per NG: Radiation to thyroid     Status post epidural steroid injection     Per NG: epidral injection x5 per pain mgmt       Surgical History:   Past Surgical History:   Procedure Laterality Date    APPENDECTOMY      BACK SURGERY  2016    lumbar L4-L5 fusion      1979    1    CATARACT  2023    Right eye    CATARACT EXTRACTION W/  INTRAOCULAR LENS IMPLANT Left 2023    Dr. Windy Barnett @ [facility]    CATARACT EXTRACTION W/ INTRAOCULAR LENS IMPLANT Right 2023    Dr. Windy Barnett @[facility]     DELIVERY ONLY      CHOLECYSTECTOMY      COLONOSCOPY  10/2005    normal, non precancerous polyps    COLONOSCOPY N/A 2018    Procedure: COLONOSCOPY;  Surgeon: Sean Hubbard MD;  Location: [facility] ENDOSCOPY    COLONOSCOPY N/A 2023    Procedure: COLONOSCOPY;  Surgeon: Sean Hubbard MD;  Location: [facility] ENDO    CYST ASPIRATION LEFT Left 2007    FNA    CYST ASPIRATION RIGHT      EGD      LAMINECTOMY      Per NG: Disc  displacement x2 and spinal fusion 2016    [facility] LOCALIZATION WIRE 1 SITE LEFT (CPT=19281)  2007    OTHER SURGICAL HISTORY      Per NG: Arthrocentesis    OTHER SURGICAL HISTORY      Per NG: arthrocentesis of the knee joint    YAG IRIDOTOMY - OD - RIGHT EYE Right 10/10/1997    [facility]    YAG IRIDOTOMY - OS - LEFT EYE Left 10/16/1997    [facility]       Family History:  Family History   Problem Relation Age of Onset    Heart Disorder Father 65    Depression Mother     Neurological Disorder Mother         Per NG: Hypothyrodism    Arthritis Mother         Per NG: Osteoarthritis    Other (Other) Mother     Skin cancer Mother     Depression Maternal Grandfather     Other (Other) Maternal Grandfather     Heart Attack Neg      Diabetes Neg         Per NG; No diabetes    Glaucoma Neg         Per NG: No glaucoma histroy    Cancer Neg     Hypertension Neg     Lipids Neg        Social History:   Social History     Socioeconomic History    Marital status:    Tobacco Use    Smoking status: Former     Packs/day: 1.00     Years: 27.00     Additional pack years: 0.00     Total pack years: 27.00     Types: Cigarettes     Quit date: 2005     Years since quittin.0    Smokeless tobacco: Never    Tobacco comments:     7 years ago   Vaping Use    Vaping Use: Never used   Substance and Sexual Activity    Alcohol use: No    Drug use: No   Other Topics Concern    Caffeine Concern Yes     Comment: 3 cups of coffee daily    Exercise No    Pt has a pacemaker No    Pt has a defibrillator No    Breast feeding No    Reaction to local anesthetic No       Medications:     Current Outpatient Medications:     celecoxib 200 MG Oral Cap, Take 1 capsule (200 mg total) by mouth 2 (two) times daily., Disp: 60 capsule, Rfl: 0    fluticasone propionate 50 MCG/ACT Nasal Suspension, 2 sprays by Each Nare route nightly., Disp: 1 each, Rfl: 0    levocetirizine 5 MG Oral Tab, Take 1 tablet (5 mg total) by mouth every evening., Disp: 30 tablet, Rfl: 1    temazepam 30 MG Oral Cap, Take 1 capsule (30 mg total) by mouth nightly as needed., Disp: 30 capsule, Rfl: 2    FLUOXETINE HCL 40 MG Oral Cap, TAKE 1 CAPSULE (40 MG TOTAL) BY MOUTH EVERY MORNING., Disp: 90 capsule, Rfl: 0    levothyroxine 112 MCG Oral Tab, Take 1 tablet (112 mcg total) by mouth before breakfast., Disp: 90 tablet, Rfl: 1    albuterol 108 (90 Base) MCG/ACT Inhalation Aero Soln, Inhale 2 puffs into the lungs every 4 (four) hours as needed., Disp: 18 g, Rfl: 0    Spacer/Aero-Holding Chambers Does not apply Device, Use with albuterol as needed, Disp: 1 each, Rfl: 0    busPIRone 15 MG Oral Tab, TAKE 1 TABLET BY MOUTH TWICE A DAY (Patient taking differently: Take 0.5 tablets (7.5 mg total) by mouth in  the morning and 0.5 tablets (7.5 mg total) before bedtime.), Disp: 180 tablet, Rfl: 0    gabapentin 800 MG Oral Tab, Take 1 tablet (800 mg total) by mouth 2 (two) times daily., Disp: 180 tablet, Rfl: 3    famotidine 20 MG Oral Tab, Take 1 tablet (20 mg total) by mouth daily., Disp: 30 tablet, Rfl: 11    OMEPRAZOLE 40 MG Oral Capsule Delayed Release, TAKE 1 CAPSULE(40 MG) BY MOUTH DAILY BEFORE BREAKFAST, Disp: 90 capsule, Rfl: 1    Melatonin 10 MG Oral Cap, Take 5 mg by mouth nightly as needed., Disp: , Rfl:     carvedilol 3.125 MG Oral Tab, TAKE 1 TABLET(3.125 MG) BY MOUTH TWICE DAILY WITH MEALS, Disp: 180 tablet, Rfl: 3    rosuvastatin (CRESTOR) 20 MG Oral Tab, Take 1 tablet (20 mg total) by mouth nightly., Disp: 90 tablet, Rfl: 3     Allergies:   Allergies   Allergen Reactions    Demerol [Meperidine] NAUSEA AND VOMITING and NAUSEA ONLY     Other reaction(s): Vomiting    Hydrocodone NAUSEA AND VOMITING    Pregabalin      Other reaction(s): Stomach discomforts    Vicodin Tuss [Hydrocodone-Guaifenesin] NAUSEA AND VOMITING and NAUSEA ONLY     Other reaction(s): Vomiting       REVIEW OF SYSTEMS  Eyes: no change in vision  Neurologic: no headache, generalized or focal weakness or numbness.  Head: normal  ENT: normal  Lungs: no shortness of breath, wheezing or SERNA  Cardiovascular:  no chest pain or palpitations  Gastrointestinal:  no abdominal pain, bowel movement problems  Musculoskeletal: no muscle pain or arthralgia  /Gyne: no frequency or discomfort while urinating  Psychiatric:  no acute distress, anxiety  or depression  Skin: normal moisturized skin    PHYSICAL EXAMINATION:  Vitals:    01/24/24 1402   BP: 96/65   Pulse: 67     CONSTITUTIONAL:  awake, alert, cooperative, no apparent distress, and appears stated age  PSYCH: normal affect  EYES:  No proptosis, no ptosis, conjunctiva normal  ENT:  Normocephalic, atraumatic  NECK:  Supple, symmetrical, trachea midline, no adenopathy, thyroid symmetric, not enlarged  and no tenderness  HEMATOLOGIC/LYMPHATICS:  no cervical lymphadenopathy and no supraclavicular lymphadenopathy  LUNGS: clear to auscultation bilaterally, no crackles or wheezing  CARDIOVASCULAR:  regular rate and rhythm, normal S1 and S2  SKIN:  no bruising or bleeding, no rashes and no lesions  EXTREMITIES: no edema      Labs:  Date  TSH  FT4  LT4  06/29/21 4.280  0.9  112  09/20/21 6.480  1.1  112  10/28/21 0.828  1.2  125  11/14/21 0.699    112  02/16/22 1.330  1.1  112   05/17/22 2.190  1.6  112  09/13/22 1.270  1.3  112  08/07/23 1.780  1.3  112      Imaging:  3/03/21  PROCEDURE: US THYROID (CPT= 28830)       COMPARISON: [facility], US HEAD/NECK (ECO=54490), 3/21/2019, 10:12 AM.       INDICATIONS: Neck pain on left side       TECHNIQUE: High-resolution ultrasound was performed of the thyroid gland.       FINDINGS:   RIGHT LOBE: 2.65 x 1.02 x 0.63 cm, 0.81 ml. Heterogeneous echogenicity. [place] shaped nodule slightly heterogeneous in appearance well demarcated from the surrounding tissues without echogenic foci measuring 0.33 x 0.24 x 0.23 cm.  Rounded nodule   slightly heterogeneous in appearance well demarcated from the surrounding tissues without echogenic foci measuring 0.30 x 0.36 x 0.24 cm.       LEFT LOBE: 2.16 x 1.11 x 0.55 cm, 0.76 ml. Heterogeneous echogenicity. [place] shaped nodule slightly heterogeneous in appearance well demarcated from the surrounding tissues without echogenic foci measuring 0.53 x 0.36 x 0.39 cm.     ISTHMUS: 0.10 cm. AP diameter in the midline. Heterogeneous echogenicity. No masses.       OTHER: No masses or significant adenopathy.       Impression   CONCLUSION:   1. Multinodular atrophic thyroid in a heterogeneous appearing gland showing little change from March 21, 2019.          ASSESSMENT/PLAN:-This is a 68 year-old female patient with a many year history of hypothyroidism most likely from autoimmune disease. She has been doing well on the 112mcg levothyroxine  dose so far and I would like to keep her on this.     - We discussed the diagnosis of hypothyroidism.  - We discussed the nonspecific nature of the symptoms of thyroid disease.  - We discussed that occasionally we require optimization of thyroid levels to TSH 1.0  - We also discussed that if symptoms do not improve on optimized levels then we can always consider combination treatment with T4 and T3  - I reminded the patient about the proper way in which to take the medication (an hour before any medications or food and that they may take two the next day if they forget to take a dose today) and they acknowledged understanding.    - Continue levothyroxine 112mcg PO qday  - Check TSH and FT4 in one year  - Check thyroid US now    She will contact Dr. Ocampo about reviewing the CT scan of the neck that she had many years ago showing a small mass superior to SCM.    Return to clinic in 1 year    Prior to this encounter, I spent over 15 minutes with preparing for the visit, including reviewing documents from other specialties as well as from PCP and going over test results and imaging studies. During the face to face encounter, I spent an additional 15 minutes which were determined for follow-up. Greater than 50% of the time was spent in counseling, anticipatory guidance, and coordination of care. Patient concerns were answered to the best of my knowledge.         1/24/24  Estefanía Rosario MD

## 2024-02-08 ENCOUNTER — HOSPITAL ENCOUNTER (OUTPATIENT)
Dept: MAMMOGRAPHY | Age: 69
Discharge: HOME | End: 2024-02-08
Attending: FAMILY MEDICINE
Payer: COMMERCIAL

## 2024-02-08 DIAGNOSIS — Z12.31 ENCOUNTER FOR SCREENING MAMMOGRAM FOR BREAST CANCER: ICD-10-CM

## 2024-02-08 PROCEDURE — 77063 BREAST TOMOSYNTHESIS BI: CPT | Performed by: FAMILY MEDICINE

## 2024-02-08 PROCEDURE — 77067 SCR MAMMO BI INCL CAD: CPT | Performed by: FAMILY MEDICINE

## 2024-02-13 DIAGNOSIS — R09.82 POST-NASAL DRIP: ICD-10-CM

## 2024-02-13 NOTE — TELEPHONE ENCOUNTER
fluticasone propionate 50 MCG/ACT Nasal Suspension, 2 sprays by Each Nare route nightly., Disp: 1 each, Rfl: 0

## 2024-02-15 RX ORDER — FLUTICASONE PROPIONATE 50 MCG
2 SPRAY, SUSPENSION (ML) NASAL NIGHTLY
Qty: 1 EACH | Refills: 5 | Status: SHIPPED | OUTPATIENT
Start: 2024-02-15 | End: 2025-02-09

## 2024-02-15 NOTE — TELEPHONE ENCOUNTER
Refill passed per [facility] protocol.  Requested Prescriptions   Pending Prescriptions Disp Refills    fluticasone propionate 50 MCG/ACT Nasal Suspension 1 each 0     Si sprays by Each Nare route nightly.       Allergy Medication Protocol Passed - 2024  5:14 PM        Passed - In person appointment or virtual visit in the past 12 mos or appointment in next 3 mos     Recent Outpatient Visits              3 weeks ago Hypothyroidism, unspecified type    [facility], [facility], [place] Estefanía Rosario MD    Office Visit    4 weeks ago Post-nasal drip    [facility] Nica Ocampo MD    Office Visit    [facility]    3 months [facility]    Office Visit    5 months ago Primary osteoarthritis of both knees    [facility] Caity Ellison MD    Office Visit          Future Appointments         Provider Department Appt Notes    In 2 months Caity Ellison MD [facility]

## 2024-02-19 RX ORDER — CELECOXIB 200 MG/1
200 CAPSULE ORAL 2 TIMES DAILY
Qty: 60 CAPSULE | Refills: 0 | Status: SHIPPED | OUTPATIENT
Start: 2024-02-19

## 2024-02-19 NOTE — TELEPHONE ENCOUNTER
Requested Prescriptions     Pending Prescriptions Disp Refills    CELECOXIB 200 MG Oral Cap [Pharmacy Med Name: CELECOXIB 200 MG CAPSULE] 60 capsule 0     Sig: TAKE 1 CAPSULE BY MOUTH TWICE A DAY     LF; 1/17/24 #60 CAP W/ 0 RF  LOV: 9/13/23   Future Appointments   Date Time Provider Department Center   2/22/2024 10:00 AM Nica Ocampo MD [facility] [place]   4/30/2024  1:30 PM Dalton Mendoza APRN [facility] [place]   5/14/2024 10:40 AM Caity Ellison MD [facility]     Labs:   Component      Latest Ref Rn 10/21/2023   WBC      4.0 - 11.0 x10(3) uL 6.1    RBC      3.80 - 5.30 x10(6)uL 4.83    Hemoglobin      12.0 - 16.0 g/dL 13.3    Hematocrit      35.0 - 48.0 % 41.6    MCV      80.0 - 100.0 fL 86.1    MCH      26.0 - 34.0 pg 27.5    MCHC      31.0 - 37.0 g/dL 32.0    RDW-SD      35.1 - 46.3 fL 45.0    RDW      11.0 - 15.0 % 14.3    Platelet Count      150.0 - 450.0 10(3)uL 269.0    Prelim Neutrophil Abs      1.50 - 7.70 x10 (3) uL 3.73    Neutrophils Absolute      1.50 - 7.70 x10(3) uL 3.73    Lymphocytes Absolute      1.00 - 4.00 x10(3) uL 1.48    Monocytes Absolute      0.10 - 1.00 x10(3) uL 0.65    Eosinophils Absolute      0.00 - 0.70 x10(3) uL 0.11    Basophils Absolute      0.00 - 0.20 x10(3) uL 0.08    Immature Granulocyte Absolute      0.00 - 1.00 x10(3) uL 0.03    Neutrophils %      % 61.4    Lymphocytes %      % 24.3    Monocytes %      % 10.7    Eosinophils %      % 1.8    Basophils %      % 1.3    Immature Granulocyte %      % 0.5    Glucose      70 - 99 mg/dL 95    Sodium      136 - 145 mmol/L 144    Potassium      3.5 - 5.1 mmol/L 3.9    Chloride      98 - 112 mmol/L 110    Carbon Dioxide, Total      21.0 - 32.0 mmol/L 27.0    ANION GAP      0 - 18 mmol/L 7    BUN      7 - 18 mg/dL 8    CREATININE      0.55 - 1.02 mg/dL 0.88    BUN/CREATININE RATIO      10.0 - 20.0  9.1 (L)    CALCIUM      8.5 - 10.1 mg/dL 9.2    CALCULATED OSMOLALITY      275 - 295 mOsm/kg 296 (H)    EGFR      >=60  mL/min/1.73m2 72    ALT (SGPT)      13 - 56 U/L 21    AST (SGOT)      15 - 37 U/L 17    ALKALINE PHOSPHATASE      55 - 142 U/L 75    Total Bilirubin      0.1 - 2.0 mg/dL 0.4    PROTEIN, TOTAL      6.4 - 8.2 g/dL 7.0    Albumin      3.4 - 5.0 g/dL 3.4    Globulin      2.8 - 4.4 g/dL 3.6    A/G Ratio      1.0 - 2.0  0.9 (L)    Patient Fasting for CMP? Yes       Legend:  (L) Low  (H) High  You were seen today for joint pain  We injected both knees with cortisone, hopefully it helps  For your shoulder pain I sent you to physical therapy  Try Celebrex once or twice a day for your pain  Do not take with Aleve or Motrin, Advil or ibuprofen

## 2024-02-22 ENCOUNTER — OFFICE VISIT (OUTPATIENT)
Dept: FAMILY MEDICINE CLINIC | Facility: CLINIC | Age: 69
End: 2024-02-22

## 2024-02-22 ENCOUNTER — TELEPHONE (OUTPATIENT)
Facility: CLINIC | Age: 69
End: 2024-02-22

## 2024-02-22 VITALS
HEIGHT: 65 IN | SYSTOLIC BLOOD PRESSURE: 92 MMHG | HEART RATE: 68 BPM | TEMPERATURE: 98 F | WEIGHT: 209 LBS | OXYGEN SATURATION: 97 % | BODY MASS INDEX: 34.82 KG/M2 | DIASTOLIC BLOOD PRESSURE: 60 MMHG

## 2024-02-22 DIAGNOSIS — M25.512 CHRONIC LEFT SHOULDER PAIN: ICD-10-CM

## 2024-02-22 DIAGNOSIS — S76.219A GROIN STRAIN, UNSPECIFIED LATERALITY, INITIAL ENCOUNTER: ICD-10-CM

## 2024-02-22 DIAGNOSIS — M15.9 PRIMARY OSTEOARTHRITIS INVOLVING MULTIPLE JOINTS: ICD-10-CM

## 2024-02-22 DIAGNOSIS — M51.16 LUMBAR DISC HERNIATION WITH RADICULOPATHY: ICD-10-CM

## 2024-02-22 DIAGNOSIS — R20.2 BILATERAL LEG PARESTHESIA: ICD-10-CM

## 2024-02-22 DIAGNOSIS — M54.41 CHRONIC LOW BACK PAIN WITH RIGHT-SIDED SCIATICA, UNSPECIFIED BACK PAIN LATERALITY: Primary | ICD-10-CM

## 2024-02-22 DIAGNOSIS — G89.29 CHRONIC LEFT SHOULDER PAIN: ICD-10-CM

## 2024-02-22 DIAGNOSIS — G89.29 CHRONIC LOW BACK PAIN WITH RIGHT-SIDED SCIATICA, UNSPECIFIED BACK PAIN LATERALITY: Primary | ICD-10-CM

## 2024-02-22 LAB
GLUCOSE BLOOD: 116
TEST STRIP LOT #: NORMAL NUMERIC

## 2024-02-22 PROCEDURE — 3074F SYST BP LT 130 MM HG: CPT | Performed by: FAMILY MEDICINE

## 2024-02-22 PROCEDURE — 3078F DIAST BP <80 MM HG: CPT | Performed by: FAMILY MEDICINE

## 2024-02-22 PROCEDURE — 82962 GLUCOSE BLOOD TEST: CPT | Performed by: FAMILY MEDICINE

## 2024-02-22 PROCEDURE — 99214 OFFICE O/P EST MOD 30 MIN: CPT | Performed by: FAMILY MEDICINE

## 2024-02-22 PROCEDURE — 3008F BODY MASS INDEX DOCD: CPT | Performed by: FAMILY MEDICINE

## 2024-02-22 NOTE — TELEPHONE ENCOUNTER
Patient outreach message received:    1 year colonoscopy recall entered into patient outreach in [facility]. Next colonoscopy will be due 6/12/2024.     Recall reminder letter mailed out to patient.

## 2024-02-22 NOTE — PROGRESS NOTES
HPI:    Patient ID: Vandana Jefferson is a 68 year old female.      Joint Pain  Associated symptoms include arthralgias, myalgias and neck pain. Pertinent negatives include no abdominal pain, chills, fever, nausea or vomiting.       Chief Complaint   Patient presents with    Groin Pain     Both sides for over a month  pt state she was told she had arthritis on her hip many years ago also has burning on both legs worst on right leg     Joint Pain     Would like a referral for rehumotologist     Forgetful    Referral     For pain clinic        Wt Readings from Last 6 Encounters:   02/22/24 209 lb (94.8 kg)   01/24/24 209 lb (94.8 kg)   01/17/24 207 lb (93.9 kg)   10/25/23 210 lb (95.3 kg)   09/13/23 204 lb (92.5 kg)   08/28/23 205 lb (93 kg)     BP Readings from Last 3 Encounters:   02/22/24 92/60   01/24/24 96/65   01/17/24 105/68     Has been having pain in both groins, left side since 2 months  Has burning in legs, getting worse  Sees pain clinic  Needs referral.  Even with adls its hard, she has to sit when cooking  Taking celebrex from rheumatology, but when back pain worse has to take ibuprofen and stops celebrex    No trouble with urinating/ bowels.    2. Needs referral for rheumatology for knee pain and left shoulder pain - has had injections in past with steroid but now approved for gel injections     3.also sometimes has word finding difficulty   This was happening as she was having remodeling done at home - kitchen, bathroom, had a bathroom leak.  Work is almost done  Patient states it was very stressful.      Review of Systems   Constitutional:  Negative for chills and fever.   Gastrointestinal:  Negative for abdominal pain, diarrhea, nausea and vomiting.   Genitourinary:  Negative for decreased urine volume, difficulty urinating, dysuria, flank pain and pelvic pain.   Musculoskeletal:  Positive for arthralgias, back pain, joint pain, myalgias and neck pain.       BP 92/60   Pulse 68   Temp 97.6 °F (36.4  °C) (Temporal)   Ht 5' 5\" (1.651 m)   Wt 209 lb (94.8 kg)   SpO2 97%   BMI 34.78 kg/m²          Current Outpatient Medications   Medication Sig Dispense Refill    celecoxib 200 MG Oral Cap Take 1 capsule (200 mg total) by mouth 2 (two) times daily. 60 capsule 0    fluticasone propionate 50 MCG/ACT Nasal Suspension 2 sprays by Each Nare route nightly. 1 each 5    FLUoxetine HCl 40 MG Oral Cap Take 1 capsule (40 mg total) by mouth every morning. 90 capsule 1    temazepam 30 MG Oral Cap Take 1 capsule (30 mg total) by mouth nightly as needed. 30 capsule 2    levothyroxine 112 MCG Oral Tab Take 1 tablet (112 mcg total) by mouth before breakfast. 90 tablet 3    levocetirizine 5 MG Oral Tab Take 1 tablet (5 mg total) by mouth every evening. 30 tablet 1    levothyroxine 112 MCG Oral Tab Take 1 tablet (112 mcg total) by mouth before breakfast. 90 tablet 1    albuterol 108 (90 Base) MCG/ACT Inhalation Aero Soln Inhale 2 puffs into the lungs every 4 (four) hours as needed. 18 g 0    Spacer/Aero-Holding Chambers Does not apply Device Use with albuterol as needed 1 each 0    busPIRone 15 MG Oral Tab TAKE 1 TABLET BY MOUTH TWICE A DAY (Patient taking differently: Take 0.5 tablets (7.5 mg total) by mouth in the morning and 0.5 tablets (7.5 mg total) before bedtime.) 180 tablet 0    gabapentin 800 MG Oral Tab Take 1 tablet (800 mg total) by mouth 2 (two) times daily. 180 tablet 3    famotidine 20 MG Oral Tab Take 1 tablet (20 mg total) by mouth daily. 30 tablet 11    OMEPRAZOLE 40 MG Oral Capsule Delayed Release TAKE 1 CAPSULE(40 MG) BY MOUTH DAILY BEFORE BREAKFAST 90 capsule 1    Melatonin 10 MG Oral Cap Take 5 mg by mouth nightly as needed.      carvedilol 3.125 MG Oral Tab TAKE 1 TABLET(3.125 MG) BY MOUTH TWICE DAILY WITH MEALS 180 tablet 3    rosuvastatin (CRESTOR) 20 MG Oral Tab Take 1 tablet (20 mg total) by mouth nightly. 90 tablet 3     Allergies:  Allergies   Allergen Reactions    Demerol [Meperidine] NAUSEA AND  VOMITING and NAUSEA ONLY     Other reaction(s): Vomiting    Hydrocodone NAUSEA AND VOMITING    Pregabalin      Other reaction(s): Stomach discomforts    Vicodin Tuss [Hydrocodone-Guaifenesin] NAUSEA AND VOMITING and NAUSEA ONLY     Other reaction(s): Vomiting      PHYSICAL EXAM:     Chief Complaint   Patient presents with    Groin Pain     Both sides for over a month  pt state she was told she had arthritis on her hip many years ago also has burning on both legs worst on right leg     Joint Pain     Would like a referral for rehumotologist     Forgetful    Referral     For pain clinic       Physical Exam  Vitals reviewed.   Cardiovascular:      Rate and Rhythm: Normal rate and regular rhythm.      Pulses: Normal pulses.      Heart sounds: Normal heart sounds.   Pulmonary:      Effort: Pulmonary effort is normal.      Breath sounds: Normal breath sounds.   Abdominal:      Tenderness: There is no abdominal tenderness.      Comments: Tender in inguinal bilateral, no redness, swelling   Neurological:      Mental Status: She is alert.                ASSESSMENT/PLAN:     Encounter Diagnoses   Name Primary?    Chronic low back pain with right-sided sciatica, unspecified back pain laterality Yes    Lumbar disc herniation with radiculopathy     Chronic left shoulder pain     Primary osteoarthritis involving multiple joints     Groin strain, unspecified laterality, initial encounter     Bilateral leg paresthesia        1. Chronic low back pain with right-sided sciatica, unspecified back pain laterality  Worsening  Follow up pain management  - Pain Management Referral - In Network    2. Lumbar disc herniation with radiculopathy  As above  - Pain Management Referral - In Network    3. Chronic left shoulder pain    - Rheumatology Referral - In Network    4. Primary osteoarthritis involving multiple joints  Gel injections planned per patient   - Rheumatology Referral - In Network    5. Groin strain, unspecified laterality,  initial encounter  Recommend Physical Therapy but patient would like to wait.      6. Bilateral leg paresthesia  Bs 116    - Glucose Blood Test      Orders Placed This Encounter   Procedures    Glucose Blood Test         The above note was creating using Dragon speech recognition technology. Please excuse any typos    Meds This Visit:  Requested Prescriptions      No prescriptions requested or ordered in this encounter       Imaging & Referrals:  OP REFERRAL PAIN MANGEMENT  RHEUMATOLOGY - INTERNAL       ID#[id number]

## 2024-02-26 ENCOUNTER — OFFICE VISIT (OUTPATIENT)
Dept: PAIN CLINIC | Facility: HOSPITAL | Age: 69
End: 2024-02-26
Attending: NURSE PRACTITIONER
Payer: COMMERCIAL

## 2024-02-26 ENCOUNTER — HOSPITAL ENCOUNTER (OUTPATIENT)
Dept: GENERAL RADIOLOGY | Facility: HOSPITAL | Age: 69
Discharge: HOME | End: 2024-02-26
Attending: ANESTHESIOLOGY
Payer: COMMERCIAL

## 2024-02-26 VITALS — OXYGEN SATURATION: 95 % | DIASTOLIC BLOOD PRESSURE: 70 MMHG | HEART RATE: 66 BPM | SYSTOLIC BLOOD PRESSURE: 105 MMHG

## 2024-02-26 DIAGNOSIS — G89.29 CHRONIC LOW BACK PAIN WITH SCIATICA, SCIATICA LATERALITY UNSPECIFIED, UNSPECIFIED BACK PAIN LATERALITY: ICD-10-CM

## 2024-02-26 DIAGNOSIS — M96.1 FAILED BACK SYNDROME, LUMBAR: ICD-10-CM

## 2024-02-26 DIAGNOSIS — M96.1 FAILED BACK SYNDROME, LUMBAR: Primary | ICD-10-CM

## 2024-02-26 DIAGNOSIS — M54.40 CHRONIC LOW BACK PAIN WITH SCIATICA, SCIATICA LATERALITY UNSPECIFIED, UNSPECIFIED BACK PAIN LATERALITY: ICD-10-CM

## 2024-02-26 DIAGNOSIS — Z98.890 HISTORY OF LUMBAR SURGERY: ICD-10-CM

## 2024-02-26 DIAGNOSIS — M54.17 LUMBOSACRAL RADICULOPATHY: ICD-10-CM

## 2024-02-26 PROCEDURE — 99211 OFF/OP EST MAY X REQ PHY/QHP: CPT

## 2024-02-26 PROCEDURE — 72110 X-RAY EXAM L-2 SPINE 4/>VWS: CPT | Performed by: ANESTHESIOLOGY

## 2024-02-26 NOTE — CHRONIC PAIN
Follow-up Note    Vandana Jefferson is a 68 year old female, originally referred to the pain clinic by Nica Glover MD, with history of No diagnosis found. returns to the clinic for follow up. Patient is complaining of low back pain radiating down to both lower extremities.  Today pain is 5 out of 10. Range of pain is from 3 to 7 out of 10.  For pain control patient presently is using Aleve. Pain Meds are reducing his pain by 20-30%. Tolerating well. No new symptoms.  Patient has no change in bowel/bladder function.  Patient had lumbar epidurals injection on July 11.  Injection gave her 60% improvement.  Still in pain but not so severe cannot sleep during the night.    ALLERGIES:  Allergies   Allergen Reactions    Demerol [Meperidine] NAUSEA AND VOMITING and NAUSEA ONLY     Other reaction(s): Vomiting    Hydrocodone NAUSEA AND VOMITING    Pregabalin      Other reaction(s): Stomach discomforts    Vicodin Tuss [Hydrocodone-Guaifenesin] NAUSEA AND VOMITING and NAUSEA ONLY     Other reaction(s): Vomiting       MEDICATION LIST:  Current Outpatient Medications   Medication Sig Dispense Refill    celecoxib 200 MG Oral Cap Take 1 capsule (200 mg total) by mouth 2 (two) times daily. 60 capsule 0    fluticasone propionate 50 MCG/ACT Nasal Suspension 2 sprays by Each Nare route nightly. 1 each 5    FLUoxetine HCl 40 MG Oral Cap Take 1 capsule (40 mg total) by mouth every morning. 90 capsule 1    temazepam 30 MG Oral Cap Take 1 capsule (30 mg total) by mouth nightly as needed. 30 capsule 2    levothyroxine 112 MCG Oral Tab Take 1 tablet (112 mcg total) by mouth before breakfast. 90 tablet 3    levocetirizine 5 MG Oral Tab Take 1 tablet (5 mg total) by mouth every evening. 30 tablet 1    levothyroxine 112 MCG Oral Tab Take 1 tablet (112 mcg total) by mouth before breakfast. 90 tablet 1    albuterol 108 (90 Base) MCG/ACT Inhalation Aero Soln Inhale 2 puffs into the lungs every 4 (four) hours as needed. 18 g 0     Spacer/Aero-Holding Chambers Does not apply Device Use with albuterol as needed 1 each 0    busPIRone 15 MG Oral Tab TAKE 1 TABLET BY MOUTH TWICE A DAY (Patient taking differently: Take 0.5 tablets (7.5 mg total) by mouth in the morning and 0.5 tablets (7.5 mg total) before bedtime.) 180 tablet 0    gabapentin 800 MG Oral Tab Take 1 tablet (800 mg total) by mouth 2 (two) times daily. 180 tablet 3    famotidine 20 MG Oral Tab Take 1 tablet (20 mg total) by mouth daily. 30 tablet 11    OMEPRAZOLE 40 MG Oral Capsule Delayed Release TAKE 1 CAPSULE(40 MG) BY MOUTH DAILY BEFORE BREAKFAST 90 capsule 1    Melatonin 10 MG Oral Cap Take 5 mg by mouth nightly as needed.      carvedilol 3.125 MG Oral Tab TAKE 1 TABLET(3.125 MG) BY MOUTH TWICE DAILY WITH MEALS 180 tablet 3    rosuvastatin (CRESTOR) 20 MG Oral Tab Take 1 tablet (20 mg total) by mouth nightly. 90 tablet 3        REVIEW OF SYSTEMS:   General: no weight change, change in appetite, thirst or fever  HEENT: no headache or blurred vision  Cardiopulmonary: no chest pain, palpitations, or shortness of breath  GI: no anorexia, nausea or vomiting, or bowel incontinence   : no dysuria, or pyuria.  Endo: no goiter, lethargy, or heat/cold intolerance  Heme/Onc: no pallor, bruising, or bleeding.    Musculoskeletal:  no new problems  Neuro:  no new problems  Psych:  no new problems    MEDICAL HISTORY:  Patient Active Problem List   Diagnosis    Primary cardiomyopathy (HCC)    Hypercholesteremia    Failed back syndrome of lumbar spine    Lumbago    Lumbar facet arthropathy    Lumbar disc herniation with radiculopathy    Lumbar radicular syndrome    Primary osteoarthritis involving multiple joints    Chronic low back pain    Anxiety and depression    Thyroid activity decreased    Acquired hypothyroidism    Chronic neck and back pain    Obesity (BMI 30.0-34.9)    Thyroid nodule    Onychomycosis    DDD (degenerative disc disease), cervical    H/O radioactive iodine thyroid  ablation    Female pattern alopecia    Myalgia of muscle of neck    Chronic left shoulder pain    Myalgia, unspecified site    History of lumbar surgery    Bilateral leg paresthesia    Polyneuropathy    Multinodular thyroid    Depression, recurrent (HCC)    Age-related nuclear cataract of both eyes    Floaters, right    Dry eye    Sweating abnormality    Generalized anxiety disorder    MDD (recurrent major depressive disorder) in remission (HCC)    Acute maxillary sinusitis    Anal or rectal pain    Anxiety states    Benign neoplasm of eyelid    Bronchitis    Congenital anomaly of nails    Cough    Diarrhea    Diarrhea of presumed infectious origin    Lump or mass in breast    Disorder of lipid metabolism    Essential hypertension    Female stress incontinence    Fibroadenosis of breast    Fibrosclerosis of breast    Gastroesophageal reflux disease    Hemorrhoids    Hordeolum internum    Astigmatism    Hypopotassemia    Injury of knee, leg, ankle and foot    Injury, finger    Intestinal infection due to Clostridium difficile    Intractable vomiting    Lateral epicondylitis of elbow    Neoplasm of uncertain behavior of connective and other soft tissue    Noninfectious gastroenteritis and colitis    Nonspecific reaction to tuberculin skin test    Arthropathy    Other specified disorders of adrenal glands    Overweight    Pain in joint, pelvic region and thigh    Pinguecula    Polycystic ovaries    Reflux esophagitis    Senile cataract    Senile osteoporosis    Solitary cyst of breast    Spinal stenosis in cervical region    Symptomatic menopausal or female climacteric states    Throat pain    Toxic diffuse goiter    Toxic uninodular goiter    Absence of bladder continence    Uterine prolapse    Visual disturbance    Vitamin D deficiency    Class 1 obesity due to excess calories with serious comorbidity and body mass index (BMI) of 33.0 to 33.9 in adult    Hypothyroidism     Past Medical History:   Diagnosis Date     Age-related nuclear cataract of both eyes 2021    Anxiety state, unspecified     Back pain     chronic    Cardiomyopathy (HCC)     Cataract     both eyes    Disorder of thyroid     Dry eye 2021    Essential hypertension     Fibromyalgia     Floaters, right 2021    H/O varicose vein stripping     Per NG: Varicose vein ; vein stripping    Headache     Per NG: OU    High cholesterol     Hypothyroidism     Meibomian gland dysfunction     Musculoskeletal problem     Per NG: injection tendon origin/insertion    Musculoskeletal problem     Per NG: Arthrocentesis of the left knee joint    Neck pain     chronic    Osteoarthritis     Radiation 10/23/2010    Per NG: Radiation to thyroid     Status post epidural steroid injection     Per NG: epidral injection x5 per pain mgmt       SURGICAL HISTORY:  Past Surgical History:   Procedure Laterality Date    APPENDECTOMY      BACK SURGERY  2016    lumbar L4-L5 fusion      1979    1    CATARACT  2023    Right eye    CATARACT EXTRACTION W/  INTRAOCULAR LENS IMPLANT Left 2023    Dr. Windy Barnett @ [facility]    CATARACT EXTRACTION W/ INTRAOCULAR LENS IMPLANT Right 2023    Dr. Windy Barnett @[facility]     DELIVERY ONLY      CHOLECYSTECTOMY      COLONOSCOPY  10/2005    normal, non precancerous polyps    COLONOSCOPY N/A 2018    Procedure: COLONOSCOPY;  Surgeon: Sean Hubbard MD;  Location: [facility] ENDOSCOPY    COLONOSCOPY N/A 2023    Procedure: COLONOSCOPY;  Surgeon: Sean Hubbard MD;  Location: [facility] ENDO    CYST ASPIRATION LEFT Left 2007    FNA    CYST ASPIRATION RIGHT      EGD      LAMINECTOMY      Per NG: Disc  displacement x2 and spinal fusion 2016    OSCAR LOCALIZATION WIRE 1 SITE LEFT (CPT=19281)  2007    OTHER SURGICAL HISTORY      Per NG: Arthrocentesis    OTHER SURGICAL HISTORY      Per NG: arthrocentesis of the knee joint    YAG IRIDOTOMY - OD - RIGHT EYE Right 10/10/1997    [facility]    YAG IRIDOTOMY - OS -  LEFT EYE Left 10/16/1997    [facility]       FAMILY HISTORY:  Family History   Problem Relation Age of Onset    Heart Disorder Father 65    Depression Mother     Neurological Disorder Mother         Per NG: Hypothyrodism    Arthritis Mother         Per NG: Osteoarthritis    Other (Other) Mother     Skin cancer Mother     Depression Maternal Grandfather     Other (Other) Maternal Grandfather     Heart Attack Neg     Diabetes Neg         Per NG; No diabetes    Glaucoma Neg         Per NG: No glaucoma histroy    Cancer Neg     Hypertension Neg     Lipids Neg        SOCIAL HISTORY:  Social History     Socioeconomic History    Marital status:      Spouse name: Not on file    Number of children: Not on file    Years of education: Not on file    Highest education level: Not on file   Occupational History    Not on file   Tobacco Use    Smoking status: Former     Packs/day: 1.00     Years: 27.00     Additional pack years: 0.00     Total pack years: 27.00     Types: Cigarettes     Quit date: 2005     Years since quittin.1    Smokeless tobacco: Never    Tobacco comments:     7 years ago   Vaping Use    Vaping Use: Never used   Substance and Sexual Activity    Alcohol use: No    Drug use: No    Sexual activity: Not on file   Other Topics Concern     Service Not Asked    Blood Transfusions Not Asked    Caffeine Concern Yes     Comment: 3 cups of coffee daily    Occupational Exposure Not Asked    Hobby Hazards Not Asked    Sleep Concern Not Asked    Stress Concern Not Asked    Weight Concern Not Asked    Special Diet Not Asked    Back Care Not Asked    Exercise No    Bike Helmet Not Asked    Seat Belt Not Asked    Self-Exams Not Asked    Grew up on a farm Not Asked    History of tanning Not Asked    Outdoor occupation Not Asked    Pt has a pacemaker No    Pt has a defibrillator No    Breast feeding No    Reaction to local anesthetic No   Social History Narrative    Not on file     Social Determinants of Health      Financial Resource Strain: Not on file   Food Insecurity: Not on file   Transportation Needs: Not on file   Physical Activity: Not on file   Stress: Not on file   Social Connections: Not on file   Housing Stability: Not on file     PHYSICAL EXAMINATION:  Vitals:    02/26/24 1057   BP: 105/70   Pulse: 66      General: Alert and oriented x3, NAD, appears stated age, appropriate disposition and demeanor, answers questions appropriately   Head: normocephalic, atraumatic  Eyes: anicteric; no injection  Ears: normal;  no discharge  Nose: externally grossly within normal limits, no unusual discharge or rhinorrhea   Throat: lips grossly within normal limits by visual exam externally  Neck: supple, trachea midline, no obvious JVD  Gait: Walking without limping  Spine: Flattened lumbar lordosis.  ROM:   Lumbar Spine: Limited flexion due to pain  MOTOR EXAMINATION:  Lower Extremities: Normal bilaterally  REFLEXES:  Lower Extremities: Depressed knee reflexes bilaterally  SENSATION (light touch/pinprick/temperature):   Lower Extremities: Normal bilaterally  SLR: Negative bilaterally  SIJ tenderness: Negative bilaterally  Isis's test: Negative bilaterally  TPs: Not identified    IMAGING:  No new imaging available    ASSESSMENT AND PLAN:    Vandana Jefferson is a 68 year old  female, with   No diagnosis found..  Would recommend physical therapy, instead of Aleve we will give her Mobic 15 mg 1 tablet p.o. daily, and for muscle spasms we will give tizanidine 4 mg 1 tablet p.o. q. hours to sleep.    Pt will return to clinic in 4-8 weeks   Conservative vs. Interventional pain treatment options were discussed at length including pharmacotherapy (eg. Anti- inflammatories, muscle relaxants, neuropathic medications, oral steroids, analgesics), injections, and further testing. Risks and benefits of all options were discussed to patients satisfaction. All questions were answered. Patient agreeable to treatment plan. Greater than 50% of  the time was spent with counseling (nature of discussion centered around pain, therapy, and treatment options), face to face time, time spent reviewing data, obtaining patient information and discussing the care with the patients health care providers.     Total Time: 32  minutes    Duyen Mcduffie DO  Anesthesiology  Pain Medicine

## 2024-02-26 NOTE — PROGRESS NOTES
PT presents ambulatory to the [facility].  PT states  5/10 pain and would like to discuss another injection. Increased LBP with new pain in her groin. BLE burning and tingling.unable to stand longer than 10 mins. Pain can be 10/10. Dr. Mcduffie saw PT for med eval.  See notes for POC.

## 2024-03-06 ENCOUNTER — TELEPHONE (OUTPATIENT)
Facility: CLINIC | Age: 69
End: 2024-03-06

## 2024-03-06 DIAGNOSIS — Z12.11 COLON CANCER SCREENING: Primary | ICD-10-CM

## 2024-03-06 DIAGNOSIS — Z86.010 HISTORY OF COLON POLYPS: ICD-10-CM

## 2024-03-06 NOTE — TELEPHONE ENCOUNTER
Dr. Hubbard    Patient called to schedule 1 year recall colonoscopy.  Please provide orders if ok to schedule directly.    Thank you    Last Procedure, Date, MD:  Colonoscopy and EGD Dr. Hubbard 6/12/23  Last Diagnosis:  colon polyps x5, diverticulosis, small internal hemorrhoids, hiatal hernia, reflux esophagitis.  Recalled (mth/yrs): 1 year  Sedation Used Previously:  MAC  Last Prep Used (if known):  Nulytely  Quality Of Prep (if known): adequate-liquid stool in descending and sigmoid colon that was washed and suctioned  Anticoagulants: no  Diabetic Med's (PO/Injectables): no  Weight loss Med's: no  Iron/Herbal/Multivitamin Supplements (RX/OTC): melatonin  Marijuana/Vaping/CBD: no  Height & Weight: 5'5\" 209 lbs BMI 34.78  BMI:  Hx of Cardiac/CVA Issues/(MI/Stroke): no  Devices Pacemaker/Defibrillator/Stents: no  Respiratory Issues/Oxygen Use/FELICE/COPD: no  Issues w/ Anesthesia: no    Symptoms (Y/N): sometimes has a little bit of trouble swallowing and has to drink water to get it down.  Does not happen all of the time and reports that she eats fast and does not chew enough reports having upper endoscopy last time.  On omeprazole in the morning and pepcid PRN which she only has to take about 3 times a month.  On and off constipation-reports on a lot of pain medication.   Symptoms Details: n/a    Special Comments/Notes: n/a    Please advise on orders and prep.     Thank you!

## 2024-03-06 NOTE — TELEPHONE ENCOUNTER
Sean Hubbard MD   Physician  Gastroenterology     Operative Report     Signed     Date of Service: 6/12/2023  3:37 PM  Case Time: Procedures: Surgeons:   6/12/2023  2:18 PM COLONOSCOPY   ESOPHAGOGASTRODUODENOSCOPY (EGD)    Sean Hubbard MD               Signed         [facility] Endoscopy Report        Preoperative Diagnosis:  - colon cancer screening  - history of colon polyps   - GERD and hiatal hernia     Postoperative Diagnosis:  - colon polyps x 5  - diverticulosis   - small internal hemorrhoids  - hiatal hernia   - reflux esophagitis         Procedure:    Colonoscopy         Surgeon:  Sean Hubbard M.D.     Anesthesia:  MAC      Technique:  After informed consent, the patient was placed in the left lateral recumbent position.  Digital rectal examination revealed no palpable intraluminal abnormalities.  An Olympus variable stiffness 190 series HD colonoscope was inserted into the rectum and advanced under direct vision by following the lumen to the cecum-the patient had some looping in the left colon, gentle pressure was placed, position changes such as supine position and then back to the left lateral decubitus were utilized to maneuver the scope to the cecum..  The colon was examined upon withdrawal in the left lateral position.     Following colonoscopy, an Olympus adult HD gastroscope was inserted into the hypopharynx and advanced under direct vision into the esophagus, stomach and duodenum.  The endoscope was withdrawn to the stomach where retroflexion of the annulus, body, cardia and fundus was performed.  The instrument was straightened, insufflated air and fluid were suctioned and the endoscope was withdrawn.  The procedures were well tolerated without immediate complication.        Findings:  The preparation of the colon was adequate-liquid stool noted in the descending and sigmoid colon, this was washed and suctioned for adequate visualization..  The terminal ileum was  examined for 2 cm and visually normal.  The ileocecal valve was well preserved. The visualized colonic mucosa from the cecum to the anal verge was normal with an intact vascular pattern.     Colon polyps x5 removed as follows;  -Cecum x1, diminutive removed by cold forceps technique.  -Ascending x2, first polyp was serrated adenomatous appearing 1.8 cm in size removed by piecemeal fashion cold snare, APC with right colon settings were used to treat a small area that was not able to be removed with snare.  Endo Clip x1 clip was placed across portion of the mucosal defect.  -Descending x2, first polyp had a serrated adenomatous appearance was approximately 1.2 cm in size removed by cold snare piecemeal fashion.  Two clips were placed across this mucosal defect.  Smaller 4 mm size polyp was removed by cold snare as well.  All polypectomy sites were inspected and found to be free of bleeding and specimens retrieved and sent for analysis.     Diverticulosis located in the sigmoid colon, no diverticulitis.     Small internal hemorrhoids noted on retroflexed view.     The esophagus showed esophagitis changes at the GE junction, no significant erosions noted.  The GE junction and diaphragmatic impression were at 36 cm and 39 cm for a 3 cm hiatal hernia.  The stomach distended appropriately with insufflated air.  The mucosa of the stomach including cardia, fundus, gastric body and antrum were normal.  The duodenal bulb and post bulbar regions were normal.     Estimated blood loss-insignificant  Specimens-see above        Impression:  - colon polyps x 5  - diverticulosis   - small internal hemorrhoids  - hiatal hernia   - reflux esophagitis      Recommendations:  - Post polypectomy instructions given  - Repeat colonoscopy in 1- 3 years  - High fiber diet for diverticular disease  - Symptomatic treatment of hemorrhoids  - Antireflux dietary and behavioral changes given hiatal hernia, continue on PPI therapy.               Sean Hubbard MD  6/12/2023  3:37 PM              Sean Hubbard MD  6/20/2023  1:06 PM CDT       I wanted to get back to you with your colonoscopy and EGD results.  You had 5 colon polyps removed which were benign.  I would advise a repeat colonoscopy in 1 year to make sure no new polyps are forming.  You also have internal hemorrhoids and diverticulosis.     The upper endoscopy showed a hiatal hernia and acid reflux (heartburn) on the esophagus.  Please avoid eating 3 hours before bed.            Specimen to Pathology Tissue: [id number]  Order: [id number]  Collected 6/12/2023  2:32 PM       Status: Final result       Visible to patient: Yes (seen)       Dx: Rectal bleeding; History of colon polyps    2 Result Notes       1 Patient Communication       1 Follow-up Encounter        Component  Ref Range & Units      Case Report     Surgical Pathology                                Case: [id number]                                     Authorizing Provider:  Sean Hubbard MD       Collected:           06/12/2023 02:32 PM             Ordering Location:     [facility]   Received:            06/12/2023 03:29 PM             Pathologist:           Gayathri Workman MD                                                               Specimens:   A) - Cecum, POLYP X1                                                                                  B) - Colon ascending, polyp x1                                                                        C) - Colon descending, polyp x1                                                                Final Diagnosis:        A. Cecal polyp x1; polypectomy:  Tubular adenoma.     B. Ascending colon polyp x1; polypectomy:   Multiple fragments of sessile serrated adenoma.     C. Descending colon polyp x1; polypectomy:   Multiple fragments of sessile serrated adenoma.        Electronically signed by Gayathri oWrkman MD on 6/13/2023 at 12:14 PM        Clinical Information       K62.5 Rectal Bleeding.  Z86.010 History Of Colon Polyps.        Gross Description      Specimen A is labeled \"Olavaria, cecal polyp x1\" received in formalin. The specimen consists of one fragment of pink-tan soft tissue measuring 0.2 cm in greatest dimension. Submitted entirely in cassette A1.      Specimen B is labeled \"Olavaria, ascending colon polyp x1\" received in formalin. The specimen consists of multiple fragments of pink-tan soft tissue measuring in aggregate 1.5 x 0.8 x 0.1 cm. Submitted entirely in cassette B1.     Specimen C is labeled \"Olavaria, descending colon polyp x1\" received in formalin. The specimen consists of multiple fragments of pink-tan soft tissue admixed with debris. The soft tissue measures in aggregate 1.3 x 0.7 x 0.1 cm. Submitted entirely in cassette C1. (jq)      Gayathri Workman M.D./eddie       Interpretation     Benign     Electronically signed by Gayathri Workman MD on 6/13/2023 at 12:14 PM     Resulting Agency [facility] ([facility])                Specimen Collected: 06/12/23  2:32 PM Last Resulted: 06/13/23 12:14 PM

## 2024-03-07 NOTE — TELEPHONE ENCOUNTER
Scheduled for:  Colonoscopy 55882  Provider Name: Dr. Hubbard   Date:  7/8/2024  Location:  [facility]           Sedation:  MAC  Time:  10:30 AM (patient is aware arrival time is 9:30 AM)   Prep: Golytely  Meds/Allergies Reconciled?:  Physician reviewed  Diagnosis with codes: Colon cancer screening Z12.11; Hx: Colon polyps Z86.010    Was patient informed to call insurance with codes (Y/N):  Yes, I [facility] with the patient.  Referral sent?:  Referral was sent at the time of electronic surgical scheduling.  EM or [facility] notified?:  I sent an electronic request to Endo Scheduling and received a confirmation today.  Medication Orders: Hold multivitamins one week prior to procedure.  Misc Orders:  N/A     Further instructions given by staff: I discussed the prep instructions with the patient which she verbally understood and is aware that I will send the instructions today.

## 2024-03-18 RX ORDER — CELECOXIB 200 MG/1
200 CAPSULE ORAL 2 TIMES DAILY
Qty: 60 CAPSULE | Refills: 0 | Status: SHIPPED | OUTPATIENT
Start: 2024-03-18

## 2024-03-18 NOTE — TELEPHONE ENCOUNTER
LOV: 9/13/23  Last Refilled:#60, 0rfs 2/19/24    ASSESSMENT/PLAN:         Bilateral knee pain secondary to OA  - X-rays do show evidence of moderate osteoarthritis and chondrocalcinosis  - No evidence of swelling to suggest pseudogout  - B/L injected with cortisone in March, helped significantly  - Now having pain again in both knees  - Both knees injected with 1 cc lidocaine and 40 mg of Kenalog in sterile fashion.  Patient tolerated procedure well  - She is also approved for gel injections, she will schedule this     Generalized OA, chronic pain  - She will try Celebrex 200 mg twice a day for her pain.  Advised to take with food and not with any other NSAID     Left clavicle fracture due to fall- stable  -Still having some pain, I will send to physical therapy     Left shoulder pain  - Left shoulder was injected back in March, it helped her symptoms but then she fell now having left clavicle and shoulder pain  - will send to PT     Elevated inflammatory markers  - Repeat blood work shows normal ESR and CRP     Chronic lower back pain s/p laminectomy and fusion  - She is also going to be following with neurosurgeon Dr. Thrasher for her chronic LBP     Pt will f/u in 2-3 mos for Synvisc injections      Caity Ellison MD  9/13/2023  5:00 PM           Please advise.

## 2024-04-02 ENCOUNTER — TELEPHONE (OUTPATIENT)
Dept: RHEUMATOLOGY | Facility: CLINIC | Age: 69
End: 2024-04-02

## 2024-04-02 NOTE — TELEPHONE ENCOUNTER
Please advise. Patient will need a PA for synvisc one injections.  LOV: 9/13/23  Future Appointments   Date Time Provider Department Center   4/3/2024  8:30 AM [facility] MRI RM1 (1.5T WIDE) [facility] MRI EM [place]   4/9/2024  9:30 AM Duyen Mcduffie,  EM PAIN EM [facility]   4/30/2024  1:30 PM Dalton Mendoza APRN [facility] [place]   5/14/2024 10:40 AM Caity Ellison MD [facility]   7/8/2024 10:30 AM VALENTÍN KEE [facility] None     Instructions    You were seen today for joint pain  We injected both knees with cortisone, hopefully it helps  For your shoulder pain I sent you to physical therapy  Try Celebrex once or twice a day for your pain  Do not take with Aleve or Motrin, Advil or ibuprofen

## 2024-04-02 NOTE — TELEPHONE ENCOUNTER
Patient calling to request a sooner appointment than her next scheduled follow up on 5/14. She states that she has been waiting for knee injections, please advise

## 2024-04-02 NOTE — TELEPHONE ENCOUNTER
If we can get PA done for Synvisc 1 injections in both knees  She can be put on the wait list to be seen sooner

## 2024-04-03 ENCOUNTER — APPOINTMENT (OUTPATIENT)
Dept: GENERAL RADIOLOGY | Age: 69
End: 2024-04-03
Attending: EMERGENCY MEDICINE
Payer: COMMERCIAL

## 2024-04-03 ENCOUNTER — APPOINTMENT (OUTPATIENT)
Dept: ULTRASOUND IMAGING | Age: 69
End: 2024-04-03
Attending: EMERGENCY MEDICINE
Payer: COMMERCIAL

## 2024-04-03 ENCOUNTER — OFFICE VISIT (OUTPATIENT)
Dept: RHEUMATOLOGY | Facility: CLINIC | Age: 69
End: 2024-04-03
Payer: COMMERCIAL

## 2024-04-03 ENCOUNTER — HOSPITAL ENCOUNTER (OUTPATIENT)
Dept: MRI IMAGING | Age: 69
Discharge: HOME | End: 2024-04-03
Attending: ANESTHESIOLOGY
Payer: COMMERCIAL

## 2024-04-03 ENCOUNTER — HOSPITAL ENCOUNTER (OUTPATIENT)
Age: 69
Discharge: HOME | End: 2024-04-03
Attending: EMERGENCY MEDICINE
Payer: COMMERCIAL

## 2024-04-03 VITALS
BODY MASS INDEX: 34.49 KG/M2 | SYSTOLIC BLOOD PRESSURE: 103 MMHG | DIASTOLIC BLOOD PRESSURE: 68 MMHG | HEART RATE: 65 BPM | WEIGHT: 207 LBS | HEIGHT: 65 IN

## 2024-04-03 VITALS
DIASTOLIC BLOOD PRESSURE: 62 MMHG | RESPIRATION RATE: 18 BRPM | SYSTOLIC BLOOD PRESSURE: 120 MMHG | HEART RATE: 74 BPM | TEMPERATURE: 98 F | OXYGEN SATURATION: 96 %

## 2024-04-03 DIAGNOSIS — M17.12 OSTEOARTHRITIS OF LEFT KNEE, UNSPECIFIED OSTEOARTHRITIS TYPE: Primary | ICD-10-CM

## 2024-04-03 DIAGNOSIS — M96.1 FAILED BACK SYNDROME, LUMBAR: ICD-10-CM

## 2024-04-03 DIAGNOSIS — M17.0 PRIMARY OSTEOARTHRITIS OF BOTH KNEES: Primary | ICD-10-CM

## 2024-04-03 DIAGNOSIS — M71.22 BAKER'S CYST, LEFT: ICD-10-CM

## 2024-04-03 LAB
#MXD IC: 0.4 X10ˆ3/UL (ref 0.1–1)
ATRIAL RATE: 64 BPM
BUN BLD-MCNC: 11 MG/DL (ref 7–18)
CHLORIDE BLD-SCNC: 102 MMOL/L (ref 98–112)
CO2 BLD-SCNC: 25 MMOL/L (ref 21–32)
CREAT BLD-MCNC: 0.7 MG/DL
EGFRCR SERPLBLD CKD-EPI 2021: 94 ML/MIN/1.73M2 (ref 60–?)
GLUCOSE BLD-MCNC: 114 MG/DL (ref 70–99)
HCT VFR BLD AUTO: 40.8 %
HCT VFR BLD CALC: 42 %
HGB BLD-MCNC: 13.3 G/DL
ISTAT IONIZED CALCIUM FOR CHEM 8: 1.21 MMOL/L (ref 1.12–1.32)
LYMPHOCYTES # BLD AUTO: 1.3 X10ˆ3/UL (ref 1–4)
LYMPHOCYTES NFR BLD AUTO: 30.6 %
MCH RBC QN AUTO: 27.3 PG (ref 26–34)
MCHC RBC AUTO-ENTMCNC: 32.6 G/DL (ref 31–37)
MCV RBC AUTO: 83.6 FL (ref 80–100)
MIXED CELL %: 10.2 %
NEUTROPHILS # BLD AUTO: 2.5 X10ˆ3/UL (ref 1.5–7.7)
NEUTROPHILS NFR BLD AUTO: 59.2 %
P AXIS: 56 DEGREES
P-R INTERVAL: 196 MS
PLATELET # BLD AUTO: 256 X10ˆ3/UL (ref 150–450)
POTASSIUM BLD-SCNC: 4.1 MMOL/L (ref 3.6–5.1)
Q-T INTERVAL: 416 MS
QRS DURATION: 90 MS
QTC CALCULATION (BEZET): 429 MS
R AXIS: -47 DEGREES
RBC # BLD AUTO: 4.88 X10ˆ6/UL
SODIUM BLD-SCNC: 139 MMOL/L (ref 136–145)
T AXIS: 42 DEGREES
TROPONIN I BLD-MCNC: <0.02 NG/ML
VENTRICULAR RATE: 64 BPM
WBC # BLD AUTO: 4.2 X10ˆ3/UL (ref 4–11)

## 2024-04-03 PROCEDURE — 99215 OFFICE O/P EST HI 40 MIN: CPT

## 2024-04-03 PROCEDURE — 93971 EXTREMITY STUDY: CPT | Performed by: EMERGENCY MEDICINE

## 2024-04-03 PROCEDURE — 36415 COLL VENOUS BLD VENIPUNCTURE: CPT

## 2024-04-03 PROCEDURE — 99213 OFFICE O/P EST LOW 20 MIN: CPT | Performed by: INTERNAL MEDICINE

## 2024-04-03 PROCEDURE — 3008F BODY MASS INDEX DOCD: CPT | Performed by: INTERNAL MEDICINE

## 2024-04-03 PROCEDURE — 3078F DIAST BP <80 MM HG: CPT | Performed by: INTERNAL MEDICINE

## 2024-04-03 PROCEDURE — 85025 COMPLETE CBC W/AUTO DIFF WBC: CPT | Performed by: EMERGENCY MEDICINE

## 2024-04-03 PROCEDURE — 80047 BASIC METABLC PNL IONIZED CA: CPT

## 2024-04-03 PROCEDURE — 73560 X-RAY EXAM OF KNEE 1 OR 2: CPT | Performed by: EMERGENCY MEDICINE

## 2024-04-03 PROCEDURE — 3074F SYST BP LT 130 MM HG: CPT | Performed by: INTERNAL MEDICINE

## 2024-04-03 PROCEDURE — 84484 ASSAY OF TROPONIN QUANT: CPT

## 2024-04-03 PROCEDURE — 72148 MRI LUMBAR SPINE W/O DYE: CPT | Performed by: ANESTHESIOLOGY

## 2024-04-03 PROCEDURE — 93010 ELECTROCARDIOGRAM REPORT: CPT

## 2024-04-03 PROCEDURE — 20610 DRAIN/INJ JOINT/BURSA W/O US: CPT | Performed by: INTERNAL MEDICINE

## 2024-04-03 PROCEDURE — 93005 ELECTROCARDIOGRAM TRACING: CPT

## 2024-04-03 RX ORDER — TRIAMCINOLONE ACETONIDE 40 MG/ML
40 INJECTION, SUSPENSION INTRA-ARTICULAR; INTRAMUSCULAR ONCE
Status: COMPLETED | OUTPATIENT
Start: 2024-04-03 | End: 2024-04-03

## 2024-04-03 NOTE — ED PROVIDER NOTES
Patient Seen in: [facility] [place]      History     Chief Complaint   Patient presents with    Leg Pain     Stated Complaint: Leg or Knee Pain    Subjective:   HPI    Patient is a 68-year-old female with past history of hypertension, cardiomyopathy, osteoarthritis, phlebitis, status post vein stripping who presents now with left knee pain and swelling.  Patient states the symptoms started approximately 3 days ago.  The patient states she has had progressively worsening left knee and left calf pain for approximately 3 days.  The patient denies any specific injury.  The patient states pain is most pronounced at the medial aspect of the left knee as well as the medial proximal left calf area.  Patient denies any numbness or tingling.  The patient denies any shortness of breath.  Patient states she has a history of cardiomyopathy and occasionally has some brief chest pain when she goes up and down the stairs.  The patient states she had brief episode of chest discomfort lasting \"minutes\" earlier today.  Patient denies any persistent chest pain.  The patient states she has no persistent shortness of breath but occasionally will feel slightly short of breath when going up and down the stairs.  Patient does have a cardiologist and saw her cardiologist most recently last year.    Objective:   Past Medical History:   Diagnosis Date    Age-related nuclear cataract of both eyes 06/24/2021    Anxiety state, unspecified     Back pain     chronic    Cardiomyopathy (HCC)     Cataract     both eyes    Disorder of thyroid     Dry eye 06/24/2021    Essential hypertension     Fibromyalgia     Floaters, right 06/24/2021    H/O varicose vein stripping     Per NG: Varicose vein ; vein stripping    Headache 2010    Per NG: OU    High cholesterol     Hypothyroidism     Meibomian gland dysfunction 2007    Musculoskeletal problem     Per NG: injection tendon origin/insertion    Musculoskeletal problem     Per NG: Arthrocentesis of the  left knee joint    Neck pain     chronic    Osteoarthritis     Radiation 10/23/2010    Per NG: Radiation to thyroid     Status post epidural steroid injection     Per NG: epidral injection x5 per pain mgmt              Past Surgical History:   Procedure Laterality Date    APPENDECTOMY      BACK SURGERY  2016    lumbar L4-L5 fusion      1979    1    CATARACT  2023    Right eye    CATARACT EXTRACTION W/  INTRAOCULAR LENS IMPLANT Left 2023    Dr. Windy Barnett @ [facility]    CATARACT EXTRACTION W/ INTRAOCULAR LENS IMPLANT Right 2023    Dr. Windy Barnett @[facility]     DELIVERY ONLY      CHOLECYSTECTOMY      COLONOSCOPY  10/2005    normal, non precancerous polyps    COLONOSCOPY N/A 2018    Procedure: COLONOSCOPY;  Surgeon: Sean Hubbard MD;  Location: [facility] ENDOSCOPY    COLONOSCOPY N/A 2023    Procedure: COLONOSCOPY;  Surgeon: Sean Hubbard MD;  Location: [facility] ENDO    CYST ASPIRATION LEFT Left 2007    FNA    CYST ASPIRATION RIGHT      EGD      LAMINECTOMY      Per NG: Disc  displacement x2 and spinal fusion 2016    [facility] LOCALIZATION WIRE 1 SITE LEFT (CPT=19281)  2007    OTHER SURGICAL HISTORY      Per NG: Arthrocentesis    OTHER SURGICAL HISTORY      Per NG: arthrocentesis of the knee joint    YAG IRIDOTOMY - OD - RIGHT EYE Right 10/10/1997    [facility]    YAG IRIDOTOMY - OS - LEFT EYE Left 10/16/1997    [facility]                Social History     Socioeconomic History    Marital status:    Tobacco Use    Smoking status: Former     Packs/day: 1.00     Years: 27.00     Additional pack years: 0.00     Total pack years: 27.00     Types: Cigarettes     Quit date: 2005     Years since quittin.2    Smokeless tobacco: Never    Tobacco comments:     7 years ago   Vaping Use    Vaping Use: Never used   Substance and Sexual Activity    Alcohol use: No    Drug use: No   Other Topics Concern    Caffeine Concern Yes     Comment: 3 cups of coffee daily    Exercise No    Pt has a  pacemaker No    Pt has a defibrillator No    Breast feeding No    Reaction to local anesthetic No              Review of Systems    Positive for stated complaint: Leg or Knee Pain  Other systems are as noted in HPI.  Constitutional and vital signs reviewed.      All other systems reviewed and negative except as noted above.    Physical Exam     ED Triage Vitals [04/03/24 0911]   /62   Pulse 74   Resp 18   Temp 97.8 °F (36.6 °C)   Temp src Temporal   SpO2 96 %   O2 Device None (Room air)       Current:/62   Pulse 74   Temp 97.8 °F (36.6 °C) (Temporal)   Resp 18   SpO2 96%         Physical Exam    Constitutional: Well-developed well-nourished in no acute distress  Head: Normocephalic, no swelling or tenderness  Eyes: Nonicteric sclera, no conjunctival injection  ENT: TMs are clear and flat bilaterally.  There is no posterior pharyngeal erythema  Chest: Clear to auscultation, no tenderness  Cardiovascular: Regular rate and rhythm without murmur; easily palpable left posterior tibial and dorsalis pedis pulses  Abdomen: Soft, nontender and nondistended  Neurologic: Patient is awake, alert and oriented ×3.  The patient's motor strength is 5 out of 5 and symmetric in the upper and lower extremities bilaterally  Extremities: There is mild tenderness without significant swelling to the medial aspect of the left knee and left proximal calf.  There are few noted superficial varicosities in the left medial calf  Skin: No pallor, no redness or warmth to the touch      ED Course     Labs Reviewed   POCT ISTAT CHEM8 CARTRIDGE - Abnormal; Notable for the following components:       Result Value    ISTAT Glucose 114 (*)     All other components within normal limits   ISTAT TROPONIN - Normal   POCT CBC     EKG    Rate, intervals and axes as noted on EKG Report.  Rate: 64  Rhythm: Sinus Rhythm  Reading: Patient's EKG demonstrates a sinus rhythm with a rate of 64.  There is left axis deviation.  There is no obvious  acute ST elevation.  When compared to an EKG done 9/20/2021, there are no acute changes                 Pulse ox is 96% on room air, normal.  Vital signs are stable     Patient's x-ray results were independently reviewed by myself.  The plain films of the knee indicate no acute fracture with moderate tricompartment osteoarthritis.  Radiologist report of the ultrasound of the left lower extremity demonstrates no left lower extremity DVT.  There is a 3.6 cm left Baker's cyst noted.    Patient's lab results and x-ray results were reviewed with the patient.  The patient states she is able to take ibuprofen for anti-inflammatory.  The patient has a previously scheduled follow-up appointment with her orthopedist, Dr. Kasper, next month.  The need for further evaluation by the patient's cardiologist for any persistent/worsening dyspnea on exertion was reviewed with the patient.  Will place in knee immobilizer.  The patient declines a walker.    MDM      The arthritis versus knee sprain versus DVT of the left lower extremity                                   Medical Decision Making      Disposition and Plan     Clinical Impression:  1. Osteoarthritis of left knee, unspecified osteoarthritis type    2. Baker's cyst, left         Disposition:  Discharge  4/3/2024 10:09 am    Follow-up:  Sebas Kasper MD  [address] [address]  [phone]      As scheduled.    Follow-up with your cardiologist for any persistent dyspnea on exertion                Medications Prescribed:  Current Discharge Medication List

## 2024-04-03 NOTE — PATIENT INSTRUCTIONS
Seen today for worsening knee pain mostly in her left knee  Both knees were injected with cortisone  Left knee does show a Baker's cyst, if it continues to be painful may have to have you see radiologist to drain it  Can see me in May for the Synvisc injection

## 2024-04-03 NOTE — TELEPHONE ENCOUNTER
She can come in at 2:30 PM today for cortisone injection, she may have to wait as I am overlooking her

## 2024-04-03 NOTE — PROGRESS NOTES
Vandana Jefferson is a 68 year old female.    HPI:     Chief Complaint   Patient presents with    Knee Pain     Juan Knee pain mostly Left leg pain and swelling        I had the pleasure of seeing Vandana Jefferson on 4/3/2024 for follow up Polyarthralgia's, OA knee's    Current Medications:  Advil as needed 2-6 pills a day  Blood work:  ESR 36, CRP 4.29--> repeat in March 2022 normal  Neg RF, CCP, HLA-B27  XR R knee: moderate OA, +choncrocalcinosis     Interval History:  This is a 67 yo F with hx of HTN, HLD, Hypothyroid, GERD, CBP s/p laminectomy 1989 and fusion 2016 presents with polyarthralgias.  She was seen by Dr. Gibbs in the past and received a cortisone injection in her left knee in 2018.  She also has chronic lower back pain.  After her fusion in 2016 she was doing well for about 2 years but then started to have a lot more back pain.  She reports the back pain is in the lower spine and radiates down her gluteal region and down to her knees.  She is now following with neurosurgeon Dr. Thrasher and will be getting MRI of her back next week.  She reports diffuse joint pain involving her hands, wrists, shoulders, knees.  She has bone spurs in her DIPs.  Again she was seen by Dr. Gibbs in the past for her osteoarthritis and received cortisone injections in her left knee.  Never received gel injections.  Currently takes tramadol once in the morning and then Advil 2 to 4 pills a day.  This is mostly for her back pain    3/30/2022:  Presents for f/u of polyarthralgia's and L knee pain  We injected her left knee with cortisone about a week ago  Blood work showed negative RF and CCP but her inflammation markers were high  Her left knee has been doing better after the injection.  She can walk up and down stairs with minimal pain  Continues to have chronic left shoulder pain.  At times is hard to raise it.  No pain in her right shoulder.  No pain in her hips  Has hand pain but mostly in her DIPs.  No pain or swelling  in her PIPs or MCPs.  She does have wrist swelling  She has chronic back pain  No temporal pain, headache, jaw pain or blurry vision    12/1/2022:  Presents for f/u of R knee pain  X-ray of her right knee did show moderate OA changes and chondrocalcinosis  Fell on left shoulder and broke R clavicle, following with orthopedic now  Having pain in both knees again, Left is worse than Right  Yesterday after shower could not walk due to pain in R knee. Also had some swelling in the R knee  Right knee is better today, was taking advil, icing and heat    9/13/2023:  Presents for f/u of knee pain  Also has diffuse joint pain, all joints hurt  Last year she fell and broke her left clavicle, has weakness in shoulders, wants to try physical therapy  Had lower back epidural injection about 2 mos ago but only helped 20%  Has been taking Advil 4 to 6 pills a day for her pain    4/3/2024:  Presents for f/u of knee pain  Having worsening left knee pain, navin the back of the knee  Went to urgent care due to the left knee pain, US showed 3.6 cm left bakers cyst. Also has pain behind the knee        HISTORY:  Past Medical History:   Diagnosis Date    Age-related nuclear cataract of both eyes 06/24/2021    Anxiety state, unspecified     Back pain     chronic    Cardiomyopathy (HCC)     Cataract     both eyes    Disorder of thyroid     Dry eye 06/24/2021    Essential hypertension     Fibromyalgia     Floaters, right 06/24/2021    H/O varicose vein stripping     Per NG: Varicose vein ; vein stripping    Headache 2010    Per NG: OU    High cholesterol     Hypothyroidism     Meibomian gland dysfunction 2007    Musculoskeletal problem     Per NG: injection tendon origin/insertion    Musculoskeletal problem     Per NG: Arthrocentesis of the left knee joint    Neck pain     chronic    Osteoarthritis     Radiation 10/23/2010    Per NG: Radiation to thyroid     Status post epidural steroid injection     Per NG: epidral injection x5 per pain mgmt       Social Hx Reviewed   Family Hx Reviewed     Medications (Active prior to today's visit):  Current Outpatient Medications   Medication Sig Dispense Refill    LORazepam 1 MG Oral Tab Take 1/2 to 1 tablet (1 mg) twice a day as needed for anxiety 60 tablet 0    celecoxib 200 MG Oral Cap Take 1 capsule (200 mg total) by mouth 2 (two) times daily. 60 capsule 0    fluticasone propionate 50 MCG/ACT Nasal Suspension 2 sprays by Each Nare route nightly. 1 each 5    FLUoxetine HCl 40 MG Oral Cap Take 1 capsule (40 mg total) by mouth every morning. 90 capsule 1    temazepam 30 MG Oral Cap Take 1 capsule (30 mg total) by mouth nightly as needed. 30 capsule 2    levocetirizine 5 MG Oral Tab Take 1 tablet (5 mg total) by mouth every evening. 30 tablet 1    levothyroxine 112 MCG Oral Tab Take 1 tablet (112 mcg total) by mouth before breakfast. 90 tablet 1    albuterol 108 (90 Base) MCG/ACT Inhalation Aero Soln Inhale 2 puffs into the lungs every 4 (four) hours as needed. 18 g 0    Spacer/Aero-Holding Chambers Does not apply Device Use with albuterol as needed 1 each 0    busPIRone 15 MG Oral Tab TAKE 1 TABLET BY MOUTH TWICE A DAY (Patient taking differently: Take 0.5 tablets (7.5 mg total) by mouth in the morning and 0.5 tablets (7.5 mg total) before bedtime.) 180 tablet 0    gabapentin 800 MG Oral Tab Take 1 tablet (800 mg total) by mouth 2 (two) times daily. 180 tablet 3    famotidine 20 MG Oral Tab Take 1 tablet (20 mg total) by mouth daily. (Patient taking differently: Take 1 tablet (20 mg total) by mouth daily. Only takes as needed about 3 times per month) 30 tablet 11    OMEPRAZOLE 40 MG Oral Capsule Delayed Release TAKE 1 CAPSULE(40 MG) BY MOUTH DAILY BEFORE BREAKFAST 90 capsule 1    Melatonin 10 MG Oral Cap Take 5 mg by mouth nightly as needed.      carvedilol 3.125 MG Oral Tab TAKE 1 TABLET(3.125 MG) BY MOUTH TWICE DAILY WITH MEALS 180 tablet 3    rosuvastatin (CRESTOR) 20 MG Oral Tab Take 1 tablet (20 mg total) by  mouth nightly. 90 tablet 3    levothyroxine 112 MCG Oral Tab Take 1 tablet (112 mcg total) by mouth before breakfast. (Patient not taking: Reported on 4/3/2024) 90 tablet 3     .cmed  Allergies:  Allergies   Allergen Reactions    Demerol [Meperidine] NAUSEA AND VOMITING and NAUSEA ONLY     Other reaction(s): Vomiting    Hydrocodone NAUSEA AND VOMITING    Pregabalin      Other reaction(s): Stomach discomforts    Vicodin Tuss [Hydrocodone-Guaifenesin] NAUSEA AND VOMITING and NAUSEA ONLY     Other reaction(s): Vomiting    Tramadol NAUSEA ONLY         ROS:   All other ROS are negative.     PHYSICAL EXAM:   GEN: AAOx3, NAD  HEENT: EOMI, PERRLA, no injection or icterus, oral mucosa moist, no oral lesions. No lymphadenopathy. No facial rash  CVS: RRR, no murmurs rubs or gallops. Equal 2+ distal pulses.   LUNGS: CTAB, no increased work of breathing  ABDOMEN:  soft NT/ND, +BS, no HSM  SKIN: No rashes or skin lesions. No nail findings  MSK:  Cervical spine: FROM  Hands: + [place]berden nodes in R second DIP and L second and third DIP.  No swelling, able to make a fist  Wrist: FROM, no pain or swelling or warmth on palpation  Elbow: FROM, no pain or swelling or warmth on palpation  Shoulders: L shoulder abduction and flexion limited to about 150 degrees  Hip: normal log roll, no lateral hip pain, FRED test negative b/l  Knees: FROM, no warmth or effusion present. No pain with ROM.   Ankles: FROM, no pain or swelling or warmth on palpation  Feet: no pain with MTP squeeze, no toe swelling or pain or warmth on palpation with FROM  Spine: no lumbar or sacral pain on palpation.  NEURO: Cranial nerves II-XII intact grossly. 5/5 strength throughout in both upper and lower extremities, sensation intact.  PSYCH: normal mood       LABS:     Component      Latest Ref Rng & Units 3/23/2022   C-REACTIVE PROTEIN      <0.30 mg/dL 4.29 (H)   C-Citrullinated Peptide IgG AB      0.0 - 6.9 U/mL 1.3   SED RATE      0 - 30 mm/Hr 36 (H)   RHEUMATOID  FACTOR      <15 IU/mL <10     Imaging:     XR L knee:  CONCLUSION:   1. Demineralization.   2. Osteoarthritis showing progression from October 29, 2010.   3. Chondrocalcinosis a new finding.   4. Postop changes in the soft tissues of the thigh.    XR R knee:  CONCLUSION:   1. Demineralization.   2. Mild to moderate osteoarthritis.   3. Chondrocalcinosis.     ASSESSMENT/PLAN:     Bilateral knee pain secondary to OA  - X-rays do show evidence of moderate osteoarthritis and chondrocalcinosis  - No evidence of swelling to suggest pseudogout  - B/L injected with cortisone in March and Sept 2023  - Now having pain worsening pain in the left knee, went to urgent care.  Ultrasound showed a 3.6 cm Baker's cyst  - Both knees injected with 1 cc lidocaine and 40 mg of Kenalog in sterile fashion.  Patient tolerated procedure well  - We will get her approved for gel injections, she will schedule this    Generalized OA, chronic pain  - She will try Celebrex 200 mg twice a day for her pain.  Advised to take with food and not with any other NSAID    Left clavicle fracture due to fall- stable  -Still having some pain, I will send to physical therapy    Left shoulder pain  - Left shoulder was injected back in March 2023, it helped her symptoms but then she fell now having left clavicle and shoulder pain  - will send to PT    Elevated inflammatory markers  - Repeat blood work shows normal ESR and CRP    Chronic lower back pain s/p laminectomy and fusion  - She is also going to be following with neurosurgeon Dr. Thrasher for her chronic LBP  - MRI lower spine was done today     Pt will f/u in 2-3 mos for Synvisc injections     Caity Ellison MD  4/3/2024  2:30 PM

## 2024-04-03 NOTE — TELEPHONE ENCOUNTER
Patient is requesting to be seen as soon as possible for a cortisone injection. Patient will get the synvisc injection at a later date.   Patient is in severe pain and was treated at the [facility] today. Patient was given a knee immobilizer at the immediate care. Please advise

## 2024-04-04 ENCOUNTER — TELEPHONE (OUTPATIENT)
Dept: FAMILY MEDICINE CLINIC | Facility: CLINIC | Age: 69
End: 2024-04-04

## 2024-04-04 NOTE — TELEPHONE ENCOUNTER
Action Requested: Summary for Provider     []  Critical Lab, Recommendations Needed  [x] Need Additional Advice  []   FYI    []   Need Orders  [] Need Medications Sent to Pharmacy  []  Other     SUMMARY: Patient is asking to be seen on:    Monday Anytime  RES 24 availabe  Tuesday after 12 noon TCM at 1200  Wednesday Anytime no sppt.    Was seen in UC  would like to discuss EKG with your    Want to be seen by PCP    Please advise     Reason for call: No chief complaint on file.  Onset: Data Unavailable

## 2024-04-04 NOTE — TELEPHONE ENCOUNTER
Spoke with patient assisted with appointment    Future Appointments   Date Time Provider Department Center   4/9/2024  9:30 AM Duyen Mcduffie,  EM PAIN [facility]   4/9/2024 12:00 PM Nica Ocampo MD [facility]   4/30/2024  1:30 PM Dalton Mendoza APRN [facility]   5/14/2024 10:40 AM [facility]   7/8/2024 10:30 AM VIDHYA, VALENTÍN [facility] None

## 2024-04-05 NOTE — TELEPHONE ENCOUNTER
From: Gurpreet Castillo  To: Mary Rodriguez MD  Sent: 2/6/2018 5:36 PM CST  Subject: Test Results Question    I have a question about SURGICAL PATHOLOGY TISSUE resulted on 2/2/18, 12:28 PM.    Dear Dr Chuy Thompson and Dr Shaina Bentley , yesterday Feb 5 at 5:47 PM , REJI Rogers PULMONARY PROGRESS NOTE- OUTPATIENT     Chief Complaint: Follow-up (Sarcoidosis / CT Chest & Labs)    HPI:  This is a pleasant 50 year-old female, current smoker (23 pack/year) with a PMH for HTN, RA. Known to our clinic for bronchoscopy with lymph node biopsy showing non-necrotizing granulomas consistent with pulmonary sarcoidosis. Here in follow up with a CT scan of the chest, ACE, CMP and CBC.     No breathing problems between visits. Quit smoking 12/2023 with Varenicline!     Diagnosed with RA many years ago and was treated with methotrexate for one year (stopped 16 years ago). Joint pain returning and would like to re-establish with rheumatology.    MMRC dyspnea scale (Modified medical research [place])   Patient responded that on a day that they feel their best, they are a:  Grade 1 - I get short of breath when hurring on level ground or walking up a slight hill.    PAST MEDICAL HISTORY:  Past Medical History:   Diagnosis Date    Essential (primary) hypertension     Failed moderate sedation during procedure     slow to  wake after tubes tied    PONV (postoperative nausea and vomiting)     RA (rheumatoid arthritis) (CMD)     Tobacco use disorder        PAST SURGERY HISTORY:  Past Surgical History:   Procedure Laterality Date    Bronchoscopy transendo for peripheral lesions  12/14/2023    Bronchoscopy transtrach transbronch bx asp 1 or 2 mediastinal lymph  12/14/2023    Bronchoscopy w computer assisted image  12/14/2023    Bronchoscopy,diagnostic w lavage  12/14/2023    Colonoscopy diagnostic  07/28/2023    repeat in 10 years    Ct scan for needle biopsy  12/14/2023    Endometrial ablation  03/04/2019    Dr. Angel: Novasure endometrial ablation    Endometrial biopsy  12/12/2018    Dr. Angel    Hysteroscopy  03/04/2019    Dr. Angel: Hysteroscopy with Myosure polyp removal    Removable partial denture  12/2016    Sling oper stres incontinence  03/04/2019    Dr. Grande: [employer] TVT sling    Tooth  extraction  2016    4 teeth; deture    Tubal ligation  2007    [place] tooth extraction  2007     FAMILY HISTORY:  Family History   Problem Relation Age of Onset    Cataracts Mother     Cataracts Father     Retinal detachment Father     Diabetes Maternal Grandmother     Cancer, Prostate Maternal Grandfather 45    Heart disease Paternal Grandfather         pace maker     SOCIAL HISTORY:  Social History     Socioeconomic History    Marital status: /Civil Union     Spouse name: JOSEPH    Number of children: 2    Years of education: 16    Highest education level: Not on file   Occupational History    Occupation:      Employer: [employer] ( ALL SITES)   Tobacco Use    Smoking status: Former     Current packs/day: 0.00     Average packs/day: 0.8 packs/day for 31.0 years (23.2 ttl pk-yrs)     Types: Cigarettes     Start date: 1993     Quit date: 2023     Years since quittin.2    Smokeless tobacco: Never    Tobacco comments:     8 cigarettes a day but trying to quit   Vaping Use    Vaping Use: never used   Substance and Sexual Activity    Alcohol use: Yes     Alcohol/week: 1.0 - 2.0 standard drink of alcohol     Types: 1 Glasses of wine per week     Comment: a couple drinks a month    Drug use: No    Sexual activity: Yes     Partners: Male     Birth control/protection: Surgical     Comment: Tubal Ligation   Other Topics Concern     Service No    Blood Transfusions No    Caffeine Concern Yes     Comment: 6-12 oz cans soda daily    Occupational Exposure No    Hobby Hazards No    Sleep Concern No    Stress Concern No    Weight Concern No    Special Diet No    Back Care No    Exercise No    Bike Helmet Not Asked    Seat Belt Yes    Self-Exams Yes   Social History Narrative    Not on file     Social Determinants of Health     Financial Resource Strain: Not on file   Food Insecurity: Not on file   Transportation Needs: Not on file   Physical Activity:  Not on file   Stress: Not on file   Social Connections: Not on file   Interpersonal Safety: Not At Risk (7/25/2023)    Interpersonal Safety     Social Determinants: Intimate Partner Violence Past Fear: No     Social Determinants: Intimate Partner Violence Current Fear: No     ALLERGIES:  ALLERGIES:   Allergen Reactions    Vicodin [Hydrocodone-Acetaminophen] VOMITING     CURRENT MEDICATIONS  Current Outpatient Medications   Medication Sig Dispense Refill    potassium chloride (KLOR-CON) 10 MEQ ER tablet Take 1 tablet by mouth daily. 10 tablet 0    Varenicline (APO-Varenicline) 0.5 MG tablet Take one 0.5mg  tablet once a day for 3 days, then increase to one 0.5-mg  tablet twice a day for 4 days. 0.5 mg at night and 1 mg in am for 4 days then switch continuing pack 53 tablet 0    Varenicline (APO-Varenicline) 1 MG tablet Take 1 tablet by mouth in the morning and 1 tablet in the evening. 60 tablet 2    losartan-hydrochlorothiazide (HYZAAR) 50-12.5 MG per tablet Take 1 tablet by mouth daily. 90 tablet 3     No current facility-administered medications for this visit.     Review of systems:    Pertinent positives include joint pain, shoulder pain, swelling of the joints and joint deformities.     PHYSICAL EXAMINATION:   VITAL SIGNS:  Visit Vitals  /80   Pulse 76   Resp 16   Ht 5' 6\" (1.676 m)   Wt 82.7 kg (182 lb 4.8 oz)   LMP 05/17/2023 (Approximate)   SpO2 98%   BMI 29.42 kg/m²   ,   Weight    04/05/24 1256   Weight: 82.7 kg (182 lb 4.8 oz)   , Body mass index is 29.42 kg/m².  GENERAL: No acute distress, well-developed, well-nourished. Good eye contact. No clubbing or cyanosis.  EYES: Pupils are equal, round and react to light. Extraocular movements are intact with normal conjunctivae.   HEENT: Normocephalic. Hearing is appropriate. Oral mucosa is moist. No oropharyngeal erythema, exudate. Tympanic membranes are clear. No tenderness with palpation over the sinuses, frontal or maxillary.   NECK: Supple. Nontender  to the touch with no obvious JVD, no lymphadenopathy, cervical or supraclavicular noted. ROM is appropriate.  RESPIRATORY: Anterior/posterior lung fields clear. No wheezes or other adventitious sounds. Symmetrical chest wall expansion without a prolonged expiratory phase. Speech is non labored. No cough. No use of accessory muscles.   CARDIOVASCULAR: Normal rate, regular rhythm. S1, S2. No murmurs or extra heart tones. No edema to the lower extremities.  GI: Soft, nontender to the touch, nondistended, with normal bowel sounds. Obese at the expense of adipose tissue.  MUSCULOSKELETAL: Normal range of motion, normal strength, no deformities. Normal gait. Independent with mobility.   SKIN: Warm, moist, and intact. No rashes noted.   NEUROLOGIC: Alert and oriented x3, cooperative, appropriate mood and affect. Normal judgment. No focal deficits noted.     LAB  Component      Latest Ref [facility] 3/29/2024  1:10 PM   WBC      4.2 - 11.0 K/mcL 7.7    RBC      4.00 - 5.20 mil/mcL 4.79    HGB      12.0 - 15.5 g/dL 14.5    HCT      36.0 - 46.5 % 43.2    MCV      78.0 - 100.0 fl 90.2    MCH      26.0 - 34.0 pg 30.3    MCHC      32.0 - 36.5 g/dL 33.6    RDW-CV      11.0 - 15.0 % 13.2    RDW-SD      39.0 - 50.0 fL 44.1    PLT      140 - 450 K/mcL 313    NRBC      <=0 /100 WBC 0    Neutrophil      % 71    LYMPH      % 18    MONO      % 7    EOSIN      % 3    BASO      % 1    Immature Granulocytes      % 0    Absolute Neutrophil      1.8 - 7.7 K/mcL 5.4    Absolute Lymph      1.0 - 4.8 K/mcL 1.4    Absolute Mono      0.3 - 0.9 K/mcL 0.5    Absolute Eos      0.0 - 0.5 K/mcL 0.3    Absolute Baso      0.0 - 0.3 K/mcL 0.1    Absolute Immature Granulocytes      0.0 - 0.2 K/mcL 0.0    Sodium      135 - 145 mmol/L 138    Potassium      3.4 - 5.1 mmol/L 3.8    Chloride      97 - 110 mmol/L 102    CO2      21 - 32 mmol/L 28    ANION GAP      7 - 19 mmol/L 12    Glucose      70 - 99 mg/dL 93    BUN      6 - 20 mg/dL 14    Creatinine      0.51 -  0.95 mg/dL 0.66    Glomerular Filtration Rate      >=60  >90    BUN/CREATININE RATIO      7 - 25  21    CALCIUM      8.4 - 10.2 mg/dL 9.0    TOTAL BILIRUBIN      0.2 - 1.0 mg/dL 0.4    AST/SGOT      <=37 Units/L 21    ALT/SGPT      <64 Units/L 31    ALK PHOSPHATASE      45 - 117 Units/L 101    Albumin      3.6 - 5.1 g/dL 3.8    TOTAL PROTEIN      6.4 - 8.2 g/dL 7.3    GLOBULIN      2.0 - 4.0 g/dL 3.5    A/G Ratio, Serum      1.0 - 2.4  1.1    Angiotensin Converting Enzyme      16 - 85 U/L 43      IMAGING  03/29/2024: CT CHEST WO CONTRAST   INDICATION: Follow-up right lower lobe lesion. Sarcoidosis.   COMPARISON: 12/14/2023   TECHNIQUE: CT chest without IV contrast   FINDINGS: Emphysema. Biapical scarring.   Irregular 11 mm right lower lobe nodule abutting the pleura  posteromedially, not substantially changed since the most recent exams  although remains indeterminate. Enlarging irregular nodular opacity in the  right middle lobe now measuring 2.3 x 2.2 cm on series 7 image 83 and  series 4 image 58 and has become more masslike.   Mild atelectasis or scarring in the lung bases. Gallstones. No acute  findings in the upper abdomen. No pleural or pericardial effusion. Stable  mediastinal adenopathy. Stable top normal sized axillary nodes.   No acute bony abnormality.    IMPRESSION:  1. Interval increase in size of a now 2.3 cm irregular airspace opacity in  the right middle lobe. Additional 11 mm irregular nodule in the posterior  medial right lower lobe is not significantly changed. While possibly  reflecting lesions of sarcoidosis, these remain indeterminate and  neoplastic etiology cannot be excluded. Short-term continued follow-up is  recommended.  2. Additional findings are stable including mediastinal adenopathy and  additional lung nodules.  3. Gallstones.Electronically Signed by: Sandor Nina MD    IMPRESSION AND PLAN:     Leonor was seen today for follow-up.    Diagnoses and all orders for this  visit:    Rheumatoid arthritis involving hand with positive rheumatoid factor, unspecified laterality (CMD)  -     SERVICE TO RHEUMATOLOGY IMMUNOLOGY    Lung nodules  -right middle lobe lung nodule on recent imaging enlarged. High risk for malignancy (history of cigarette smoking/very abnormal PET scan). Robotic assisted bronchoscopy with anesthesia with the use of cone beam fluoroscopy under anesthesia to be used for biopsies of the Right middle lobe lesion.  Endobronchial ultrasound may used for lymph node sampling, site to be determined using real time assessment. To be performed by Dr. Everardo Caballero. Risks and benefits of the procedure reviewed and she agrees to proceed.   -orders placed.     Sarcoidosis of lung (CMD)  -sarcoidosis of the chest lymph nodes diagnosed by bronchoscopic sampling.   -mediastinal and hilar lymph nodes are stable on recent imaging.   -CMP does not show hypercalcemia, normal liver function test.    -ACE level normal.   -CBC stable.  --No overt evidence of sarcoidosis.  -prior to her next visit, add complete PFT's.  -The pathophysiology of Sarcoidosis was explained as well as current progress and treatment goals discussed. We discussed signs of sarcoidosis activity and the importance to notify the clinic of any change in condition.no evidence of active.     History of smoking 25-50 pack years  -congratulated on smoking cessation.  -continue Varenicline.  -meets criteria for annual CT lung cancer screening (age, pack/year smoked, quit within 15 years).    -resume annual CT lung cancer screening once monitoring current lung nodules resolved.      Follow up to be determined after bronchoscopy.     I spent a total of 61 minutes on the day of the visit.  This includes pre-charting, chart review, and documenting.     FLACO Massey

## 2024-04-09 ENCOUNTER — OFFICE VISIT (OUTPATIENT)
Dept: FAMILY MEDICINE CLINIC | Facility: CLINIC | Age: 69
End: 2024-04-09
Payer: COMMERCIAL

## 2024-04-09 ENCOUNTER — OFFICE VISIT (OUTPATIENT)
Dept: PAIN CLINIC | Facility: HOSPITAL | Age: 69
End: 2024-04-09
Attending: ANESTHESIOLOGY
Payer: COMMERCIAL

## 2024-04-09 VITALS
BODY MASS INDEX: 34.49 KG/M2 | WEIGHT: 207 LBS | OXYGEN SATURATION: 94 % | DIASTOLIC BLOOD PRESSURE: 73 MMHG | SYSTOLIC BLOOD PRESSURE: 109 MMHG | HEIGHT: 65 IN | HEART RATE: 68 BPM

## 2024-04-09 VITALS
OXYGEN SATURATION: 95 % | SYSTOLIC BLOOD PRESSURE: 96 MMHG | HEART RATE: 78 BPM | WEIGHT: 203 LBS | DIASTOLIC BLOOD PRESSURE: 68 MMHG | BODY MASS INDEX: 34 KG/M2

## 2024-04-09 DIAGNOSIS — M17.12 PRIMARY OSTEOARTHRITIS OF LEFT KNEE: Primary | ICD-10-CM

## 2024-04-09 DIAGNOSIS — M47.816 LUMBAR SPONDYLOSIS: Primary | ICD-10-CM

## 2024-04-09 DIAGNOSIS — R94.31 ABNORMAL EKG: ICD-10-CM

## 2024-04-09 DIAGNOSIS — M71.22 BAKER'S CYST, LEFT: ICD-10-CM

## 2024-04-09 DIAGNOSIS — Z86.79 HISTORY OF CARDIOMYOPATHY: ICD-10-CM

## 2024-04-09 PROCEDURE — 99211 OFF/OP EST MAY X REQ PHY/QHP: CPT

## 2024-04-09 PROCEDURE — 3078F DIAST BP <80 MM HG: CPT | Performed by: FAMILY MEDICINE

## 2024-04-09 PROCEDURE — 3074F SYST BP LT 130 MM HG: CPT | Performed by: FAMILY MEDICINE

## 2024-04-09 PROCEDURE — 99214 OFFICE O/P EST MOD 30 MIN: CPT | Performed by: FAMILY MEDICINE

## 2024-04-09 PROCEDURE — 3008F BODY MASS INDEX DOCD: CPT | Performed by: FAMILY MEDICINE

## 2024-04-09 RX ORDER — POLYETHYLENE GLYCOL-3350 AND ELECTROLYTES 236; 6.74; 5.86; 2.97; 22.74 G/274.31G; G/274.31G; G/274.31G; G/274.31G; G/274.31G
POWDER, FOR SOLUTION ORAL
COMMUNITY
Start: 2024-03-06

## 2024-04-09 RX ORDER — IBUPROFEN 400 MG/1
400 TABLET ORAL EVERY 6 HOURS PRN
COMMUNITY

## 2024-04-09 NOTE — PROGRESS NOTES
Patient presents in office today with reported pain in lower back with right sided radiculopathy.  Pain limits her to 10mins of activity before needing to take a break.      Current pain level reported  3-4/10    Last reported dose of Gabapentin this morning       Narcotic Contract renewal NA

## 2024-04-09 NOTE — CHRONIC PAIN
Follow-up Note  Vandana Jefferson is a 68 year old female, originally referred to the pain clinic by Nica Glover MD, with history of No diagnosis found. returns to the clinic for follow up. Patient is complaining of low back pain radiating down to both lower extremities.  Today pain is 5 out of 10. Range of pain is from 3 to 7 out of 10.  For pain control patient presently is using Aleve. Pain Meds are reducing his pain by 20-30%. Tolerating well. No new symptoms.  Patient has no change in bowel/bladder function.  Patient had lumbar epidurals injection on July 11.  Injection gave her 60% improvement.  Still in pain but not so severe cannot sleep during the night.    ALLERGIES:  Allergies   Allergen Reactions    Demerol [Meperidine] NAUSEA AND VOMITING and NAUSEA ONLY     Other reaction(s): Vomiting    Hydrocodone NAUSEA AND VOMITING    Pregabalin      Other reaction(s): Stomach discomforts    Vicodin Tuss [Hydrocodone-Guaifenesin] NAUSEA AND VOMITING and NAUSEA ONLY     Other reaction(s): Vomiting    Tramadol NAUSEA ONLY       MEDICATION LIST:  Current Outpatient Medications   Medication Sig Dispense Refill    LORazepam 1 MG Oral Tab Take 1/2 to 1 tablet (1 mg) twice a day as needed for anxiety 60 tablet 0    celecoxib 200 MG Oral Cap Take 1 capsule (200 mg total) by mouth 2 (two) times daily. 60 capsule 0    fluticasone propionate 50 MCG/ACT Nasal Suspension 2 sprays by Each Nare route nightly. 1 each 5    FLUoxetine HCl 40 MG Oral Cap Take 1 capsule (40 mg total) by mouth every morning. 90 capsule 1    temazepam 30 MG Oral Cap Take 1 capsule (30 mg total) by mouth nightly as needed. 30 capsule 2    Spacer/Aero-Holding Chambers Does not apply Device Use with albuterol as needed 1 each 0    busPIRone 15 MG Oral Tab TAKE 1 TABLET BY MOUTH TWICE A DAY (Patient taking differently: Take 0.5 tablets (7.5 mg total) by mouth in the morning and 0.5 tablets (7.5 mg total) before bedtime.) 180 tablet 0    gabapentin 800  MG Oral Tab Take 1 tablet (800 mg total) by mouth 2 (two) times daily. 180 tablet 3    famotidine 20 MG Oral Tab Take 1 tablet (20 mg total) by mouth daily. (Patient taking differently: Take 1 tablet (20 mg total) by mouth daily. Only takes as needed about 3 times per month) 30 tablet 11    OMEPRAZOLE 40 MG Oral Capsule Delayed Release TAKE 1 CAPSULE(40 MG) BY MOUTH DAILY BEFORE BREAKFAST 90 capsule 1    Melatonin 10 MG Oral Cap Take 5 mg by mouth nightly as needed.      carvedilol 3.125 MG Oral Tab TAKE 1 TABLET(3.125 MG) BY MOUTH TWICE DAILY WITH MEALS 180 tablet 3    rosuvastatin (CRESTOR) 20 MG Oral Tab Take 1 tablet (20 mg total) by mouth nightly. 90 tablet 3    levothyroxine 112 MCG Oral Tab Take 1 tablet (112 mcg total) by mouth before breakfast. (Patient not taking: Reported on 4/3/2024) 90 tablet 3    levocetirizine 5 MG Oral Tab Take 1 tablet (5 mg total) by mouth every evening. 30 tablet 1    levothyroxine 112 MCG Oral Tab Take 1 tablet (112 mcg total) by mouth before breakfast. 90 tablet 1    albuterol 108 (90 Base) MCG/ACT Inhalation Aero Soln Inhale 2 puffs into the lungs every 4 (four) hours as needed. 18 g 0        REVIEW OF SYSTEMS:   General: no weight change, change in appetite, thirst or fever  HEENT: no headache or blurred vision  Cardiopulmonary: no chest pain, palpitations, or shortness of breath  GI: no anorexia, nausea or vomiting, or bowel incontinence   : no dysuria, or pyuria.  Endo: no goiter, lethargy, or heat/cold intolerance  Heme/Onc: no pallor, bruising, or bleeding.    Musculoskeletal:  no new problems  Neuro:  no new problems  Psych:  no new problems    MEDICAL HISTORY:  Patient Active Problem List   Diagnosis    Primary cardiomyopathy (HCC)    Hypercholesteremia    Failed back syndrome, lumbar    Lumbago    Lumbar facet arthropathy    Lumbosacral radiculopathy    Lumbar radicular syndrome    Primary osteoarthritis involving multiple joints    Chronic low back pain    Anxiety  and depression    Thyroid activity decreased    Acquired hypothyroidism    Chronic neck and back pain    Obesity (BMI 30.0-34.9)    Thyroid nodule    Onychomycosis    DDD (degenerative disc disease), cervical    H/O radioactive iodine thyroid ablation    Female pattern alopecia    Myalgia of muscle of neck    Chronic left shoulder pain    Myalgia, unspecified site    History of lumbar surgery    Bilateral leg paresthesia    Polyneuropathy    Multinodular thyroid    Depression, recurrent (HCC)    Age-related nuclear cataract of both eyes    Floaters, right    Dry eye    Sweating abnormality    Generalized anxiety disorder    MDD (recurrent major depressive disorder) in remission (HCC)    Acute maxillary sinusitis    Anal or rectal pain    Anxiety states    Benign neoplasm of eyelid    Bronchitis    Congenital anomaly of nails    Cough    Diarrhea    Diarrhea of presumed infectious origin    Lump or mass in breast    Disorder of lipid metabolism    Essential hypertension    Female stress incontinence    Fibroadenosis of breast    Fibrosclerosis of breast    Gastroesophageal reflux disease    Hemorrhoids    Hordeolum internum    Astigmatism    Hypopotassemia    Injury of knee, leg, ankle and foot    Injury, finger    Intestinal infection due to Clostridium difficile    Intractable vomiting    Lateral epicondylitis of elbow    Neoplasm of uncertain behavior of connective and other soft tissue    Noninfectious gastroenteritis and colitis    Nonspecific reaction to tuberculin skin test    Arthropathy    Other specified disorders of adrenal glands    Overweight    Pain in joint, pelvic region and thigh    Pinguecula    Polycystic ovaries    Reflux esophagitis    Senile cataract    Senile osteoporosis    Solitary cyst of breast    Spinal stenosis in cervical region    Symptomatic menopausal or female climacteric states    Throat pain    Toxic diffuse goiter    Toxic uninodular goiter    Absence of bladder continence     Uterine prolapse    Visual disturbance    Vitamin D deficiency    Class 1 obesity due to excess calories with serious comorbidity and body mass index (BMI) of 33.0 to 33.9 in adult    Hypothyroidism     Past Medical History:   Diagnosis Date    Age-related nuclear cataract of both eyes 2021    Anxiety state, unspecified     Back pain     chronic    Cardiomyopathy (HCC)     Cataract     both eyes    Disorder of thyroid     Dry eye 2021    Essential hypertension     Fibromyalgia     Floaters, right 2021    H/O varicose vein stripping     Per NG: Varicose vein ; vein stripping    Headache     Per NG: OU    High cholesterol     Hypothyroidism     Meibomian gland dysfunction     Musculoskeletal problem     Per NG: injection tendon origin/insertion    Musculoskeletal problem     Per NG: Arthrocentesis of the left knee joint    Neck pain     chronic    Osteoarthritis     Radiation 10/23/2010    Per NG: Radiation to thyroid     Status post epidural steroid injection     Per NG: epidral injection x5 per pain mgmt       SURGICAL HISTORY:  Past Surgical History:   Procedure Laterality Date    APPENDECTOMY      BACK SURGERY  2016    lumbar L4-L5 fusion      1979    1    CATARACT  2023    Right eye    CATARACT EXTRACTION W/  INTRAOCULAR LENS IMPLANT Left 2023    Dr. Windy Barnett @ [facility]    CATARACT EXTRACTION W/ INTRAOCULAR LENS IMPLANT Right 2023    Dr. Windy Barnett @[facility]     DELIVERY ONLY      CHOLECYSTECTOMY      COLONOSCOPY  10/2005    normal, non precancerous polyps    COLONOSCOPY N/A 2018    Procedure: COLONOSCOPY;  Surgeon: Sean Hubbard MD;  Location: [facility] ENDOSCOPY    COLONOSCOPY N/A 2023    Procedure: COLONOSCOPY;  Surgeon: Sean Hubbard MD;  Location: [facility] ENDO    CYST ASPIRATION LEFT Left 2007    FNA    CYST ASPIRATION RIGHT      EGD      LAMINECTOMY      Per NG: Disc  displacement x2 and spinal fusion     [facility] [place] [facility]  1 SITE LEFT (CPT=19281)  2007    OTHER SURGICAL HISTORY      Per NG: Arthrocentesis    OTHER SURGICAL HISTORY      Per NG: arthrocentesis of the knee joint    YAG IRIDOTOMY - OD - RIGHT EYE Right 10/10/1997    RJ    YAG IRIDOTOMY - OS - LEFT EYE Left 10/16/1997    RJ       FAMILY HISTORY:  Family History   Problem Relation Age of Onset    Heart Disorder Father 65    Depression Mother     Neurological Disorder Mother         Per NG: Hypothyrodism    Arthritis Mother         Per NG: Osteoarthritis    Other (Other) Mother     Skin cancer Mother     Depression Maternal Grandfather     Other (Other) Maternal Grandfather     Heart Attack Neg     Diabetes Neg         Per NG; No diabetes    Glaucoma Neg         Per NG: No glaucoma histroy    Cancer Neg     Hypertension Neg     Lipids Neg        SOCIAL HISTORY:  Social History     Socioeconomic History    Marital status:      Spouse name: Not on file    Number of children: Not on file    Years of education: Not on file    Highest education level: Not on file   Occupational History    Not on file   Tobacco Use    Smoking status: Former     Packs/day: 1.00     Years: 27.00     Additional pack years: 0.00     Total pack years: 27.00     Types: Cigarettes     Quit date: 2005     Years since quittin.2    Smokeless tobacco: Never    Tobacco comments:     7 years ago   Vaping Use    Vaping Use: Never used   Substance and Sexual Activity    Alcohol use: No    Drug use: No    Sexual activity: Not on file   Other Topics Concern     Service Not Asked    Blood Transfusions Not Asked    Caffeine Concern Yes     Comment: 3 cups of coffee daily    Occupational Exposure Not Asked    Hobby Hazards Not Asked    Sleep Concern Not Asked    Stress Concern Not Asked    Weight Concern Not Asked    Special Diet Not Asked    Back Care Not Asked    Exercise No    Bike Helmet Not Asked    Seat Belt Not Asked    Self-Exams Not Asked    Grew up on a farm Not Asked    History  of tanning Not Asked    Outdoor occupation Not Asked    Pt has a pacemaker No    Pt has a defibrillator No    Breast feeding No    Reaction to local anesthetic No   Social History Narrative    Not on file     Social Determinants of Health     Financial Resource Strain: Not on file   Food Insecurity: Not on file   Transportation Needs: Not on file   Physical Activity: Not on file   Stress: Not on file   Social Connections: Not on file   Housing Stability: Not on file     PHYSICAL EXAMINATION:  Vitals:    04/09/24 0918   BP: 96/68   Pulse: 78      General: Alert and oriented x3, NAD, appears stated age, appropriate disposition and demeanor, answers questions appropriately   Head: normocephalic, atraumatic  Eyes: anicteric; no injection  Ears: normal;  no discharge  Nose: externally grossly within normal limits, no unusual discharge or rhinorrhea   Throat: lips grossly within normal limits by visual exam externally  Neck: supple, trachea midline, no obvious JVD  Gait: Walking without limping  Spine: Flattened lumbar lordosis.  ROM:   Lumbar Spine: Limited flexion due to pain  MOTOR EXAMINATION:  Lower Extremities: Normal bilaterally  REFLEXES:  Lower Extremities: Depressed knee reflexes bilaterally  SENSATION (light touch/pinprick/temperature):   Lower Extremities: Normal bilaterally  SLR: Negative bilaterally  SIJ tenderness: Negative bilaterally  Isis's test: Negative bilaterally  TPs: Not identified    IMAGING:  No new imaging available    ASSESSMENT AND PLAN:    Vandana Jefferson is a 68 year old  female, with  low back pain     #Axial Low Back Pain, chronic worsening   #Lumbar Spondylosis  #Facetogenic pain  -Evidence of adjacent level facet arthropathy at L3/4 and L5/S1   -Will trial a Bilat L3/4 and  L5/S1 MBB  -If successful, will confirm with second diagnostic block then procedure to RFA     #Right Sacroiliac Joint Pain, chronic worsening   -Patient has >3 provocative exam maneuvers which include + kell's  finger, FRED, thigh thrust, compression, distraction, Gaenslen's  -Will evaluate after facet therapy   -could be a candidate for right SI joint injection  -If significant short term relief >75% on two occasions but no sustained relief consider SI joint ablation.  Other options included surgical referral for fusion surgery or a posterior stabilization/fusion.      Conservative vs. Interventional pain treatment options were discussed at length including pharmacotherapy (eg. Anti- inflammatories, muscle relaxants, neuropathic medications, oral steroids, analgesics), injections, and further testing. Risks and benefits of all options were discussed to patients satisfaction. All questions were answered. Patient agreeable to treatment plan. Greater than 50% of the time was spent with counseling (nature of discussion centered around pain, therapy, and treatment options), face to face time, time spent reviewing data, obtaining patient information and discussing the care with the patients health care providers.     Anatoly Santo MD, 04/09/24, 10:53 AM

## 2024-04-09 NOTE — PROGRESS NOTES
HPI:    Patient ID: Vandana Jefferson is a 68 year old female.      Leg Pain         Chief Complaint   Patient presents with    Urgent Care F/u     Discuss EKG results       Leg Pain     Left leg was told she has a cyst on the back of her knee        Wt Readings from Last 6 Encounters:   04/09/24 207 lb (93.9 kg)   04/09/24 203 lb (92.1 kg)   04/03/24 207 lb (93.9 kg)   02/22/24 209 lb (94.8 kg)   01/24/24 209 lb (94.8 kg)   01/17/24 207 lb (93.9 kg)     BP Readings from Last 3 Encounters:   04/09/24 109/73   04/09/24 96/68   04/03/24 103/68     Had severe pain around left knee 4/3/24  Went to   4/3/24    Xray knee  Impression   CONCLUSION:  1. No acute fracture or dislocation.  2. Moderate tricompartment osteoarthritis with slight progression.  3. Superimposed chondrocalcinosis has progressed     Us venous left knee  Impression   CONCLUSION:  1. No left lower extremity DVT.  2. 3.6 cm left Baker's cyst.            Also patient noticed her EKG was abnormal  Normal sinus rhythm   Left axis deviation   Low voltage QRS, consider pulmonary disease, pericardial effusion, or normal variant   Inferior infarct , age undetermined   Abnormal ECG     Has seen cardiology in sept 2023, DR ORTEGA Villarreal  Notes reviewed  Patient mentions hx of cardiomyopathy   Last echo with previous cardiology in 7/2021    Review of Systems   Cardiovascular:  Negative for chest pain, palpitations and leg swelling.   Musculoskeletal:  Positive for arthralgias. Negative for joint swelling.       /73   Pulse 68   Ht 5' 5\" (1.651 m)   Wt 207 lb (93.9 kg)   SpO2 94%   BMI 34.45 kg/m²          Current Outpatient Medications   Medication Sig Dispense Refill    ibuprofen 400 MG Oral Tab Take 1 tablet (400 mg total) by mouth every 6 (six) hours as needed for Pain.      LORazepam 1 MG Oral Tab Take 1/2 to 1 tablet (1 mg) twice a day as needed for anxiety 60 tablet 0    fluticasone propionate 50 MCG/ACT Nasal Suspension 2 sprays by Each Nare route  nightly. 1 each 5    FLUoxetine HCl 40 MG Oral Cap Take 1 capsule (40 mg total) by mouth every morning. 90 capsule 1    temazepam 30 MG Oral Cap Take 1 capsule (30 mg total) by mouth nightly as needed. 30 capsule 2    levothyroxine 112 MCG Oral Tab Take 1 tablet (112 mcg total) by mouth before breakfast. 90 tablet 3    Spacer/Aero-Holding Chambers Does not apply Device Use with albuterol as needed 1 each 0    busPIRone 15 MG Oral Tab TAKE 1 TABLET BY MOUTH TWICE A DAY (Patient taking differently: Take 0.5 tablets (7.5 mg total) by mouth in the morning and 0.5 tablets (7.5 mg total) before bedtime.) 180 tablet 0    gabapentin 800 MG Oral Tab Take 1 tablet (800 mg total) by mouth 2 (two) times daily. 180 tablet 3    famotidine 20 MG Oral Tab Take 1 tablet (20 mg total) by mouth daily. (Patient taking differently: Take 1 tablet (20 mg total) by mouth daily. Only takes as needed about 3 times per month) 30 tablet 11    OMEPRAZOLE 40 MG Oral Capsule Delayed Release TAKE 1 CAPSULE(40 MG) BY MOUTH DAILY BEFORE BREAKFAST 90 capsule 1    Melatonin 10 MG Oral Cap Take 5 mg by mouth nightly as needed.      carvedilol 3.125 MG Oral Tab TAKE 1 TABLET(3.125 MG) BY MOUTH TWICE DAILY WITH MEALS 180 tablet 3    rosuvastatin (CRESTOR) 20 MG Oral Tab Take 1 tablet (20 mg total) by mouth nightly. 90 tablet 3    GAVILYTE-G 236 g Oral Recon Soln MIX WITH WATER AND TAKE 4,000 ML BY MOUTH ONCE FOR 1 DOSE. AS DIRECTED BY GI CLINIC INSTRUCTIONS. (Patient not taking: Reported on 4/9/2024)      celecoxib 200 MG Oral Cap Take 1 capsule (200 mg total) by mouth 2 (two) times daily. (Patient not taking: Reported on 4/9/2024) 60 capsule 0     Allergies:  Allergies   Allergen Reactions    Demerol [Meperidine] NAUSEA AND VOMITING and NAUSEA ONLY     Other reaction(s): Vomiting    Hydrocodone NAUSEA AND VOMITING    Pregabalin      Other reaction(s): Stomach discomforts    Vicodin Tuss [Hydrocodone-Guaifenesin] NAUSEA AND VOMITING and NAUSEA ONLY      Other reaction(s): Vomiting    Tramadol NAUSEA ONLY      PHYSICAL EXAM:     Chief Complaint   Patient presents with    Urgent Care F/u     Discuss EKG results       Leg Pain     Left leg was told she has a cyst on the back of her knee       Physical Exam  Vitals reviewed.   Cardiovascular:      Rate and Rhythm: Normal rate and regular rhythm.      Pulses: Normal pulses.      Heart sounds: Normal heart sounds. No murmur heard.     No friction rub. No gallop.   Pulmonary:      Effort: Pulmonary effort is normal.      Breath sounds: Normal breath sounds.   Musculoskeletal:         General: Tenderness present.      Right lower leg: No edema.      Left lower leg: No edema.      Comments: Tenderness back of left knee    Neurological:      Mental Status: She is alert.                ASSESSMENT/PLAN:     Encounter Diagnoses   Name Primary?    Primary osteoarthritis of left knee Yes    Baker's cyst, left     Abnormal EKG     History of cardiomyopathy        Reviewed UC notes, imaging, EKG, labs     1. Primary osteoarthritis of left knee    - Ortho Referral - In Network    2. Baker's cyst, left    - Ortho Referral - In Network    3. Abnormal EKG    - CARD ECHO 2D DOPPLER (CPT=93306); Future    4. History of cardiomyopathy    - CARD ECHO 2D DOPPLER (CPT=93306); Future      No orders of the defined types were placed in this encounter.        The above note was creating using Dragon speech recognition technology. Please excuse any typos    Meds This Visit:  Requested Prescriptions      No prescriptions requested or ordered in this encounter       Imaging & Referrals:  ORTHOPEDIC - INTERNAL  CARD ECHO 2D DOPPLER (CPT=93306)       ID#[id number]

## 2024-04-09 NOTE — PATIENT INSTRUCTIONS
Refill policies:    Allow 2-3 business days for refills; controlled substances may take longer.  Contact your pharmacy at least 5 days prior to running out of medication and have them send an electronic request or submit request through the “request refill” option in your [employer] account.  Refills are not addressed on weekends; covering physicians do not authorize routine medications on weekends.  No narcotics or controlled substances are refilled after noon on Fridays or by on call physicians.  By law, narcotics must be electronically prescribed.  A 30 day supply with no refills is the maximum allowed.  If your prescription is due for a refill, you may be due for a follow up appointment.  To best provide you care, patients receiving routine medications need to be seen at least once a year.  Patients receiving narcotic/controlled substance medications need to be seen at least once every 3 months.  In the event that your preferred pharmacy does not have the requested medication in stock (e.g. Backordered), it is your responsibility to find another pharmacy that has the requested medication available.  We will gladly send a new prescription to that pharmacy at your request.    Scheduling Tests:    If your physician has ordered radiology tests such as MRI or CT scans, please contact Central Scheduling at [phone] right away to schedule the test.  Once scheduled, the [facility] Centralized Referral Team will work with your insurance carrier to obtain pre-certification or prior authorization.  Depending on your insurance carrier, approval may take 3-10 days.  It is highly recommended patients assure they have received an authorization before having a test performed.  If test is done without insurance authorization, patient may be responsible for the entire amount billed.      Precertification and Prior Authorizations:  If your physician has recommended that you have a procedure or additional testing performed the [facility]  Centralized Referral Team will contact your insurance carrier to obtain pre-certification or prior authorization.    You are strongly encouraged to contact your insurance carrier to verify that your procedure/test has been approved and is a COVERED benefit.  Although the [facility] Centralized Referral Team does its due diligence, the insurance carrier gives the disclaimer that \"Although the procedure is authorized, this does not guarantee payment.\"    Ultimately the patient is responsible for payment.   Thank you for your understanding in this matter.  Paperwork Completion:  If you require FMLA or disability paperwork for your recovery, please make sure to either drop it off or have it faxed to our office at [phone]. Be sure the form has your name and date of birth on it.  The form will be faxed to our Forms Department and they will complete it for you.  There is a 25$ fee for all forms that need to be filled out.  Please be aware there is a 10-14 day turnaround time.  You will need to sign a release of information (EFREN) form if your paperwork does not come with one.  You may call the Forms Department with any questions at [phone].  Their fax number is [phone].

## 2024-04-10 ENCOUNTER — TELEPHONE (OUTPATIENT)
Dept: PAIN CLINIC | Facility: HOSPITAL | Age: 69
End: 2024-04-10

## 2024-04-10 DIAGNOSIS — M47.816 LUMBAR SPONDYLOSIS: Primary | ICD-10-CM

## 2024-04-10 NOTE — TELEPHONE ENCOUNTER
PATIENT NAME:  TREY ROBERTS/ [phone] (home)/ There is no work phone number on file.  PATIENT :  10/30/1955  REFERRAL ID #:  [id number]  REFERRAL STATUS:  Authorized [1]  REVIEW REFERRAL NOTES FOR MORE INFORMATION:  DATE AUTHORIZED:  2024 // EXPIRATION DATE: 2025   REFERRED BY:  ALMA ERNANDEZ MD (TEL: [phone])  REFERRED TO: ALMA ERNANDEZ MD (TEL: [phone])     No vendor specified on referral. / No vendor phone number known.  [facility]  REFERRED FOR:    Diagnoses:    M47.816 (ICD-10-CM) - 721.3 (ICD-9-CM) - Lumbar spondylosis  Procedures:     [id number] - [facility] PAIN NAVIGATOR   NUMBER OF VISITS AUTH: 1

## 2024-04-10 NOTE — TELEPHONE ENCOUNTER
Spoke with Vandana (pt)     Procedure 04/25 w Gal   F/U 05/10 w [facility]    Case placed and signed

## 2024-04-15 RX ORDER — CELECOXIB 200 MG/1
200 CAPSULE ORAL 2 TIMES DAILY
Qty: 60 CAPSULE | Refills: 0 | Status: SHIPPED | OUTPATIENT
Start: 2024-04-15

## 2024-04-15 NOTE — TELEPHONE ENCOUNTER
LOV:  4/3/2024  Future Appointments   Date Time Provider Department Center   4/30/2024  1:30 PM Dalton Mendoza APRN [facility]   5/10/2024 10:00 AM Anatoly Santo MD [facility] PAIN EM [facility]   5/14/2024 10:40 AM Caity Ellison MD [facility]   7/8/2024 10:30 AM VIDHYA, PROCEDURE [id number] None     Labs:    Component      Latest Ref Rng 10/21/2023   Glucose      70 - 99 mg/dL 95    Sodium      136 - 145 mmol/L 144    Potassium      3.5 - 5.1 mmol/L 3.9    Chloride      98 - 112 mmol/L 110    Carbon Dioxide, Total      21.0 - 32.0 mmol/L 27.0    ANION GAP      0 - 18 mmol/L 7    BUN      7 - 18 mg/dL 8    CREATININE      0.55 - 1.02 mg/dL 0.88    BUN/CREATININE RATIO      10.0 - 20.0  9.1 (L)    CALCIUM      8.5 - 10.1 mg/dL 9.2    CALCULATED OSMOLALITY      275 - 295 mOsm/kg 296 (H)    EGFR      >=60 mL/min/1.73m2 72    ALT (SGPT)      13 - 56 U/L 21    AST (SGOT)      15 - 37 U/L 17    ALKALINE PHOSPHATASE      55 - 142 U/L 75    Total Bilirubin      0.1 - 2.0 mg/dL 0.4    PROTEIN, TOTAL      6.4 - 8.2 g/dL 7.0    Albumin      3.4 - 5.0 g/dL 3.4    Globulin      2.8 - 4.4 g/dL 3.6    A/G Ratio      1.0 - 2.0  0.9 (L)    Patient Fasting for CMP? Yes       Legend:  (L) Low  (H) High

## 2024-04-26 ENCOUNTER — TELEPHONE (OUTPATIENT)
Dept: PAIN CLINIC | Facility: HOSPITAL | Age: 69
End: 2024-04-26

## 2024-04-26 NOTE — TELEPHONE ENCOUNTER
Order Information    Encounter Summary  Order Details  Procedure: [facility] PAIN NAVIGATOR   Associated diagnosis: M47.816 (ICD-10-CM) - 721.3 (ICD-9-CM) - Lumbar spondylosis   Date: 4/25/2024   Provider: Anatoly Santo MD   Department: [facility] PAIN CNTR   Progress Note  Office Visit with URSZULA VILLALOBOS     Order Questions    Question Answer   Anesthesia Type Local   Provider Gal   Location [facility]   Procedure Medial Branch Block   Laterality/Level Bilat L3/4 and L5/S1   CPT (Hit enter after each entry) INJ PARAVERT F JNT L/S 1 LEV [73459]    INJ PARAVERT F JNT L/S 2 LEV [[id number]]   C-ARM Yes   Medical clearance requested (will send to Pain Navigator) No   Patient has Medicare coverage? No

## 2024-04-26 NOTE — TELEPHONE ENCOUNTER
Please start prior authorization process for Referral #[id number] has been entered,   Routed to [facility] Centralized P RFL Team

## 2024-04-29 ENCOUNTER — PATIENT MESSAGE (OUTPATIENT)
Dept: ENDOCRINOLOGY CLINIC | Facility: CLINIC | Age: 69
End: 2024-04-29

## 2024-04-29 DIAGNOSIS — E66.09 CLASS 1 OBESITY DUE TO EXCESS CALORIES WITH SERIOUS COMORBIDITY AND BODY MASS INDEX (BMI) OF 33.0 TO 33.9 IN ADULT: Primary | ICD-10-CM

## 2024-04-29 DIAGNOSIS — M47.816 LUMBAR SPONDYLOSIS: Primary | ICD-10-CM

## 2024-04-29 DIAGNOSIS — E66.09 CLASS 1 OBESITY DUE TO EXCESS CALORIES WITH SERIOUS COMORBIDITY AND BODY MASS INDEX (BMI) OF 34.0 TO 34.9 IN ADULT: ICD-10-CM

## 2024-04-29 NOTE — TELEPHONE ENCOUNTER
Spoke with Vandana (patient)     Procedure scheduled with  on 05/06/24  Post procedure follow up on 05/20/24 with Gal    Case placed and signed.

## 2024-04-29 NOTE — TELEPHONE ENCOUNTER
PATIENT NAME:  TREY ROBERTS/ [phone] (home)/ There is no work phone number on file.  PATIENT :  10/30/1955  REFERRAL ID #:  [id number]  REFERRAL STATUS:  Authorized [1]  REVIEW REFERRAL NOTES FOR MORE INFORMATION:  DATE AUTHORIZED:  2024 // EXPIRATION DATE: 2025   REFERRED BY:  ALMA ERNANDEZ MD (TEL: [phone])  REFERRED TO: No referral provider, MD (TEL: No Referred to Provider on Referral)     No vendor specified on referral. / No vendor phone number known.  No facility specified on referral  REFERRED FOR:    Diagnoses:    M47.816 (ICD-10-CM) - 721.3 (ICD-9-CM) - Lumbar spondylosis  Procedures:     [id number] - [facility] PAIN NAVIGATOR   NUMBER OF VISITS AUTH: 1

## 2024-04-30 NOTE — TELEPHONE ENCOUNTER
Dr. Rosario, please see and advise patient message below regarding Mounjaro. LOV was 1/24/24. Thank you.

## 2024-04-30 NOTE — TELEPHONE ENCOUNTER
From: Vandana Jefferson  To: Estefanía Rosario  Sent: 4/29/2024 10:18 AM CDT  Subject: Toro     Dear Dr Rosario, could you please prescribe again Monjaouro at 2.5 mg since I was never was able to use the 5 mg and had To stopped last year because long covid , please advice if I need to squedule a visit with you .  Sincerely   Vandana Jefferson   [place]ira [phone]

## 2024-05-02 ENCOUNTER — HOSPITAL ENCOUNTER (OUTPATIENT)
Dept: CV DIAGNOSTICS | Facility: HOSPITAL | Age: 69
Discharge: HOME | End: 2024-05-02
Attending: FAMILY MEDICINE
Payer: COMMERCIAL

## 2024-05-02 DIAGNOSIS — Z86.79 HISTORY OF CARDIOMYOPATHY: ICD-10-CM

## 2024-05-02 DIAGNOSIS — R94.31 ABNORMAL EKG: ICD-10-CM

## 2024-05-02 PROCEDURE — 93306 TTE W/DOPPLER COMPLETE: CPT | Performed by: FAMILY MEDICINE

## 2024-05-03 RX ORDER — TIRZEPATIDE 2.5 MG/.5ML
2.5 INJECTION, SOLUTION SUBCUTANEOUS WEEKLY
Qty: 2 ML | Refills: 3 | Status: SHIPPED | OUTPATIENT
Start: 2024-05-03 | End: 2024-05-10

## 2024-05-07 ENCOUNTER — TELEPHONE (OUTPATIENT)
Dept: RHEUMATOLOGY | Facility: CLINIC | Age: 69
End: 2024-05-07

## 2024-05-07 DIAGNOSIS — M17.0 PRIMARY OSTEOARTHRITIS OF BOTH KNEES: Primary | ICD-10-CM

## 2024-05-07 NOTE — TELEPHONE ENCOUNTER
See message and advise           ASSESSMENT/PLAN:      Bilateral knee pain secondary to OA  - X-rays do show evidence of moderate osteoarthritis and chondrocalcinosis  - No evidence of swelling to suggest pseudogout  - B/L injected with cortisone in March and Sept 2023  - Now having pain worsening pain in the left knee, went to urgent care.  Ultrasound showed a 3.6 cm Baker's cyst  - Both knees injected with 1 cc lidocaine and 40 mg of Kenalog in sterile fashion.  Patient tolerated procedure well  - We will get her approved for gel injections, she will schedule this     Generalized OA, chronic pain  - She will try Celebrex 200 mg twice a day for her pain.  Advised to take with food and not with any other NSAID     Left clavicle fracture due to fall- stable  -Still having some pain, I will send to physical therapy     Left shoulder pain  - Left shoulder was injected back in March 2023, it helped her symptoms but then she fell now having left clavicle and shoulder pain  - will send to PT     Elevated inflammatory markers  - Repeat blood work shows normal ESR and CRP     Chronic lower back pain s/p laminectomy and fusion  - She is also going to be following with neurosurgeon Dr. Thrasher for her chronic LBP  - MRI lower spine was done today      Pt will f/u in 2-3 mos for Synvisc injections      Caity Ellison MD  4/3/2024  2:30 PM

## 2024-05-07 NOTE — TELEPHONE ENCOUNTER
Patient called and was told to call before her appointment on 5/14 to verify if her gel injection is covered by her  insurance, please confirm if patients injection will be covered.

## 2024-05-08 NOTE — TELEPHONE ENCOUNTER
Pt has HMO insurance, please sign off on referral. Will sent to [facility] to follow up or call the P to verify.

## 2024-05-09 ENCOUNTER — TELEPHONE (OUTPATIENT)
Dept: ENDOCRINOLOGY CLINIC | Facility: CLINIC | Age: 69
End: 2024-05-09

## 2024-05-09 DIAGNOSIS — E66.09 CLASS 1 OBESITY DUE TO EXCESS CALORIES WITH SERIOUS COMORBIDITY AND BODY MASS INDEX (BMI) OF 33.0 TO 33.9 IN ADULT: Primary | ICD-10-CM

## 2024-05-09 NOTE — TELEPHONE ENCOUNTER
Current Outpatient Medications   Medication Sig Dispense Refill    Tirzepatide (MOUNJARO) 2.5 MG/0.5ML Subcutaneous Solution Pen-injector Inject 2.5 mg into the skin once a week. (Patient taking differently: Inject 2.5 mg into the skin once a week. PATIENT IS NOT TAKING) 2 mL 3     KEY #[id number]

## 2024-05-10 ENCOUNTER — OFFICE VISIT (OUTPATIENT)
Dept: PAIN CLINIC | Facility: HOSPITAL | Age: 69
End: 2024-05-10
Attending: STUDENT IN AN ORGANIZED HEALTH CARE EDUCATION/TRAINING PROGRAM
Payer: COMMERCIAL

## 2024-05-10 ENCOUNTER — TELEPHONE (OUTPATIENT)
Dept: PAIN CLINIC | Facility: HOSPITAL | Age: 69
End: 2024-05-10

## 2024-05-10 VITALS
DIASTOLIC BLOOD PRESSURE: 67 MMHG | WEIGHT: 207 LBS | BODY MASS INDEX: 34 KG/M2 | SYSTOLIC BLOOD PRESSURE: 101 MMHG | OXYGEN SATURATION: 95 % | HEART RATE: 73 BPM

## 2024-05-10 DIAGNOSIS — M47.816 LUMBAR SPONDYLOSIS: Primary | ICD-10-CM

## 2024-05-10 PROCEDURE — 99211 OFF/OP EST MAY X REQ PHY/QHP: CPT

## 2024-05-10 NOTE — TELEPHONE ENCOUNTER
Please start prior authorization process for referral #[id number] has been entered,     Routed to [facility] Centralized P RFL team

## 2024-05-10 NOTE — PATIENT INSTRUCTIONS
Refill policies:    Allow 2-3 business days for refills; controlled substances may take longer.  Contact your pharmacy at least 5 days prior to running out of medication and have them send an electronic request or submit request through the “request refill” option in your [employer] account.  Refills are not addressed on weekends; covering physicians do not authorize routine medications on weekends.  No narcotics or controlled substances are refilled after noon on Fridays or by on call physicians.  By law, narcotics must be electronically prescribed.  A 30 day supply with no refills is the maximum allowed.  If your prescription is due for a refill, you may be due for a follow up appointment.  To best provide you care, patients receiving routine medications need to be seen at least once a year.  Patients receiving narcotic/controlled substance medications need to be seen at least once every 3 months.  In the event that your preferred pharmacy does not have the requested medication in stock (e.g. Backordered), it is your responsibility to find another pharmacy that has the requested medication available.  We will gladly send a new prescription to that pharmacy at your request.    Scheduling Tests:    If your physician has ordered radiology tests such as MRI or CT scans, please contact Central Scheduling at [phone] right away to schedule the test.  Once scheduled, the [facility] Centralized Referral Team will work with your insurance carrier to obtain pre-certification or prior authorization.  Depending on your insurance carrier, approval may take 3-10 days.  It is highly recommended patients assure they have received an authorization before having a test performed.  If test is done without insurance authorization, patient may be responsible for the entire amount billed.      Precertification and Prior Authorizations:  If your physician has recommended that you have a procedure or additional testing performed the [facility]  Centralized Referral Team will contact your insurance carrier to obtain pre-certification or prior authorization.    You are strongly encouraged to contact your insurance carrier to verify that your procedure/test has been approved and is a COVERED benefit.  Although the [facility] Centralized Referral Team does its due diligence, the insurance carrier gives the disclaimer that \"Although the procedure is authorized, this does not guarantee payment.\"    Ultimately the patient is responsible for payment.   Thank you for your understanding in this matter.  Paperwork Completion:  If you require FMLA or disability paperwork for your recovery, please make sure to either drop it off or have it faxed to our office at [phone]. Be sure the form has your name and date of birth on it.  The form will be faxed to our Forms Department and they will complete it for you.  There is a 25$ fee for all forms that need to be filled out.  Please be aware there is a 10-14 day turnaround time.  You will need to sign a release of information (EFREN) form if your paperwork does not come with one.  You may call the Forms Department with any questions at [phone].  Their fax number is [phone].

## 2024-05-10 NOTE — TELEPHONE ENCOUNTER
Encounter Summary  Order Details  Procedure: [facility] PAIN NAVIGATOR   Associated diagnosis: M47.816 (ICD-10-CM) - 721.3 (ICD-9-CM) - Lumbar spondylosis   Date: 5/10/2024   Provider: Anatoly Santo MD   Department: [facility] PAIN CNTR   Progress Note  Office Visit with ALANNA WEN     Order Questions    Question Answer   Anesthesia Type Sedation   Provider Gal   Location [facility]   Procedure RFA   Laterality/Level Bilat L3/4 and L5/S1   CPT (Hit enter after each entry) DESTROY LUMB/SAC FACET JNT [[id number]]    DESTROY L/S FACET JNT ADDL [[id number]]   Medical clearance requested (will send to Pain Navigator) No   Patient has Medicare coverage? No

## 2024-05-10 NOTE — CHRONIC PAIN
Follow-up Note  Interval History:  Patient is seen today for follow up after Lumbar MBB reporting 80% pain relief, improved mobility, sleep quality and overall quality of life.     Vandana Jefferson is a 68 year old female, originally referred to the pain clinic by Nica Glover MD, with history of   1. Lumbar spondylosis     returns to the clinic for follow up. Patient is complaining of low back pain radiating down to both lower extremities.  Today pain is 5 out of 10. Range of pain is from 3 to 7 out of 10.  For pain control patient presently is using Aleve. Pain Meds are reducing his pain by 20-30%. Tolerating well. No new symptoms.  Patient has no change in bowel/bladder function.  Patient had lumbar epidurals injection on July 11.  Injection gave her 60% improvement.  Still in pain but not so severe cannot sleep during the night.    ALLERGIES:  Allergies   Allergen Reactions    Demerol [Meperidine] NAUSEA AND VOMITING and NAUSEA ONLY     Other reaction(s): Vomiting    Hydrocodone NAUSEA AND VOMITING    Pregabalin      Other reaction(s): Stomach discomforts    Vicodin Tuss [Hydrocodone-Guaifenesin] NAUSEA AND VOMITING and NAUSEA ONLY     Other reaction(s): Vomiting    Tramadol NAUSEA ONLY       MEDICATION LIST:  Current Outpatient Medications   Medication Sig Dispense Refill    busPIRone 15 MG Oral Tab Take 0.5 tablets (7.5 mg total) by mouth in the morning and 0.5 tablets (7.5 mg total) before bedtime. 30 tablet 0    temazepam 30 MG Oral Cap Take 1 capsule (30 mg total) by mouth nightly as needed. 30 capsule 2    LORazepam 1 MG Oral Tab Take 1/2 to 1 tablet (1 mg) twice a day as needed for anxiety 60 tablet 0    celecoxib 200 MG Oral Cap Take 1 capsule (200 mg total) by mouth 2 (two) times daily. 60 capsule 0    GAVILYTE-G 236 g Oral Recon Soln       ibuprofen 400 MG Oral Tab Take 1 tablet (400 mg total) by mouth every 6 (six) hours as needed for Pain.      fluticasone propionate 50 MCG/ACT Nasal Suspension  2 sprays by Each Nare route nightly. 1 each 5    FLUoxetine HCl 40 MG Oral Cap Take 1 capsule (40 mg total) by mouth every morning. 90 capsule 1    levothyroxine 112 MCG Oral Tab Take 1 tablet (112 mcg total) by mouth before breakfast. 90 tablet 3    Spacer/Aero-Holding Chambers Does not apply Device Use with albuterol as needed 1 each 0    gabapentin 800 MG Oral Tab Take 1 tablet (800 mg total) by mouth 2 (two) times daily. 180 tablet 3    famotidine 20 MG Oral Tab Take 1 tablet (20 mg total) by mouth daily. (Patient taking differently: Take 1 tablet (20 mg total) by mouth daily. Only takes as needed about 3 times per month) 30 tablet 11    OMEPRAZOLE 40 MG Oral Capsule Delayed Release TAKE 1 CAPSULE(40 MG) BY MOUTH DAILY BEFORE BREAKFAST 90 capsule 1    Melatonin 10 MG Oral Cap Take 5 mg by mouth nightly as needed.      carvedilol 3.125 MG Oral Tab TAKE 1 TABLET(3.125 MG) BY MOUTH TWICE DAILY WITH MEALS 180 tablet 3    rosuvastatin (CRESTOR) 20 MG Oral Tab Take 1 tablet (20 mg total) by mouth nightly. 90 tablet 3        REVIEW OF SYSTEMS:   General: no weight change, change in appetite, thirst or fever  HEENT: no headache or blurred vision  Cardiopulmonary: no chest pain, palpitations, or shortness of breath  GI: no anorexia, nausea or vomiting, or bowel incontinence   : no dysuria, or pyuria.  Endo: no goiter, lethargy, or heat/cold intolerance  Heme/Onc: no pallor, bruising, or bleeding.    Musculoskeletal:  no new problems  Neuro:  no new problems  Psych:  no new problems    MEDICAL HISTORY:  Patient Active Problem List   Diagnosis    Primary cardiomyopathy (HCC)    Hypercholesteremia    Failed back syndrome, lumbar    Lumbago    Lumbar facet arthropathy    Lumbosacral radiculopathy    Lumbar radicular syndrome    Primary osteoarthritis involving multiple joints    Chronic low back pain    Anxiety and depression    Thyroid activity decreased    Acquired hypothyroidism    Chronic neck and back pain    Obesity  (BMI 30.0-34.9)    Thyroid nodule    Onychomycosis    DDD (degenerative disc disease), cervical    H/O radioactive iodine thyroid ablation    Female pattern alopecia    Myalgia of muscle of neck    Chronic left shoulder pain    Myalgia, unspecified site    History of lumbar surgery    Bilateral leg paresthesia    Polyneuropathy    Multinodular thyroid    Depression, recurrent (HCC)    Age-related nuclear cataract of both eyes    Floaters, right    Dry eye    Sweating abnormality    Generalized anxiety disorder    MDD (recurrent major depressive disorder) in remission (HCC)    Acute maxillary sinusitis    Anal or rectal pain    Anxiety states    Benign neoplasm of eyelid    Bronchitis    Congenital anomaly of nails    Cough    Diarrhea    Diarrhea of presumed infectious origin    Lump or mass in breast    Disorder of lipid metabolism    Essential hypertension    Female stress incontinence    Fibroadenosis of breast    Fibrosclerosis of breast    Gastroesophageal reflux disease    Hemorrhoids    Hordeolum internum    Astigmatism    Hypopotassemia    Injury of knee, leg, ankle and foot    Injury, finger    Intestinal infection due to Clostridium difficile    Intractable vomiting    Lateral epicondylitis of elbow    Neoplasm of uncertain behavior of connective and other soft tissue    Noninfectious gastroenteritis and colitis    Nonspecific reaction to tuberculin skin test    Arthropathy    Other specified disorders of adrenal glands    Overweight    Pain in joint, pelvic region and thigh    Pinguecula    Polycystic ovaries    Reflux esophagitis    Senile cataract    Senile osteoporosis    Solitary cyst of breast    Spinal stenosis in cervical region    Symptomatic menopausal or female climacteric states    Throat pain    Toxic diffuse goiter    Toxic uninodular goiter    Absence of bladder continence    Uterine prolapse    Visual disturbance    Vitamin D deficiency    Class 1 obesity due to excess calories with serious  comorbidity and body mass index (BMI) of 33.0 to 33.9 in adult    Hypothyroidism    History of cardiomyopathy    Baker's cyst, left     Past Medical History:    Age-related nuclear cataract of both eyes    Anxiety state, unspecified    Back pain    chronic    Cardiomyopathy (HCC)    Cataract    both eyes    Disorder of thyroid    Dry eye    Essential hypertension    Fibromyalgia    Floaters, right    H/O varicose vein stripping    Per NG: Varicose vein ; vein stripping    Headache    Per NG: OU    High cholesterol    Hypothyroidism    Meibomian gland dysfunction    Musculoskeletal problem    Per NG: injection tendon origin/insertion    Musculoskeletal problem    Per NG: Arthrocentesis of the left knee joint    Neck pain    chronic    Osteoarthritis    Radiation    Per NG: Radiation to thyroid     Status post epidural steroid injection    Per NG: epidral injection x5 per pain mgmt       SURGICAL HISTORY:  Past Surgical History:   Procedure Laterality Date    Appendectomy      Back surgery  2016    lumbar L4-L5 fusion      1979    1    Cataract  2023    Right eye    Cataract extraction w/  intraocular lens implant Left 2023    Dr. Windy Barnett @ [facility]    Cataract extraction w/ intraocular lens implant Right 2023    Dr. Windy Barnett @[facility]     delivery only      Cholecystectomy      Colonoscopy  10/2005    normal, non precancerous polyps    Colonoscopy N/A 2018    Procedure: COLONOSCOPY;  Surgeon: Sean Hubbard MD;  Location: [facility] ENDOSCOPY    Colonoscopy N/A 2023    Procedure: COLONOSCOPY;  Surgeon: Sean Hubbard MD;  [facility] ENDO    Cyst aspiration left Left 2007    FNA    Cyst aspiration right      Egd      Laminectomy      Per NG: Disc  displacement x2 and spinal fusion 2016    [facility] localization wire 1 site left (cpt=19281)  2007    Other surgical history      Per NG: Arthrocentesis    Other surgical history      Per NG: arthrocentesis of the knee  joint    Yag iridotomy - od - right eye Right 10/10/1997    RJM    Yag iridotomy - os - left eye Left 10/16/1997    RJ       FAMILY HISTORY:  Family History   Problem Relation Age of Onset    Heart Disorder Father 65    Depression Mother     Neurological Disorder Mother         Per NG: Hypothyrodism    Arthritis Mother         Per NG: Osteoarthritis    Other (Other) Mother     Skin cancer Mother     Depression Maternal Grandfather     Other (Other) Maternal Grandfather     Heart Attack Neg     Diabetes Neg         Per NG; No diabetes    Glaucoma Neg         Per NG: No glaucoma histroy    Cancer Neg     Hypertension Neg     Lipids Neg        SOCIAL HISTORY:  Social History     Socioeconomic History    Marital status:      Spouse name: Not on file    Number of children: Not on file    Years of education: Not on file    Highest education level: Not on file   Occupational History    Not on file   Tobacco Use    Smoking status: Former     Current packs/day: 0.00     Average packs/day: 1 pack/day for 27.0 years (27.0 ttl pk-yrs)     Types: Cigarettes     Start date: 1978     Quit date: 2005     Years since quittin.3    Smokeless tobacco: Never    Tobacco comments:     7 years ago   Vaping Use    Vaping status: Never Used   Substance and Sexual Activity    Alcohol use: No    Drug use: No    Sexual activity: Not on file   Other Topics Concern     Service Not Asked    Blood Transfusions Not Asked    Caffeine Concern Yes     Comment: 3 cups of coffee daily    Occupational Exposure Not Asked    Hobby Hazards Not Asked    Sleep Concern Not Asked    Stress Concern Not Asked    Weight Concern Not Asked    Special Diet Not Asked    Back Care Not Asked    Exercise No    Bike Helmet Not Asked    Seat Belt Not Asked    Self-Exams Not Asked    Grew up on a farm Not Asked    History of tanning Not Asked    Outdoor occupation Not Asked    Pt has a pacemaker No    Pt has a defibrillator No    Breast feeding  No    Reaction to local anesthetic No   Social History Narrative    Not on file     Social Determinants of Health     Financial Resource Strain: Not on file   Food Insecurity: Not on file   Transportation Needs: Not on file   Physical Activity: Not on file   Stress: Not on file   Social Connections: Unknown (3/14/2021)    [facility]    Social Connections     Conversations with friends/family/neighbors per week: Not on file   Housing Stability: Low Risk  (7/18/2021)    [facility]    Housing Stability     Mortgage Payment Concerns?: Not on file     Number of Places Lived in the Last Year: Not on file     Unstable Housing?: Not on file     PHYSICAL EXAMINATION:  Vitals:    05/10/24 0727   BP: 101/67   Pulse: 73      General: Alert and oriented x3, NAD, appears stated age, appropriate disposition and demeanor, answers questions appropriately   Head: normocephalic, atraumatic  Eyes: anicteric; no injection  Ears: normal;  no discharge  Nose: externally grossly within normal limits, no unusual discharge or rhinorrhea   Throat: lips grossly within normal limits by visual exam externally  Neck: supple, trachea midline, no obvious JVD  Gait: Walking without limping  Spine: Flattened lumbar lordosis.  ROM:   Lumbar Spine: Limited flexion due to pain  MOTOR EXAMINATION:  Lower Extremities: Normal bilaterally  REFLEXES:  Lower Extremities: Depressed knee reflexes bilaterally  SENSATION (light touch/pinprick/temperature):   Lower Extremities: Normal bilaterally  SLR: Negative bilaterally  SIJ tenderness: Negative bilaterally  Isis's test: Negative bilaterally  TPs: Not identified    IMAGING:  No new imaging available    ASSESSMENT AND PLAN:    Vandana Jefferson is a 68 year old  female, with  low back pain     #Axial Low Back Pain, chronic worsening   #Lumbar Spondylosis  #Facetogenic  pain  -Evidence of adjacent level facet arthropathy at L3/4 and L5/S1   -s/p Bilat L3/4 and  L5/S1 MBB with % relief on two occasions  -Will procedure to L3/4 and L5/S1 RFA wqith IV sedation      #Right Sacroiliac Joint Pain, chronic worsening   -Patient has >3 provocative exam maneuvers which include + kell's finger, FRED, thigh thrust, compression, distraction, Gaenslen's  -Will evaluate after facet therapy   -could be a candidate for right SI joint injection  -If significant short term relief >75% on two occasions but no sustained relief consider SI joint ablation.  Other options included surgical referral for fusion surgery or a posterior stabilization/fusion.      Comprehensive analgesic plan was formulated. Conservative vs. Aggressive measures were discussed at length including pharmacotherapy (eg. Anti- inflammatories, muscle relaxants, neuropathic medications, oral steroids, analgesics), injections, and further testing. Risks and benefits of all options were discussed at length to patients satisfaction during a comprehensive interactive discussion. All questions were answered during extended questions and answer session. Patient agreeable to discussion plan. Greater than 50% of the time was spent with counseling (nature of discussion centered around pain, therapy, and treatment options), face to face time, time spent reviewing data, obtaining patient information and discussing the care with the patients health care providers.     Time spent: 30    Anatoly Santo MD, 05/10/24, 8:43 AM,e

## 2024-05-10 NOTE — TELEPHONE ENCOUNTER
Medication  CGM  pump supply Requested:      Tirzepatide (MOUNJARO) 2.5 MG/0.5ML Subcutaneous Solution Pen-injector                                                             CoverMyMeds Used:    Key: [id number]     Sig: Inject 2.5 mg into the skin once a week.    DX Code:E66.09                                       Case Number/Pending Ref#:           Pt needs PA assistance for this medication, thanks for your help

## 2024-05-10 NOTE — PROGRESS NOTES
Last procedure: Bilateral Medial Branch Block #2 L3/4 L4/5  Date: 05/06/2024      Percentage of relief obtained: 50-60%   Some pain increase on some days.     Duration of relief: Currently still feeling some relief, some days pain increases/returns     Current Pain Score: 5

## 2024-05-11 RX ORDER — TIRZEPATIDE 2.5 MG/.5ML
2.5 INJECTION, SOLUTION SUBCUTANEOUS WEEKLY
Qty: 2 ML | Refills: 2 | Status: SHIPPED | OUTPATIENT
Start: 2024-05-11 | End: 2024-05-17

## 2024-05-11 NOTE — TELEPHONE ENCOUNTER
Message to Endocrinology    Per Epic, Mounjaro has been discontinued, a new prescription may be needed    Please advise

## 2024-05-14 ENCOUNTER — OFFICE VISIT (OUTPATIENT)
Dept: RHEUMATOLOGY | Facility: CLINIC | Age: 69
End: 2024-05-14

## 2024-05-14 VITALS
HEIGHT: 65 IN | HEART RATE: 64 BPM | BODY MASS INDEX: 34.49 KG/M2 | SYSTOLIC BLOOD PRESSURE: 112 MMHG | DIASTOLIC BLOOD PRESSURE: 72 MMHG | WEIGHT: 207 LBS

## 2024-05-14 DIAGNOSIS — M17.0 PRIMARY OSTEOARTHRITIS OF BOTH KNEES: Primary | ICD-10-CM

## 2024-05-14 PROCEDURE — 20610 DRAIN/INJ JOINT/BURSA W/O US: CPT | Performed by: INTERNAL MEDICINE

## 2024-05-14 PROCEDURE — 99213 OFFICE O/P EST LOW 20 MIN: CPT | Performed by: INTERNAL MEDICINE

## 2024-05-14 PROCEDURE — 3078F DIAST BP <80 MM HG: CPT | Performed by: INTERNAL MEDICINE

## 2024-05-14 PROCEDURE — 3008F BODY MASS INDEX DOCD: CPT | Performed by: INTERNAL MEDICINE

## 2024-05-14 PROCEDURE — 3074F SYST BP LT 130 MM HG: CPT | Performed by: INTERNAL MEDICINE

## 2024-05-14 NOTE — PROGRESS NOTES
Vandana Jefferson is a 68 year old female.    HPI:     Chief Complaint   Patient presents with    Knee Pain    Osteoarthritis       I had the pleasure of seeing Vandana Jefferson on 5/14/2024 for follow up Polyarthralgia's, OA knee's    Current Medications:  Advil as needed 2-6 pills a day  Blood work:  ESR 36, CRP 4.29--> repeat in March 2022 normal  Neg RF, CCP, HLA-B27  XR R knee: moderate OA, +choncrocalcinosis     Interval History:  This is a 65 yo F with hx of HTN, HLD, Hypothyroid, GERD, CBP s/p laminectomy 1989 and fusion 2016 presents with polyarthralgias.  She was seen by Dr. Gibbs in the past and received a cortisone injection in her left knee in 2018.  She also has chronic lower back pain.  After her fusion in 2016 she was doing well for about 2 years but then started to have a lot more back pain.  She reports the back pain is in the lower spine and radiates down her gluteal region and down to her knees.  She is now following with neurosurgeon Dr. Thrasher and will be getting MRI of her back next week.  She reports diffuse joint pain involving her hands, wrists, shoulders, knees.  She has bone spurs in her DIPs.  Again she was seen by Dr. Gibbs in the past for her osteoarthritis and received cortisone injections in her left knee.  Never received gel injections.  Currently takes tramadol once in the morning and then Advil 2 to 4 pills a day.  This is mostly for her back pain    3/30/2022:  Presents for f/u of polyarthralgia's and L knee pain  We injected her left knee with cortisone about a week ago  Blood work showed negative RF and CCP but her inflammation markers were high  Her left knee has been doing better after the injection.  She can walk up and down stairs with minimal pain  Continues to have chronic left shoulder pain.  At times is hard to raise it.  No pain in her right shoulder.  No pain in her hips  Has hand pain but mostly in her DIPs.  No pain or swelling in her PIPs or MCPs.  She does  have wrist swelling  She has chronic back pain  No temporal pain, headache, jaw pain or blurry vision    12/1/2022:  Presents for f/u of R knee pain  X-ray of her right knee did show moderate OA changes and chondrocalcinosis  Fell on left shoulder and broke R clavicle, following with orthopedic now  Having pain in both knees again, Left is worse than Right  Yesterday after shower could not walk due to pain in R knee. Also had some swelling in the R knee  Right knee is better today, was taking advil, icing and heat    9/13/2023:  Presents for f/u of knee pain  Also has diffuse joint pain, all joints hurt  Last year she fell and broke her left clavicle, has weakness in shoulders, wants to try physical therapy  Had lower back epidural injection about 2 mos ago but only helped 20%  Has been taking Advil 4 to 6 pills a day for her pain    4/3/2024:  Presents for f/u of knee pain  Having worsening left knee pain, navin the back of the knee  Went to urgent care due to the left knee pain, US showed 3.6 cm left bakers cyst. Also has pain behind the knee    5/14/2024:  Presents for f/u of knee pain  Last visit both knees were injected with cortisone April and helped but symptoms coming back now  She is here for gel injections        HISTORY:  Past Medical History:    Age-related nuclear cataract of both eyes    Anxiety state, unspecified    Back pain    chronic    Cardiomyopathy (HCC)    Cataract    both eyes    Disorder of thyroid    Dry eye    Essential hypertension    Fibromyalgia    Floaters, right    H/O varicose vein stripping    Per NG: Varicose vein ; vein stripping    Headache    Per NG: OU    High cholesterol    Hypothyroidism    Meibomian gland dysfunction    Musculoskeletal problem    Per NG: injection tendon origin/insertion    Musculoskeletal problem    Per NG: Arthrocentesis of the left knee joint    Neck pain    chronic    Osteoarthritis    Radiation    Per NG: Radiation to thyroid     Status post epidural  steroid injection    Per NG: epidral injection x5 per pain mgmt      Social Hx Reviewed   Family Hx Reviewed     Medications (Active prior to today's visit):  Current Outpatient Medications   Medication Sig Dispense Refill    Tirzepatide (MOUNJARO) 2.5 MG/0.5ML Subcutaneous Solution Pen-injector Inject 2.5 mg into the skin once a week. 2 mL 2    busPIRone 15 MG Oral Tab Take 0.5 tablets (7.5 mg total) by mouth in the morning and 0.5 tablets (7.5 mg total) before bedtime. 30 tablet 0    temazepam 30 MG Oral Cap Take 1 capsule (30 mg total) by mouth nightly as needed. 30 capsule 2    LORazepam 1 MG Oral Tab Take 1/2 to 1 tablet (1 mg) twice a day as needed for anxiety 60 tablet 0    celecoxib 200 MG Oral Cap Take 1 capsule (200 mg total) by mouth 2 (two) times daily. 60 capsule 0    GAVILYTE-G 236 g Oral Recon Soln       fluticasone propionate 50 MCG/ACT Nasal Suspension 2 sprays by Each Nare route nightly. 1 each 5    FLUoxetine HCl 40 MG Oral Cap Take 1 capsule (40 mg total) by mouth every morning. 90 capsule 1    levothyroxine 112 MCG Oral Tab Take 1 tablet (112 mcg total) by mouth before breakfast. 90 tablet 3    Spacer/Aero-Holding Chambers Does not apply Device Use with albuterol as needed 1 each 0    gabapentin 800 MG Oral Tab Take 1 tablet (800 mg total) by mouth 2 (two) times daily. 180 tablet 3    famotidine 20 MG Oral Tab Take 1 tablet (20 mg total) by mouth daily. (Patient taking differently: Take 1 tablet (20 mg total) by mouth daily. Only takes as needed about 3 times per month) 30 tablet 11    OMEPRAZOLE 40 MG Oral Capsule Delayed Release TAKE 1 CAPSULE(40 MG) BY MOUTH DAILY BEFORE BREAKFAST 90 capsule 1    Melatonin 10 MG Oral Cap Take 5 mg by mouth nightly as needed.      carvedilol 3.125 MG Oral Tab TAKE 1 TABLET(3.125 MG) BY MOUTH TWICE DAILY WITH MEALS 180 tablet 3    rosuvastatin (CRESTOR) 20 MG Oral Tab Take 1 tablet (20 mg total) by mouth nightly. 90 tablet 3    ibuprofen 400 MG Oral Tab Take 1  tablet (400 mg total) by mouth every 6 (six) hours as needed for Pain. (Patient not taking: Reported on 5/14/2024)       .cmed  Allergies:  Allergies   Allergen Reactions    Demerol [Meperidine] NAUSEA AND VOMITING and NAUSEA ONLY     Other reaction(s): Vomiting    Hydrocodone NAUSEA AND VOMITING    Pregabalin      Other reaction(s): Stomach discomforts    Vicodin Tuss [Hydrocodone-Guaifenesin] NAUSEA AND VOMITING and NAUSEA ONLY     Other reaction(s): Vomiting    Tramadol NAUSEA ONLY         ROS:   All other ROS are negative.     PHYSICAL EXAM:   GEN: AAOx3, NAD  HEENT: EOMI, PERRLA, no injection or icterus, oral mucosa moist, no oral lesions. No lymphadenopathy. No facial rash  CVS: RRR, no murmurs rubs or gallops. Equal 2+ distal pulses.   LUNGS: CTAB, no increased work of breathing  ABDOMEN:  soft NT/ND, +BS, no HSM  SKIN: No rashes or skin lesions. No nail findings  MSK:  Cervical spine: FROM  Hands: + Heberden nodes in R second DIP and L second and third DIP.  No swelling, able to make a fist  Wrist: FROM, no pain or swelling or warmth on palpation  Elbow: FROM, no pain or swelling or warmth on palpation  Shoulders: L shoulder abduction and flexion limited to about 150 degrees  Hip: normal log roll, no lateral hip pain, FRED test negative b/l  Knees: FROM, no warmth or effusion present. No pain with ROM.   Ankles: FROM, no pain or swelling or warmth on palpation  Feet: no pain with MTP squeeze, no toe swelling or pain or warmth on palpation with FROM  Spine: no lumbar or sacral pain on palpation.  NEURO: Cranial nerves II-XII intact grossly. 5/5 strength throughout in both upper and lower extremities, sensation intact.  PSYCH: normal mood       LABS:     Component      Latest Ref Rng & Units 3/23/2022   C-REACTIVE PROTEIN      <0.30 mg/dL 4.29 (H)   C-Citrullinated Peptide IgG AB      0.0 - 6.9 U/mL 1.3   SED RATE      0 - 30 mm/Hr 36 (H)   RHEUMATOID FACTOR      <15 IU/mL <10     Imaging:     XR L  knee:  CONCLUSION:   1. Demineralization.   2. Osteoarthritis showing progression from October 29, 2010.   3. Chondrocalcinosis a new finding.   4. Postop changes in the soft tissues of the thigh.    XR R knee:  CONCLUSION:   1. Demineralization.   2. Mild to moderate osteoarthritis.   3. Chondrocalcinosis.     ASSESSMENT/PLAN:     Bilateral knee pain secondary to OA  - X-rays do show evidence of moderate osteoarthritis and chondrocalcinosis  - No evidence of swelling to suggest pseudogout  - B/L injected with cortisone in March and Sept 2023, 4/2024  - Ultrasound showed a 3.6 cm Baker's cyst  - Both knees injected with Synvisc 1.  Patient tolerated procedure well    Generalized OA, chronic pain  - She will try Celebrex 200 mg twice a day for her pain.  Advised to take with food and not with any other NSAID    Left clavicle fracture due to fall- stable  -Still having some pain, I will send to physical therapy    Left shoulder pain  - Left shoulder was injected back in March 2023, it helped her symptoms but then she fell now having left clavicle and shoulder pain  - will send to PT    Elevated inflammatory markers  - Repeat blood work shows normal ESR and CRP    Chronic lower back pain s/p laminectomy and fusion  - She is also going to be following with neurosurgeon Dr. Thrasher for her chronic LBP  - MRI lower spine was done today     Pt will f/u in 3-4 mos    Caity Ellison MD  5/14/2024  10:40 AM

## 2024-05-15 NOTE — TELEPHONE ENCOUNTER
Dr. Rosario please see patient message below. Do you want patient to come in for a follow up?    \"Dr Levy , I was told the insurance cover Wegovy  as long as the BMI is higher than 31 . I am 207 lbs , could you please let me know  if I should to see you in order to get that checked prescription, please.      Sincerely   Vandana Jefferson \"

## 2024-05-15 NOTE — TELEPHONE ENCOUNTER
Called [employer] at [phone], spoke with Reji MALDONADO, states she will fax prior authorization form.  Unable to process via Cover My Meds or Electronic Prior Authorization.

## 2024-05-16 NOTE — TELEPHONE ENCOUNTER
Prior Authorization form completed and faxed to [employer] at [phone] with last office visit notes.    Awaiting determination

## 2024-05-17 DIAGNOSIS — M47.816 LUMBAR SPONDYLOSIS: Primary | ICD-10-CM

## 2024-05-17 RX ORDER — SEMAGLUTIDE 0.25 MG/.5ML
0.25 INJECTION, SOLUTION SUBCUTANEOUS WEEKLY
Qty: 4 EACH | Refills: 0 | Status: SHIPPED | OUTPATIENT
Start: 2024-05-17

## 2024-05-17 NOTE — TELEPHONE ENCOUNTER
Hi!  Please let her know that I can prescribe Wegove. Please ask her to ask her insurance about Zepboumd also. Thank you!

## 2024-05-17 NOTE — TELEPHONE ENCOUNTER
Prior authorization request completed for: Bilateral Radio Frequency Ablation L3/4 L5/S1   Authorization #[id number]   Pre-D: Not required  Exclusions/Restrictions: Based on medical necessity  Covered Benefit: Based on medical necessity  Authorization dates: N/A  CPT codes approved: 59692 [id number]  Number of visits/dates of service approved: 1  Physician: Gal  Location: [facility]        Patient can be scheduled.

## 2024-05-17 NOTE — TELEPHONE ENCOUNTER
Spoke with Vandana (patient)     Procedure scheduled with Dr. Santo on 05/23/24 - [facility]  Post procedure follow up scheduled with  on 06/07/24    Case placed and signed.

## 2024-05-20 RX ORDER — CELECOXIB 200 MG/1
200 CAPSULE ORAL 2 TIMES DAILY
Qty: 60 CAPSULE | Refills: 0 | Status: SHIPPED | OUTPATIENT
Start: 2024-05-20

## 2024-05-20 NOTE — TELEPHONE ENCOUNTER
LOV: 5/14/24  Last Refilled:#60, 0rfs 4/15/24    ASSESSMENT/PLAN:      Bilateral knee pain secondary to OA  - X-rays do show evidence of moderate osteoarthritis and chondrocalcinosis  - No evidence of swelling to suggest pseudogout  - B/L injected with cortisone in March and Sept 2023, 4/2024  - Ultrasound showed a 3.6 cm Baker's cyst  - Both knees injected with Synvisc 1.  Patient tolerated procedure well     Generalized OA, chronic pain  - She will try Celebrex 200 mg twice a day for her pain.  Advised to take with food and not with any other NSAID     Left clavicle fracture due to fall- stable  -Still having some pain, I will send to physical therapy     Left shoulder pain  - Left shoulder was injected back in March 2023, it helped her symptoms but then she fell now having left clavicle and shoulder pain  - will send to PT     Elevated inflammatory markers  - Repeat blood work shows normal ESR and CRP     Chronic lower back pain s/p laminectomy and fusion  - She is also going to be following with neurosurgeon Dr. Thrasher for her chronic LBP  - MRI lower spine was done today      Pt will f/u in 3-4 mos     Caity Ellison MD  5/14/2024  10:40 AM                 Please advise.

## 2024-05-21 NOTE — TELEPHONE ENCOUNTER
LVM for patient to call back or reply to adQ message sent with message from Dr Ulrich.    Per [employer] Mounjaro 2.5 mg was denied. Per representative, patient needs a diagnosis of Diabetes type 2 or have tried and failed Metformin, insulin, Dipeptidyl peptidase 4 (DPP-4), SGLT2 inhibitors, Sulfonylureas, dopamine receptor agonist like Bromocriptine mesylate (Cycloset) or Alpha-glucosidase inhibitor.

## 2024-05-22 ENCOUNTER — TELEPHONE (OUTPATIENT)
Dept: ENDOCRINOLOGY CLINIC | Facility: CLINIC | Age: 69
End: 2024-05-22

## 2024-05-22 NOTE — TELEPHONE ENCOUNTER
Per chart review: mounjaro is not covered d/t patient does not have dx of DM  Per  dtd 4/29/24: patient will be trying Lydia Massey since her insurance does not cover weight loss meds

## 2024-05-23 ENCOUNTER — MED REC SCAN ONLY (OUTPATIENT)
Dept: ENDOCRINOLOGY CLINIC | Facility: CLINIC | Age: 69
End: 2024-05-23

## 2024-05-23 NOTE — TELEPHONE ENCOUNTER
PA done for wegovy via [employer] -received fax: denied : The requested drug must be covered under your pharmacy benefit- weight loss medication is not covered    Sent to scanning

## 2024-05-30 DIAGNOSIS — R09.82 POST-NASAL DRIP: ICD-10-CM

## 2024-05-30 RX ORDER — FAMOTIDINE 20 MG/1
20 TABLET, FILM COATED ORAL DAILY
Qty: 90 TABLET | Refills: 2 | Status: SHIPPED | OUTPATIENT
Start: 2024-05-30

## 2024-05-30 RX ORDER — OMEPRAZOLE 40 MG/1
40 CAPSULE, DELAYED RELEASE ORAL DAILY
Qty: 90 CAPSULE | Refills: 2 | Status: SHIPPED | OUTPATIENT
Start: 2024-05-30

## 2024-05-30 NOTE — TELEPHONE ENCOUNTER
Dr. Hubbard    Patient requesting 90 day fills for famotidine and omeprazole.  I can't fill per protocol because I don't see a message within last year that says she is to continue on these.    Last seen by you on 6/12/23 for procedure and has upcoming procedure with you on 7/8/2024.    Thank you

## 2024-06-03 RX ORDER — FLUTICASONE PROPIONATE 50 MCG
SPRAY, SUSPENSION (ML) NASAL
Qty: 48 ML | Refills: 1 | Status: SHIPPED | OUTPATIENT
Start: 2024-06-03

## 2024-06-03 NOTE — TELEPHONE ENCOUNTER
REFILL PASSED PER [facility] PROTOCOLS    Requested Prescriptions   Pending Prescriptions Disp Refills    FLUTICASONE PROPIONATE 50 MCG/ACT Nasal Suspension [Pharmacy Med Name: FLUTICASONE PROP 50 MCG SPRAY] 48 mL 1     Sig: SPRAY 2 SPRAYS BY EACH NARE ROUTE NIGHTLY.       Allergy Medication Protocol Passed - 5/30/2024 10:19 AM        Passed - In person appointment or virtual visit in the past 12 mos or appointment in next 3 mos     Recent Outpatient Visits              [facility]    3 weeks ago Lumbar spondylosis    [facility] Anatoly Santo MD    Office Visit    1 month ago Primary osteoarthritis of left knee    [facility]    Office Visit    1 month ago Lumbar spondylosis    [facility] Anatoly Santo MD    Office Visit    2 months ago Primary osteoarthritis of both knees    [facility]          Future Appointments         Provider Department Appt Notes    In 4 days Anatoly Santo MD [facility] 2 Wk F/U Post Bilateral RFA on 05/23 w Gal.    In 1 month [facility]    In 2 months Caity Ellison MD [facility] follow up                         Future Appointments         Provider Department Appt Notes    In 4 days Anatoly Santo MD [facility] 2 Wk F/U Post Bilateral RFA on 05/23 w Gal.    In 1 [facility]    In 2 months Caity Ellison MD [facility] follow up           Recent Outpatient Visits              2 weeks ago Primary osteoarthritis of both knees    [facility], Caity Carson MD    Office Visit    3 weeks ago Lumbar spondylosis    [facility] Anatoly Santo MD    Office Visit    1 month ago Primary osteoarthritis of left knee    [facility], Nica Conley MD    Office Visit    1 month ago Lumbar spondylosis    [facility] Anatoly Santo MD    Office Visit    2 months ago Primary osteoarthritis of both knees    [facility], Caity Carson MD    Office Visit

## 2024-06-07 ENCOUNTER — HOSPITAL ENCOUNTER (OUTPATIENT)
Dept: GENERAL RADIOLOGY | Facility: HOSPITAL | Age: 69
Discharge: HOME | End: 2024-06-07
Attending: STUDENT IN AN ORGANIZED HEALTH CARE EDUCATION/TRAINING PROGRAM
Payer: COMMERCIAL

## 2024-06-07 ENCOUNTER — OFFICE VISIT (OUTPATIENT)
Dept: PAIN CLINIC | Facility: HOSPITAL | Age: 69
End: 2024-06-07
Attending: STUDENT IN AN ORGANIZED HEALTH CARE EDUCATION/TRAINING PROGRAM
Payer: COMMERCIAL

## 2024-06-07 VITALS
BODY MASS INDEX: 34 KG/M2 | DIASTOLIC BLOOD PRESSURE: 66 MMHG | HEART RATE: 96 BPM | SYSTOLIC BLOOD PRESSURE: 101 MMHG | WEIGHT: 207 LBS | OXYGEN SATURATION: 94 %

## 2024-06-07 DIAGNOSIS — M47.817 SPONDYLOSIS OF LUMBOSACRAL REGION WITH SPINAL OSTEOARTHRITIS COMPLICATION: Primary | ICD-10-CM

## 2024-06-07 DIAGNOSIS — M47.817 SPONDYLOSIS OF LUMBOSACRAL REGION WITH SPINAL OSTEOARTHRITIS COMPLICATION: ICD-10-CM

## 2024-06-07 PROCEDURE — 72040 X-RAY EXAM NECK SPINE 2-3 VW: CPT | Performed by: STUDENT IN AN ORGANIZED HEALTH CARE EDUCATION/TRAINING PROGRAM

## 2024-06-07 NOTE — PATIENT INSTRUCTIONS
Refill policies:    Allow 2-3 business days for refills; controlled substances may take longer.  Contact your pharmacy at least 5 days prior to running out of medication and have them send an electronic request or submit request through the “request refill” option in your [employer] account.  Refills are not addressed on weekends; covering physicians do not authorize routine medications on weekends.  No narcotics or controlled substances are refilled after noon on Fridays or by on call physicians.  By law, narcotics must be electronically prescribed.  A 30 day supply with no refills is the maximum allowed.  If your prescription is due for a refill, you may be due for a follow up appointment.  To best provide you care, patients receiving routine medications need to be seen at least once a year.  Patients receiving narcotic/controlled substance medications need to be seen at least once every 3 months.  In the event that your preferred pharmacy does not have the requested medication in stock (e.g. Backordered), it is your responsibility to find another pharmacy that has the requested medication available.  We will gladly send a new prescription to that pharmacy at your request.    Scheduling Tests:    If your physician has ordered radiology tests such as MRI or CT scans, please contact Central Scheduling at [phone] right away to schedule the test.  Once scheduled, the [facility] Centralized Referral Team will work with your insurance carrier to obtain pre-certification or prior authorization.  Depending on your insurance carrier, approval may take 3-10 days.  It is highly recommended patients assure they have received an authorization before having a test performed.  If test is done without insurance authorization, patient may be responsible for the entire amount billed.      Precertification and Prior Authorizations:  If your physician has recommended that you have a procedure or additional testing performed the [facility]  Centralized Referral Team will contact your insurance carrier to obtain pre-certification or prior authorization.    You are strongly encouraged to contact your insurance carrier to verify that your procedure/test has been approved and is a COVERED benefit.  Although the [facility] Centralized Referral Team does its due diligence, the insurance carrier gives the disclaimer that \"Although the procedure is authorized, this does not guarantee payment.\"    Ultimately the patient is responsible for payment.   Thank you for your understanding in this matter.  Paperwork Completion:  If you require FMLA or disability paperwork for your recovery, please make sure to either drop it off or have it faxed to our office at [phone]. Be sure the form has your name and date of birth on it.  The form will be faxed to our Forms Department and they will complete it for you.  There is a 25$ fee for all forms that need to be filled out.  Please be aware there is a 10-14 day turnaround time.  You will need to sign a release of information (EFREN) form if your paperwork does not come with one.  You may call the Forms Department with any questions at [phone].  Their fax number is [phone].

## 2024-06-07 NOTE — PROGRESS NOTES
Last procedure:   BILATERAL L3/4 L5/S1 RADIOFREQUENCY ABLATION     Date: 5/23/24  Percentage of relief obtained: 70%  Duration of relief: PT reports that she is feeling better more and more daily    Current Pain Score: 2    Narcotic Contract Exp: NA

## 2024-06-07 NOTE — CHRONIC PAIN
Follow-up Note  Interval History:  Patient is seen today for follow up after lumbar RFA reporting 70% pain relief, improved mobility, sleep quality and overall quality of life.       Vandana Jefferson is a 68 year old female, originally referred to the pain clinic by Nica Glover MD, with history of   No diagnosis found.   returns to the clinic for follow up. Patient is complaining of low back pain radiating down to both lower extremities.  Today pain is 5 out of 10. Range of pain is from 3 to 7 out of 10.  For pain control patient presently is using Aleve. Pain Meds are reducing his pain by 20-30%. Tolerating well. No new symptoms.  Patient has no change in bowel/bladder function.  Patient had lumbar epidurals injection on July 11.  Injection gave her 60% improvement.  Still in pain but not so severe cannot sleep during the night.    ALLERGIES:  Allergies   Allergen Reactions    Demerol [Meperidine] NAUSEA AND VOMITING and NAUSEA ONLY     Other reaction(s): Vomiting    Hydrocodone NAUSEA AND VOMITING    Pregabalin      Other reaction(s): Stomach discomforts    Vicodin Tuss [Hydrocodone-Guaifenesin] NAUSEA AND VOMITING and NAUSEA ONLY     Other reaction(s): Vomiting    Tramadol NAUSEA ONLY       MEDICATION LIST:  Current Outpatient Medications   Medication Sig Dispense Refill    fluticasone propionate 50 MCG/ACT Nasal Suspension SPRAY 2 SPRAYS BY EACH NARE ROUTE NIGHTLY. 48 mL 1    Omeprazole 40 MG Oral Capsule Delayed Release Take 1 capsule (40 mg total) by mouth daily. 90 capsule 2    famotidine 20 MG Oral Tab Take 1 tablet (20 mg total) by mouth daily. 90 tablet 2    busPIRone 15 MG Oral Tab Take 0.5 tablets (7.5 mg total) by mouth in the morning and 0.5 tablets (7.5 mg total) before bedtime. 30 tablet 0    temazepam 30 MG Oral Cap Take 1 capsule (30 mg total) by mouth nightly as needed. 30 capsule 2    LORazepam 1 MG Oral Tab Take 1/2 to 1 tablet (1 mg) twice a day as needed for anxiety 60 tablet 0     FLUoxetine HCl 40 MG Oral Cap Take 1 capsule (40 mg total) by mouth every morning. 90 capsule 1    levothyroxine 112 MCG Oral Tab Take 1 tablet (112 mcg total) by mouth before breakfast. 90 tablet 3    gabapentin 800 MG Oral Tab Take 1 tablet (800 mg total) by mouth 2 (two) times daily. 180 tablet 3    Melatonin 10 MG Oral Cap Take 5 mg by mouth nightly as needed.      carvedilol 3.125 MG Oral Tab TAKE 1 TABLET(3.125 MG) BY MOUTH TWICE DAILY WITH MEALS 180 tablet 3    rosuvastatin (CRESTOR) 20 MG Oral Tab Take 1 tablet (20 mg total) by mouth nightly. 90 tablet 3    CELECOXIB 200 MG Oral Cap TAKE 1 CAPSULE BY MOUTH TWICE A DAY (Patient not taking: Reported on 6/7/2024) 60 capsule 0    ibuprofen 400 MG Oral Tab Take 1 tablet (400 mg total) by mouth every 6 (six) hours as needed for Pain. (Patient not taking: Reported on 5/14/2024)          REVIEW OF SYSTEMS:   General: no weight change, change in appetite, thirst or fever  HEENT: no headache or blurred vision  Cardiopulmonary: no chest pain, palpitations, or shortness of breath  GI: no anorexia, nausea or vomiting, or bowel incontinence   : no dysuria, or pyuria.  Endo: no goiter, lethargy, or heat/cold intolerance  Heme/Onc: no pallor, bruising, or bleeding.    Musculoskeletal:  no new problems  Neuro:  no new problems  Psych:  no new problems    MEDICAL HISTORY:  Patient Active Problem List   Diagnosis    Primary cardiomyopathy (HCC)    Hypercholesteremia    Failed back syndrome, lumbar    Lumbago    Lumbar facet arthropathy    Lumbosacral radiculopathy    Lumbar radicular syndrome    Primary osteoarthritis involving multiple joints    Chronic low back pain    Anxiety and depression    Thyroid activity decreased    Acquired hypothyroidism    Chronic neck and back pain    Obesity (BMI 30.0-34.9)    Thyroid nodule    Onychomycosis    DDD (degenerative disc disease), cervical    H/O radioactive iodine thyroid ablation    Female pattern alopecia    Myalgia of muscle  of neck    Chronic left shoulder pain    Myalgia, unspecified site    History of lumbar surgery    Bilateral leg paresthesia    Polyneuropathy    Multinodular thyroid    Depression, recurrent (HCC)    Age-related nuclear cataract of both eyes    Floaters, right    Dry eye    Sweating abnormality    Generalized anxiety disorder    MDD (recurrent major depressive disorder) in remission (HCC)    Acute maxillary sinusitis    Anal or rectal pain    Anxiety states    Benign neoplasm of eyelid    Bronchitis    Congenital anomaly of nails    Cough    Diarrhea    Diarrhea of presumed infectious origin    Lump or mass in breast    Disorder of lipid metabolism    Essential hypertension    Female stress incontinence    Fibroadenosis of breast    Fibrosclerosis of breast    Gastroesophageal reflux disease    Hemorrhoids    Hordeolum internum    Astigmatism    Hypopotassemia    Injury of knee, leg, ankle and foot    Injury, finger    Intestinal infection due to Clostridium difficile    Intractable vomiting    Lateral epicondylitis of elbow    Neoplasm of uncertain behavior of connective and other soft tissue    Noninfectious gastroenteritis and colitis    Nonspecific reaction to tuberculin skin test    Arthropathy    Other specified disorders of adrenal glands    Overweight    Pain in joint, pelvic region and thigh    Pinguecula    Polycystic ovaries    Reflux esophagitis    Senile cataract    Senile osteoporosis    Solitary cyst of breast    Spinal stenosis in cervical region    Symptomatic menopausal or female climacteric states    Throat pain    Toxic diffuse goiter    Toxic uninodular goiter    Absence of bladder continence    Uterine prolapse    Visual disturbance    Vitamin D deficiency    Class 1 obesity due to excess calories with serious comorbidity and body mass index (BMI) of 33.0 to 33.9 in adult    Hypothyroidism    History of cardiomyopathy    Baker's cyst, left     Past Medical History:    Age-related nuclear  cataract of both eyes    Anxiety state, unspecified    Back pain    chronic    Cardiomyopathy (HCC)    Cataract    both eyes    Disorder of thyroid    Dry eye    Essential hypertension    Fibromyalgia    Floaters, right    H/O varicose vein stripping    Per NG: Varicose vein ; vein stripping    Headache    Per NG: OU    High cholesterol    Hypothyroidism    Meibomian gland dysfunction    Musculoskeletal problem    Per NG: injection tendon origin/insertion    Musculoskeletal problem    Per NG: Arthrocentesis of the left knee joint    Neck pain    chronic    Osteoarthritis    Radiation    Per NG: Radiation to thyroid     Status post epidural steroid injection    Per NG: epidral injection x5 per pain mgmt       SURGICAL HISTORY:  Past Surgical History:   Procedure Laterality Date    Appendectomy      Back surgery  2016    lumbar L4-L5 fusion      1979    1    Cataract  2023    Right eye    Cataract extraction w/  intraocular lens implant Left 2023    Dr. Windy Barnett @ [facility]    Cataract extraction w/ intraocular lens implant Right 2023    Dr. Windy Barentt @[facility]     delivery only      Cholecystectomy      Colonoscopy  10/2005    normal, non precancerous polyps    Colonoscopy N/A 2018    Procedure: COLONOSCOPY;  Surgeon: Sean Hubbard MD;  Location: [facility] ENDOSCOPY    Colonoscopy N/A 2023    Procedure: COLONOSCOPY;  Surgeon: Sean Hubbard MD;  Location: [facility] ENDO    Cyst aspiration left Left 2007    FNA    Cyst aspiration right      Egd      Laminectomy      Per NG: Disc  displacement x2 and spinal fusion 2016    [facility] localization wire 1 site left (cpt=19281)  2007    Other surgical history      Per NG: Arthrocentesis    Other surgical history      Per NG: arthrocentesis of the knee joint    Yag iridotomy - od - right eye Right 10/10/1997    [facility]    Yag iridotomy - os - left eye Left 10/16/1997    [facility]       FAMILY HISTORY:  Family History   Problem Relation Age  of Onset    Heart Disorder Father 65    Depression Mother     Neurological Disorder Mother         Per NG: Hypothyrodism    Arthritis Mother         Per NG: Osteoarthritis    Other (Other) Mother     Skin cancer Mother     Depression Maternal Grandfather     Other (Other) Maternal Grandfather     Heart Attack Neg     Diabetes Neg         Per NG; No diabetes    Glaucoma Neg         Per NG: No glaucoma histroy    Cancer Neg     Hypertension Neg     Lipids Neg        SOCIAL HISTORY:  Social History     Socioeconomic History    Marital status:      Spouse name: Not on file    Number of children: Not on file    Years of education: Not on file    Highest education level: Not on file   Occupational History    Not on file   Tobacco Use    Smoking status: Former     Current packs/day: 0.00     Average packs/day: 1 pack/day for 27.0 years (27.0 ttl pk-yrs)     Types: Cigarettes     Start date: 1978     Quit date: 2005     Years since quittin.4    Smokeless tobacco: Never    Tobacco comments:     7 years ago   Vaping Use    Vaping status: Never Used   Substance and Sexual Activity    Alcohol use: No    Drug use: No    Sexual activity: Not on file   Other Topics Concern     Service Not Asked    Blood Transfusions Not Asked    Caffeine Concern Yes     Comment: 3 cups of coffee daily    Occupational Exposure Not Asked    Hobby Hazards Not Asked    Sleep Concern Not Asked    Stress Concern Not Asked    Weight Concern Not Asked    Special Diet Not Asked    Back Care Not Asked    Exercise No    Bike Helmet Not Asked    Seat Belt Not Asked    Self-Exams Not Asked    Grew up on a farm Not Asked    History of tanning Not Asked    Outdoor occupation Not Asked    Pt has a pacemaker No    Pt has a defibrillator No    Breast feeding No    Reaction to local anesthetic No   Social History Narrative    Not on file     Social Determinants of Health     Financial Resource Strain: Not on file   Food Insecurity: Not on  file   Transportation Needs: Not on file   Physical Activity: Not on file   Stress: Not on file   Social Connections: Unknown (3/14/2021)    [facility]    Social Connections     Conversations with friends/family/neighbors per week: Not on file   Housing Stability: Low Risk  (7/18/2021)    [facility]    Housing Stability     Mortgage Payment Concerns?: Not on file     Number of Places Lived in the Last Year: Not on file     Unstable Housing?: Not on file     PHYSICAL EXAMINATION:  Vitals:    06/07/24 1019   BP: 101/66   Pulse: 96      General: Alert and oriented x3, NAD, appears stated age, appropriate disposition and demeanor, answers questions appropriately   Head: normocephalic, atraumatic  Eyes: anicteric; no injection  Ears: normal;  no discharge  Nose: externally grossly within normal limits, no unusual discharge or rhinorrhea   Throat: lips grossly within normal limits by visual exam externally  Neck: supple, trachea midline, no obvious JVD  Gait: Walking without limping  Spine: Flattened lumbar lordosis.  ROM:   Lumbar Spine: Limited flexion due to pain  MOTOR EXAMINATION:  Lower Extremities: Normal bilaterally  REFLEXES:  Lower Extremities: Depressed knee reflexes bilaterally  SENSATION (light touch/pinprick/temperature):   Lower Extremities: Normal bilaterally  SLR: Negative bilaterally  SIJ tenderness: Negative bilaterally  Isis's test: Negative bilaterally  TPs: Not identified    IMAGING:  No new imaging available    ASSESSMENT AND PLAN:    Vandana Jefferson is a 68 year old  female, with  low back pain     #Axial Low Back Pain, chronic worsening   #Lumbar Spondylosis  #Facetogenic pain  -Evidence of adjacent level facet arthropathy at L3/4 and L5/S1   -s/p Bilat L3/4 and  L5/S1 RFA with 70% relief      #Axial Neck Pain, chronic worsening   #Cervical Spondylosis  #Facetogenic  pain  -Will trial a course of PT   - Will obtain X-ray films         Comprehensive analgesic plan was formulated. Conservative vs. Aggressive measures were discussed at length including pharmacotherapy (eg. Anti- inflammatories, muscle relaxants, neuropathic medications, oral steroids, analgesics), injections, and further testing. Risks and benefits of all options were discussed at length to patients satisfaction during a comprehensive interactive discussion. All questions were answered during extended questions and answer session. Patient agreeable to discussion plan. Greater than 50% of the time was spent with counseling (nature of discussion centered around pain, therapy, and treatment options), face to face time, time spent reviewing data, obtaining patient information and discussing the care with the patients health care providers.     Time spent: 30    Anatoly Santo MD, 06/07/24, 11:11 AM

## 2024-06-10 ENCOUNTER — TELEPHONE (OUTPATIENT)
Facility: CLINIC | Age: 69
End: 2024-06-10

## 2024-06-11 NOTE — TELEPHONE ENCOUNTER
See Case change in TE 03/06/2024    REScheduled for:  Colonoscopy 34970  Provider Name: Dr. Hubbard   Date:  FROM:   7/8/2024    TO: 09/06/2024  Location:  [facility]           Sedation:  MAC  Time: FROM: 10:30 AM   TO: 2:15PM (patient is aware arrival time is 1:15PM   Prep: [facility] Prep Instructions Given VIA Mychart  Meds/Allergies Reconciled?:  Physician reviewed  Diagnosis with codes: Colon cancer screening Z12.11; Hx: Colon polyps Z86.010    Was patient informed to call insurance with codes (Y/N):  Yes, [facility] with the [facility].  Referral sent?:  Referral was sent at the time of electronic surgical scheduling.  EM or [facility] notified?:  I sent an electronic request to Endo Scheduling and received a confirmation today.  Medication Orders: Hold multivitamins one week prior to procedure.  Misc Orders:  N/A     Further instructions given by staff: I discussed the prep instructions with the patient which she verbally understood and is aware that I will send the instructions today.       Patient was informed about the new cancellation policy for his/her procedure. Patient was also given a copy of the cancellation policy at the time of the appointment and verbalized understanding.

## 2024-06-27 ENCOUNTER — OFFICE VISIT (OUTPATIENT)
Dept: PAIN CLINIC | Facility: HOSPITAL | Age: 69
End: 2024-06-27
Attending: STUDENT IN AN ORGANIZED HEALTH CARE EDUCATION/TRAINING PROGRAM

## 2024-06-27 ENCOUNTER — TELEPHONE (OUTPATIENT)
Dept: FAMILY MEDICINE CLINIC | Facility: CLINIC | Age: 69
End: 2024-06-27

## 2024-06-27 VITALS
HEART RATE: 74 BPM | WEIGHT: 204 LBS | BODY MASS INDEX: 34 KG/M2 | DIASTOLIC BLOOD PRESSURE: 69 MMHG | OXYGEN SATURATION: 92 % | SYSTOLIC BLOOD PRESSURE: 111 MMHG

## 2024-06-27 DIAGNOSIS — M47.816 LUMBAR SPONDYLOSIS: Primary | ICD-10-CM

## 2024-06-27 DIAGNOSIS — M47.817 SPONDYLOSIS OF LUMBOSACRAL REGION WITH SPINAL OSTEOARTHRITIS COMPLICATION: Primary | ICD-10-CM

## 2024-06-27 PROCEDURE — 99211 OFF/OP EST MAY X REQ PHY/QHP: CPT

## 2024-06-27 RX ORDER — DICLOFENAC SODIUM 75 MG/1
75 TABLET, DELAYED RELEASE ORAL 2 TIMES DAILY
Qty: 60 TABLET | Refills: 1 | Status: SHIPPED | OUTPATIENT
Start: 2024-06-27 | End: 2024-08-26

## 2024-06-27 NOTE — PATIENT INSTRUCTIONS
Refill policies:    Allow 2-3 business days for refills; controlled substances may take longer.  Contact your pharmacy at least 5 days prior to running out of medication and have them send an electronic request or submit request through the “request refill” option in your [employer] account.  Refills are not addressed on weekends; covering physicians do not authorize routine medications on weekends.  No narcotics or controlled substances are refilled after noon on Fridays or by on call physicians.  By law, narcotics must be electronically prescribed.  A 30 day supply with no refills is the maximum allowed.  If your prescription is due for a refill, you may be due for a follow up appointment.  To best provide you care, patients receiving routine medications need to be seen at least once a year.  Patients receiving narcotic/controlled substance medications need to be seen at least once every 3 months.  In the event that your preferred pharmacy does not have the requested medication in stock (e.g. Backordered), it is your responsibility to find another pharmacy that has the requested medication available.  We will gladly send a new prescription to that pharmacy at your request.    Scheduling Tests:    If your physician has ordered radiology tests such as MRI or CT scans, please contact Central Scheduling at [phone] right away to schedule the test.  Once scheduled, the [facility] Centralized Referral Team will work with your insurance carrier to obtain pre-certification or prior authorization.  Depending on your insurance carrier, approval may take 3-10 days.  It is highly recommended patients assure they have received an authorization before having a test performed.  If test is done without insurance authorization, patient may be responsible for the entire amount billed.      Precertification and Prior Authorizations:  If your physician has recommended that you have a procedure or additional testing performed the [facility]  Centralized Referral Team will contact your insurance carrier to obtain pre-certification or prior authorization.    You are strongly encouraged to contact your insurance carrier to verify that your procedure/test has been approved and is a COVERED benefit.  Although the [facility] Centralized Referral Team does its due diligence, the insurance carrier gives the disclaimer that \"Although the procedure is authorized, this does not guarantee payment.\"    Ultimately the patient is responsible for payment.   Thank you for your understanding in this matter.  Paperwork Completion:  If you require FMLA or disability paperwork for your recovery, please make sure to either drop it off or have it faxed to our office at [phone]. Be sure the form has your name and date of birth on it.  The form will be faxed to our Forms Department and they will complete it for you.  There is a 25$ fee for all forms that need to be filled out.  Please be aware there is a 10-14 day turnaround time.  You will need to sign a release of information (EFREN) form if your paperwork does not come with one.  You may call the Forms Department with any questions at [phone].  Their fax number is [phone].

## 2024-06-27 NOTE — PROGRESS NOTES
Patient presents in office today with reported pain in lower back and buttocks     Current pain level reported = 2/10    Last reported dose of Celebrex this morning.       Narcotic Contract renewal NA    Increased pain since 06.18.24

## 2024-06-27 NOTE — TELEPHONE ENCOUNTER
Patient is requesting referral.     Name of specialist and specialty department : physical therapy, stated was referred by pain clinic to physical therapy, but wants to go to [facility] near house  Reason for visit with the specialist: back and neck  Address of the specialist office: [address] in [place]  Appointment date: None     Asking for call once placed     CSS informed patient the turnaround time for referral is 5-7 business days.  Patient was informed to check their [employer] account for referral status.

## 2024-06-28 NOTE — CHRONIC PAIN
Follow-up Note  Interval History:  Patient is seen today for follow up. She reports the last week she was very active and had some back pain but today she is actually feeling a lot better after a good night sleep and rates her pain 2/10.     Vandana Jefferson is a 68 year old female, originally referred to the pain clinic by Nica Glover MD, with history of   No diagnosis found.   returns to the clinic for follow up. Patient is complaining of low back pain radiating down to both lower extremities.  Today pain is 5 out of 10. Range of pain is from 3 to 7 out of 10.  For pain control patient presently is using Aleve. Pain Meds are reducing his pain by 20-30%. Tolerating well. No new symptoms.  Patient has no change in bowel/bladder function.  Patient had lumbar epidurals injection on July 11.  Injection gave her 60% improvement.  Still in pain but not so severe cannot sleep during the night.    ALLERGIES:  Allergies   Allergen Reactions    Demerol [Meperidine] NAUSEA AND VOMITING and NAUSEA ONLY     Other reaction(s): Vomiting    Hydrocodone NAUSEA AND VOMITING    Pregabalin      Other reaction(s): Stomach discomforts    Vicodin Tuss [Hydrocodone-Guaifenesin] NAUSEA AND VOMITING and NAUSEA ONLY     Other reaction(s): Vomiting    Tramadol NAUSEA ONLY       MEDICATION LIST:  Current Outpatient Medications   Medication Sig Dispense Refill    diclofenac 75 MG Oral Tab EC Take 1 tablet (75 mg total) by mouth 2 (two) times daily. 60 tablet 1    fluticasone propionate 50 MCG/ACT Nasal Suspension SPRAY 2 SPRAYS BY EACH NARE ROUTE NIGHTLY. 48 mL 1    Omeprazole 40 MG Oral Capsule Delayed Release Take 1 capsule (40 mg total) by mouth daily. 90 capsule 2    famotidine 20 MG Oral Tab Take 1 tablet (20 mg total) by mouth daily. 90 tablet 2    busPIRone 15 MG Oral Tab Take 0.5 tablets (7.5 mg total) by mouth in the morning and 0.5 tablets (7.5 mg total) before bedtime. 30 tablet 0    CELECOXIB 200 MG Oral Cap TAKE 1 CAPSULE BY  MOUTH TWICE A DAY 60 capsule 0    temazepam 30 MG Oral Cap Take 1 capsule (30 mg total) by mouth nightly as needed. 30 capsule 2    LORazepam 1 MG Oral Tab Take 1/2 to 1 tablet (1 mg) twice a day as needed for anxiety 60 tablet 0    ibuprofen 400 MG Oral Tab Take 1 tablet (400 mg total) by mouth every 6 (six) hours as needed for Pain.      FLUoxetine HCl 40 MG Oral Cap Take 1 capsule (40 mg total) by mouth every morning. 90 capsule 1    levothyroxine 112 MCG Oral Tab Take 1 tablet (112 mcg total) by mouth before breakfast. 90 tablet 3    gabapentin 800 MG Oral Tab Take 1 tablet (800 mg total) by mouth 2 (two) times daily. 180 tablet 3    Melatonin 10 MG Oral Cap Take 5 mg by mouth nightly as needed.      carvedilol 3.125 MG Oral Tab TAKE 1 TABLET(3.125 MG) BY MOUTH TWICE DAILY WITH MEALS 180 tablet 3    rosuvastatin (CRESTOR) 20 MG Oral Tab Take 1 tablet (20 mg total) by mouth nightly. 90 tablet 3        REVIEW OF SYSTEMS:   General: no weight change, change in appetite, thirst or fever  HEENT: no headache or blurred vision  Cardiopulmonary: no chest pain, palpitations, or shortness of breath  GI: no anorexia, nausea or vomiting, or bowel incontinence   : no dysuria, or pyuria.  Endo: no goiter, lethargy, or heat/cold intolerance  Heme/Onc: no pallor, bruising, or bleeding.    Musculoskeletal:  no new problems  Neuro:  no new problems  Psych:  no new problems    MEDICAL HISTORY:  Patient Active Problem List   Diagnosis    Primary cardiomyopathy (HCC)    Hypercholesteremia    Failed back syndrome, lumbar    Lumbago    Lumbar facet arthropathy    Lumbosacral radiculopathy    Lumbar radicular syndrome    Primary osteoarthritis involving multiple joints    Chronic low back pain    Anxiety and depression    Thyroid activity decreased    Acquired hypothyroidism    Chronic neck and back pain    Obesity (BMI 30.0-34.9)    Thyroid nodule    Onychomycosis    DDD (degenerative disc disease), cervical    H/O radioactive  iodine thyroid ablation    Female pattern alopecia    Myalgia of muscle of neck    Chronic left shoulder pain    Myalgia, unspecified site    History of lumbar surgery    Bilateral leg paresthesia    Polyneuropathy    Multinodular thyroid    Depression, recurrent (HCC)    Age-related nuclear cataract of both eyes    Floaters, right    Dry eye    Sweating abnormality    Generalized anxiety disorder    MDD (recurrent major depressive disorder) in remission (HCC)    Acute maxillary sinusitis    Anal or rectal pain    Anxiety states    Benign neoplasm of eyelid    Bronchitis    Congenital anomaly of nails    Cough    Diarrhea    Diarrhea of presumed infectious origin    Lump or mass in breast    Disorder of lipid metabolism    Essential hypertension    Female stress incontinence    Fibroadenosis of breast    Fibrosclerosis of breast    Gastroesophageal reflux disease    Hemorrhoids    Hordeolum internum    Astigmatism    Hypopotassemia    Injury of knee, leg, ankle and foot    Injury, finger    Intestinal infection due to Clostridium difficile    Intractable vomiting    Lateral epicondylitis of elbow    Neoplasm of uncertain behavior of connective and other soft tissue    Noninfectious gastroenteritis and colitis    Nonspecific reaction to tuberculin skin test    Arthropathy    Other specified disorders of adrenal glands    Overweight    Pain in joint, pelvic region and thigh    Pinguecula    Polycystic ovaries    Reflux esophagitis    Senile cataract    Senile osteoporosis    Solitary cyst of breast    Spinal stenosis in cervical region    Symptomatic menopausal or female climacteric states    Throat pain    Toxic diffuse goiter    Toxic uninodular goiter    Absence of bladder continence    Uterine prolapse    Visual disturbance    Vitamin D deficiency    Class 1 obesity due to excess calories with serious comorbidity and body mass index (BMI) of 33.0 to 33.9 in adult    Hypothyroidism    History of cardiomyopathy     Baker's cyst, left     Past Medical History:    Age-related nuclear cataract of both eyes    Anxiety state, unspecified    Back pain    chronic    Cardiomyopathy (HCC)    Cataract    both eyes    Disorder of thyroid    Dry eye    Essential hypertension    Fibromyalgia    Floaters, right    H/O varicose vein stripping    Per NG: Varicose vein ; vein stripping    Headache    Per NG: OU    High cholesterol    Hypothyroidism    Meibomian gland dysfunction    Musculoskeletal problem    Per NG: injection tendon origin/insertion    Musculoskeletal problem    Per NG: Arthrocentesis of the left knee joint    Neck pain    chronic    Osteoarthritis    Radiation    Per NG: Radiation to thyroid     Status post epidural steroid injection    Per NG: epidral injection x5 per pain mgmt       SURGICAL HISTORY:  Past Surgical History:   Procedure Laterality Date    Appendectomy      Back surgery  2016    lumbar L4-L5 fusion      1979        Cataract  2023    Right eye    Cataract extraction w/  intraocular lens implant Left 2023    Dr. Windy Barnett @ [facility]    Cataract extraction w/ intraocular lens implant Right 2023    Dr. Windy Barnett @[facility]     delivery only      Cholecystectomy      Colonoscopy  10/2005    normal, non precancerous polyps    Colonoscopy N/A 2018    Procedure: COLONOSCOPY;  Surgeon: Sean Hubbard MD;  Location: [facility] ENDOSCOPY    Colonoscopy N/A 2023    Procedure: COLONOSCOPY;  Surgeon: Sean Hubbard MD;  Location: [facility] ENDO    Cyst aspiration left Left 2007    FNA    Cyst aspiration right      Egd      Laminectomy      Per NG: Disc  displacement x2 and spinal fusion 2016    [facility] localization wire 1 site left (cpt=19281)  2007    Other surgical history      Per NG: Arthrocentesis    Other surgical history      Per NG: arthrocentesis of the knee joint    Yag iridotomy - od - right eye Right 10/10/1997    [facility]    Yag iridotomy - os - left eye Left 10/16/1997     MICHELA       FAMILY HISTORY:  Family History   Problem Relation Age of Onset    Heart Disorder Father 65    Depression Mother     Neurological Disorder Mother         Per NG: Hypothyrodism    Arthritis Mother         Per NG: Osteoarthritis    Other (Other) Mother     Skin cancer Mother     Depression Maternal Grandfather     Other (Other) Maternal Grandfather     Heart Attack Neg     Diabetes Neg         Per NG; No diabetes    Glaucoma Neg         Per NG: No glaucoma histroy    Cancer Neg     Hypertension Neg     Lipids Neg        SOCIAL HISTORY:  Social History     Socioeconomic History    Marital status:      Spouse name: Not on file    Number of children: Not on file    Years of education: Not on file    Highest education level: Not on file   Occupational History    Not on file   Tobacco Use    Smoking status: Former     Current packs/day: 0.00     Average packs/day: 1 pack/day for 27.0 years (27.0 ttl pk-yrs)     Types: Cigarettes     Start date: 1978     Quit date: 2005     Years since quittin.5    Smokeless tobacco: Never    Tobacco comments:     7 years ago   Vaping Use    Vaping status: Never Used   Substance and Sexual Activity    Alcohol use: No    Drug use: No    Sexual activity: Not on file   Other Topics Concern     Service Not Asked    Blood Transfusions Not Asked    Caffeine Concern Yes     Comment: 3 cups of coffee daily    Occupational Exposure Not Asked    Hobby Hazards Not Asked    Sleep Concern Not Asked    Stress Concern Not Asked    Weight Concern Not Asked    Special Diet Not Asked    Back Care Not Asked    Exercise No    Bike Helmet Not Asked    Seat Belt Not Asked    Self-Exams Not Asked    Grew up on a farm Not Asked    History of tanning Not Asked    Outdoor occupation Not Asked    Pt has a pacemaker No    Pt has a defibrillator No    Breast feeding No    Reaction to local anesthetic No   Social History Narrative    Not on file     Social Determinants of Health      Financial Resource Strain: Not on file   Food Insecurity: Not on file   Transportation Needs: Not on file   Physical Activity: Not on file   Stress: Not on file   Social Connections: Unknown (3/14/2021)    Received [facility], [facility]    Social Connections     Conversations with friends/family/neighbors per week: Not on file   Housing Stability: Low Risk  (7/18/2021)    Received from [facility], [facility]    Housing Stability     Mortgage Payment Concerns?: Not on file     Number of Places Lived in the Last Year: Not on file     Unstable Housing?: Not on file     PHYSICAL EXAMINATION:  Vitals:    06/27/24 1109   BP: 111/69   Pulse: 74      General: Alert and oriented x3, NAD, appears stated age, appropriate disposition and demeanor, answers questions appropriately   Head: normocephalic, atraumatic  Eyes: anicteric; no injection  Ears: normal;  no discharge  Nose: externally grossly within normal limits, no unusual discharge or rhinorrhea   Throat: lips grossly within normal limits by visual exam externally  Neck: supple, trachea midline, no obvious JVD  Gait: Walking without limping  Spine: Flattened lumbar lordosis.  ROM:   Lumbar Spine: Limited flexion due to pain  MOTOR EXAMINATION:  Lower Extremities: Normal bilaterally  REFLEXES:  Lower Extremities: Depressed knee reflexes bilaterally  SENSATION (light touch/pinprick/temperature):   Lower Extremities: Normal bilaterally  SLR: Negative bilaterally  SIJ tenderness: Negative bilaterally  Isis's test: Negative bilaterally  TPs: Not identified    IMAGING:  No new imaging available    ASSESSMENT AND PLAN:    Vandana Jefferson is a 68 year old  female, with  low back pain     #Axial Low Back Pain, chronic stable   #Lumbar Spondylosis  #Facetogenic pain  -s/p Bilat L3/4 and  L5/S1 RFA with 70% relief   -Discussed balanced activity and obtaining adequate rest   -Will trial prn  Diclofenac      #Axial Neck Pain, chronic worsening   #Cervical Spondylosis  #Facetogenic pain  -X-ray with evidence of spondylosis most marked at C4/5 and C5/6   -Will trial a course of PT with diclofenac    -Could be a candidate for MBB       Comprehensive analgesic plan was formulated. Conservative vs. Aggressive measures were discussed at length including pharmacotherapy (eg. Anti- inflammatories, muscle relaxants, neuropathic medications, oral steroids, analgesics), injections, and further testing. Risks and benefits of all options were discussed at length to patients satisfaction during a comprehensive interactive discussion. All questions were answered during extended questions and answer session. Patient agreeable to discussion plan. Greater than 50% of the time was spent with counseling (nature of discussion centered around pain, therapy, and treatment options), face to face time, time spent reviewing data, obtaining patient information and discussing the care with the patients health care providers.     Time spent: 30  Anatoly Santo MD, 06/28/24, 11:37 AM

## 2024-06-28 NOTE — TELEPHONE ENCOUNTER
Dr. Ocampo,     Patient called requesting referral to external PT facility[facility]     Previous referral put in by Pain Management, although was put in as internal.    Pended referral please review diagnosis and sign off if you agree.    Thank you.  Imelda Oliva  Managed Care

## 2024-07-01 ENCOUNTER — TELEPHONE (OUTPATIENT)
Dept: PAIN CLINIC | Facility: HOSPITAL | Age: 69
End: 2024-07-01

## 2024-07-01 NOTE — TELEPHONE ENCOUNTER
Contacted patient to advise that PT orders have been placed as she requested. Patient will contact [facility] to verify they have received them, if not patient is aware to contact our office with fax number and we will send over. Patient states she is waiting for injections for her neck that were recommended by Dr. Santo. Please contact patient to further discuss.

## 2024-07-11 ENCOUNTER — TELEPHONE (OUTPATIENT)
Dept: PAIN CLINIC | Facility: HOSPITAL | Age: 69
End: 2024-07-11

## 2024-07-11 NOTE — TELEPHONE ENCOUNTER
Patient calling looking for an update on when to schedule procedure. I do not see a Prior Auth for any procedures. Please review chart and call patient back to advise.

## 2024-07-12 ENCOUNTER — TELEPHONE (OUTPATIENT)
Dept: CASE MANAGEMENT | Age: 69
End: 2024-07-12

## 2024-07-12 DIAGNOSIS — M47.816 LUMBAR SPONDYLOSIS: Primary | ICD-10-CM

## 2024-07-12 DIAGNOSIS — M47.817 SPONDYLOSIS OF LUMBOSACRAL REGION WITH SPINAL OSTEOARTHRITIS COMPLICATION: ICD-10-CM

## 2024-07-12 NOTE — TELEPHONE ENCOUNTER
Dr. Ocampo,     Physical therapy referral needs to come from PCP.     Patient is requesting to go to [facility].     Pended referral please review diagnosis and sign off if you agree.    Thank you.  Imelda Oliva  [facility]

## 2024-07-12 NOTE — TELEPHONE ENCOUNTER
Patient calling for update of referral, stated she was called yesterday PT was canceled     Managed care please review Dr campbell

## 2024-07-12 NOTE — TELEPHONE ENCOUNTER
Routed to pod mate as provider is out today.  Patient has an appointment today and is requesting signature.

## 2024-07-13 NOTE — TELEPHONE ENCOUNTER
Patient called requesting a callback regarding a \"paper\" that she needs filled out to attend physical therapy. I tried to assist patient and she declined my assistance stating she wants to speak with a RN. I informed her there are no nurses in today and that  is out of the office until 07/22

## 2024-07-15 NOTE — TELEPHONE ENCOUNTER
Office staff, please watch for single-page fax for this patient. She will drop off a copy tomorrow at the office.    Spoke to patient, full name and date of birth verified.  Patient states there is one single-page form that [facility] is requiring for her to have completed before she can resume therapy.     Patient has only had one visit so far and the rest were cancelled by [facility].   Patient informed Dr. Ocampo is out of the office until 7/22/24.     Patient asked for appointment with MIKEL Esparza, but the only available appointment this week is 7/17 at 4:40 PM and patient has a different appointment that day at 4:00 PM.     [facility] was supposed to fax the form to Dr. Ocampo at the office, patient will drop off a copy of the form - she does not have it in electronic format to send through [facility].

## 2024-07-17 ENCOUNTER — MED REC SCAN ONLY (OUTPATIENT)
Dept: FAMILY MEDICINE CLINIC | Facility: CLINIC | Age: 69
End: 2024-07-17

## 2024-07-18 ENCOUNTER — OFFICE VISIT (OUTPATIENT)
Dept: OTOLARYNGOLOGY | Facility: CLINIC | Age: 69
End: 2024-07-18
Payer: COMMERCIAL

## 2024-07-18 DIAGNOSIS — H61.23 BILATERAL IMPACTED CERUMEN: Primary | ICD-10-CM

## 2024-07-18 PROCEDURE — 69210 REMOVE IMPACTED EAR WAX UNI: CPT | Performed by: OTOLARYNGOLOGY

## 2024-07-18 NOTE — PROGRESS NOTES
Vandana Jefferson is a 68 year old female.    Chief Complaint   Patient presents with    Cerumen Impaction    Mass     Left side of neck, denies pain at the moment       HISTORY OF PRESENT ILLNESS  2012 she presents with a long history of a persistent lymph node on the left. We repeated a CT scan which demonstrates essentially no change in a left lateral lymph node since July of 2010. It measures about 1.8 cm in its largest diameter. She does have chronic neck problems and suspected that perhaps her discomfort maybe secondary to musculoskeletal issues.     9/30/21 she presents today with concerns about some pain discomfort in the left side of her neck.  She notes an asymmetry in the appearance of her neck on that side along the submandibular region.  Seen by primary care physician and noted to have cerumen impaction bilaterally as well as palpable tenderness in the neck and a possible mass.  Here for evaluation and treatment.  No other signs or symptoms other than pain and discomfort.     11/4/21 started on cyclobenzaprine and meloxicam at her last visit with significant improvement of her symptoms of ear pain and even back pain with 10 days of use.  Stopped the medication due to excessive sweating but now continues to have sweating which seems to be unrelated to use of medications.  No other signs, symptoms or complaints.     6/23/23 here for ear cleaning bilaterally as well as recurrent episodes of neck discomfort on the left.  Same area that has bothered her for at least 13 years or longer.  No other signs, symptoms complaints.  This week she used some Vicks on the area for several days and resolve the pain.  She did well with meloxicam and cyclobenzaprine but one of the medications caused her to sweat too much so she stopped the use.  Uses Advil at home.     7/18/24 Patient presents for cerumen removal. No other complaints or concerns at this time    Social History     Socioeconomic History    Marital status:     Tobacco Use    Smoking status: Former     Current packs/day: 0.00     Average packs/day: 1 pack/day for 27.0 years (27.0 ttl pk-yrs)     Types: Cigarettes     Start date: 1978     Quit date: 2005     Years since quittin.5    Smokeless tobacco: Never    Tobacco comments:     7 years ago   Vaping Use    Vaping status: Never Used   Substance and Sexual Activity    Alcohol use: No    Drug use: No   Other Topics Concern    Caffeine Concern Yes     Comment: 3 cups of coffee daily    Exercise No    Pt has a pacemaker No    Pt has a defibrillator No    Breast feeding No    Reaction to local anesthetic No       Family History   Problem Relation Age of Onset    Heart Disorder Father 65    Depression Mother     Neurological Disorder Mother         Per NG: Hypothyrodism    Arthritis Mother         Per NG: Osteoarthritis    Other (Other) Mother     Skin cancer Mother     Depression Maternal Grandfather     Other (Other) Maternal Grandfather     Heart Attack Neg     Diabetes Neg         Per NG; No diabetes    Glaucoma Neg         Per NG: No glaucoma histroy    Cancer Neg     Hypertension Neg     Lipids Neg        Past Medical History:    Age-related nuclear cataract of both eyes    Anxiety state, unspecified    Back pain    chronic    Cardiomyopathy (HCC)    Cataract    both eyes    Disorder of thyroid    Dry eye    Essential hypertension    Fibromyalgia    Floaters, right    H/O varicose vein stripping    Per NG: Varicose vein ; vein stripping    Headache    Per NG: OU    High cholesterol    Hypothyroidism    Meibomian gland dysfunction    Musculoskeletal problem    Per NG: injection tendon origin/insertion    Musculoskeletal problem    Per NG: Arthrocentesis of the left knee joint    Neck pain    chronic    Osteoarthritis    Radiation    Per NG: Radiation to thyroid     Status post epidural steroid injection    Per NG: epidral injection x5 per pain mgmt       Past Surgical History:   Procedure Laterality  Date    Appendectomy      Back surgery  2016    lumbar L4-L5 fusion      1979    1    Cataract  2023    Right eye    Cataract extraction w/  intraocular lens implant Left 2023    Dr. Windy Barnett @ [facility]    Cataract extraction w/ intraocular lens implant Right 2023    Dr. Windy Barnett @[facility]     delivery only      Cholecystectomy      Colonoscopy  10/2005    normal, non precancerous polyps    Colonoscopy N/A 2018    Procedure: COLONOSCOPY;  Surgeon: Sean Hubbard MD;  Location: [facility] ENDOSCOPY    Colonoscopy N/A 2023    Procedure: COLONOSCOPY;  Surgeon: Sean Hubbard MD;  Location: [facility] ENDO    Cyst aspiration left Left 2007    FNA    Cyst aspiration right      Egd      Laminectomy      Per NG: Disc  displacement x2 and spinal fusion 2016    [facility] localization wire 1 site left (cpt=19281)  2007    Other surgical history      Per NG: Arthrocentesis    Other surgical history      Per NG: arthrocentesis of the knee joint    Yag iridotomy - od - right eye Right 10/10/1997    [facility]    Yag iridotomy - os - left eye Left 10/16/1997    [facility]       REVIEW OF SYSTEMS    System Neg/Pos Details   Constitutional Negative Fatigue, fever and weight loss.   ENMT Negative Drooling.   Eyes Negative Blurred vision and vision changes.   Respiratory Negative Dyspnea and wheezing.   Cardio Negative Chest pain, irregular heartbeat/palpitations and syncope.   GI Negative Abdominal pain and diarrhea.   Endocrine Negative Cold intolerance and heat intolerance.   Neuro Negative Tremors.   Psych Negative Anxiety and depression.   Integumentary Negative Frequent skin infections, pigment change and rash.   Hema/Lymph Negative Easy bleeding and easy bruising.           PHYSICAL EXAM    There were no vitals taken for this visit.       Constitutional Normal Overall appearance - Normal.        Neck Exam Normal Inspection - Normal. Palpation - Normal. Parotid gland - Normal. Thyroid gland -  Normal.             Head/Face Normal Facial features - Normal. Eyebrows - Normal. Skull - Normal.             Ears Normal Inspection - Right: Normal, Left: Normal. Canal - Right: Normal, Left: Normal. TM - Right: Normal, Left: Normal.   Skin Normal Inspection - Normal.                              Canals:  Right: Canal reveals cerumen impaction,   Left: Canal reveals cerumen impaction,     Tympanic Membranes:  Right: Normal tympanic membrane.   Left: Normal tympanic membrane.     TM Visualized Method:   Right TM examined via otomicroscopy.    Left TM examined via otomicroscopy.      PROCEDURE:    Removal of cerumen impaction   The cerumen impaction was completely removed using microscopy.   Removal was completed by using acurette and/or suction.   Comments: Return to clinic as needed.  Avoid q-tips, water precautions and use over the counter wax remedies as needed.      Current Outpatient Medications:     Tirzepatide (MOUNJARO) 2.5 MG/0.5ML Subcutaneous Solution Pen-injector, , Disp: , Rfl:     diclofenac 75 MG Oral Tab EC, Take 1 tablet (75 mg total) by mouth 2 (two) times daily., Disp: 60 tablet, Rfl: 1    fluticasone propionate 50 MCG/ACT Nasal Suspension, SPRAY 2 SPRAYS BY EACH NARE ROUTE NIGHTLY., Disp: 48 mL, Rfl: 1    Omeprazole 40 MG Oral Capsule Delayed Release, Take 1 capsule (40 mg total) by mouth daily., Disp: 90 capsule, Rfl: 2    famotidine 20 MG Oral Tab, Take 1 tablet (20 mg total) by mouth daily., Disp: 90 tablet, Rfl: 2    busPIRone 15 MG Oral Tab, Take 0.5 tablets (7.5 mg total) by mouth in the morning and 0.5 tablets (7.5 mg total) before bedtime., Disp: 30 tablet, Rfl: 0    temazepam 30 MG Oral Cap, Take 1 capsule (30 mg total) by mouth nightly as needed., Disp: 30 capsule, Rfl: 2    LORazepam 1 MG Oral Tab, Take 1/2 to 1 tablet (1 mg) twice a day as needed for anxiety, Disp: 60 tablet, Rfl: 0    FLUoxetine HCl 40 MG Oral Cap, Take 1 capsule (40 mg total) by mouth every morning., Disp: 90  capsule, Rfl: 1    levothyroxine 112 MCG Oral Tab, Take 1 tablet (112 mcg total) by mouth before breakfast., Disp: 90 tablet, Rfl: 3    gabapentin 800 MG Oral Tab, Take 1 tablet (800 mg total) by mouth 2 (two) times daily., Disp: 180 tablet, Rfl: 3    Melatonin 10 MG Oral Cap, Take 5 mg by mouth nightly as needed., Disp: , Rfl:     carvedilol 3.125 MG Oral Tab, TAKE 1 TABLET(3.125 MG) BY MOUTH TWICE DAILY WITH MEALS, Disp: 180 tablet, Rfl: 3    rosuvastatin (CRESTOR) 20 MG Oral Tab, Take 1 tablet (20 mg total) by mouth nightly., Disp: 90 tablet, Rfl: 3  ASSESSMENT AND PLAN    1. Bilateral impacted cerumen    - REMOVAL IMPACTED CERUMEN REQUIRING INSTRUMENTATION, UNILATERAL      All cerumen was removed using microscopy. I have asked the patient to return to see me as needed for repeat cerumen removal in the future.      Wilver Moreno MD    7/18/2024    2:29 PM

## 2024-07-22 ENCOUNTER — PATIENT MESSAGE (OUTPATIENT)
Dept: FAMILY MEDICINE CLINIC | Facility: CLINIC | Age: 69
End: 2024-07-22

## 2024-07-22 ENCOUNTER — TELEPHONE (OUTPATIENT)
Dept: RHEUMATOLOGY | Facility: CLINIC | Age: 69
End: 2024-07-22

## 2024-07-22 NOTE — TELEPHONE ENCOUNTER
Patient called and is asking to speak  to a nurse, patient said she is having severe pain in her knees that didn't allow her to complete her physical therapy was told to contact her doctor. Patient also said she leave to [place] in 10 days.

## 2024-07-22 NOTE — TELEPHONE ENCOUNTER
Name and  verified. Pt offered appointment and accepted. Appointment scheduled.      Future Appointments   Date Time Provider Department [place]   2024 10:30 AM Caity Ellison MD [facility]   2024 11:30 AM Dalton Mendoza APRN [facility] [place]   2024  2:00 PM Soraya Pope LCSW [facility] [place]   2024 11:00 AM Soraya Pope LCSW [facility] [place]   2024 12:00 PM Caity Ellison MD [facility]   2024  2:15 PM [facility]

## 2024-07-22 NOTE — TELEPHONE ENCOUNTER
Returned pt call; verified name and . Pt states gel injections in her knees on 24 did not work. She has till had pain in both knees - left is worse. Reports that last week pain became severe. Today pain was so bad she could not complete PT for her back. Therapist taped both knees for pt. Pt reports left knee is a little swollen. Pt was on Diclofenac but had to stop due to stomach issues. Has been using acetaminophen and Ibuprofen; states gets more relief than Celebrex.    Pt asking for knee injections prior to 24 when she leaves for [place]. States she already called PCP and still has one referral to see Dr Ellison.    [facility]

## 2024-07-22 NOTE — TELEPHONE ENCOUNTER
Patient called to follow up on referral to rheumatology. Should still have one visit left from 2/22/2024 referral. Transferred patient to rheumatology.

## 2024-07-26 ENCOUNTER — TELEPHONE (OUTPATIENT)
Dept: RHEUMATOLOGY | Facility: CLINIC | Age: 69
End: 2024-07-26

## 2024-07-26 ENCOUNTER — HOSPITAL ENCOUNTER (OUTPATIENT)
Age: 69
Discharge: HOME | End: 2024-07-26
Payer: COMMERCIAL

## 2024-07-26 VITALS
OXYGEN SATURATION: 97 % | HEIGHT: 65 IN | RESPIRATION RATE: 18 BRPM | DIASTOLIC BLOOD PRESSURE: 72 MMHG | TEMPERATURE: 97 F | HEART RATE: 69 BPM | SYSTOLIC BLOOD PRESSURE: 112 MMHG | BODY MASS INDEX: 33.32 KG/M2 | WEIGHT: 200 LBS

## 2024-07-26 DIAGNOSIS — R05.1 ACUTE COUGH: Primary | ICD-10-CM

## 2024-07-26 DIAGNOSIS — U07.1 COVID-19: ICD-10-CM

## 2024-07-26 LAB — SARS-COV-2 RNA RESP QL NAA+PROBE: DETECTED

## 2024-07-26 PROCEDURE — U0002 COVID-19 LAB TEST NON-CDC: HCPCS | Performed by: NURSE PRACTITIONER

## 2024-07-26 PROCEDURE — 99213 OFFICE O/P EST LOW 20 MIN: CPT | Performed by: NURSE PRACTITIONER

## 2024-07-26 RX ORDER — BENZONATATE 100 MG/1
100 CAPSULE ORAL 3 TIMES DAILY PRN
Qty: 30 CAPSULE | Refills: 0 | Status: SHIPPED | OUTPATIENT
Start: 2024-07-26 | End: 2024-08-25

## 2024-07-26 NOTE — TELEPHONE ENCOUNTER
Patient is advising Dr. Ellison she had to cancel the appointment that was offered to her for 7/27/24 because she tested positive for COVID.   Patient mentions she is still having severe knee pain and requesting an appointment with provider in about 5 days.   Please advise.

## 2024-07-26 NOTE — ED PROVIDER NOTES
Patient Seen in: [facility]      History     Chief Complaint   Patient presents with    Cough/URI     Stated Complaint: Covid Testing    Subjective:   HPI    68-year-old female here for evaluation of cough and congestion that started the last 3 days and worse last night.  Her mom just tested positive for COVID today.  She denies fevers or chills, shortness of breath, chest pain, headache or dizziness.  She has been eating and drinking okay but feeling more fatigued.    Objective:   Past Medical History:    Age-related nuclear cataract of both eyes    Anxiety state, unspecified    Back pain    chronic    Cardiomyopathy (HCC)    Cataract    both eyes    Disorder of thyroid    Dry eye    Essential hypertension    Fibromyalgia    Floaters, right    H/O varicose vein stripping    Per NG: Varicose vein ; vein stripping    Headache    Per NG: OU    High cholesterol    Hypothyroidism    Meibomian gland dysfunction    Musculoskeletal problem    Per NG: injection tendon origin/insertion    Musculoskeletal problem    Per NG: Arthrocentesis of the left knee joint    Neck pain    chronic    Osteoarthritis    Radiation    Per NG: Radiation to thyroid     Status post epidural steroid injection    Per NG: epidral injection x5 per pain mgmt              Past Surgical History:   Procedure Laterality Date    Appendectomy      Back surgery  2016    lumbar L4-L5 fusion      1979    1    Cataract  2023    Right eye    Cataract extraction w/  intraocular lens implant Left 2023    Dr. Windy Barnett @ [facility]    Cataract extraction w/ intraocular lens implant Right 2023    [facility]     delivery only      Cholecystectomy      Colonoscopy  10/2005    normal, non precancerous polyps    Colonoscopy N/A 2018    Procedure: COLONOSCOPY;  Surgeon: Sean Hubbard MD;  Location: [facility] ENDOSCOPY    Colonoscopy N/A 2023    Procedure: COLONOSCOPY;  Surgeon: Sean Hubbard  MD;  Location: [facility] ENDO    Cyst aspiration left Left 2007    FNA    Cyst aspiration right      Egd      Laminectomy      Per NG: Disc  displacement x2 and spinal fusion 2016    [facility] localization wire 1 site left (cpt=19281)  2007    Other surgical history      Per NG: Arthrocentesis    Other surgical history      Per NG: arthrocentesis of the knee joint    Yag iridotomy - od - right eye Right 10/10/1997    [facility]    Yag iridotomy - os - left eye Left 10/16/1997    [facility]                Social History     Socioeconomic History    Marital status:    Tobacco Use    Smoking status: Former     Current packs/day: 0.00     Average packs/day: 1 pack/day for 27.0 years (27.0 ttl pk-yrs)     Types: Cigarettes     Start date: 1978     Quit date: 2005     Years since quittin.5    Smokeless tobacco: Never    Tobacco comments:     7 years ago   Vaping Use    Vaping status: Never Used   Substance and Sexual Activity    Alcohol use: No    Drug use: No   Other Topics Concern    Caffeine Concern Yes     Comment: 3 cups of coffee daily    Exercise No    Pt has a pacemaker No    Pt has a defibrillator No    Breast feeding No    Reaction to local anesthetic No     Social Determinants of Health      [facility]    Social Connections    Received from [facility], [facility]    Housing Stability              Review of Systems    Positive for stated Chief Complaint: Cough/URI    Other systems are as noted in HPI.  Constitutional and vital signs reviewed.      All other systems reviewed and negative except as noted above.    Physical Exam     ED Triage Vitals   BP 24 0936 112/72   Pulse 24 0935 69   Resp 24 0935 18   Temp 24 0935 97.2 °F (36.2 °C)   Temp src --    SpO2 24 0935 97 %   O2 Device 24 0935 None (Room air)       Current Vitals:   Vital Signs  BP: 112/72  Pulse: 69  Resp:  18  Temp: 97.2 °F (36.2 °C)    Oxygen Therapy  SpO2: 97 %  O2 Device: None (Room air)            Physical Exam  Vitals and nursing note reviewed.   Constitutional:       General: She is not in acute distress.     Appearance: Normal appearance. She is not ill-appearing, toxic-appearing or diaphoretic.   HENT:      Head: Normocephalic.      Right Ear: Tympanic membrane, ear canal and external ear normal.      Left Ear: Tympanic membrane, ear canal and external ear normal.      Nose: Congestion present.      Mouth/Throat:      Mouth: Mucous membranes are moist.      Pharynx: Oropharynx is clear. No oropharyngeal exudate or posterior oropharyngeal erythema.   Eyes:      General:         Right eye: No discharge.         Left eye: No discharge.      Extraocular Movements: Extraocular movements intact.      Conjunctiva/sclera: Conjunctivae normal.      Pupils: Pupils are equal, round, and reactive to light.   Cardiovascular:      Rate and Rhythm: Normal rate.      Pulses: Normal pulses.   Pulmonary:      Effort: Pulmonary effort is normal. No respiratory distress.      Breath sounds: Normal breath sounds. No stridor. No wheezing, rhonchi or rales.   Musculoskeletal:         General: Normal range of motion.   Skin:     General: Skin is warm.   Neurological:      Mental Status: She is alert and oriented to person, place, and time.   Psychiatric:         Mood and Affect: Mood normal.         Behavior: Behavior normal.             ED Course     Labs Reviewed   RAPID SARS-COV-2 BY PCR - Abnormal; Notable for the following components:       Result Value    Rapid SARS-CoV-2 by PCR Detected (*)     All other components within normal limits          ED Course as of 07/26/24 1756  ------------------------------------------------------------  Time: 07/26 0950  Value: Rapid SARS-CoV-2 by PCR(!)  Comment: (Reviewed)            MDM     68-year-old female here for evaluation of cough and congestion x 3 days, worse since last night.  Mom  tested positive for COVID today.    Exam patient well-appearing, lungs are clear with no wheezing stridor or crackles, bilateral TM normal, pharynx clear with no erythema or swelling.  Patient is congested  He is talking in full sentences in no respiratory distress.    Differential diagnoses reflecting the complexity of care include but are not limited to COVID, other viral URI.    Comorbidities that add complexity to management include: Anxiety, depression, cardiomyopathy, high cholesterol, hypertension  History obtained by an independent source was from: Patient  My independent interpretations of studies include: covid POSITIVE  Shared decision making was done by: Patient  Discussions of management was done with: patient    Patient is well appearing, non-toxic and in no acute distress.  Vital signs are stable.   Discussed paxlovid. PT is on buspirone, rosuvastatin which interact with paxlovid. She would need to stop these medications for 5 days of therapy and restart after 3 days of finishing (8 days total)  Discussed pros and cons, side effects of taking paxlovid.  Advised to speak with PCP and psychaitrist 1st before deciding if its worth taking it vs her usual meds and supportive, symptomatic care.    PT agrees with plan. She is leaning towards not taking it but would like RX sent just in case her doc wants her to.   BUN 11, creatinine 0.70  Discussed tessalon perles, PO fluids, salt water garlges, warm teas, tylenol prn.  All questions answered. Return and ER precautions given.    Counseled: Patient, regarding diagnosis, regarding treatment plan, regarding diagnostic results, regarding prescription, I have discussed with the patient the results of tests, differential diagnosis, and warning signs and symptoms that should prompt immediate return. The patient understands these instructions and agrees to the follow-up plan provided. There is no barriers to learning. Appropriate f/u given. Patient agrees to return  for any concerns/ problems/complications.                                     Medical Decision Making      Disposition and Plan     Clinical Impression:  1. Acute cough    2. COVID-19         Disposition:  Discharge  7/26/2024 10:05 am    Follow-up:  Nica Ocampo MD  [address] [place] [id number]  [phone]    Call today            Medications Prescribed:  Discharge Medication List as of 7/26/2024 10:05 AM        START taking these medications    Details   nirmatrelvir-ritonavir 300-100 MG Oral Tablet Therapy Pack Take two nirmatrelvir tablets (300mg) with one ritonavir tablet (100mg) together twice daily for 5 days., Normal, Disp-30 tablet, R-0      benzonatate 100 MG Oral Cap Take 1 capsule (100 mg total) by mouth 3 (three) times daily as needed for cough., Normal, Disp-30 capsule, R-0

## 2024-07-26 NOTE — DISCHARGE INSTRUCTIONS
CALL YOUR PRIMARY AND PSYCHIATRIST NOW. LET THEM KNOW YOU HAVE COVID.   IF YOU DECIDE TO TAKE PAXLOVID YOU NEED TO STOP TAKING BUSPIRONE AND ROSUVASATIN DURING 5 day course of therapy and start it back up 3 days after finishing the paxlovid (so will be off of it for 8 days).  Discuss with your doctors if this is worth taking and being off your regular medications.  Paxlovid can cause diarrhea, abdominal pain, and vomiting so you may not feel better necessarily. IT does not take virus away but only shortens your symptom days.  -SELF QUARANTINE AND STAY AWAY FROM OTHERS WHILE YOU HAVE SYMPTOMS  CDC recommends masking for 5 days from positive test. I would avoid close contact with others for this time. (Reschedule PT)    -WASH YOUR HANDS OFTEN, DISINFECT COMMON SURFACES AROUND HOME THAT OTHERS USE  -COVER YOUR COUGH AND WEAR A MASK!  -DRINK PLENTY OF WATER, GATORADE, EAT SMALL MEALS, [place] diet if upset stomach.  -TAKE TYLENOL 650MG-1000MG TABLET BY MOUTH EVERY 6 HOURS AS NEEDED FOR BODYACHES, PAIN/FEVER > 100.4  Self-Care at Home:  Runny Nose/Congestion:  - Flonase nasal spray to each nostril once or twice daily  - Antihistamine (Zyrtec, Claritin) once daily  Cough:  -Tessalon perles three times a day  -Mucinex DM over the counter  - Warm tea w/honey  - Humidifier in bedroom at night  Sore Throat:  - Salt water gargle  -Warm teas, throat lozenges  -Tylenol every 6-8 hours as needed for pain  GO TO ER: worsening shortness of breath, chest pain, fever > 102 despite use of tylenol or motrin, vomiting and diarrhea and unable to keep down fluids

## 2024-07-26 NOTE — ED INITIAL ASSESSMENT (HPI)
Pt presents to the IC with c/o cough and congestion for the last 2-3 days, worse since last night. Pt's family member just tested positive for covid.

## 2024-07-26 NOTE — TELEPHONE ENCOUNTER
No opening earlier than currently scheduled that I can see.     Added to waitlist for earlier appointment    Please advise if you would be able to fit her in prior to visit on 8/28/24 for severe knee pain.  Canceled appointment tomorrow due to covid.     Future Appointments   Date Time Provider Department Center   7/30/2024 11:30 AM Dalton Mendoza APRN [facility] [place]   7/30/2024  2:00 PM Soraya Pope LCSW [facility] [place]   8/14/2024 11:00 AM Soraya Pope LCSW [facility] [place]   8/28/2024 12:00 PM [facility]   9/6/2024  2:15 PM VALENTÍN KEE [facility] None

## 2024-07-28 ENCOUNTER — TELEPHONE (OUTPATIENT)
Dept: FAMILY MEDICINE CLINIC | Facility: CLINIC | Age: 69
End: 2024-07-28

## 2024-07-28 NOTE — TELEPHONE ENCOUNTER
On call:  Patient paged me this morning at 7:49 AM stating that she is has sore throat and ear pain.  Patient was diagnosed with COVID after she tested positive on 7/26/24    Patient states that she was prescribed Paxlovid.  Her mother is also positive for COVID.  Recommended warm saline rinses and alternating Tylenol with Motrin..  Patient was not tested for strep.  If symptoms do not improve may need to go back to urgent care to get tested for strep.  Patient verbalized understanding of plan.

## 2024-07-30 ENCOUNTER — TELEPHONE (OUTPATIENT)
Dept: RHEUMATOLOGY | Facility: CLINIC | Age: 69
End: 2024-07-30

## 2024-07-30 NOTE — TELEPHONE ENCOUNTER
Patient, cancelled her appointment for tomorrow due to having a positive Covid test. Patient states that she is still having knee pain. Patient  has an appointment scheduled on 8-28-24, but, would like to be seen sooner. There are no sooner appointments.

## 2024-08-01 NOTE — TELEPHONE ENCOUNTER
Name and  verified.  appointment confirmed with patient.       Future Appointments   Date Time Provider Department Center   2024  8:40 AM Caity lElison MD [facility]   2024 11:00 AM Soraya Pope LCSW [facility] [place]   2024 12:00 PM Caity Ellison MD [facility]   2024  2:15 PM VALENTÍN KEE [facility]   10/29/2024 11:30 AM Dalton Mendoza APRN [facility] [place]

## 2024-08-07 ENCOUNTER — TELEPHONE (OUTPATIENT)
Dept: FAMILY MEDICINE CLINIC | Facility: CLINIC | Age: 69
End: 2024-08-07

## 2024-08-07 NOTE — TELEPHONE ENCOUNTER
Spoke with patient, ( Name and   verified ) informed of Dr Ocampo's message below    Offered APRN , patient declined but she will go to the [facility] tomorrow morning  ( 2024 )

## 2024-08-07 NOTE — TELEPHONE ENCOUNTER
Dr Ocampo, please advise    Can you add an appt tomorrow for recheck? Patient does not have transportation today.       Patient (name and  verified) states she was dx with covid on  at the urgent care. Patient was prescribed Paxlovid and did complete the medication. Patient states that she did get better but then on 24 she started having sinus congestion, headache, chills, and cough. Patient is asking for a follow up appt.   Patient is supposed to travel to [place] next week, states she already postponed trip 2 weeks.     Denies any chest pain or shortness of breath.

## 2024-08-08 ENCOUNTER — APPOINTMENT (OUTPATIENT)
Dept: GENERAL RADIOLOGY | Age: 69
End: 2024-08-08
Attending: NURSE PRACTITIONER
Payer: COMMERCIAL

## 2024-08-08 ENCOUNTER — HOSPITAL ENCOUNTER (OUTPATIENT)
Age: 69
Discharge: HOME | End: 2024-08-08
Payer: COMMERCIAL

## 2024-08-08 VITALS
DIASTOLIC BLOOD PRESSURE: 63 MMHG | OXYGEN SATURATION: 97 % | TEMPERATURE: 98 F | RESPIRATION RATE: 18 BRPM | HEART RATE: 72 BPM | SYSTOLIC BLOOD PRESSURE: 117 MMHG

## 2024-08-08 DIAGNOSIS — U07.1 COVID-19: Primary | ICD-10-CM

## 2024-08-08 LAB — S PYO AG THROAT QL: NEGATIVE

## 2024-08-08 PROCEDURE — 71046 X-RAY EXAM CHEST 2 VIEWS: CPT | Performed by: NURSE PRACTITIONER

## 2024-08-08 PROCEDURE — 87880 STREP A ASSAY W/OPTIC: CPT | Performed by: NURSE PRACTITIONER

## 2024-08-08 PROCEDURE — 99213 OFFICE O/P EST LOW 20 MIN: CPT | Performed by: NURSE PRACTITIONER

## 2024-08-08 RX ORDER — BENZONATATE 100 MG/1
100 CAPSULE ORAL 3 TIMES DAILY PRN
Qty: 20 CAPSULE | Refills: 0 | Status: SHIPPED | OUTPATIENT
Start: 2024-08-08 | End: 2024-08-15

## 2024-08-08 NOTE — ED PROVIDER NOTES
Patient Seen in: [facility]    History   CC: cough  HPI: Vandana Jefferson 68 year old female  who presents c/o cough, sore throat, congestion, fatigue which have been present x2wks however worsened 8/4. States she tested + for covid 2 wks ago and started paxlovid. A few days following pax completion, she began to feel worse. Here requesting abx. Denies anne-marie, cp, gi s/s, rash, fever.     Past Medical History:    Age-related nuclear cataract of both eyes    Anxiety state, unspecified    Back pain    chronic    Cardiomyopathy (HCC)    Cataract    both eyes    Disorder of thyroid    Dry eye    Essential hypertension    Fibromyalgia    Floaters, right    H/O varicose vein stripping    Per NG: Varicose vein ; vein stripping    Headache    Per NG: OU    High cholesterol    Hypothyroidism    Meibomian gland dysfunction    Musculoskeletal problem    Per NG: injection tendon origin/insertion    Musculoskeletal problem    Per NG: Arthrocentesis of the left knee joint    Neck pain    chronic    Osteoarthritis    Radiation    Per NG: Radiation to thyroid     Status post epidural steroid injection    Per NG: epidral injection x5 per pain mgmt       Past Surgical History:   Procedure Laterality Date    Appendectomy      Back surgery  2016    lumbar L4-L5 fusion      1979    1    Cataract  2023    Right eye    Cataract extraction w/  intraocular lens implant Left 2023    Dr. Windy Barnett @ [facility]    Cataract extraction w/ intraocular lens implant Right 2023    Dr. Windy Barnett @[facility]     delivery only      Cholecystectomy      Colonoscopy  10/2005    normal, non precancerous polyps    Colonoscopy N/A 2018    Procedure: COLONOSCOPY;  Surgeon: Sean Hubbard MD;  Location: [facility] ENDOSCOPY    Colonoscopy N/A 2023    Procedure: COLONOSCOPY;  Surgeon: Sean Hubbard MD;  Location: [facility]    Cyst aspiration left Left 2007    FNA    Cyst aspiration right      Egd       Laminectomy      Per NG: Disc  displacement x2 and spinal fusion 2016    [facility] localization wire 1 site left (cpt=19281)  2007    Other surgical history      Per NG: Arthrocentesis    Other surgical history      Per NG: arthrocentesis of the knee joint    Yag iridotomy - od - right eye Right 10/10/1997    RJM    Yag iridotomy - os - left eye Left 10/16/1997    RJM       Family History   Problem Relation Age of Onset    Heart Disorder Father 65    Depression Mother     Neurological Disorder Mother         Per NG: Hypothyrodism    Arthritis Mother         Per NG: Osteoarthritis    Other (Other) Mother     Skin cancer Mother     Depression Maternal Grandfather     Other (Other) Maternal Grandfather     Heart Attack Neg     Diabetes Neg         Per NG; No diabetes    Glaucoma Neg         Per NG: No glaucoma histroy    Cancer Neg     Hypertension Neg     Lipids Neg        Social History     Socioeconomic History    Marital status:    Tobacco Use    Smoking status: Former     Current packs/day: 0.00     Average packs/day: 1 pack/day for 27.0 years (27.0 ttl pk-yrs)     Types: Cigarettes     Start date: 1978     Quit date: 2005     Years since quittin.6    Smokeless tobacco: Never    Tobacco comments:     7 years ago   Vaping Use    Vaping status: Never Used   Substance and Sexual Activity    Alcohol use: No    Drug use: No   Other Topics Concern    Caffeine Concern Yes     Comment: 3 cups of coffee daily    Exercise No    Pt has a pacemaker No    Pt has a defibrillator No    Breast feeding No    Reaction to local anesthetic No     Social Determinants of Health      [facility]    Social Connections    [facility]    Housing Stability       ROS:  Review of Systems    Positive for stated complaint: Sore Throat,Chest Congested  Other systems are as noted in  HPI.  Constitutional and vital signs reviewed.      All other systems reviewed and negative except as noted above.    PSFH elements reviewed from today and agreed except as otherwise stated in HPI.             Constitutional and vital signs reviewed.        Physical Exam     ED Triage Vitals [08/08/24 0806]   /63   Pulse 72   Resp 18   Temp 97.7 °F (36.5 °C)   Temp src Temporal   SpO2 97 %   O2 Device None (Room air)       Current:/63   Pulse 72   Temp 97.7 °F (36.5 °C) (Temporal)   Resp 18   SpO2 97%         PE:  General - Appears well, non-toxic and in NAD  Head - Appears symmetrical without deformity/swelling cranium, scalp, or facial bones  Eyes - sclera not injected, no discharge noted, no periorbital edema  ENT - EAC bilaterally without discharge, TM pearly grey with COL visualized appropriately bilaterally.   no nasal drainage noted in nares bilat, no cobblestoning to post. Pharynx.   Oropharynx clear, posterior pharynx is without erythema and without tonsilar enlargement or exudate, uvula midline, +gag, voice is clear. No trismus  Neck - no significant adenopathy, supple with trachea midline  Resp - Lung sounds clear bilaterally and wob unlabored, good aeration with equal, even expansion bilaterally   CV - RRR, no murmur  GI - Appears round and flat, BS +x4 quadrants, no tenderness/guarding with palpation  Skin - no rashes or petechiae noted, pink warm and dry throughout, mmm, no obvious signs of swelling/trauma/deformity, cap refill <2seconds  Neuro - A&O x4, steady gait  MSK - makes purposeful movements of all extremities, radial pulses 2+ bilat.  Psych - Interactive and appropriate      ED Course     Labs Reviewed   POCT RAPID STREP - Normal       MDM   XR CHEST PA + LAT CHEST (CPT=71046) (Final result)  Result time 08/08/24 08:40:51  Final result by Cristobal Gallardo MD (08/08/24 08:40:51)                Impression:    CONCLUSION: No acute cardiopulmonary disease.           Dictated by  (CST): Cristobal Gallardo MD on 8/08/2024 at 8:39 AM      Finalized by (CST): Cristobal Gallardo MD on 8/08/2024 at 8:40 AM                  Narrative:    PROCEDURE: XR CHEST PA + LAT CHEST (CPT=71046)     COMPARISON: [facility], XR CHEST PA + LAT CHEST (HXN=[id number]), 10/30/2023, 4:45 PM.  [facility], XR CHEST PA + LAT CHEST (KZR=[id number]), 4/11/2023, 4:20 PM.  External [facility], XR CHEST AP PORTABLE (CPT=71045), 7/14/2022,  8:26 PM.     INDICATIONS: Cough and chest congestion following covid x 2 weeks ago     TECHNIQUE:   Two views.       FINDINGS:  CARDIAC/VASC: No cardiomegaly.  Tortuous thoracic aorta.  No vascular congestion  MEDIAST/RUCHI: No visible mass or adenopathy.  LUNGS/PLEURA: Mild linear scarring right lung base.  No pleural effusion.  No pneumothorax.  BONES: Degenerative change thoracic spine.  OTHER: Surgical clips right upper quadrant.                    DDx: covid rebound, strep pharyngitis, pneumonia, unspecified viral illness    Pt eval'ed at this IC 7/26 and ++ covid PCR. Rx'ed Paxlovid and completed last dose 7/31. Began with worsening s/s 8/4. Discussed with pt likely rebound covid however pt is here requesting abx. Rapid strep preformed by RN, negative. CXR negative as noted above and reviewed by this provider.  Results discussed with patient as well as general COVID instructions, viral illness instructions, rebound COVID as likely cause following Paxlovid, Tylenol or Motrin as needed for discomfort, rest, hydration instructions, cough suppressant as prescribed if needed and precautions on use reviewed, follow-up and return/ED precautions reviewed. Patient is historian and demonstrates understanding of all instruction and agrees with plan of care.      Disposition and Plan     Clinical Impression:  1. COVID-19        Disposition:  Discharge    Follow-up:  Nica Ocampo MD  [address] [place] [id number]  [phone]    Go in 1 week  As  needed      Medications Prescribed:  Current Discharge Medication List        START taking these medications    Details   !! benzonatate 100 MG Oral Cap Take 1 capsule (100 mg total) by mouth 3 (three) times daily as needed for cough.  Qty: 20 capsule, Refills: 0       !! - Potential duplicate medications found. Please discuss with provider.

## 2024-08-08 NOTE — ED INITIAL ASSESSMENT (HPI)
Pt states 2 weeks ago she was positive for covid and took paxlovid.  Pt states her symptoms came back this past Sunday.  +cough, sore throat and sinus congestion.

## 2024-08-13 ENCOUNTER — OFFICE VISIT (OUTPATIENT)
Dept: FAMILY MEDICINE CLINIC | Facility: CLINIC | Age: 69
End: 2024-08-13
Payer: COMMERCIAL

## 2024-08-13 ENCOUNTER — NURSE TRIAGE (OUTPATIENT)
Dept: FAMILY MEDICINE CLINIC | Facility: CLINIC | Age: 69
End: 2024-08-13

## 2024-08-13 VITALS
HEIGHT: 65 IN | TEMPERATURE: 99 F | BODY MASS INDEX: 33.32 KG/M2 | HEART RATE: 84 BPM | OXYGEN SATURATION: 97 % | WEIGHT: 200 LBS | DIASTOLIC BLOOD PRESSURE: 70 MMHG | RESPIRATION RATE: 18 BRPM | SYSTOLIC BLOOD PRESSURE: 130 MMHG

## 2024-08-13 DIAGNOSIS — R30.0 DYSURIA: Primary | ICD-10-CM

## 2024-08-13 DIAGNOSIS — R39.9 UTI SYMPTOMS: ICD-10-CM

## 2024-08-13 DIAGNOSIS — N30.01 ACUTE CYSTITIS WITH HEMATURIA: Primary | ICD-10-CM

## 2024-08-13 LAB
BILIRUBIN: NEGATIVE
GLUCOSE (URINE DIPSTICK): NEGATIVE MG/DL
KETONES (URINE DIPSTICK): NEGATIVE MG/DL
MULTISTIX LOT#: ABNORMAL NUMERIC
NITRITE, URINE: NEGATIVE
PH, URINE: 6 (ref 4.5–8)
PROTEIN (URINE DIPSTICK): NEGATIVE MG/DL
SPECIFIC GRAVITY: <1.005 (ref 1–1.03)
UROBILINOGEN,SEMI-QN: 0.2 MG/DL (ref 0–1.9)

## 2024-08-13 PROCEDURE — 3075F SYST BP GE 130 - 139MM HG: CPT | Performed by: PHYSICIAN ASSISTANT

## 2024-08-13 PROCEDURE — 87088 URINE BACTERIA CULTURE: CPT | Performed by: PHYSICIAN ASSISTANT

## 2024-08-13 PROCEDURE — 3008F BODY MASS INDEX DOCD: CPT | Performed by: PHYSICIAN ASSISTANT

## 2024-08-13 PROCEDURE — 99213 OFFICE O/P EST LOW 20 MIN: CPT | Performed by: PHYSICIAN ASSISTANT

## 2024-08-13 PROCEDURE — 87086 URINE CULTURE/COLONY COUNT: CPT | Performed by: PHYSICIAN ASSISTANT

## 2024-08-13 PROCEDURE — 87186 SC STD MICRODIL/AGAR DIL: CPT | Performed by: PHYSICIAN ASSISTANT

## 2024-08-13 PROCEDURE — 81003 URINALYSIS AUTO W/O SCOPE: CPT | Performed by: PHYSICIAN ASSISTANT

## 2024-08-13 PROCEDURE — 3078F DIAST BP <80 MM HG: CPT | Performed by: PHYSICIAN ASSISTANT

## 2024-08-13 RX ORDER — NITROFURANTOIN 25; 75 MG/1; MG/1
100 CAPSULE ORAL 2 TIMES DAILY
Qty: 10 CAPSULE | Refills: 0 | Status: SHIPPED | OUTPATIENT
Start: 2024-08-13 | End: 2024-08-18

## 2024-08-13 NOTE — PROGRESS NOTES
CHIEF COMPLAINT:     Chief Complaint   Patient presents with    UTI     Urgency/frequent/painful urination and pelvic pain .       HPI:   Vandana Jefferson is a 68 year old female who presents with symptoms of UTI. Patient reporting symptoms of urgency, frequency, dysuria and pelvic pressure since this morning   Symptoms have been progressing rapidly since onset.  Treatments tried: increased water intake.  Patient denies flank pain, fever, hematuria, nausea, or vomiting. Patient denies genital discharge or itching. Patient reports hx of frequent UTIs when going through menopause ~15ish years ago, but non since.  Recently had COVID.  Feeling well from COVID standpoint    Current Outpatient Medications   Medication Sig Dispense Refill    FLUoxetine HCl 40 MG Oral Cap Take 1 capsule (40 mg total) by mouth every morning. 90 capsule 1    LORazepam 1 MG Oral Tab Take 1/2 to 1 tablet (1 mg) twice a day as needed for anxiety 60 tablet 0    [START ON 9/4/2024] temazepam 30 MG Oral Cap Take 1 capsule (30 mg total) by mouth nightly as needed. 30 capsule 2    benzonatate 100 MG Oral Cap Take 1 capsule (100 mg total) by mouth 3 (three) times daily as needed for cough. 30 capsule 0    Tirzepatide (MOUNJARO) 2.5 MG/0.5ML Subcutaneous Solution Pen-injector       fluticasone propionate 50 MCG/ACT Nasal Suspension SPRAY 2 SPRAYS BY EACH NARE ROUTE NIGHTLY. 48 mL 1    Omeprazole 40 MG Oral Capsule Delayed Release Take 1 capsule (40 mg total) by mouth daily. 90 capsule 2    famotidine 20 MG Oral Tab Take 1 tablet (20 mg total) by mouth daily. 90 tablet 2    busPIRone 15 MG Oral Tab Take 0.5 tablets (7.5 mg total) by mouth in the morning and 0.5 tablets (7.5 mg total) before bedtime. 30 tablet 0    levothyroxine 112 MCG Oral Tab Take 1 tablet (112 mcg total) by mouth before breakfast. 90 tablet 3    gabapentin 800 MG Oral Tab Take 1 tablet (800 mg total) by mouth 2 (two) times daily. 180 tablet 3    Melatonin 10 MG Oral Cap Take 5 mg  by mouth nightly as needed.      carvedilol 3.125 MG Oral Tab TAKE 1 TABLET(3.125 MG) BY MOUTH TWICE DAILY WITH MEALS 180 tablet 3    rosuvastatin (CRESTOR) 20 MG Oral Tab Take 1 tablet (20 mg total) by mouth nightly. 90 tablet 3    benzonatate 100 MG Oral Cap Take 1 capsule (100 mg total) by mouth 3 (three) times daily as needed for cough. (Patient not taking: Reported on 2024) 20 capsule 0    diclofenac 75 MG Oral Tab EC Take 1 tablet (75 mg total) by mouth 2 (two) times daily. (Patient not taking: Reported on 2024) 60 tablet 1      Past Medical History:    Age-related nuclear cataract of both eyes    Anxiety state, unspecified    Back pain    chronic    Cardiomyopathy (HCC)    Cataract    both eyes    Disorder of thyroid    Dry eye    Essential hypertension    Fibromyalgia    Floaters, right    H/O varicose vein stripping    Per NG: Varicose vein ; vein stripping    Headache    Per NG: OU    High cholesterol    Hypothyroidism    Meibomian gland dysfunction    Musculoskeletal problem    Per NG: injection tendon origin/insertion    Musculoskeletal problem    Per NG: Arthrocentesis of the left knee joint    Neck pain    chronic    Osteoarthritis    Radiation    Per NG: Radiation to thyroid     Status post epidural steroid injection    Per NG: epidral injection x5 per pain mgmt      Social History:  Social History     Socioeconomic History    Marital status:    Tobacco Use    Smoking status: Former     Current packs/day: 0.00     Average packs/day: 1 pack/day for 27.0 years (27.0 ttl pk-yrs)     Types: Cigarettes     Start date: 1978     Quit date: 2005     Years since quittin.6    Smokeless tobacco: Never    Tobacco comments:     7 years ago   Vaping Use    Vaping status: Never Used   Substance and Sexual Activity    Alcohol use: No    Drug use: No   Other Topics Concern    Caffeine Concern Yes     Comment: 3 cups of coffee daily    Exercise No    Pt has a pacemaker No    Pt has a  defibrillator No    Breast feeding No    Reaction to local anesthetic No     Social Determinants of Health      [facility]    Social Connections    [facility]    Housing Stability         REVIEW OF SYSTEMS:   GENERAL: See above  GI: See HPI.    : See HPI.      EXAM:   /70   Pulse 84   Temp 99 °F (37.2 °C)   Resp 18   Ht 5' 5\" (1.651 m)   Wt 200 lb (90.7 kg)   SpO2 97%   BMI 33.28 kg/m²   GENERAL: well developed, well nourished,in no apparent distress  CARDIO: RRR,  LUNGS: clear to ausculation bilaterally, no wheezing or rhonchi  GI: BS present x 4.  No hepatosplenomegaly.  : Mild suprapubic tenderness. No bladder distention, or CVAT     Recent Results (from the past 24 hour(s))   URINALYSIS, AUTO, W/O SCOPE    Collection Time: 08/13/24  4:30 PM   Result Value Ref Range    Glucose Urine Negative Negative mg/dL    Bilirubin Urine Negative Negative    Ketones, UA Negative Negative - Trace mg/dL    Spec Gravity <1.005 1.005 - 1.030    Blood Urine Moderate (A) Negative    PH Urine 6.0 5.0 - 8.0    Protein Urine Negative Negative - Trace mg/dL    Urobilinogen Urine 0.2 0.2 - 1.0 mg/dL    Nitrite Urine Negative Negative    Leukocyte Esterase Urine Large (A) Negative    APPEARANCE hazy Clear    Color Urine light yellow Yellow    Multistix Lot# [id number] Numeric    Multistix Expiration Date 5/31/25 Date         ASSESSMENT AND PLAN:   Vandana Jefferson is a 68 year old female presents with urinary symptoms.    ASSESSMENT:  Encounter Diagnoses   Name Primary?    Acute cystitis with hematuria Yes    UTI symptoms        PLAN: Meds as listed below.  Comfort measures as described in Patient Instructions. Will send urine culture and adjust medication as necessary.     Meds & Refills for this Visit:  Requested Prescriptions     Signed Prescriptions Disp Refills    nitrofurantoin  monohydrate macro 100 MG Oral Cap 10 capsule 0     Sig: Take 1 capsule (100 mg total) by mouth 2 (two) times daily for 5 days.       Risk and benefits of medication discussed. Hx of CDiff in 2011, nitrofurantoin low risk for C.Diff  Stressed importance of completing full course of antibiotic unless told otherwise.     The patient indicates understanding of these issues and agrees to the plan.  The patient is asked to see PCP in 3 days if not better. Seek care immediately for new onset of fever, vomiting, worsening symptoms.

## 2024-08-13 NOTE — TELEPHONE ENCOUNTER
Patient called back, notified of below and states understanding.  She is going to go to [facility] in [place] today or [place] [facility] for eval.   Patient feels symptoms may be a little worse and she would like to start of something today if possible.     Advised this is good plan and she is agreeable.

## 2024-08-13 NOTE — TELEPHONE ENCOUNTER
Action Requested: Summary for Provider     []  Critical Lab, Recommendations Needed  [x] Need Additional Advice  []   FYI    [x]   Need Orders  [] Need Medications Sent to Pharmacy  []  Other     SUMMARY: Per protocol to be seen today, no appointment's available. Patient requesting a urine test and ABT for UTI signs/symptoms.    *Has been experiencing burning, pain with urination, unable to empty bladder all the way, urinary frequency and urgency    Reason for call: Urinary Symptoms  Onset: today    Denies fever, hematuria, lower back pain    Went over home care advice. Call back, go to Walk in care or Immediate care if new symptoms arise,  if signs/symptoms worsen or has questions or concerns. Patient verbalized understanding and agrees with plan.    Reason for Disposition   Age > 50 years    Protocols used: Urination Pain - Female-A-OH

## 2024-08-13 NOTE — TELEPHONE ENCOUNTER
Can submit urine sample if didn't go to walk in   Push fluids  If back pain, fever, blood in urine should be seen

## 2024-08-28 ENCOUNTER — OFFICE VISIT (OUTPATIENT)
Dept: RHEUMATOLOGY | Facility: CLINIC | Age: 69
End: 2024-08-28

## 2024-08-28 VITALS
BODY MASS INDEX: 33.32 KG/M2 | HEART RATE: 68 BPM | WEIGHT: 200 LBS | DIASTOLIC BLOOD PRESSURE: 71 MMHG | HEIGHT: 65 IN | SYSTOLIC BLOOD PRESSURE: 107 MMHG

## 2024-08-28 DIAGNOSIS — M17.0 PRIMARY OSTEOARTHRITIS OF BOTH KNEES: Primary | ICD-10-CM

## 2024-08-28 PROCEDURE — 3074F SYST BP LT 130 MM HG: CPT | Performed by: INTERNAL MEDICINE

## 2024-08-28 PROCEDURE — 99213 OFFICE O/P EST LOW 20 MIN: CPT | Performed by: INTERNAL MEDICINE

## 2024-08-28 PROCEDURE — 20610 DRAIN/INJ JOINT/BURSA W/O US: CPT | Performed by: INTERNAL MEDICINE

## 2024-08-28 PROCEDURE — 3078F DIAST BP <80 MM HG: CPT | Performed by: INTERNAL MEDICINE

## 2024-08-28 PROCEDURE — 3008F BODY MASS INDEX DOCD: CPT | Performed by: INTERNAL MEDICINE

## 2024-08-28 RX ORDER — TRIAMCINOLONE ACETONIDE 40 MG/ML
40 INJECTION, SUSPENSION INTRA-ARTICULAR; INTRAMUSCULAR ONCE
Status: COMPLETED | OUTPATIENT
Start: 2024-08-28 | End: 2024-08-28

## 2024-08-28 NOTE — PATIENT INSTRUCTIONS
You were seen today for arthritis in both knees  I injected both knees with cortisone, hopefully this helps  You can get cortisone injections every 4 to 5 months

## 2024-08-28 NOTE — PROGRESS NOTES
Vandana Jefferson is a 68 year old female.    HPI:     Chief Complaint   Patient presents with    Follow - Up    Knee Pain     Juan        I had the pleasure of seeing Vandana Jefferson on 8/28/2024 for follow up Polyarthralgia's, OA knee's    Current Medications:  Advil as needed 2-6 pills a day  Blood work:  ESR 36, CRP 4.29--> repeat in March 2022 normal  Neg RF, CCP, HLA-B27  XR R knee: moderate OA, +choncrocalcinosis     Interval History:  This is a 65 yo F with hx of HTN, HLD, Hypothyroid, GERD, CBP s/p laminectomy 1989 and fusion 2016 presents with polyarthralgias.  She was seen by Dr. Gibbs in the past and received a cortisone injection in her left knee in 2018.  She also has chronic lower back pain.  After her fusion in 2016 she was doing well for about 2 years but then started to have a lot more back pain.  She reports the back pain is in the lower spine and radiates down her gluteal region and down to her knees.  She is now following with neurosurgeon Dr. Thrasher and will be getting MRI of her back next week.  She reports diffuse joint pain involving her hands, wrists, shoulders, knees.  She has bone spurs in her DIPs.  Again she was seen by Dr. Gibbs in the past for her osteoarthritis and received cortisone injections in her left knee.  Never received gel injections.  Currently takes tramadol once in the morning and then Advil 2 to 4 pills a day.  This is mostly for her back pain    3/30/2022:  Presents for f/u of polyarthralgia's and L knee pain  We injected her left knee with cortisone about a week ago  Blood work showed negative RF and CCP but her inflammation markers were high  Her left knee has been doing better after the injection.  She can walk up and down stairs with minimal pain  Continues to have chronic left shoulder pain.  At times is hard to raise it.  No pain in her right shoulder.  No pain in her hips  Has hand pain but mostly in her DIPs.  No pain or swelling in her PIPs or MCPs.  She  does have wrist swelling  She has chronic back pain  No temporal pain, headache, jaw pain or blurry vision    12/1/2022:  Presents for f/u of R knee pain  X-ray of her right knee did show moderate OA changes and chondrocalcinosis  Fell on left shoulder and broke R clavicle, following with orthopedic now  Having pain in both knees again, Left is worse than Right  Yesterday after shower could not walk due to pain in R knee. Also had some swelling in the R knee  Right knee is better today, was taking advil, icing and heat    9/13/2023:  Presents for f/u of knee pain  Also has diffuse joint pain, all joints hurt  Last year she fell and broke her left clavicle, has weakness in shoulders, wants to try physical therapy  Had lower back epidural injection about 2 mos ago but only helped 20%  Has been taking Advil 4 to 6 pills a day for her pain    4/3/2024:  Presents for f/u of knee pain  Having worsening left knee pain, navin the back of the knee  Went to urgent care due to the left knee pain, US showed 3.6 cm left bakers cyst. Also has pain behind the knee    5/14/2024:  Presents for f/u of knee pain  Last visit both knees were injected with cortisone April and helped but symptoms coming back now  She is here for gel injections    8/28/2024:  Presents for f/u of knee pain  Last visit in May 2024 both knees injected with Synvisc 1 but did not help  Both knees were injected with cortisone in April and helped her   She recently had COVID but now doing better            HISTORY:  Past Medical History:    Age-related nuclear cataract of both eyes    Anxiety state, unspecified    Back pain    chronic    Cardiomyopathy (HCC)    Cataract    both eyes    Disorder of thyroid    Dry eye    Essential hypertension    Fibromyalgia    Floaters, right    H/O varicose vein stripping    Per NG: Varicose vein ; vein stripping    Headache    Per NG: OU    High cholesterol    Hypothyroidism    Meibomian gland dysfunction    Musculoskeletal  problem    Per NG: injection tendon origin/insertion    Musculoskeletal problem    Per NG: Arthrocentesis of the left knee joint    Neck pain    chronic    Osteoarthritis    Radiation    Per NG: Radiation to thyroid     Status post epidural steroid injection    Per NG: epidral injection x5 per pain mgmt      Social Hx Reviewed   Family Hx Reviewed     Medications (Active prior to today's visit):  Current Outpatient Medications   Medication Sig Dispense Refill    FLUoxetine HCl 40 MG Oral Cap Take 1 capsule (40 mg total) by mouth every morning. 90 capsule 1    LORazepam 1 MG Oral Tab Take 1/2 to 1 tablet (1 mg) twice a day as needed for anxiety 60 tablet 0    [START ON 9/4/2024] temazepam 30 MG Oral Cap Take 1 capsule (30 mg total) by mouth nightly as needed. 30 capsule 2    Tirzepatide (MOUNJARO) 2.5 MG/0.5ML Subcutaneous Solution Pen-injector       fluticasone propionate 50 MCG/ACT Nasal Suspension SPRAY 2 SPRAYS BY EACH NARE ROUTE NIGHTLY. 48 mL 1    Omeprazole 40 MG Oral Capsule Delayed Release Take 1 capsule (40 mg total) by mouth daily. 90 capsule 2    famotidine 20 MG Oral Tab Take 1 tablet (20 mg total) by mouth daily. 90 tablet 2    busPIRone 15 MG Oral Tab Take 0.5 tablets (7.5 mg total) by mouth in the morning and 0.5 tablets (7.5 mg total) before bedtime. 30 tablet 0    levothyroxine 112 MCG Oral Tab Take 1 tablet (112 mcg total) by mouth before breakfast. 90 tablet 3    gabapentin 800 MG Oral Tab Take 1 tablet (800 mg total) by mouth 2 (two) times daily. 180 tablet 3    Melatonin 10 MG Oral Cap Take 5 mg by mouth nightly as needed.      carvedilol 3.125 MG Oral Tab TAKE 1 TABLET(3.125 MG) BY MOUTH TWICE DAILY WITH MEALS 180 tablet 3    rosuvastatin (CRESTOR) 20 MG Oral Tab Take 1 tablet (20 mg total) by mouth nightly. 90 tablet 3     .cmed  Allergies:  Allergies   Allergen Reactions    Demerol [Meperidine] NAUSEA AND VOMITING and NAUSEA ONLY     Other reaction(s): Vomiting    Hydrocodone NAUSEA AND  VOMITING    Pregabalin      Other reaction(s): Stomach discomforts    Vicodin Tuss [Hydrocodone-Guaifenesin] NAUSEA AND VOMITING and NAUSEA ONLY     Other reaction(s): Vomiting    Tramadol NAUSEA ONLY         ROS:   All other ROS are negative.     PHYSICAL EXAM:   GEN: AAOx3, NAD  HEENT: EOMI, PERRLA, no injection or icterus, oral mucosa moist, no oral lesions. No lymphadenopathy. No facial rash  CVS: RRR, no murmurs rubs or gallops. Equal 2+ distal pulses.   LUNGS: CTAB, no increased work of breathing  ABDOMEN:  soft NT/ND, +BS, no HSM  SKIN: No rashes or skin lesions. No nail findings  MSK:  Cervical spine: FROM  Hands: + Heberden nodes in R second DIP and L second and third DIP.  No swelling, able to make a fist  Wrist: FROM, no pain or swelling or warmth on palpation  Elbow: FROM, no pain or swelling or warmth on palpation  Shoulders: L shoulder abduction and flexion limited to about 150 degrees  Hip: normal log roll, no lateral hip pain, FRED test negative b/l  Knees: FROM, no warmth or effusion present. No pain with ROM.   Ankles: FROM, no pain or swelling or warmth on palpation  Feet: no pain with MTP squeeze, no toe swelling or pain or warmth on palpation with FROM  Spine: no lumbar or sacral pain on palpation.  NEURO: Cranial nerves II-XII intact grossly. 5/5 strength throughout in both upper and lower extremities, sensation intact.  PSYCH: normal mood       LABS:     Component      Latest Ref Rng & Units 3/23/2022   C-REACTIVE PROTEIN      <0.30 mg/dL 4.29 (H)   C-Citrullinated Peptide IgG AB      0.0 - 6.9 U/mL 1.3   SED RATE      0 - 30 mm/Hr 36 (H)   RHEUMATOID FACTOR      <15 IU/mL <10     Imaging:     XR L knee:  CONCLUSION:   1. Demineralization.   2. Osteoarthritis showing progression from October 29, 2010.   3. Chondrocalcinosis a new finding.   4. Postop changes in the soft tissues of the thigh.    XR R knee:  CONCLUSION:   1. Demineralization.   2. Mild to moderate osteoarthritis.   3.  Chondrocalcinosis.     ASSESSMENT/PLAN:     Bilateral knee pain secondary to OA  - X-rays do show evidence of moderate osteoarthritis and chondrocalcinosis  - No evidence of swelling to suggest pseudogout  - B/L injected with cortisone in March and Sept 2023, 4/2024, Synvisc 5/2024  - Ultrasound showed a 3.6 cm Baker's cyst  - Both knees injected with 1 cc of lidocaine and 40 mg of Kenalog in sterile fashion.  Patient tolerated procedure well    Generalized OA, chronic pain  - She will try Celebrex 200 mg twice a day for her pain.  Advised to take with food and not with any other NSAID    Left clavicle fracture due to fall- stable  -Still having some pain, I will send to physical therapy    Left shoulder pain  - Left shoulder was injected back in March 2023, it helped her symptoms but then she fell now having left clavicle and shoulder pain  - will send to PT    Elevated inflammatory markers  - Repeat blood work shows normal ESR and CRP    Chronic lower back pain s/p laminectomy and fusion  - She is also going to be following with neurosurgeon Dr. Thrasher for her chronic LBP  - MRI lower spine done 4/2024 showing degenerative disc disease throughout the lumbar spine and moderate narrowing of the bilateral neural foramin    Pt will f/u in 3-4 mos    There is a longitudinal care relationship with me, the care plan reflects the ongoing nature of the continuous relationship of care, and the medical record indicates that there is ongoing treatment of a serious/complex medical condition which I am currently managing.  is Applicable.     Caity Ellison MD  5/14/2024  10:40 AM

## 2024-08-30 NOTE — PAT NURSING NOTE
Patient instructed to hold MOUNJARO 7 days prior to procedure per the Pre-surgical testing policy.

## 2024-09-03 ENCOUNTER — NURSE TRIAGE (OUTPATIENT)
Dept: FAMILY MEDICINE CLINIC | Facility: CLINIC | Age: 69
End: 2024-09-03

## 2024-09-03 ENCOUNTER — TELEPHONE (OUTPATIENT)
Facility: CLINIC | Age: 69
End: 2024-09-03

## 2024-09-03 NOTE — TELEPHONE ENCOUNTER
Action Requested: Summary for Provider     []  Critical Lab, Recommendations Needed  [] Need Additional Advice  []   FYI    []   Need Orders  [] Need Medications Sent to Pharmacy  []  Other     SUMMARY: Sinus and chest congestion, headaches, gray phlegm, no energy ,no fever, x 2 months and getting worse. Was tested COVID positive on 7/25/24, tried home test but it was inconclusive.   Prefers to be seen tomorrow, unable to go to the office today due to transportation issue.   Instructed to call the office if she develops fever overnight and we will change the in office appointment to a video.   Advised urgent care or immediate care  for worsening symptoms and emergency room  for shortness of breath or chest pain.       [facility] address provided.   Future Appointments   Date Time Provider Department Center   9/4/2024 11:00 AM Lenore Bravo APRN [facility]     Reason for call: Sinusitis  Onset: 2 months            Reason for Disposition   Sinus congestion (pressure, fullness) present > 10 days    Protocols used: Sinus Pain and Congestion-A-[facility]

## 2024-09-03 NOTE — TELEPHONE ENCOUNTER
Called patient - rescheduled her colonoscopy with Dr. Hubbard to 9/17/2024 at [facility].    Please refer to telephone encounter dated 3/6/2024 for scheduling orders.    Telephone encounter closed.

## 2024-09-04 ENCOUNTER — OFFICE VISIT (OUTPATIENT)
Dept: FAMILY MEDICINE CLINIC | Facility: CLINIC | Age: 69
End: 2024-09-04

## 2024-09-04 VITALS
HEART RATE: 69 BPM | SYSTOLIC BLOOD PRESSURE: 104 MMHG | TEMPERATURE: 99 F | WEIGHT: 199.5 LBS | BODY MASS INDEX: 33 KG/M2 | DIASTOLIC BLOOD PRESSURE: 65 MMHG

## 2024-09-04 DIAGNOSIS — J98.8 RESPIRATORY INFECTION: Primary | ICD-10-CM

## 2024-09-04 PROCEDURE — 99214 OFFICE O/P EST MOD 30 MIN: CPT | Performed by: NURSE PRACTITIONER

## 2024-09-04 PROCEDURE — 3078F DIAST BP <80 MM HG: CPT | Performed by: NURSE PRACTITIONER

## 2024-09-04 PROCEDURE — 3074F SYST BP LT 130 MM HG: CPT | Performed by: NURSE PRACTITIONER

## 2024-09-04 RX ORDER — AZITHROMYCIN 250 MG/1
250 TABLET, FILM COATED ORAL DAILY
COMMUNITY
Start: 2024-05-10

## 2024-09-04 RX ORDER — PREDNISONE 20 MG/1
TABLET ORAL
Qty: 10 TABLET | Refills: 0 | Status: SHIPPED | OUTPATIENT
Start: 2024-09-04

## 2024-09-04 NOTE — PROGRESS NOTES
HPI    Patient presents for concerns of productive cough, nasal congestion, fatigue, and body aches for the past 2 months.  Symptoms started after being diagnosed with COVID on July 25th and has lingered ever since.  Has last 10 lbs in the past 2 weeks.  Cough is productive with grey mucus.  Patient reports that she started a Z-Joseph at home and that she is on day 3 of 5.  Patient requesting RSV and COVID testing today.    Review of Systems   Constitutional:  Positive for fatigue.   HENT:  Positive for congestion and sinus pressure.    Respiratory:  Positive for cough.    Musculoskeletal:  Positive for myalgias.        Vitals:    09/04/24 1054   BP: 104/65   Pulse: 69   Temp: 98.6 °F (37 °C)   TempSrc: Tympanic   Weight: 199 lb 8 oz (90.5 kg)     Body mass index is 33.2 kg/m².    Health Maintenance   Topic Date Due    Zoster Vaccines (1 of 2) Never done    Pneumococcal Vaccine: 65+ Years (2 of 2 - PCV) 06/16/2022    Annual Physical  11/17/2023    Colorectal Cancer Screening  06/12/2024    COVID-19 Vaccine (5 - 2023-24 season) 09/01/2024    Influenza Vaccine (1) 10/01/2024    Mammogram  02/08/2025    DEXA Scan  Completed    Annual Depression Screening  Completed    Fall Risk Screening (Annual)  Completed       No LMP recorded. Patient is postmenopausal.    Past Medical History:    Age-related nuclear cataract of both eyes    Anxiety state, unspecified    Back pain    chronic    Cardiomyopathy (HCC)    Cataract    both eyes    Disorder of thyroid    Dry eye    Essential hypertension    Fibromyalgia    Floaters, right    H/O varicose vein stripping    Per NG: Varicose vein ; vein stripping    Headache    Per NG: OU    High cholesterol    Hypothyroidism    Meibomian gland dysfunction    Musculoskeletal problem    Per NG: injection tendon origin/insertion    Musculoskeletal problem    Per NG: Arthrocentesis of the left knee joint    Neck pain    chronic    Osteoarthritis    Radiation    Per NG: Radiation to thyroid      Status post epidural steroid injection    Per NG: epidral injection x5 per pain mgmt       .  Past Surgical History:   Procedure Laterality Date    Appendectomy      Back surgery  2016    lumbar L4-L5 fusion      1979    1    Cataract  2023    Right eye    Cataract extraction w/  intraocular lens implant Left 2023    Dr. Windy Barnett @ [facility]    Cataract extraction w/ intraocular lens implant Right 2023    Dr. Windy Barnett @[facility]     delivery only      Cholecystectomy      Colonoscopy  10/2005    normal, non precancerous polyps    Colonoscopy N/A 2018    Procedure: COLONOSCOPY;  Surgeon: Sean Hubbard MD;  Location: [facility] ENDOSCOPY    Colonoscopy N/A 2023    Procedure: COLONOSCOPY;  Surgeon: Sean Hubbard MD;  Location: [facility] beds at the [facility] ENDO    Cyst aspiration left Left 2007    FNA    Cyst aspiration right      Egd      Laminectomy      Per NG: Disc  displacement x2 and spinal fusion 2016    Teena localization wire 1 site left (cpt=19281)  2007    Other surgical history      Per NG: Arthrocentesis    Other surgical history      Per NG: arthrocentesis of the knee joint    Yag iridotomy - od - right eye Right 10/10/1997    RJM    Yag iridotomy - os - left eye Left 10/16/1997    RJM       Family History   Problem Relation Age of Onset    Heart Disorder Father 65    Depression Mother     Neurological Disorder Mother         Per NG: Hypothyrodism    Arthritis Mother         Per NG: Osteoarthritis    Other (Other) Mother     Skin cancer Mother     Depression Maternal Grandfather     Other (Other) Maternal Grandfather     Heart Attack Neg     Diabetes Neg         Per NG; No diabetes    Glaucoma Neg         Per NG: No glaucoma histroy    Cancer Neg     Hypertension Neg     Lipids Neg        Social History     Socioeconomic History    Marital status:      Spouse name: Not on file    Number of children: Not on file    Years of education: Not on file    Highest education  level: Not on file   Occupational History    Not on file   Tobacco Use    Smoking status: Former     Current packs/day: 0.00     Average packs/day: 1 pack/day for 27.0 years (27.0 ttl pk-yrs)     Types: Cigarettes     Start date: 1978     Quit date: 2005     Years since quittin.6    Smokeless tobacco: Never    Tobacco comments:     7 years ago   Vaping Use    Vaping status: Never Used   Substance and Sexual Activity    Alcohol use: No    Drug use: No    Sexual activity: Not on file   Other Topics Concern     Service Not Asked    Blood Transfusions Not Asked    Caffeine Concern Yes     Comment: 3 cups of coffee daily    Occupational Exposure Not Asked    Hobby Hazards Not Asked    Sleep Concern Not Asked    Stress Concern Not Asked    Weight Concern Not Asked    Special Diet Not Asked    Back Care Not Asked    Exercise No    Bike Helmet Not Asked    Seat Belt Not Asked    Self-Exams Not Asked    Grew up on a farm Not Asked    History of tanning Not Asked    Outdoor occupation Not Asked    Pt has a pacemaker No    Pt has a defibrillator No    Breast feeding No    Reaction to local anesthetic No   Social History Narrative    Not on file     Social Determinants of Health     Financial Resource Strain: Not on file   Food Insecurity: Not on file   Transportation Needs: Not on file   Physical Activity: Not on file   Stress: Not on file   Social Connections: Unknown (3/14/2021)    [facility]    Social Connections     Conversations with friends/family/neighbors per week: Not on file   Housing Stability: Low Risk  (2021)    [facility], [facility]    Housing Stability     Mortgage Payment Concerns?: Not on file     Number of Places Lived in the Last Year: Not on file     Unstable Housing?: Not on file       Current Outpatient Medications   Medication Sig Dispense Refill     azithromycin 250 MG Oral Tab Take 1 tablet (250 mg total) by mouth daily.      predniSONE 20 MG Oral Tab Take 2 tablets once a day for 5 days 10 tablet 0    FLUoxetine HCl 40 MG Oral Cap Take 1 capsule (40 mg total) by mouth every morning. 90 capsule 1    LORazepam 1 MG Oral Tab Take 1/2 to 1 tablet (1 mg) twice a day as needed for anxiety 60 tablet 0    temazepam 30 MG Oral Cap Take 1 capsule (30 mg total) by mouth nightly as needed. 30 capsule 2    fluticasone propionate 50 MCG/ACT Nasal Suspension SPRAY 2 SPRAYS BY EACH NARE ROUTE NIGHTLY. 48 mL 1    Omeprazole 40 MG Oral Capsule Delayed Release Take 1 capsule (40 mg total) by mouth daily. 90 capsule 2    famotidine 20 MG Oral Tab Take 1 tablet (20 mg total) by mouth daily. 90 tablet 2    busPIRone 15 MG Oral Tab Take 0.5 tablets (7.5 mg total) by mouth in the morning and 0.5 tablets (7.5 mg total) before bedtime. 30 tablet 0    levothyroxine 112 MCG Oral Tab Take 1 tablet (112 mcg total) by mouth before breakfast. 90 tablet 3    gabapentin 800 MG Oral Tab Take 1 tablet (800 mg total) by mouth 2 (two) times daily. 180 tablet 3    Melatonin 10 MG Oral Cap Take 5 mg by mouth nightly as needed.      carvedilol 3.125 MG Oral Tab TAKE 1 TABLET(3.125 MG) BY MOUTH TWICE DAILY WITH MEALS 180 tablet 3    rosuvastatin (CRESTOR) 20 MG Oral Tab Take 1 tablet (20 mg total) by mouth nightly. 90 tablet 3    Tirzepatide (MOUNJARO) 2.5 MG/0.5ML Subcutaneous Solution Pen-injector  (Patient not taking: Reported on 9/4/2024)         Allergies:  Allergies   Allergen Reactions    Demerol [Meperidine] NAUSEA AND VOMITING and NAUSEA ONLY     Other reaction(s): Vomiting    Hydrocodone NAUSEA AND VOMITING    Pregabalin      Other reaction(s): Stomach discomforts    Vicodin Tuss [Hydrocodone-Guaifenesin] NAUSEA AND VOMITING and NAUSEA ONLY     Other reaction(s): Vomiting    Tramadol NAUSEA ONLY       Physical Exam  Vitals and nursing note reviewed.   Constitutional:       General: She is  not in acute distress.     Appearance: Normal appearance.   HENT:      Head: Normocephalic and atraumatic.      Right Ear: Tympanic membrane, ear canal and external ear normal. There is no impacted cerumen.      Left Ear: Tympanic membrane, ear canal and external ear normal. There is no impacted cerumen.      Nose: Congestion present.      Mouth/Throat:      Mouth: Mucous membranes are moist.      Pharynx: Oropharynx is clear. No oropharyngeal exudate or posterior oropharyngeal erythema.   Cardiovascular:      Rate and Rhythm: Normal rate and regular rhythm.      Heart sounds: Normal heart sounds.   Pulmonary:      Effort: Pulmonary effort is normal. No respiratory distress.      Breath sounds: Normal breath sounds. No stridor. No wheezing, rhonchi or rales.   Chest:      Chest wall: No tenderness.   Neurological:      Mental Status: She is alert and oriented to person, place, and time.          Assessment and Plan:  Problem List Items Addressed This Visit    None  Visit Diagnoses       Respiratory infection    -  Primary    Relevant Medications    azithromycin 250 MG Oral Tab    predniSONE 20 MG Oral Tab    Other Relevant Orders    SARS-CoV-2/Flu A and B/RSV by PCR (Daniela) [E] *Collect in Office!           May continue Z-Joseph as directed.  Prednisone 40 mg x 5 days.  COVID, flu, RSV swab collected in office today.  Supportive care discussed.  Follow-up as needed.     Discussed plan of care with patient and patient is in agreement.  All questions answered. Patient to call with questions or concerns.    Encouraged to sign up for My Chart if not already registered.

## 2024-09-05 LAB
FLUAV + FLUBV RNA SPEC NAA+PROBE: NOT DETECTED
FLUAV + FLUBV RNA SPEC NAA+PROBE: NOT DETECTED
RSV RNA SPEC NAA+PROBE: NOT DETECTED
SARS-COV-2 RNA RESP QL NAA+PROBE: NOT DETECTED

## 2024-09-11 ENCOUNTER — HOSPITAL ENCOUNTER (OUTPATIENT)
Dept: CT IMAGING | Facility: HOSPITAL | Age: 69
Discharge: HOME | End: 2024-09-11
Attending: FAMILY MEDICINE
Payer: COMMERCIAL

## 2024-09-11 DIAGNOSIS — J34.89 SINUS PRESSURE: ICD-10-CM

## 2024-09-11 DIAGNOSIS — J32.9 RECURRENT SINUSITIS: ICD-10-CM

## 2024-09-11 PROCEDURE — 70486 CT MAXILLOFACIAL W/O DYE: CPT | Performed by: FAMILY MEDICINE

## 2024-09-17 ENCOUNTER — ANESTHESIA EVENT (OUTPATIENT)
Dept: ENDOSCOPY | Age: 69
End: 2024-09-17
Payer: COMMERCIAL

## 2024-09-17 ENCOUNTER — HOSPITAL ENCOUNTER (OUTPATIENT)
Age: 69
Setting detail: HOSPITAL OUTPATIENT SURGERY
Discharge: HOME | End: 2024-09-17
Attending: INTERNAL MEDICINE | Admitting: INTERNAL MEDICINE
Payer: COMMERCIAL

## 2024-09-17 ENCOUNTER — ANESTHESIA (OUTPATIENT)
Dept: ENDOSCOPY | Age: 69
End: 2024-09-17
Payer: COMMERCIAL

## 2024-09-17 DIAGNOSIS — Z12.11 COLON CANCER SCREENING: ICD-10-CM

## 2024-09-17 DIAGNOSIS — Z86.010 HISTORY OF COLON POLYPS: ICD-10-CM

## 2024-09-17 PROCEDURE — 99070 SPECIAL SUPPLIES PHYS/QHP: CPT | Performed by: INTERNAL MEDICINE

## 2024-09-17 PROCEDURE — 88305 TISSUE EXAM BY PATHOLOGIST: CPT | Performed by: INTERNAL MEDICINE

## 2024-09-17 PROCEDURE — 45385 COLONOSCOPY W/LESION REMOVAL: CPT | Performed by: INTERNAL MEDICINE

## 2024-09-17 DEVICE — REPLAY HEMOSTASIS CLIP, 11MM SPAN
Type: IMPLANTABLE DEVICE | Site: COLON | Status: FUNCTIONAL
Brand: REPLAY

## 2024-09-17 RX ORDER — LIDOCAINE HYDROCHLORIDE 10 MG/ML
INJECTION, SOLUTION EPIDURAL; INFILTRATION; INTRACAUDAL; PERINEURAL AS NEEDED
Status: DISCONTINUED | OUTPATIENT
Start: 2024-09-17 | End: 2024-09-17 | Stop reason: SURG

## 2024-09-17 RX ORDER — SODIUM CHLORIDE, SODIUM LACTATE, POTASSIUM CHLORIDE, CALCIUM CHLORIDE 600; 310; 30; 20 MG/100ML; MG/100ML; MG/100ML; MG/100ML
INJECTION, SOLUTION INTRAVENOUS CONTINUOUS
Status: DISCONTINUED | OUTPATIENT
Start: 2024-09-17 | End: 2024-09-17

## 2024-09-17 RX ADMIN — SODIUM CHLORIDE, SODIUM LACTATE, POTASSIUM CHLORIDE, CALCIUM CHLORIDE: 600; 310; 30; 20 INJECTION, SOLUTION INTRAVENOUS at 11:12:00

## 2024-09-17 RX ADMIN — LIDOCAINE HYDROCHLORIDE 25 MG: 10 INJECTION, SOLUTION EPIDURAL; INFILTRATION; INTRACAUDAL; PERINEURAL at 10:31:00

## 2024-09-17 NOTE — H&P
History & Physical Examination    Patient Name: Vandana Jefferson  MRN: [id number]  [facility]: [id number]  YOB: 1955    Diagnosis:   Hx colon polyps   Colon cancer screening    Facility-Administered Medications Prior to Admission   Medication Dose Route Frequency Provider Last Rate Last Admin    [COMPLETED] triamcinolone acetonide (Kenalog-40) 40 MG/ML injection 40 mg  40 mg Intra-articular Once Caity Ellison MD   40 mg at 08/28/24 1210    [COMPLETED] triamcinolone acetonide (Kenalog-40) 40 MG/ML injection 40 mg  40 mg Intra-articular Once Caity Ellison MD   40 mg at 08/28/24 1207    Hylan G-F 20 SOSY 16 mg  16 mg Intra-articular Once Caity Ellison MD        Lidocaine HCl (PF) (XYLOCAINE) 1 % injection SOLN 1 mL  1 mL Injection Once Bryson Shell MD         Medications Prior to Admission   Medication Sig Dispense Refill Last Dose    FLUoxetine HCl 40 MG Oral Cap Take 1 capsule (40 mg total) by mouth every morning. 90 capsule 1 9/16/2024    LORazepam 1 MG Oral Tab Take 1/2 to 1 tablet (1 mg) twice a day as needed for anxiety 60 tablet 0 9/16/2024    temazepam 30 MG Oral Cap Take 1 capsule (30 mg total) by mouth nightly as needed. 30 capsule 2 9/16/2024 at prn    Tirzepatide (MOUNJARO) 2.5 MG/0.5ML Subcutaneous Solution Pen-injector  (Patient not taking: Reported on 9/4/2024)    at >2 months    fluticasone propionate 50 MCG/ACT Nasal Suspension SPRAY 2 SPRAYS BY EACH NARE ROUTE NIGHTLY. 48 mL 1  at prn    Omeprazole 40 MG Oral Capsule Delayed Release Take 1 capsule (40 mg total) by mouth daily. 90 capsule 2 9/14/2024    famotidine 20 MG Oral Tab Take 1 tablet (20 mg total) by mouth daily. 90 tablet 2 9/14/2024    busPIRone 15 MG Oral Tab Take 0.5 tablets (7.5 mg total) by mouth in the morning and 0.5 tablets (7.5 mg total) before bedtime. 30 tablet 0 9/16/2024    levothyroxine 112 MCG Oral Tab Take 1 tablet (112 mcg total) by mouth before breakfast. 90 tablet 3 9/16/2024    gabapentin 800 MG Oral Tab Take 1  tablet (800 mg total) by mouth 2 (two) times daily. 180 tablet 3 2024    Melatonin 10 MG Oral Cap Take 5 mg by mouth nightly as needed.   2024 at prn    rosuvastatin (CRESTOR) 20 MG Oral Tab Take 1 tablet (20 mg total) by mouth nightly. 90 tablet 3 9/15/2024    azithromycin 250 MG Oral Tab Take 1 tablet (250 mg total) by mouth daily. (Patient not taking: Reported on 2024)   Not Taking    predniSONE 20 MG Oral Tab Take 2 tablets once a day for 5 days (Patient not taking: Reported on 2024) 10 tablet 0 Not Taking    [] nitrofurantoin monohydrate macro 100 MG Oral Cap Take 1 capsule (100 mg total) by mouth 2 (two) times daily for 5 days. 10 capsule 0     [] benzonatate 100 MG Oral Cap Take 1 capsule (100 mg total) by mouth 3 (three) times daily as needed for cough. (Patient not taking: Reported on 2024) 20 capsule 0     [] nirmatrelvir-ritonavir 300-100 MG Oral Tablet Therapy Pack Take two nirmatrelvir tablets (300mg) with one ritonavir tablet (100mg) together twice daily for 5 days. 30 tablet 0     [] benzonatate 100 MG Oral Cap Take 1 capsule (100 mg total) by mouth 3 (three) times daily as needed for cough. 30 capsule 0     [] diclofenac 75 MG Oral Tab EC Take 1 tablet (75 mg total) by mouth 2 (two) times daily. (Patient not taking: Reported on 2024) 60 tablet 1     carvedilol 3.125 MG Oral Tab TAKE 1 TABLET(3.125 MG) BY MOUTH TWICE DAILY WITH MEALS 180 tablet 3 2024     Current Facility-Administered Medications   Medication Dose Route Frequency    lactated ringers infusion   Intravenous Continuous       Allergies:   Allergies   Allergen Reactions    Demerol [Meperidine] NAUSEA AND VOMITING and NAUSEA ONLY     Other reaction(s): Vomiting    Hydrocodone NAUSEA AND VOMITING    Pregabalin      Other reaction(s): Stomach discomforts    Vicodin Tuss [Hydrocodone-Guaifenesin] NAUSEA AND VOMITING and NAUSEA ONLY     Other reaction(s): Vomiting    Tramadol  NAUSEA ONLY       Past Medical History:    Age-related nuclear cataract of both eyes    Anxiety state, unspecified    Back pain    chronic    Cardiomyopathy (HCC)    Cataract    both eyes    Disorder of thyroid    Dry eye    Essential hypertension    Fibromyalgia    Floaters, right    H/O varicose vein stripping    Per NG: Varicose vein ; vein stripping    Headache    Per NG: OU    High cholesterol    Hypothyroidism    Meibomian gland dysfunction    Musculoskeletal problem    Per NG: injection tendon origin/insertion    Musculoskeletal problem    Per NG: Arthrocentesis of the left knee joint    Neck pain    chronic    Osteoarthritis    Radiation    Per NG: Radiation to thyroid     Status post epidural steroid injection    Per NG: epidral injection x5 per pain mgmt     Past Surgical History:   Procedure Laterality Date    Appendectomy      Back surgery  2016    lumbar L4-L5 fusion      1979        Cataract  2023    Right eye    Cataract extraction w/  intraocular lens implant Left 2023    Dr. Windy Barnett @ [facility]    Cataract extraction w/ intraocular lens implant Right 2023    Dr. Windy Barnett @[facility]     delivery only      Cholecystectomy      Colonoscopy  10/2005    normal, non precancerous polyps    Colonoscopy N/A 2018    Procedure: COLONOSCOPY;  Surgeon: Sean Hubbard MD;  Location: [facility] ENDOSCOPY    Colonoscopy N/A 2023    Procedure: COLONOSCOPY;  Surgeon: Sean Hubbard MD;  Location: [facility] ENDO    Cyst aspiration left Left 2007    FNA    Cyst aspiration right      Egd      Laminectomy      Per NG: Disc  displacement x2 and spinal fusion 2016    [facility] localization wire 1 site left (cpt=19281)  2007    Other surgical history      Per NG: Arthrocentesis    Other surgical history      Per NG: arthrocentesis of the knee joint    Yag iridotomy - od - right eye Right 10/10/1997    MICHELA    Yag iridotomy - os - left eye Left 10/16/1997    MICHELA     Family History    Problem Relation Age of Onset    Heart Disorder Father 65    Depression Mother     Neurological Disorder Mother         Per NG: Hypothyrodism    Arthritis Mother         Per NG: Osteoarthritis    Other (Other) Mother     Skin cancer Mother     Depression Maternal Grandfather     Other (Other) Maternal Grandfather     Heart Attack Neg     Diabetes Neg         Per NG; No diabetes    Glaucoma Neg         Per NG: No glaucoma histroy    Cancer Neg     Hypertension Neg     Lipids Neg      Social History     Tobacco Use    Smoking status: Former     Current packs/day: 0.00     Average packs/day: 1 pack/day for 27.0 years (27.0 ttl pk-yrs)     Types: Cigarettes     Start date: 1978     Quit date: 2005     Years since quittin.7    Smokeless tobacco: Never    Tobacco comments:     7 years ago   Substance Use Topics    Alcohol use: No       SYSTEM Check if Review is Normal Check if Physical Exam is Normal If not normal, please explain:   HEENT [x ] [ x]    NECK & BACK [x ] [x ]    HEART [x ] [ x]    LUNGS [x ] [ x]    ABDOMEN [x ] [x ]    UROGENITAL [ ] [ ]    EXTREMITIES [x ] [x ]    OTHER        [ x ] I have discussed the risks and benefits and alternatives with the patient/family.  They understand and agree to proceed with plan of care.  [ x ] I have reviewed the History and Physical done within the last 30 days.  Any changes noted above.    Sean Hubbard MD  2024  10:23 AM

## 2024-09-17 NOTE — DISCHARGE INSTRUCTIONS
Home Care Instructions for Colonoscopy  Diet:  - Resume your regular diet as tolerated unless otherwise instructed.  - Start with light meals to minimize bloating.  - Do not drink alcohol today.    Medication:  - If you have questions about resuming your normal medications, please contact your Primary Care Physician.    Activities:  - Take it easy today. Do not return to work today.  - Do not drive today.  - Do not operate any machinery today (including kitchen equipment).    Colonoscopy:  - You may notice some rectal \"spotting\" (a little blood on the toilet tissue) for a day or two after the exam. This is normal.  - If you experience any rectal bleeding (not spotting), persistent tenderness or sharp severe abdominal pains, oral temperature over 100 degrees Fahrenheit, light-headedness or dizziness, or any other problems, contact your doctor.      **If unable to reach your doctor, please go to the [facility] Emergency Room**    - Your referring physician will receive a full report of your examination.  - If you do not hear from your doctor's office within two weeks of your biopsy, please call them for your results.    You may be able to see your laboratory results in Player [employer] between 4 and 7 business days.  In some cases, your physician may not have viewed the results before they are released to Player X.  If you have questions regarding your results contact the physician who ordered the test/exam by phone or via Player X by choosing \"Ask a Medical Question.\"

## 2024-09-17 NOTE — ANESTHESIA POSTPROCEDURE EVALUATION
Patient: Vandana Jefferson    Procedure Summary       Date: 09/17/24 Room / Location: [facility] ENDOSCOPY [facility]    Anesthesia Start: 1028 Anesthesia Stop: 1115    Procedure: COLONOSCOPY Diagnosis:       Colon cancer screening      History of colon polyps      (colon polyps, diverticulosis, hemorrhoids)    Surgeons: Sean Hubbard MD Anesthesiologist: Slick Thompson MD    Anesthesia Type: MAC ASA Status: 2            Anesthesia Type: MAC    Vitals Value Taken Time   /66 09/17/24 1115   Temp 98 09/17/24 1115   Pulse 64 09/17/24 1114   Resp 16 09/17/24 1114   SpO2 100 % 09/17/24 1114   Vitals shown include unfiled device data.    [facility] AN Post Evaluation:   Patient Evaluated in PACU  Patient Participation: complete - patient participated  Level of Consciousness: awake  Pain Management: adequate  Airway Patency:patent  Dental exam unchanged from preop  Yes    Cardiovascular Status: acceptable  Respiratory Status: acceptable  Postoperative Hydration acceptable      SLICK THOMPSON MD  9/17/2024 11:15 AM

## 2024-09-17 NOTE — OPERATIVE REPORT
[facility] Endoscopy Report  Date of procedure-September 17, 2024    Preoperative Diagnosis:  -Colorectal cancer screening  -History colon polyps    Postoperative Diagnosis:  -Colon polyps x 10  -Diverticulosis  -Internal hemorrhoids    Procedure:    Colonoscopy     Surgeon:  Sean Hubbard M.D.    Anesthesia:  MAC     Technique:  After informed consent, the patient was placed in the left lateral recumbent position.  Digital rectal examination revealed no palpable intraluminal abnormalities.  An Olympus variable stiffness 190 series HD colonoscope was inserted into the rectum and advanced under direct vision by following the lumen to the cecum.  The colon was examined upon withdrawal in the left lateral position.  The procedures was well tolerated without immediate complication.      Findings:  The preparation of the colon was good.  The terminal ileum was examined for 4 cm and visually normal.  The ileocecal valve was well preserved. The visualized colonic mucosa from the cecum to the anal verge was normal with an intact vascular pattern.    Colon polyps x 10 removed as follows;  -Ascending x 1, sessile 6 mm in size and cold snare removed.  -Transverse x 3, there is polyp was sessile 4 mm in size and cold snare removed.  The other 2 polyps were sessile 10 to 15 mm in size apiece and removed by cold snare technique.  Endoclips x 2 placed across 1 of these larger polyps and 1 clip placed across the other 1.  -Sigmoid x 5, all polyps had hyperplastic features were 4 to 5 mm in size and cold snare removed.  -Rectum x 1, sessile 4 mm in size hyperplastic and removed by cold snare technique.  All polypectomy sites inspected and found to be free of bleeding and specimens retrieved and sent for analysis.    Diverticulosis located in the sigmoid and descending colon, no diverticulitis.    Small internal hemorrhoids noted on retroflexed view.    Estimated blood loss-insignificant  Specimens-see  above    Impression:  -Colon polyps x 10  -Diverticulosis  -Internal hemorrhoids    Recommendations:  - Post polypectomy instructions given  - Repeat colonoscopy in 3 years  - High fiber diet for diverticular disease  - Symptomatic treatment of hemorrhoids          Sean Hubbard MD  9/17/2024  11:13 AM

## 2024-09-17 NOTE — ANESTHESIA PREPROCEDURE EVALUATION
Anesthesia PreOp Note    HPI:     Vandana Jefferson is a 68 year old female who presents for preoperative consultation requested by: Sean Hubbard MD    Date of Surgery: 9/17/2024    Procedure(s):  COLONOSCOPY  Indication: Colon cancer screening / History of colon polyps    Relevant Problems   No relevant active problems       NPO:  Last Liquid Consumption Date: 09/17/24  Last Liquid Consumption Time: 0600  Last Solid Consumption Date: 09/16/24  Last Solid Consumption Time: 0830  Last Liquid Consumption Date: 09/17/24          History Review:  Patient Active Problem List    Diagnosis Date Noted    History of cardiomyopathy 04/09/2024    Baker's cyst, left 04/09/2024    Hypothyroidism 04/19/2023    Class 1 obesity due to excess calories with serious comorbidity and body mass index (BMI) of 33.0 to 33.9 in adult 01/03/2023    Generalized anxiety disorder 06/13/2022    MDD (recurrent major depressive disorder) in remission (HCC) 06/13/2022    Sweating abnormality 11/10/2021    Age-related nuclear cataract of both eyes 06/24/2021    Floaters, right 06/24/2021    Dry eye 06/24/2021    Multinodular thyroid 06/16/2021    Depression, recurrent (HCC) 06/16/2021    History of lumbar surgery 11/25/2019    Bilateral leg paresthesia 11/25/2019    Polyneuropathy 11/25/2019    Myalgia, unspecified site 09/11/2019    Intractable vomiting 08/19/2019    Chronic left shoulder pain 11/27/2018    Myalgia of muscle of neck 06/05/2018    Female pattern alopecia 05/21/2018    H/O radioactive iodine thyroid ablation 11/02/2017    DDD (degenerative disc disease), cervical 08/17/2017    Onychomycosis 12/27/2016    Chronic neck and back pain 11/30/2016    Obesity (BMI 30.0-34.9) 11/30/2016    Thyroid nodule 11/30/2016    Acquired hypothyroidism 07/20/2016    Chronic low back pain 08/26/2015    Anxiety and depression 08/26/2015    Thyroid activity decreased 08/26/2015    Primary osteoarthritis involving multiple joints 05/18/2015    Failed  back syndrome, lumbar 03/19/2015    Lumbago 03/19/2015    Lumbar facet arthropathy 03/19/2015    Lumbosacral radiculopathy 03/19/2015    Lumbar radicular syndrome 03/19/2015    Anal or rectal pain 11/27/2013    Gastroesophageal reflux disease 11/27/2013    Hemorrhoids 11/27/2013    Overweight 10/16/2013    Vitamin D deficiency 10/16/2013    Pain in joint, pelvic region and thigh 08/28/2013    Spinal stenosis in cervical region 02/11/2013    Primary cardiomyopathy (HCC) 04/30/2012    Astigmatism 07/28/2011    Essential hypertension 06/06/2011    Bronchitis 01/12/2011    Cough 01/05/2011    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    Toxic diffuse goiter 07/08/2010    Female stress incontinence 06/09/2010    Throat pain 06/03/2010    Uterine prolapse 06/03/2010    Hordeolum internum 03/17/2010    Senile cataract 03/17/2010    Acute maxillary sinusitis 04/23/2009    Hypopotassemia 01/27/2009    Diarrhea of presumed infectious origin 01/26/2009    Intestinal infection due to Clostridium difficile 01/26/2009    Fibrosclerosis of breast 01/12/2009    Symptomatic menopausal or female climacteric states 05/29/2008    Reflux esophagitis 01/07/2008    Arthropathy 08/30/2007    Nonspecific reaction to tuberculin skin test 06/13/2007    Disorder of lipid metabolism 05/09/2007    Lump or mass in breast 04/16/2007    Neoplasm of uncertain behavior of connective and other soft tissue 02/16/2007    Injury, finger 01/08/2007    Other specified disorders of adrenal glands 01/08/2007    Absence of bladder continence 01/08/2007    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    Hypercholesteremia 08/23/2006    Benign neoplasm of eyelid 08/18/2006    Pinguecula 08/18/2006    Injury of knee, leg, ankle and foot 08/16/2006    Visual disturbance 08/01/2006    Diarrhea 05/11/2006    Noninfectious gastroenteritis and colitis 04/28/2006    Anxiety states 04/05/2006    Senile osteoporosis 04/05/2006    Fibroadenosis of breast 03/31/2006    Polycystic  ovaries 2005    Lateral epicondylitis of elbow 2005    Solitary cyst of breast 2005       Past Medical History:    Age-related nuclear cataract of both eyes    Anxiety state, unspecified    Back pain    chronic    Cardiomyopathy (HCC)    Cataract    both eyes    Disorder of thyroid    Dry eye    Essential hypertension    Fibromyalgia    Floaters, right    H/O varicose vein stripping    Per NG: Varicose vein ; vein stripping    Headache    Per NG: OU    High cholesterol    Hypothyroidism    Meibomian gland dysfunction    Musculoskeletal problem    Per NG: injection tendon origin/insertion    Musculoskeletal problem    Per NG: Arthrocentesis of the left knee joint    Neck pain    chronic    Osteoarthritis    Radiation    Per NG: Radiation to thyroid     Status post epidural steroid injection    Per NG: epidral injection x5 per pain mgmt       Past Surgical History:   Procedure Laterality Date    Appendectomy      Back surgery  2016    lumbar L4-L5 fusion      1979    1    Cataract  2023    Right eye    Cataract extraction w/  intraocular lens implant Left 2023    Dr. Windy Barnett @ [facility]    Cataract extraction w/ intraocular lens implant Right 2023    Dr. Windy Barnett @[facility]     delivery only      Cholecystectomy      Colonoscopy  10/2005    normal, non precancerous polyps    Colonoscopy N/A 2018    Procedure: COLONOSCOPY;  Surgeon: Sean Hubbard MD;  Location: [facility] ENDOSCOPY    Colonoscopy N/A 2023    Procedure: COLONOSCOPY;  Surgeon: Sean Hubbard MD;  [facility]    Cyst aspiration left Left 2007    FNA    Cyst aspiration right      Egd      Laminectomy      Per NG: Disc  displacement x2 and spinal fusion 2016    Teena localization wire 1 site left (cpt=19281)  2007    Other surgical history      Per NG: Arthrocentesis    Other surgical history      Per NG: arthrocentesis of the knee joint    Yag iridotomy - od - right eye Right  10/10/1997    [facility]    Yag iridotomy - os - left eye Left 10/16/1997    MICHELA       Facility-Administered Medications Prior to Admission   Medication Dose Route Frequency Provider Last Rate Last Admin    [COMPLETED] triamcinolone acetonide (Kenalog-40) 40 MG/ML injection 40 mg  40 mg Intra-articular Once Caity Ellison MD   40 mg at 08/28/24 1210    [COMPLETED] triamcinolone acetonide (Kenalog-40) 40 MG/ML injection 40 mg  40 mg Intra-articular Once Caity Ellison MD   40 mg at 08/28/24 1207    Hylan G-F 20 SOSY 16 mg  16 mg Intra-articular Once Caity Ellison MD        Lidocaine HCl (PF) (XYLOCAINE) 1 % injection SOLN 1 mL  1 mL Injection Once Bryson Shell MD         Medications Prior to Admission   Medication Sig Dispense Refill Last Dose    FLUoxetine HCl 40 MG Oral Cap Take 1 capsule (40 mg total) by mouth every morning. 90 capsule 1 9/16/2024    LORazepam 1 MG Oral Tab Take 1/2 to 1 tablet (1 mg) twice a day as needed for anxiety 60 tablet 0 9/16/2024    temazepam 30 MG Oral Cap Take 1 capsule (30 mg total) by mouth nightly as needed. 30 capsule 2 9/16/2024 at prn    Tirzepatide (MOUNJARO) 2.5 MG/0.5ML Subcutaneous Solution Pen-injector  (Patient not taking: Reported on 9/4/2024)    at >2 months    fluticasone propionate 50 MCG/ACT Nasal Suspension SPRAY 2 SPRAYS BY EACH NARE ROUTE NIGHTLY. 48 mL 1  at prn    Omeprazole 40 MG Oral Capsule Delayed Release Take 1 capsule (40 mg total) by mouth daily. 90 capsule 2 9/14/2024    famotidine 20 MG Oral Tab Take 1 tablet (20 mg total) by mouth daily. 90 tablet 2 9/14/2024    busPIRone 15 MG Oral Tab Take 0.5 tablets (7.5 mg total) by mouth in the morning and 0.5 tablets (7.5 mg total) before bedtime. 30 tablet 0 9/16/2024    levothyroxine 112 MCG Oral Tab Take 1 tablet (112 mcg total) by mouth before breakfast. 90 tablet 3 9/16/2024    gabapentin 800 MG Oral Tab Take 1 tablet (800 mg total) by mouth 2 (two) times daily. 180 tablet 3 9/16/2024    Melatonin 10 MG Oral Cap Take  5 mg by mouth nightly as needed.   2024 at prn    rosuvastatin (CRESTOR) 20 MG Oral Tab Take 1 tablet (20 mg total) by mouth nightly. 90 tablet 3 9/15/2024    azithromycin 250 MG Oral Tab Take 1 tablet (250 mg total) by mouth daily. (Patient not taking: Reported on 2024)   Not Taking    predniSONE 20 MG Oral Tab Take 2 tablets once a day for 5 days (Patient not taking: Reported on 2024) 10 tablet 0 Not Taking    [] nitrofurantoin monohydrate macro 100 MG Oral Cap Take 1 capsule (100 mg total) by mouth 2 (two) times daily for 5 days. 10 capsule 0     [] benzonatate 100 MG Oral Cap Take 1 capsule (100 mg total) by mouth 3 (three) times daily as needed for cough. (Patient not taking: Reported on 2024) 20 capsule 0     [] nirmatrelvir-ritonavir 300-100 MG Oral Tablet Therapy Pack Take two nirmatrelvir tablets (300mg) with one ritonavir tablet (100mg) together twice daily for 5 days. 30 tablet 0     [] benzonatate 100 MG Oral Cap Take 1 capsule (100 mg total) by mouth 3 (three) times daily as needed for cough. 30 capsule 0     [] diclofenac 75 MG Oral Tab EC Take 1 tablet (75 mg total) by mouth 2 (two) times daily. (Patient not taking: Reported on 2024) 60 tablet 1     carvedilol 3.125 MG Oral Tab TAKE 1 TABLET(3.125 MG) BY MOUTH TWICE DAILY WITH MEALS 180 tablet 3 2024     Current Facility-Administered Medications Ordered in Epic   Medication Dose Route Frequency Provider Last Rate Last Admin    lactated ringers infusion   Intravenous Continuous Sean Hubbard MD 20 mL/hr at 24 1003 New Bag at 24 1003     No current [facility]-ordered outpatient medications on file.       Allergies   Allergen Reactions    Demerol [Meperidine] NAUSEA AND VOMITING and NAUSEA ONLY     Other reaction(s): Vomiting    Hydrocodone NAUSEA AND VOMITING    Pregabalin      Other reaction(s): Stomach discomforts    Vicodin Tuss [Hydrocodone-Guaifenesin] NAUSEA AND VOMITING  and NAUSEA ONLY     Other reaction(s): Vomiting    Tramadol NAUSEA ONLY       Family History   Problem Relation Age of Onset    Heart Disorder Father 65    Depression Mother     Neurological Disorder Mother         Per NG: Hypothyrodism    Arthritis Mother         Per NG: Osteoarthritis    Other (Other) Mother     Skin cancer Mother     Depression Maternal Grandfather     Other (Other) Maternal Grandfather     Heart Attack Neg     Diabetes Neg         Per NG; No diabetes    Glaucoma Neg         Per NG: No glaucoma histroy    Cancer Neg     Hypertension Neg     Lipids Neg      Social History     Socioeconomic History    Marital status:    Tobacco Use    Smoking status: Former     Current packs/day: 0.00     Average packs/day: 1 pack/day for 27.0 years (27.0 ttl pk-yrs)     Types: Cigarettes     Start date: 1978     Quit date: 2005     Years since quittin.7    Smokeless tobacco: Never    Tobacco comments:     7 years ago   Vaping Use    Vaping status: Never Used   Substance and Sexual Activity    Alcohol use: No    Drug use: No   Other Topics Concern    Caffeine Concern Yes     Comment: 3 cups of coffee daily    Exercise No    Pt has a pacemaker No    Pt has a defibrillator No    Breast feeding No    Reaction to local anesthetic No       Available pre-op labs reviewed.             Vital Signs:  Body mass index is 32.95 kg/m².   height is 1.651 m (5' 5\") and weight is 89.8 kg (198 lb). Her blood pressure is 127/64 and her pulse is 70. Her respiration is 16 and oxygen saturation is 95%.   Vitals:    24 1440 24 0955   BP:  127/64   Pulse:  70   Resp:  16   SpO2:  95%   Weight: 89.8 kg (198 lb)    Height: 1.651 m (5' 5\")         Anesthesia Evaluation     Patient summary reviewed and Nursing notes reviewed    No history of anesthetic complications   Airway   Mallampati: II  Neck ROM: full  Dental      Pulmonary - negative ROS and normal exam   Cardiovascular - negative ROS and normal  exam  (+) hypertension    Neuro/Psych - negative ROS     GI/Hepatic/Renal - negative ROS   (+) GERD    Endo/Other - negative ROS   Abdominal  - normal exam  (+) obese                 Anesthesia Plan:   ASA:  2  Plan:   MAC  Informed Consent Plan and Risks Discussed With:  Patient      I have informed EveNEMESIO Jefferson and/or legal guardian or family member of the nature of the anesthetic plan, benefits, risks including possible dental damage if relevant, major complications, and any alternative forms of anesthetic management.   All of the patient's questions were answered to the best of my ability. The patient desires the anesthetic management as planned.  SLICK THOMPSON MD  9/17/2024 10:05 AM  Present on Admission:  **None**

## 2024-09-18 VITALS
DIASTOLIC BLOOD PRESSURE: 75 MMHG | OXYGEN SATURATION: 99 % | HEIGHT: 65 IN | WEIGHT: 198 LBS | SYSTOLIC BLOOD PRESSURE: 128 MMHG | HEART RATE: 57 BPM | RESPIRATION RATE: 13 BRPM | BODY MASS INDEX: 32.99 KG/M2

## 2024-09-19 ENCOUNTER — TELEPHONE (OUTPATIENT)
Facility: CLINIC | Age: 69
End: 2024-09-19

## 2024-09-19 NOTE — TELEPHONE ENCOUNTER
----- Message from Sean Hubbard sent at 9/19/2024  2:34 PM CDT -----  I wanted to get back to you with your colonoscopy results.  You had 10 colon polyps removed which were benign.  I would advise a repeat colonoscopy in 3 years to make sure no new polyps are forming.      You also have internal hemorrhoids and diverticulosis.  Please stay on a high fiber diet and call with any questions.

## 2024-09-19 NOTE — TELEPHONE ENCOUNTER
Health Maintenance Updated.    3 year colonoscopy recall entered into patient outreach in [facility].  Next colonoscopy will be due 9/17/2027.    Patient viewed below result note in MyChart:  Seen by patient Vandana Jefferson on 9/19/2024  4:11 PM

## 2024-09-25 ENCOUNTER — TELEPHONE (OUTPATIENT)
Dept: PAIN CLINIC | Facility: HOSPITAL | Age: 69
End: 2024-09-25

## 2024-09-25 NOTE — TELEPHONE ENCOUNTER
Fax received from [facility] stating that pt self discharged from PT d/t going out of town. PT stating they cannot complete final reassessment.

## 2024-10-01 ENCOUNTER — OFFICE VISIT (OUTPATIENT)
Dept: FAMILY MEDICINE CLINIC | Facility: CLINIC | Age: 69
End: 2024-10-01
Payer: COMMERCIAL

## 2024-10-01 VITALS
HEART RATE: 64 BPM | TEMPERATURE: 98 F | HEIGHT: 65 IN | BODY MASS INDEX: 34.16 KG/M2 | WEIGHT: 205 LBS | DIASTOLIC BLOOD PRESSURE: 64 MMHG | SYSTOLIC BLOOD PRESSURE: 99 MMHG

## 2024-10-01 DIAGNOSIS — H53.9 VISUAL CHANGES: ICD-10-CM

## 2024-10-01 DIAGNOSIS — M25.561 CHRONIC PAIN OF BOTH KNEES: ICD-10-CM

## 2024-10-01 DIAGNOSIS — I42.9 PRIMARY CARDIOMYOPATHY (HCC): ICD-10-CM

## 2024-10-01 DIAGNOSIS — F32.A ANXIETY AND DEPRESSION: ICD-10-CM

## 2024-10-01 DIAGNOSIS — M25.562 CHRONIC PAIN OF BOTH KNEES: ICD-10-CM

## 2024-10-01 DIAGNOSIS — E78.00 HYPERCHOLESTEREMIA: ICD-10-CM

## 2024-10-01 DIAGNOSIS — H04.123 DRY EYES, BILATERAL: ICD-10-CM

## 2024-10-01 DIAGNOSIS — Z98.890 HISTORY OF LUMBAR SURGERY: ICD-10-CM

## 2024-10-01 DIAGNOSIS — Z98.49 STATUS POST CATARACT EXTRACTION, UNSPECIFIED LATERALITY: ICD-10-CM

## 2024-10-01 DIAGNOSIS — M96.1 FAILED BACK SYNDROME OF LUMBAR SPINE: ICD-10-CM

## 2024-10-01 DIAGNOSIS — G89.29 CHRONIC PAIN OF BOTH KNEES: ICD-10-CM

## 2024-10-01 DIAGNOSIS — M54.16 LUMBAR RADICULAR SYNDROME: ICD-10-CM

## 2024-10-01 DIAGNOSIS — R41.3 MEMORY CHANGES: Primary | ICD-10-CM

## 2024-10-01 DIAGNOSIS — F41.9 ANXIETY AND DEPRESSION: ICD-10-CM

## 2024-10-01 DIAGNOSIS — E03.9 ACQUIRED HYPOTHYROIDISM: ICD-10-CM

## 2024-10-01 PROCEDURE — 99213 OFFICE O/P EST LOW 20 MIN: CPT | Performed by: FAMILY MEDICINE

## 2024-10-01 PROCEDURE — 90662 IIV NO PRSV INCREASED AG IM: CPT | Performed by: FAMILY MEDICINE

## 2024-10-01 PROCEDURE — 90472 IMMUNIZATION ADMIN EACH ADD: CPT | Performed by: FAMILY MEDICINE

## 2024-10-01 PROCEDURE — 3074F SYST BP LT 130 MM HG: CPT | Performed by: FAMILY MEDICINE

## 2024-10-01 PROCEDURE — 3078F DIAST BP <80 MM HG: CPT | Performed by: FAMILY MEDICINE

## 2024-10-01 PROCEDURE — 90471 IMMUNIZATION ADMIN: CPT | Performed by: FAMILY MEDICINE

## 2024-10-01 PROCEDURE — 90677 PCV20 VACCINE IM: CPT | Performed by: FAMILY MEDICINE

## 2024-10-01 PROCEDURE — 3008F BODY MASS INDEX DOCD: CPT | Performed by: FAMILY MEDICINE

## 2024-10-01 NOTE — PROGRESS NOTES
HPI:    Patient ID: Vandana Jefferson is a 68 year old female.      Knee Pain   Pertinent negatives include no fever or numbness.       Chief Complaint   Patient presents with    Referral     For cardiologist, gynecologist for hormonal check   and endocronologist     Forgetful     Noticed she has becoming forgetful even for simple words like microwave     Knee Pain     Bilateral worst on left has had cortisone shots and Gel injections but no relief        Wt Readings from Last 6 Encounters:   10/01/24 205 lb (93 kg)   08/30/24 198 lb (89.8 kg)   09/04/24 199 lb 8 oz (90.5 kg)   08/28/24 200 lb (90.7 kg)   08/13/24 200 lb (90.7 kg)   07/26/24 200 lb (90.7 kg)     BP Readings from Last 3 Encounters:   10/01/24 99/64   09/17/24 128/75   09/04/24 104/65     Sees rheumatology  Has had knee injections  Feels pain returns    Doesn't want knee surgery    Struggling with weight    Struggles with sweets.    Using stevia     Needs referrals.        Review of Systems   Constitutional:  Negative for chills, fatigue and fever.   Eyes:  Negative for visual disturbance.   Respiratory:  Negative for cough, shortness of breath and wheezing.    Cardiovascular:  Negative for chest pain, palpitations and leg swelling.   Genitourinary:  Negative for decreased urine volume and difficulty urinating.   Musculoskeletal:  Positive for arthralgias and back pain.   Neurological:  Negative for dizziness, tremors, seizures, weakness, light-headedness, numbness and headaches.   Psychiatric/Behavioral:  Positive for behavioral problems and sleep disturbance. The patient is nervous/anxious.        BP 99/64   Pulse 64   Temp 97.8 °F (36.6 °C) (Temporal)   Ht 5' 5\" (1.651 m)   Wt 205 lb (93 kg)   BMI 34.11 kg/m²          Current Outpatient Medications   Medication Sig Dispense Refill    azithromycin 250 MG Oral Tab Take 1 tablet (250 mg total) by mouth daily.      FLUoxetine HCl 40 MG Oral Cap Take 1 capsule (40 mg total) by mouth every morning.  90 capsule 1    LORazepam 1 MG Oral Tab Take 1/2 to 1 tablet (1 mg) twice a day as needed for anxiety 60 tablet 0    temazepam 30 MG Oral Cap Take 1 capsule (30 mg total) by mouth nightly as needed. 30 capsule 2    Tirzepatide (MOUNJARO) 2.5 MG/0.5ML Subcutaneous Solution Pen-injector       fluticasone propionate 50 MCG/ACT Nasal Suspension SPRAY 2 SPRAYS BY EACH NARE ROUTE NIGHTLY. 48 mL 1    Omeprazole 40 MG Oral Capsule Delayed Release Take 1 capsule (40 mg total) by mouth daily. 90 capsule 2    famotidine 20 MG Oral Tab Take 1 tablet (20 mg total) by mouth daily. 90 tablet 2    busPIRone 15 MG Oral Tab Take 0.5 tablets (7.5 mg total) by mouth in the morning and 0.5 tablets (7.5 mg total) before bedtime. 30 tablet 0    levothyroxine 112 MCG Oral Tab Take 1 tablet (112 mcg total) by mouth before breakfast. 90 tablet 3    gabapentin 800 MG Oral Tab Take 1 tablet (800 mg total) by mouth 2 (two) times daily. 180 tablet 3    Melatonin 10 MG Oral Cap Take 5 mg by mouth nightly as needed.      carvedilol 3.125 MG Oral Tab TAKE 1 TABLET(3.125 MG) BY MOUTH TWICE DAILY WITH MEALS 180 tablet 3    rosuvastatin (CRESTOR) 20 MG Oral Tab Take 1 tablet (20 mg total) by mouth nightly. 90 tablet 3     Allergies:  Allergies   Allergen Reactions    Demerol [Meperidine] NAUSEA AND VOMITING and NAUSEA ONLY     Other reaction(s): Vomiting    Hydrocodone NAUSEA AND VOMITING    Pregabalin      Other reaction(s): Stomach discomforts    Vicodin Tuss [Hydrocodone-Guaifenesin] NAUSEA AND VOMITING and NAUSEA ONLY     Other reaction(s): Vomiting    Tramadol NAUSEA ONLY      PHYSICAL EXAM:     Chief Complaint   Patient presents with    Referral     For cardiologist, gynecologist for hormonal check   and endocronologist     Forgetful     Noticed she has becoming forgetful even for simple words like microwave     Knee Pain     Bilateral worst on left has had cortisone shots and Gel injections but no relief       Physical Exam  Vitals and nursing  note reviewed.   Constitutional:       Appearance: She is well-developed.   Eyes:      Pupils: Pupils are equal, round, and reactive to light.   Neck:      Thyroid: No thyromegaly.   Cardiovascular:      Rate and Rhythm: Normal rate and regular rhythm.      Heart sounds: No murmur heard.  Pulmonary:      Effort: Pulmonary effort is normal.      Breath sounds: Normal breath sounds. No wheezing.   Musculoskeletal:      Cervical back: Normal range of motion and neck supple.      Right lower leg: No edema.      Left lower leg: No edema.   Skin:     General: Skin is warm and dry.      Findings: No rash.   Neurological:      Mental Status: She is alert and oriented to person, place, and time.   Psychiatric:         Mood and Affect: Mood is anxious.         Behavior: Behavior normal. Behavior is cooperative.         Thought Content: Thought content normal.         Cognition and Memory: Cognition and memory normal.         Judgment: Judgment normal.                ASSESSMENT/PLAN:     Encounter Diagnoses   Name Primary?    Memory changes Yes    Chronic pain of both knees     Anxiety and depression     Acquired hypothyroidism     Dry eyes, bilateral     Visual changes     Status post cataract extraction, unspecified laterality     Primary cardiomyopathy (HCC)     Hypercholesteremia        1. Memory changes  Recommend following up with her psychiatrist and to follow-up perhaps with a neuropsychiatrist.  Patient to let me know the name and I can place a referral for neuropsychiatrist with her plan    2. Chronic pain of both knees  Patient has had multiple knee injections.  She states she is not interested in surgery.  I would recommend physical therapy patient agreeable to this  - Physical Therapy Referral - [facility]    3. Anxiety and depression  Patient currently seeing a psychiatrist.  She states she normally sees the NP    4. Acquired hypothyroidism    - ENDOCRINOLOGY - INTERNAL    5. Dry eyes, bilateral    -  Ophthalmology Referral - In Network    6. Visual changes    - Ophthalmology Referral - In Network    7. Status post cataract extraction, unspecified laterality    - Ophthalmology Referral - In Network    8. Primary cardiomyopathy (HCC)    - [facility] CARDIOLOGY EXTERNAL    9. Hypercholesteremia    - [facility] CARDIOLOGY EXTERNAL      Orders Placed This Encounter   Procedures    High Dose Fluzone trivalent influenza, 65yrs+ PFS (94371)    PCV20 VACCINE FOR INTRAMUSCULAR USE         The above note was creating using Dragon speech recognition technology. Please excuse any typos    Meds This Visit:  Requested Prescriptions      No prescriptions requested or ordered in this encounter       Imaging & Referrals:  INFLUENZA VAC HIGH DOSE PRSV FREE  PCV20 VACCINE FOR INTRAMUSCULAR USE  ENDOCRINOLOGY - INTERNAL  OPHTHALMOLOGY - INTERNAL  [facility] CARDIOLOGY EXTERNAL  PHYSICAL THERAPY - INTERNAL       ID#[id number]

## 2024-10-03 RX ORDER — GABAPENTIN 800 MG/1
800 TABLET ORAL 2 TIMES DAILY
Qty: 180 TABLET | Refills: 3 | Status: SHIPPED | OUTPATIENT
Start: 2024-10-03

## 2024-10-03 NOTE — TELEPHONE ENCOUNTER
Refill Per Protocol     Requested Prescriptions   Pending Prescriptions Disp Refills    GABAPENTIN 800 MG Oral Tab [Pharmacy Med Name: GABAPENTIN 800 MG TABLET] 180 tablet 3     Sig: TAKE 1 TABLET BY MOUTH 2 TIMES DAILY.       Neurology Medications Passed - 10/1/2024  5:54 PM        Passed - In person appointment or virtual visit in the past 6 mos or appointment in next 3 mos     Recent Outpatient Visits              2 days ago Memory changes    [facility] Nica Ocampo MD    Office Visit    4 weeks ago [facility]    Office Visit    1 month ago Primary osteoarthritis of both knees    [facility] Caity Ellison MD    Office Visit    1 month ago Acute cystitis with hematuria    [facility] [place] Armida Agrawal PA-C    Office Visit    2 months ago Bilateral impacted cerumen    [facility] Wilver Moreno MD    Office Visit          Future Appointments         Provider Department Appt Notes    In 6 days [address] Ultrasound [facility]    In 3 months Ciaty Ellison MD [facility] follow up                           Future Appointments         Provider Department Appt Notes    In 6 days [address] Ultrasound [facility]    In 3 months Caity Ellison MD [facility] follow up          Recent Outpatient Visits              2 days ago [facility]    Office Visit    4 weeks ago [facility] infection    [facility]    Office Visit    1 month ago Primary osteoarthritis of both knees     [facility], Caity Carson MD    Office Visit    1 month ago Acute cystitis with hematuria    [facility], [facility], Armida Sarah PA-C    Office Visit    2 months ago Bilateral impacted cerumen    [facility], [facility], Wilver Corrigan MD    Office Visit

## 2024-10-09 ENCOUNTER — HOSPITAL ENCOUNTER (OUTPATIENT)
Dept: ULTRASOUND IMAGING | Facility: HOSPITAL | Age: 69
Discharge: HOME | End: 2024-10-09
Attending: INTERNAL MEDICINE
Payer: COMMERCIAL

## 2024-10-09 DIAGNOSIS — E04.1 THYROID NODULE: ICD-10-CM

## 2024-10-09 PROCEDURE — 76536 US EXAM OF HEAD AND NECK: CPT | Performed by: INTERNAL MEDICINE

## 2024-11-06 NOTE — TELEPHONE ENCOUNTER
Dr Rhea Jimenez,    Reviewed 'savings' card option with her as it sounds like she used the free 1mo voucher.  Pended Mounjaro 2.5mg/weekly Left foot

## 2024-11-14 ENCOUNTER — OFFICE VISIT (OUTPATIENT)
Dept: OBGYN CLINIC | Facility: CLINIC | Age: 69
End: 2024-11-14
Payer: COMMERCIAL

## 2024-11-14 VITALS — BODY MASS INDEX: 34 KG/M2 | WEIGHT: 203 LBS

## 2024-11-14 DIAGNOSIS — R53.82 CHRONIC FATIGUE: ICD-10-CM

## 2024-11-14 DIAGNOSIS — N63.14 MASS OF LOWER INNER QUADRANT OF RIGHT BREAST: Primary | ICD-10-CM

## 2024-11-14 DIAGNOSIS — Z01.419 WELL WOMAN EXAM WITH ROUTINE GYNECOLOGICAL EXAM: ICD-10-CM

## 2024-11-14 DIAGNOSIS — N39.3 STRESS INCONTINENCE OF URINE: ICD-10-CM

## 2024-11-14 DIAGNOSIS — N95.1 SYMPTOMATIC MENOPAUSAL OR FEMALE CLIMACTERIC STATES: ICD-10-CM

## 2024-11-14 PROCEDURE — 99387 INIT PM E/M NEW PAT 65+ YRS: CPT | Performed by: STUDENT IN AN ORGANIZED HEALTH CARE EDUCATION/TRAINING PROGRAM

## 2024-11-14 PROCEDURE — 99203 OFFICE O/P NEW LOW 30 MIN: CPT | Performed by: STUDENT IN AN ORGANIZED HEALTH CARE EDUCATION/TRAINING PROGRAM

## 2024-11-14 NOTE — PROGRESS NOTES
[facility]  Obstetrics and Gynecology  Gynecology Visit    Chief Complaint   Patient presents with    New Patient    Consult     Possible hormonal therapy.           Vandana Jefferson is a 69 year old female who presents for an annual visit with some concerns about energy/hormone levels for which she was referred by her PCP Dr Ocampo.     Here in part due to sampling difficulty with last Pap smear. Also to discuss hormonal management. Reports significant fatigue; cannot enjoy her granddaughters. Worse since COVID. Fell and broke her clavicle, reports losing muscle mass since then. Had labs recently from a naturopath to evaluate her sxs (sending via [employer] message); reviewed on her phone but not yet in media. Labs are a combination of hormones and vitamin levels; largely normal except for low vitamin D. Feels like she is losing her mind with forgetfulness. Has had longstanding depression + anxiety - taking buspar and fluoxetine + PRN lorazepam.  Difficulty sleeping - has been in therapy but unclear if CBT.     Having hot flashes and night sweats since age 50 to the present, although less severe than before. Lots of intercrural and neck sweating at night. Last period was 50-51. Took some type of hormonal pill for 3 months in menopause to manage heavier bleeding, but uncertain what type (might have been contraceptive). No vaginal bleeding since menopause. Takes gabapentin 800 mg BID but reports no drowsiness.     Not sexually active, does not want to be.  Personal history of repeated breast aspirations and sampling for breast cysts.   Take a lot of ibuprofen. Previously taking vitamin D but stopped after counseling from PCP.     Menstrual/Gyn Hx:   No LMP recorded. Patient is postmenopausal.  Menarche 14  / Menopause 51  Menstrual Flow: regular every 28 days when menstruating  Reports no h/o abnormal Pap. Now >64 yo. Last Pap 11/2018 and NILM/HPV negative.   Did not complete Gardasil/HPV vaccination series.   No h/o  STI  Reports no h/o fibroids or ovarian cysts. Has had any type of GYN surgery - 1 CS  Has not used birth control in the past; .   Is no currently sexually active, and reports no problems with intercourse.   Reports some issues with bowel or bladder function -- Wears pantyliner because has small urine leaking due to stress Saw urologist for evaluation, had some type of procedure (before COVID), but sxs currently stable and not significantly bothersome.    Focused Fhx:    fhx of cancer: skin  h/o VTE in family - brother (during COVID)  h/o fragility fracture or hip fracture or in either parent; mom had osteoporosis      Patients >40  Does  have PCP - Dr Ocampo  Reports no h/o HRT -  Has  had mammogram, last 02/2024 BIRADS 2   Perimenopausal sxs: persistent NS and HF    Additional Annual Questions:   1) Colonoscopy due 2027 (last 09/2024 with hyperplastic polyps)   2) Bone scan due: 2031 (last 2021 with 10 yr major fx risk of 3.7%)  3) Diet & Exercise: Struggling to exercise with husbands comorbidities and energy levels  4) Weight goals Desires weight loss but uncertain how  5) Immunizations:   >Flu (Aug-April) 10/2024  >TDaP next 2026 (every 10 years)  >COVID s/p 3 doses      Immunization History   Administered Date(s) Administered    >=3 YRS FLUZONE OR FLUARIX QUAD PRESERVE FREE SINGLE DOSE (98679) FLU CLINIC 10/18/2016    Covid-19 Vaccine Moderna 100 mcg/0.5 ml 02/10/2021, 03/10/2021, 12/03/2021    FLU VAC High Dose 65 YRS & Older PRSV Free (67270) 09/20/2021, 10/17/2022, 11/29/2023    FLULAVAL 6 months & older 0.5 ml Prefilled syringe (21770) 11/02/2017, 10/12/2018, 09/19/2019, 09/22/2020    FLUZONE 6 months and older PFS 0.5 ml (09409) 10/18/2016, 11/02/2017, 10/12/2018, 09/19/2019, 09/22/2020    Fluvirin, 3 Years & >, Im 10/10/2013    Fluzone Vaccine Medicare () 09/20/2021    High Dose Fluzone Influenza Vaccine, 65yr+ PF 0.5mL (02684) 10/01/2024    Influenza 10/08/2002, 11/03/2015, 11/02/2017    Influenza  Vaccine, Preserv Free 2009    Moderna Covid-19 Vaccine 50mcg/0.5ml 12yrs+ 2023    Pneumococcal Conjugate PCV20 10/01/2024    Pneumovax 23 2021    TDAP 2016         Current Outpatient Medications:     [START ON 2024] busPIRone 15 MG Oral Tab, Take 1 tablet (15 mg total) by mouth in the morning and 1 tablet (15 mg total) before bedtime., Disp: 180 tablet, Rfl: 0    temazepam 30 MG Oral Cap, Take 1 capsule (30 mg total) by mouth nightly as needed for Sleep., Disp: 30 capsule, Rfl: 2    [START ON 2024] LORazepam 1 MG Oral Tab, Take 1/2 to 1 tablet (1 mg) twice a day as needed for anxiety, Disp: 60 tablet, Rfl: 2    gabapentin 800 MG Oral Tab, Take 1 tablet (800 mg total) by mouth 2 (two) times daily., Disp: 180 tablet, Rfl: 3    azithromycin 250 MG Oral Tab, Take 1 tablet (250 mg total) by mouth daily., Disp: , Rfl:     FLUoxetine HCl 40 MG Oral Cap, Take 1 capsule (40 mg total) by mouth every morning., Disp: 90 capsule, Rfl: 1    Tirzepatide (MOUNJARO) 2.5 MG/0.5ML Subcutaneous Solution Pen-injector, , Disp: , Rfl:     fluticasone propionate 50 MCG/ACT Nasal Suspension, SPRAY 2 SPRAYS BY EACH NARE ROUTE NIGHTLY., Disp: 48 mL, Rfl: 1    Omeprazole 40 MG Oral Capsule Delayed Release, Take 1 capsule (40 mg total) by mouth daily., Disp: 90 capsule, Rfl: 2    famotidine 20 MG Oral Tab, Take 1 tablet (20 mg total) by mouth daily., Disp: 90 tablet, Rfl: 2    levothyroxine 112 MCG Oral Tab, Take 1 tablet (112 mcg total) by mouth before breakfast., Disp: 90 tablet, Rfl: 3    Melatonin 10 MG Oral Cap, Take 5 mg by mouth nightly as needed., Disp: , Rfl:     carvedilol 3.125 MG Oral Tab, TAKE 1 TABLET(3.125 MG) BY MOUTH TWICE DAILY WITH MEALS, Disp: 180 tablet, Rfl: 3    rosuvastatin (CRESTOR) 20 MG Oral Tab, Take 1 tablet (20 mg total) by mouth nightly., Disp: 90 tablet, Rfl: 3    Allergies[1]    OB History    Para Term  AB Living   1 1 1 0 0 1   SAB IAB Ectopic Multiple Live  Births   0 0 0 0 0      # Outcome Date GA Lbr Kam/2nd Weight Sex Type Anes PTL Lv   1 Term                Past Medical History:    Age-related nuclear cataract of both eyes    Anxiety state, unspecified    Back pain    chronic    Cardiomyopathy (HCC)    Cataract    both eyes    Disorder of thyroid    Dry eye    Essential hypertension    Fibromyalgia    Floaters, right    H/O varicose vein stripping    Per NG: Varicose vein ; vein stripping    Headache    Per NG: OU    High cholesterol    Hypothyroidism    Meibomian gland dysfunction    Musculoskeletal problem    Per NG: injection tendon origin/insertion    Musculoskeletal problem    Per NG: Arthrocentesis of the left knee joint    Neck pain    chronic    Osteoarthritis    Radiation    Per NG: Radiation to thyroid     Status post epidural steroid injection    Per NG: epidral injection x5 per pain mgmt       Past Surgical History:   Procedure Laterality Date    Appendectomy      Back surgery  2016    lumbar L4-L5 fusion      1979    1    Cataract  2023    Right eye    Cataract extraction w/  intraocular lens implant Left 2023    Dr. Windy Barnett @ [facility]    Cataract extraction w/ intraocular lens implant Right 2023    Dr. Windy Barnett @[facility]     delivery only      Cholecystectomy      Colonoscopy  10/2005    normal, non precancerous polyps    Colonoscopy N/A 2018    Procedure: COLONOSCOPY;  Surgeon: Sean Hubbard MD;  Location: [facility] ENDOSCOPY    Colonoscopy N/A 2023    Procedure: COLONOSCOPY;  Surgeon: Sean Hubbard MD;  Location: [facility] ENDO    Colonoscopy N/A 2024    Procedure: COLONOSCOPY;  Surgeon: Sean Hubbard MD;  Location: [facility] ENDO    Cyst aspiration left Left 2007    FNA    Cyst aspiration right      Egd      Laminectomy      Per NG: Disc  displacement x2 and spinal fusion 2016    [facility] localization wire 1 site left (cpt=19281)  2007    Other surgical history      Per NG: Arthrocentesis     Other surgical history      Per NG: arthrocentesis of the knee joint    Yag iridotomy - od - right eye Right 10/10/1997    RJM    Yag iridotomy - os - left eye Left 10/16/1997    RJ       Family History   Problem Relation Age of Onset    Heart Disorder Father 65    Depression Mother     Neurological Disorder Mother         Per NG: Hypothyrodism    Arthritis Mother         Per NG: Osteoarthritis    Other (Other) Mother     Skin cancer Mother     Depression Maternal Grandfather     Other (Other) Maternal Grandfather     Heart Attack Neg     Diabetes Neg         Per NG; No diabetes    Glaucoma Neg         Per NG: No glaucoma histroy    Cancer Neg     Hypertension Neg     Lipids Neg         Tobacco  Allergies  Med Hx  Surg Hx  Fam Hx  Soc Hx        Social History     Socioeconomic History    Marital status:      Spouse name: Not on file    Number of children: Not on file    Years of education: Not on file    Highest education level: Not on file   Occupational History    Not on file   Tobacco Use    Smoking status: Former     Current packs/day: 0.00     Average packs/day: 1 pack/day for 27.0 years (27.0 ttl pk-yrs)     Types: Cigarettes     Start date: 1978     Quit date: 2005     Years since quittin.8    Smokeless tobacco: Never    Tobacco comments:     7 years ago   Vaping Use    Vaping status: Never Used   Substance and Sexual Activity    Alcohol use: No    Drug use: No    Sexual activity: Not on file   Other Topics Concern     Service Not Asked    Blood Transfusions Not Asked    Caffeine Concern Yes     Comment: 3 cups of coffee daily    Occupational Exposure Not Asked    Hobby Hazards Not Asked    Sleep Concern Not Asked    Stress Concern Not Asked    Weight Concern Not Asked    Special Diet Not Asked    Back Care Not Asked    Exercise No    Bike Helmet Not Asked    Seat Belt Not Asked    Self-Exams Not Asked    Grew up on a farm Not Asked    History of tanning Not Asked    Outdoor  occupation Not Asked    Pt has a pacemaker No    Pt has a defibrillator No    Breast feeding No    Reaction to local anesthetic No   Social History Narrative    Not on file     Social Drivers of Health     Financial Resource Strain: Not on file   Food Insecurity: Not on file   Transportation Needs: Not on file   Physical Activity: Not on file   Stress: Not on file   Social Connections: Unknown (3/14/2021)    [facility]    Social Connections     Conversations with friends/family/neighbors per week: Not on file   Housing Stability: Low Risk  (7/18/2021)    Received from [facility], [facility]    Housing Stability     Mortgage Payment Concerns?: Not on file     Number of Places Lived in the Last Year: Not on file     Unstable Housing?: Not on file       Physical Exam:    Body mass index is 33.78 kg/m².      Physical Exam  Constitutional:       Appearance: She is obese.   HENT:      Head: Normocephalic and atraumatic.   Eyes:      Extraocular Movements: Extraocular movements intact.   Pulmonary:      Effort: Pulmonary effort is normal.   Chest:          Comments: Approx 1-2 cm smooth mobile mass, mildly tender to palpation, on right breast. Breast exam otherwise benign.   Abdominal:      Palpations: Abdomen is soft.   Genitourinary:     Comments: Significant stenosis of introitus related to atrophy without other skin findings; speculum exam only possible with Bryce speculum. Vaginal tissue with significant atrophic changes, cervix only partially visualized without abnormalities. Normal bimanual but limited by body habitus.   Musculoskeletal:      Cervical back: Normal range of motion.   Skin:     General: Skin is warm and dry.   Neurological:      General: No focal deficit present.      Mental Status: She is alert. Mental status is at baseline.   Psychiatric:         Behavior: Behavior normal.         Thought  Content: Thought content normal.                   Vandana Jefferson is a 69 year old yo here today for new annual visit; with additional concerns/issues today.     Mass of lower inner quadrant of right breast (Primary)  Assessment & Plan:  Small round circumscribed lesion appreciated on palpation in right breast; favor cyst but diagnostic mammogram ordered with recommendation to complete within next 8 weeks.   Orders:  -     [facility] ERIC 2D+3D DIAGNOSTIC OSCAR  BILAT (CPT=77066/12557); Future; Expected date: 11/14/2024  Chronic fatigue  -     [facility] Nutrition Therapy - DIETITIAN - [place] Location  Well woman exam with routine gynecological exam  Assessment & Plan:  No further Pap smears indicated as > 66 yo and all previously normal. Mammogram up to date and normal but with likely cyst palpated today on exam; changed to diagnostic mammogram and will schedule at earliest convenience. Bone scan, colonoscopy, and immunization UTD. Vulva with significant atrophy due to menopause but no lesions. See for annual in one year or PRN.   Stress incontinence of urine  Assessment & Plan:  Sxs stable and mild; previously evaluated and had procedure with urology per patient but no documentation or op note located within chart review or CareEverywhere. No intervention indicated unless further symptomatic or bothersome.   Symptomatic menopausal or female climacteric states  Assessment & Plan:  Her symptomts of fatigue/low energy, night sweats, and hot flashes are likely multifactorial. Discussed the use of a medication like Veozah for the vasomotor symptoms, but her primary complaint is fatigue and Veozah would have no effect on that symptom. Not a candidate for HRT as >10 years from menopause. Other medications that can treat both VMS as well as low energy would more likely be either paroxetine or venlafaxine, although would need to discuss with psychiatry regarding transitioning from current mood  medications, as likely that her chronic depression has some contributing component. Between venlafaxine and paroxetine, the use of venlafaxine is considered off label but would have additional benefit of helping to address neuropathic pain.     Additionally, exercise can help with both mood and weight concerns; discussed resuming exercise (specifically water-based classes to minimize weight bearing given her back/joint discomfort). Most recent TSH normal, but low vitamin D can also impact energy levels and may be reasonable to resume vit D supplementation after discussion with PCP. Finally, gabapentin (used for her back/neuropathic pain) can also cause fatigue/somnolence, so may also  consider adjusting her dosing of gabapentin. Finally modifying eating habits/nutrition can also help increase energy levels, so referral to nutrition placed. Defer vitamin D and gabapentin adjustment to her PCP.          RTC 1 yr or PRN    Renetta Flores MD               [1]   Allergies  Allergen Reactions    Demerol [Meperidine] NAUSEA AND VOMITING and NAUSEA ONLY     Other reaction(s): Vomiting    Hydrocodone NAUSEA AND VOMITING    Pregabalin      Other reaction(s): Stomach discomforts    Vicodin Tuss [Hydrocodone-Guaifenesin] NAUSEA AND VOMITING and NAUSEA ONLY     Other reaction(s): Vomiting    Tramadol NAUSEA ONLY

## 2024-11-16 PROBLEM — Z01.419 WELL WOMAN EXAM WITH ROUTINE GYNECOLOGICAL EXAM: Status: ACTIVE | Noted: 2024-11-16

## 2024-11-16 NOTE — ASSESSMENT & PLAN NOTE
No further Pap smears indicated as > 66 yo and all previously normal. Mammogram up to date and normal but with likely cyst palpated today on exam; changed to diagnostic mammogram and will schedule at earliest convenience. Bone scan, colonoscopy, and immunization UTD. Vulva with significant atrophy due to menopause but no lesions. See for annual in one year or PRN.

## 2024-11-16 NOTE — ASSESSMENT & PLAN NOTE
Not noted on exam; introitus significantly stenosed so unlikely for significant prolapse possible through current size.

## 2024-11-16 NOTE — ASSESSMENT & PLAN NOTE
Sxs stable and mild; previously evaluated and had procedure with urology per patient but no documentation or op note located within chart review or CareEverywhere. No intervention indicated unless further symptomatic or bothersome.

## 2024-11-18 ENCOUNTER — TELEPHONE (OUTPATIENT)
Dept: FAMILY MEDICINE CLINIC | Facility: CLINIC | Age: 69
End: 2024-11-18

## 2024-11-18 NOTE — ASSESSMENT & PLAN NOTE
Her symptomts of fatigue/low energy, night sweats, and hot flashes are likely multifactorial. Discussed the use of a medication like Veozah for the vasomotor symptoms, but her primary complaint is fatigue and Veozah would have no effect on that symptom. Not a candidate for HRT as >10 years from menopause. Other medications that can treat both VMS as well as low energy would more likely be either paroxetine or venlafaxine, although would need to discuss with psychiatry regarding transitioning from current mood medications, as likely that her chronic depression has some contributing component. Between venlafaxine and paroxetine, the use of venlafaxine is considered off label but would have additional benefit of helping to address neuropathic pain.     Additionally, exercise can help with both mood and weight concerns; discussed resuming exercise (specifically water-based classes to minimize weight bearing given her back/joint discomfort). Most recent TSH normal, but low vitamin D can also impact energy levels and may be reasonable to resume vit D supplementation after discussion with PCP. Finally, gabapentin (used for her back/neuropathic pain) can also cause fatigue/somnolence, so may also  consider adjusting her dosing of gabapentin. Finally modifying eating habits/nutrition can also help increase energy levels, so referral to nutrition placed. Defer vitamin D and gabapentin adjustment to her PCP.

## 2024-11-25 ENCOUNTER — TELEPHONE (OUTPATIENT)
Dept: OBGYN CLINIC | Facility: CLINIC | Age: 69
End: 2024-11-25

## 2024-11-25 NOTE — TELEPHONE ENCOUNTER
Patient was seen recently and a change in her medication that is prescribed by a mental health professional was supposed to be provided. She has not received further detail regarding this. Please call to discuss.

## 2024-11-25 NOTE — TELEPHONE ENCOUNTER
Pt name and  verified.     Pt states there is no point of living and does not want to live like this. Pt EDPS score 14/30. Pt is having recurrent panic attacks and depressive symptoms. Pt denies wanting to harm self or has the means to harm self. Conference melissa sepncer into the call. Pt will follow up with therapist.

## 2024-12-09 ENCOUNTER — OFFICE VISIT (OUTPATIENT)
Dept: FAMILY MEDICINE CLINIC | Facility: CLINIC | Age: 69
End: 2024-12-09

## 2024-12-09 VITALS
TEMPERATURE: 98 F | WEIGHT: 208 LBS | DIASTOLIC BLOOD PRESSURE: 71 MMHG | BODY MASS INDEX: 34.66 KG/M2 | HEIGHT: 65 IN | HEART RATE: 66 BPM | SYSTOLIC BLOOD PRESSURE: 107 MMHG

## 2024-12-09 DIAGNOSIS — R09.82 POSTNASAL DRIP: ICD-10-CM

## 2024-12-09 DIAGNOSIS — E78.00 HYPERCHOLESTEREMIA: Primary | ICD-10-CM

## 2024-12-09 DIAGNOSIS — N63.10 MASS OF RIGHT BREAST, UNSPECIFIED QUADRANT: ICD-10-CM

## 2024-12-09 DIAGNOSIS — F41.9 ANXIETY AND DEPRESSION: ICD-10-CM

## 2024-12-09 DIAGNOSIS — N95.2 ATROPHIC VAGINITIS: ICD-10-CM

## 2024-12-09 DIAGNOSIS — F32.A ANXIETY AND DEPRESSION: ICD-10-CM

## 2024-12-09 DIAGNOSIS — E03.9 ACQUIRED HYPOTHYROIDISM: ICD-10-CM

## 2024-12-09 PROCEDURE — 3074F SYST BP LT 130 MM HG: CPT | Performed by: FAMILY MEDICINE

## 2024-12-09 PROCEDURE — 99214 OFFICE O/P EST MOD 30 MIN: CPT | Performed by: FAMILY MEDICINE

## 2024-12-09 PROCEDURE — 3008F BODY MASS INDEX DOCD: CPT | Performed by: FAMILY MEDICINE

## 2024-12-09 PROCEDURE — 3078F DIAST BP <80 MM HG: CPT | Performed by: FAMILY MEDICINE

## 2024-12-09 RX ORDER — AZELASTINE 1 MG/ML
1 SPRAY, METERED NASAL 2 TIMES DAILY
Qty: 1 EACH | Refills: 3 | Status: SHIPPED | OUTPATIENT
Start: 2024-12-09

## 2024-12-09 NOTE — PROGRESS NOTES
HPI:    Patient ID: Vandana Jefferson is a 69 year old female.      Cyst  Pertinent negatives include no chest pain, chills, coughing, fever, headaches, numbness or weakness.   Anxiety  Pertinent negatives include no chest pain, chills, coughing, fever, headaches, numbness or weakness.       Chief Complaint   Patient presents with    Other     States she saw natural Doctor and was recommended to take vitamins every other day B12 and other dietary supplements was told she was prediabetic     Cyst     States her gyne found a cyst on R breast does have some pain states she had one in the past in L breast has been having needle aspiration since age 30 for her breast     Anxiety     And depression     Post Nasal Drip       Wt Readings from Last 6 Encounters:   12/09/24 208 lb (94.3 kg)   11/14/24 203 lb (92.1 kg)   10/01/24 205 lb (93 kg)   08/30/24 198 lb (89.8 kg)   09/04/24 199 lb 8 oz (90.5 kg)   08/28/24 200 lb (90.7 kg)     BP Readings from Last 3 Encounters:   12/09/24 107/71   10/01/24 99/64   09/17/24 128/75     Has multiple complaints    Saw [facility] doctor and had labs done with them  Tsh slightly elevated 3.6  Dhea was low  Given supplements- dhea, selenium and has not started.    Also recommended weight loss injections.    Has a lot of stress   will having surgery next month    Mother aging, lives with her.        Has had recurrent utis  One in aug then again recently  Has vaginal dryness      Review of Systems   Constitutional:  Negative for chills and fever.   Eyes:  Negative for visual disturbance.   Respiratory:  Negative for cough, shortness of breath and wheezing.    Cardiovascular:  Negative for chest pain, palpitations and leg swelling.   Genitourinary:  Negative for decreased urine volume and difficulty urinating.   Musculoskeletal:  Positive for back pain.   Neurological:  Negative for dizziness, tremors, seizures, weakness, light-headedness, numbness and headaches.    Psychiatric/Behavioral:  Positive for behavioral problems. The patient is nervous/anxious.         Stress eating        /71   Pulse 66   Temp 98 °F (36.7 °C) (Temporal)   Ht 5' 5\" (1.651 m)   Wt 208 lb (94.3 kg)   BMI 34.61 kg/m²          Current Outpatient Medications   Medication Sig Dispense Refill    azelastine 0.1 % Nasal Solution 1 spray by Nasal route 2 (two) times daily. 1 each 3    FLUoxetine HCl 20 MG Oral Cap Take 1 capsule (20 mg total) by mouth every morning. 30 capsule 0    ARIPiprazole 5 MG Oral Tab Take 1 tablet (5 mg total) by mouth at bedtime. 30 tablet 0    busPIRone 15 MG Oral Tab Take 1 tablet (15 mg total) by mouth in the morning and 1 tablet (15 mg total) before bedtime. 180 tablet 0    temazepam 30 MG Oral Cap Take 1 capsule (30 mg total) by mouth nightly as needed for Sleep. 30 capsule 2    LORazepam 1 MG Oral Tab Take 1/2 to 1 tablet (1 mg) twice a day as needed for anxiety 60 tablet 2    gabapentin 800 MG Oral Tab Take 1 tablet (800 mg total) by mouth 2 (two) times daily. 180 tablet 3    fluticasone propionate 50 MCG/ACT Nasal Suspension SPRAY 2 SPRAYS BY EACH NARE ROUTE NIGHTLY. 48 mL 1    Omeprazole 40 MG Oral Capsule Delayed Release Take 1 capsule (40 mg total) by mouth daily. 90 capsule 2    famotidine 20 MG Oral Tab Take 1 tablet (20 mg total) by mouth daily. 90 tablet 2    levothyroxine 112 MCG Oral Tab Take 1 tablet (112 mcg total) by mouth before breakfast. 90 tablet 3    Melatonin 10 MG Oral Cap Take 5 mg by mouth nightly as needed.      carvedilol 3.125 MG Oral Tab TAKE 1 TABLET(3.125 MG) BY MOUTH TWICE DAILY WITH MEALS 180 tablet 3    rosuvastatin (CRESTOR) 20 MG Oral Tab Take 1 tablet (20 mg total) by mouth nightly. 90 tablet 3     Allergies:Allergies[1]   PHYSICAL EXAM:     Chief Complaint   Patient presents with    Other     States she saw natural Doctor and was recommended to take vitamins every other day B12 and other dietary supplements was told she was  prediabetic     Cyst     States her gyne found a cyst on R breast does have some pain states she had one in the past in L breast has been having needle aspiration since age 30 for her breast     Anxiety     And depression     Post Nasal Drip      Physical Exam  Vitals reviewed.   Cardiovascular:      Rate and Rhythm: Normal rate and regular rhythm.      Pulses: Normal pulses.      Heart sounds: Normal heart sounds.   Musculoskeletal:      Cervical back: Normal range of motion and neck supple.   Neurological:      Mental Status: She is alert and oriented to person, place, and time.   Psychiatric:         Mood and Affect: Mood normal.         Behavior: Behavior normal.         Thought Content: Thought content normal.         Judgment: Judgment normal.                ASSESSMENT/PLAN:     Encounter Diagnoses   Name Primary?    Hypercholesteremia Yes    Anxiety and depression     Acquired hypothyroidism     Postnasal drip     Mass of right breast, unspecified quadrant     Atrophic vaginitis        1. Hypercholesteremia  Recommend low fat diet and low cholesterol. Avoid fried foods, cheese, processed foods. Recommend aerobic exercise at least 30mins five days a week.       2. Anxiety and depression  Follow up psychiatry     3. Acquired hypothyroidism  Follow up endo  Thyroid dose may need adjustment  Follow up on dhea as well.    - ENDOCRINOLOGY - INTERNAL    4. Postnasal drip    - azelastine 0.1 % Nasal Solution; 1 spray by Nasal route 2 (two) times daily.  Dispense: 1 each; Refill: 3    5. Mass of right breast, unspecified quadrant  Mammo scheduled    6. Atrophic vaginitis  Follow up gyne ? Topical vaginal estrogen  Reviewed hygiene, care   Prevention utis  If recurs, to see urology       No orders of the defined types were placed in this encounter.        The above note was creating using Dragon speech recognition technology. Please excuse any typos    Meds This Visit:  Requested Prescriptions     Signed Prescriptions  Disp Refills    azelastine 0.1 % Nasal Solution 1 each 3     Si spray by Nasal route 2 (two) times daily.       Imaging & Referrals:  ENDOCRINOLOGY - INTERNAL       ID#[id number]       [1]   Allergies  Allergen Reactions    Demerol [Meperidine] NAUSEA AND VOMITING and NAUSEA ONLY     Other reaction(s): Vomiting    Hydrocodone NAUSEA AND VOMITING    Pregabalin      Other reaction(s): Stomach discomforts    Vicodin Tuss [Hydrocodone-Guaifenesin] NAUSEA AND VOMITING and NAUSEA ONLY     Other reaction(s): Vomiting    Tramadol NAUSEA ONLY

## 2024-12-10 ENCOUNTER — LAB ENCOUNTER (OUTPATIENT)
Dept: LAB | Age: 69
End: 2024-12-10
Attending: INTERNAL MEDICINE
Payer: COMMERCIAL

## 2024-12-10 DIAGNOSIS — E03.9 HYPOTHYROIDISM, UNSPECIFIED TYPE: ICD-10-CM

## 2024-12-10 LAB
T4 FREE SERPL-MCNC: 1.3 NG/DL (ref 0.8–1.7)
TSI SER-ACNC: 8.54 UIU/ML (ref 0.55–4.78)

## 2024-12-10 PROCEDURE — 84443 ASSAY THYROID STIM HORMONE: CPT

## 2024-12-10 PROCEDURE — 84439 ASSAY OF FREE THYROXINE: CPT

## 2024-12-10 PROCEDURE — 36415 COLL VENOUS BLD VENIPUNCTURE: CPT

## 2024-12-11 ENCOUNTER — PATIENT MESSAGE (OUTPATIENT)
Dept: ENDOCRINOLOGY CLINIC | Facility: CLINIC | Age: 69
End: 2024-12-11

## 2024-12-11 DIAGNOSIS — E03.9 HYPOTHYROIDISM, UNSPECIFIED TYPE: Primary | ICD-10-CM

## 2024-12-11 NOTE — TELEPHONE ENCOUNTER
Dr. Rosario -- pt completed labs 12/10 and wants to know results. Currently on levothyroxine 112 mcg    Component      Latest Ref Rng 12/10/2024   T4,Free (Direct)      0.8 - 1.7 ng/dL 1.3    TSH      0.550 - 4.780 uIU/mL 8.544 (H)

## 2024-12-13 ENCOUNTER — PATIENT MESSAGE (OUTPATIENT)
Dept: FAMILY MEDICINE CLINIC | Facility: CLINIC | Age: 69
End: 2024-12-13

## 2024-12-18 ENCOUNTER — HOSPITAL ENCOUNTER (OUTPATIENT)
Dept: MAMMOGRAPHY | Facility: HOSPITAL | Age: 69
Discharge: HOME | End: 2024-12-18
Attending: STUDENT IN AN ORGANIZED HEALTH CARE EDUCATION/TRAINING PROGRAM
Payer: COMMERCIAL

## 2024-12-18 ENCOUNTER — HOSPITAL ENCOUNTER (OUTPATIENT)
Dept: ULTRASOUND IMAGING | Facility: HOSPITAL | Age: 69
Discharge: HOME | End: 2024-12-18
Attending: STUDENT IN AN ORGANIZED HEALTH CARE EDUCATION/TRAINING PROGRAM
Payer: COMMERCIAL

## 2024-12-18 DIAGNOSIS — N63.14 MASS OF LOWER INNER QUADRANT OF RIGHT BREAST: ICD-10-CM

## 2024-12-18 PROCEDURE — 77061 BREAST TOMOSYNTHESIS UNI: CPT | Performed by: STUDENT IN AN ORGANIZED HEALTH CARE EDUCATION/TRAINING PROGRAM

## 2024-12-18 PROCEDURE — 77065 DX MAMMO INCL CAD UNI: CPT | Performed by: STUDENT IN AN ORGANIZED HEALTH CARE EDUCATION/TRAINING PROGRAM

## 2024-12-18 PROCEDURE — 76642 ULTRASOUND BREAST LIMITED: CPT | Performed by: STUDENT IN AN ORGANIZED HEALTH CARE EDUCATION/TRAINING PROGRAM

## 2024-12-26 ENCOUNTER — TELEPHONE (OUTPATIENT)
Dept: FAMILY MEDICINE CLINIC | Facility: CLINIC | Age: 69
End: 2024-12-26

## 2024-12-26 DIAGNOSIS — M15.0 PRIMARY OSTEOARTHRITIS INVOLVING MULTIPLE JOINTS: Primary | ICD-10-CM

## 2024-12-26 DIAGNOSIS — G89.29 CHRONIC LEFT SHOULDER PAIN: ICD-10-CM

## 2024-12-26 DIAGNOSIS — M25.512 CHRONIC LEFT SHOULDER PAIN: ICD-10-CM

## 2024-12-26 NOTE — TELEPHONE ENCOUNTER
Patient scheduled a mychart appointment with provider. Per HMO insurance patient will need a referral.     Name of specialist and specialty department :  Dr. Caity Ellison, Rheumatology    Reason for visit with the specialist:  Established patient - follow up appointments for 2025    Address of the specialist office: [address] [place] [address]    Appointment date: 3/26/25 and on the wait list       CSS informed patient the turnaround time for referral is 5-7 business days.  Patient was informed to check their MyChart account for referral status.

## 2024-12-26 NOTE — TELEPHONE ENCOUNTER
Dr. Ocampo,     Patient called requesting referral to Dr. Ellison for follow up visits.    Patient's appointment date is past the date of expiration of current referral.     Pended referral please review diagnosis and sign off if you agree.    Thank you.  Imelda Oliva  [facility]

## 2025-01-15 RX ORDER — LEVOTHYROXINE SODIUM 125 UG/1
125 TABLET ORAL
Qty: 90 TABLET | Refills: 1 | Status: SHIPPED | OUTPATIENT
Start: 2025-01-15

## 2025-01-15 NOTE — TELEPHONE ENCOUNTER
Sent MC with recommendations written below by MD. Pt notified of updated prescription being sent and lab orders placed by MD.

## 2025-01-15 NOTE — TELEPHONE ENCOUNTER
Hi!    Please let her know that I would like to increase her levothyroxine dose to 125mcg and we will recheck the levels in 3 months. She can see me at that time. You do not need to send me a response. Thank you.

## 2025-01-18 ENCOUNTER — TELEPHONE (OUTPATIENT)
Dept: FAMILY MEDICINE CLINIC | Facility: CLINIC | Age: 70
End: 2025-01-18

## 2025-01-18 NOTE — TELEPHONE ENCOUNTER
Patient is upset with her endocrinologist because they took her weeks to give her the results and gave her a higher dosage. Wants new endocrinologist

## 2025-01-20 NOTE — TELEPHONE ENCOUNTER
I called patient and she is upset with her care or lack of care from Dr. Rosario. She had to reschedule an appointment with her in December for a \"personal reason.\" Patient stated, \"I had my thyroid blood test done on 12/10/24 and I did not get results or a response until 1/15/25  in a my chart message that was advising her that her dose is going to be increased from 112 mcg to 125 mcg and with the increase she was getting sweats and didn't feel well.\"     Patient is asking Dr. Ocampo to recommend a new Endocrinologist. (I advised [email or web address]) but she wants her doctor to advise her.

## 2025-01-21 NOTE — TELEPHONE ENCOUNTER
Spoke with patient, (  Name and date of birth verified ) informed of Dr. Ocampo's  message below      Provided information for  Dr Elam  at [phone]    Patient verbalizes understanding and agrees with plan.

## 2025-01-24 ENCOUNTER — TELEPHONE (OUTPATIENT)
Facility: CLINIC | Age: 70
End: 2025-01-24

## 2025-01-24 ENCOUNTER — TELEPHONE (OUTPATIENT)
Dept: FAMILY MEDICINE CLINIC | Facility: CLINIC | Age: 70
End: 2025-01-24

## 2025-01-24 DIAGNOSIS — K21.9 GASTROESOPHAGEAL REFLUX DISEASE, UNSPECIFIED WHETHER ESOPHAGITIS PRESENT: Primary | ICD-10-CM

## 2025-01-24 DIAGNOSIS — R13.12 OROPHARYNGEAL DYSPHAGIA: Primary | ICD-10-CM

## 2025-01-24 NOTE — TELEPHONE ENCOUNTER
The patient contacted, she has been experiencing dysphagia with coughing when she tries to swallow, this been going on for some time.  Feels like it may be worsening.    Plan-counseled on eating behavior, alternate solids and liquids, chew food well and eat slowly.  -Video swallow  -Consideration for repeat upper endoscopy, she did have a prior hiatal hernia on endoscopy from 2023

## 2025-01-24 NOTE — TELEPHONE ENCOUNTER
Spoke to patient c/o issues swallowing for about 4 months on and off. Noticed it started happening after 9/17/24 colonoscopy. In the last 2 weeks it has increased and has noticed it has become harder to pass food. She has to drink water when eating to pass down the food. She notices now she is also coughing when eating solid food as she feels food is getting stuck more. No issues swallowing fluids. Denies any pain when swallowing or changes in voice, SOB.     Still taking Omeprazole and Famotidine. States she started taking omeprazole before meals to see if this helped and it has not.     Patient made aware of red flags that would indicate reporting to ER. Patient verbalized understanding.    Patient would like further advise. Thank you.

## 2025-01-24 NOTE — TELEPHONE ENCOUNTER
Patient is requesting referral.     Name of specialist and specialty department : Dr. Sean Hubbard  // GI  Reason for visit with the specialist: Possible acid reflux, unable to swallow. Possible endoscopy. See telephone encounter from today  Address of the specialist office: [address] [address]  Appointment date: No appointment scheduled yet, they will call her back this afternoon to schedule, needs referral.          CSS informed patient the turnaround time for referral is 5-7 business days.  Patient was informed to check their [employer] account for referral status.

## 2025-01-29 NOTE — TELEPHONE ENCOUNTER
I called Vandana  I let her know Dr. Hubbard placed an order for the video swallow test  I let her know that she needs to call central scheduling to set up this test.  I gave her the number to central scheduling and she will call to set up this test.

## 2025-02-07 ENCOUNTER — HOSPITAL ENCOUNTER (OUTPATIENT)
Dept: MAMMOGRAPHY | Facility: HOSPITAL | Age: 70
Discharge: HOME | End: 2025-02-07
Attending: STUDENT IN AN ORGANIZED HEALTH CARE EDUCATION/TRAINING PROGRAM
Payer: COMMERCIAL

## 2025-02-07 PROCEDURE — 77062 BREAST TOMOSYNTHESIS BI: CPT | Performed by: STUDENT IN AN ORGANIZED HEALTH CARE EDUCATION/TRAINING PROGRAM

## 2025-02-07 PROCEDURE — 77066 DX MAMMO INCL CAD BI: CPT | Performed by: STUDENT IN AN ORGANIZED HEALTH CARE EDUCATION/TRAINING PROGRAM

## 2025-02-10 ENCOUNTER — TELEPHONE (OUTPATIENT)
Dept: FAMILY MEDICINE CLINIC | Facility: CLINIC | Age: 70
End: 2025-02-10

## 2025-02-10 DIAGNOSIS — M15.0 PRIMARY OSTEOARTHRITIS INVOLVING MULTIPLE JOINTS: Primary | ICD-10-CM

## 2025-02-10 NOTE — TELEPHONE ENCOUNTER
Patient contacts clinic requesting a new ortho referral, was dissatisfied with Dr. Olson, does not feel he treated her effectively. He gaver her a steroid injection to her knee and told her she had to come back to have fluid drained.  New referral pended for signature.

## 2025-02-22 DIAGNOSIS — E03.9 HYPOTHYROIDISM, UNSPECIFIED TYPE: ICD-10-CM

## 2025-02-24 ENCOUNTER — TELEPHONE (OUTPATIENT)
Facility: CLINIC | Age: 70
End: 2025-02-24

## 2025-02-24 ENCOUNTER — NURSE TRIAGE (OUTPATIENT)
Dept: FAMILY MEDICINE CLINIC | Facility: CLINIC | Age: 70
End: 2025-02-24

## 2025-02-24 RX ORDER — LEVOTHYROXINE SODIUM 112 UG/1
112 TABLET ORAL
Qty: 90 TABLET | Refills: 1 | OUTPATIENT
Start: 2025-02-24

## 2025-02-24 NOTE — TELEPHONE ENCOUNTER
Received Rx for Levothyroxine 112 MCG     Per TE 12/11/24,\"Estefanía Rosario MD     1/15/25  1:43 PM  Note      Hi!     Please let her know that I would like to increase her levothyroxine dose to 125mcg and we will recheck the levels in 3 months. She can see me at that time. You do not need to send me a response. Thank you.          Rx for Levothyroxine 125 MCG last refilled on 1/15/25

## 2025-02-24 NOTE — TELEPHONE ENCOUNTER
Action Requested: Summary for Provider     []  Critical Lab, Recommendations Needed  [] Need Additional Advice  [x]   FYI    []   Need Orders  [] Need Medications Sent to Pharmacy  []  Other     SUMMARY: Patient scheduled for an appointment tomorrow at 10 am with .     Patient states for past few days she has right lower back pain. Pain does not radiate down leg. Paint is constant 6/10 pain. No numbness or weakness in leg or foot. No fever or burning with urination.     Patient has tried cold pack and over the counter pain reliever. Patient offered sooner appointment, but declined.     Reason for call: Low Back Pain    Reason for Disposition   SEVERE back pain (e.g., excruciating, unable to do any normal activities) and not improved after pain medicine and CARE ADVICE    Protocols used: Back Pain-A-[place]

## 2025-02-24 NOTE — TELEPHONE ENCOUNTER
1st Reminder letter was sent to patient mychart for orders pending:   XR VIDEO SWALLOW (CPT=74230) (Order #[id number]) on 1/24/25

## 2025-02-25 ENCOUNTER — OFFICE VISIT (OUTPATIENT)
Dept: FAMILY MEDICINE CLINIC | Facility: CLINIC | Age: 70
End: 2025-02-25

## 2025-02-25 VITALS
SYSTOLIC BLOOD PRESSURE: 90 MMHG | DIASTOLIC BLOOD PRESSURE: 70 MMHG | OXYGEN SATURATION: 95 % | HEART RATE: 78 BPM | HEIGHT: 65 IN | TEMPERATURE: 97 F | WEIGHT: 212 LBS | BODY MASS INDEX: 35.32 KG/M2

## 2025-02-25 DIAGNOSIS — I42.9 PRIMARY CARDIOMYOPATHY (HCC): ICD-10-CM

## 2025-02-25 DIAGNOSIS — I95.9 HYPOTENSION, UNSPECIFIED HYPOTENSION TYPE: ICD-10-CM

## 2025-02-25 DIAGNOSIS — E78.00 HYPERCHOLESTEREMIA: ICD-10-CM

## 2025-02-25 DIAGNOSIS — M54.16 LUMBAR RADICULAR SYNDROME: Primary | ICD-10-CM

## 2025-02-25 DIAGNOSIS — R53.83 FATIGUE, UNSPECIFIED TYPE: ICD-10-CM

## 2025-02-25 DIAGNOSIS — M96.1 FAILED BACK SYNDROME OF LUMBAR SPINE: ICD-10-CM

## 2025-02-25 PROCEDURE — 3074F SYST BP LT 130 MM HG: CPT | Performed by: FAMILY MEDICINE

## 2025-02-25 PROCEDURE — 99214 OFFICE O/P EST MOD 30 MIN: CPT | Performed by: FAMILY MEDICINE

## 2025-02-25 PROCEDURE — 3078F DIAST BP <80 MM HG: CPT | Performed by: FAMILY MEDICINE

## 2025-02-25 PROCEDURE — 3008F BODY MASS INDEX DOCD: CPT | Performed by: FAMILY MEDICINE

## 2025-02-25 RX ORDER — MONTELUKAST SODIUM 10 MG/1
10 TABLET ORAL NIGHTLY
COMMUNITY
Start: 2025-01-10

## 2025-02-25 NOTE — PROGRESS NOTES
HPI:    Patient ID: Vandana Jefferson is a 69 year old female.      Low Back Pain  Pertinent negatives include no chest pain, fever, headaches, numbness or weakness.       Chief Complaint   Patient presents with    Low Back Pain     RT lower side on and off since last Thursday or Friday feeling better today had no energy at all coming in or getting up from  bed would like to have her B12 checked thinks pain is due to her injection of generic of ozempic        Wt Readings from Last 6 Encounters:   02/25/25 212 lb (96.2 kg)   12/09/24 208 lb (94.3 kg)   11/14/24 203 lb (92.1 kg)   10/01/24 205 lb (93 kg)   08/30/24 198 lb (89.8 kg)   09/04/24 199 lb 8 oz (90.5 kg)     BP Readings from Last 3 Encounters:   02/25/25 90/70   12/09/24 107/71   10/01/24 99/64     Hx of chronic low back pain  Having right sided low back pain.  Having injections in lower back  - 4 in last session, feels no relief  Takes advil/ tylenol.  Saw pain management last year    Feels very tired  Feels no energy, worse lately  Drinking plenty of water.        Review of Systems   Constitutional:  Positive for fatigue. Negative for chills and fever.   Eyes:  Negative for visual disturbance.   Respiratory:  Negative for cough, shortness of breath and wheezing.    Cardiovascular:  Negative for chest pain, palpitations and leg swelling.   Genitourinary:  Negative for decreased urine volume and difficulty urinating.   Neurological:  Negative for dizziness, tremors, seizures, weakness, light-headedness, numbness and headaches.       BP 90/70   Pulse 78   Temp 97.4 °F (36.3 °C) (Temporal)   Ht 5' 5\" (1.651 m)   Wt 212 lb (96.2 kg)   SpO2 95%   BMI 35.28 kg/m²          Current Outpatient Medications   Medication Sig Dispense Refill    montelukast 10 MG Oral Tab Take 1 tablet (10 mg total) by mouth nightly.      busPIRone 15 MG Oral Tab Take 1 tablet (15 mg total) by mouth in the morning and 1 tablet (15 mg total) before bedtime. 180 tablet 0     ARIPiprazole 5 MG Oral Tab Take 1 tablet (5 mg total) by mouth at bedtime. 30 tablet 2    LORazepam 1 MG Oral Tab Take 1/2 to 1 tablet (1 mg) twice a day as needed for anxiety 60 tablet 2    temazepam 30 MG Oral Cap Take 1 capsule (30 mg total) by mouth nightly as needed for Sleep. 30 capsule 2    levothyroxine 125 MCG Oral Tab Take 1 tablet (125 mcg total) by mouth before breakfast. 90 tablet 1    azelastine 0.1 % Nasal Solution 1 spray by Nasal route 2 (two) times daily. 1 each 3    gabapentin 800 MG Oral Tab Take 1 tablet (800 mg total) by mouth 2 (two) times daily. 180 tablet 3    fluticasone propionate 50 MCG/ACT Nasal Suspension SPRAY 2 SPRAYS BY EACH NARE ROUTE NIGHTLY. 48 mL 1    Omeprazole 40 MG Oral Capsule Delayed Release Take 1 capsule (40 mg total) by mouth daily. 90 capsule 2    famotidine 20 MG Oral Tab Take 1 tablet (20 mg total) by mouth daily. 90 tablet 2    Melatonin 10 MG Oral Cap Take 5 mg by mouth nightly as needed.      carvedilol 3.125 MG Oral Tab TAKE 1 TABLET(3.125 MG) BY MOUTH TWICE DAILY WITH MEALS 180 tablet 3    rosuvastatin (CRESTOR) 20 MG Oral Tab Take 1 tablet (20 mg total) by mouth nightly. 90 tablet 3     Allergies:Allergies[1]   PHYSICAL EXAM:     Chief Complaint   Patient presents with    Low Back Pain     RT lower side on and off since last Thursday or Friday feeling better today had no energy at all coming in or getting up from  bed would like to have her B12 checked thinks pain is due to her injection of generic of ozempic       Physical Exam  Vitals and nursing note reviewed.   Constitutional:       Appearance: She is well-developed.   Eyes:      Pupils: Pupils are equal, round, and reactive to light.   Neck:      Thyroid: No thyromegaly.   Cardiovascular:      Rate and Rhythm: Normal rate and regular rhythm.      Heart sounds: No murmur heard.  Pulmonary:      Effort: Pulmonary effort is normal.      Breath sounds: Normal breath sounds. No wheezing.   Abdominal:       Palpations: There is no mass.      Tenderness: There is no abdominal tenderness. There is no right CVA tenderness or left CVA tenderness.   Musculoskeletal:         General: No swelling.      Cervical back: Normal range of motion and neck supple.      Right lower leg: No edema.      Left lower leg: No edema.   Skin:     General: Skin is warm and dry.      Findings: No rash.   Neurological:      Mental Status: She is alert and oriented to person, place, and time.   Psychiatric:         Mood and Affect: Mood normal.         Behavior: Behavior normal.                ASSESSMENT/PLAN:     Encounter Diagnoses   Name Primary?    Lumbar radicular syndrome Yes    Failed back syndrome of lumbar spine     Fatigue, unspecified type     Hypercholesteremia     Primary cardiomyopathy (HCC)     Hypotension, unspecified hypotension type        1. Lumbar radicular syndrome    - Pain Management Referral - In Network    2. Failed back syndrome of lumbar spine    - Pain Management Referral - In Network    3. Fatigue, unspecified type  Complete tsh per endo   - CBC With Differential With Platelet; Future  - Vitamin D [E]; Future  - Vitamin B12; Future  - Ferritin; Future    4. Hypercholesteremia    - Comp Metabolic Panel (14); Future  - Lipid Panel; Future    5. Primary cardiomyopathy (HCC)    - [facility] CARDIOLOGY EXTERNAL    6. Hypotension, unspecified hypotension type  Push fluids  Follow up cardiology  - [facility] CARDIOLOGY EXTERNAL      Orders Placed This Encounter   Procedures    CBC With Differential With Platelet    Vitamin D [E]    Vitamin B12    Ferritin    Comp Metabolic Panel (14)    Lipid Panel         The above note was creating using Dragon speech recognition technology. Please excuse any typos    Meds This Visit:  Requested Prescriptions      No prescriptions requested or ordered in this encounter       Imaging & Referrals:  OP REFERRAL PAIN MANGEMENT  [facility] CARDIOLOGY EXTERNAL        ID#[id number]       [1]   Allergies  Allergen Reactions    Demerol [Meperidine] NAUSEA AND VOMITING and NAUSEA ONLY     Other reaction(s): Vomiting    Hydrocodone NAUSEA AND VOMITING    Pregabalin      Other reaction(s): Stomach discomforts    Vicodin Tuss [Hydrocodone-Guaifenesin] NAUSEA AND VOMITING and NAUSEA ONLY     Other reaction(s): Vomiting    Tramadol NAUSEA ONLY

## 2025-02-28 ENCOUNTER — LAB ENCOUNTER (OUTPATIENT)
Dept: LAB | Age: 70
End: 2025-02-28
Attending: FAMILY MEDICINE
Payer: COMMERCIAL

## 2025-02-28 DIAGNOSIS — R53.83 FATIGUE, UNSPECIFIED TYPE: ICD-10-CM

## 2025-02-28 DIAGNOSIS — E78.00 HYPERCHOLESTEREMIA: ICD-10-CM

## 2025-02-28 DIAGNOSIS — E03.9 HYPOTHYROIDISM, UNSPECIFIED TYPE: ICD-10-CM

## 2025-02-28 LAB
ALBUMIN SERPL-MCNC: 4.6 G/DL (ref 3.2–4.8)
ALBUMIN/GLOB SERPL: 1.8 {RATIO} (ref 1–2)
ALP LIVER SERPL-CCNC: 75 U/L
ALT SERPL-CCNC: 35 U/L
ANION GAP SERPL CALC-SCNC: 8 MMOL/L (ref 0–18)
AST SERPL-CCNC: 24 U/L (ref ?–34)
BASOPHILS # BLD AUTO: 0.05 X10(3) UL (ref 0–0.2)
BASOPHILS NFR BLD AUTO: 0.7 %
BILIRUB SERPL-MCNC: 0.4 MG/DL (ref 0.2–1.1)
BILIRUB UR QL: NEGATIVE
BUN BLD-MCNC: 13 MG/DL (ref 9–23)
BUN/CREAT SERPL: 14.4 (ref 10–20)
CALCIUM BLD-MCNC: 9.4 MG/DL (ref 8.7–10.4)
CHLORIDE SERPL-SCNC: 105 MMOL/L (ref 98–112)
CHOLEST SERPL-MCNC: 146 MG/DL (ref ?–200)
CLARITY UR: CLEAR
CO2 SERPL-SCNC: 27 MMOL/L (ref 21–32)
CREAT BLD-MCNC: 0.9 MG/DL
DEPRECATED HBV CORE AB SER IA-ACNC: 56 NG/ML
DEPRECATED RDW RBC AUTO: 43.3 FL (ref 35.1–46.3)
EGFRCR SERPLBLD CKD-EPI 2021: 69 ML/MIN/1.73M2 (ref 60–?)
EOSINOPHIL # BLD AUTO: 0.09 X10(3) UL (ref 0–0.7)
EOSINOPHIL NFR BLD AUTO: 1.3 %
ERYTHROCYTE [DISTWIDTH] IN BLOOD BY AUTOMATED COUNT: 14.1 % (ref 11–15)
FASTING PATIENT LIPID ANSWER: YES
FASTING STATUS PATIENT QL REPORTED: YES
GLOBULIN PLAS-MCNC: 2.6 G/DL (ref 2–3.5)
GLUCOSE BLD-MCNC: 93 MG/DL (ref 70–99)
GLUCOSE UR-MCNC: NORMAL MG/DL
HCT VFR BLD AUTO: 41.3 %
HDLC SERPL-MCNC: 57 MG/DL (ref 40–59)
HGB BLD-MCNC: 13.8 G/DL
HGB UR QL STRIP.AUTO: NEGATIVE
IMM GRANULOCYTES # BLD AUTO: 0.01 X10(3) UL (ref 0–1)
IMM GRANULOCYTES NFR BLD: 0.1 %
KETONES UR-MCNC: NEGATIVE MG/DL
LDLC SERPL CALC-MCNC: 73 MG/DL (ref ?–100)
LEUKOCYTE ESTERASE UR QL STRIP.AUTO: NEGATIVE
LYMPHOCYTES # BLD AUTO: 1.79 X10(3) UL (ref 1–4)
LYMPHOCYTES NFR BLD AUTO: 25.8 %
MCH RBC QN AUTO: 28.5 PG (ref 26–34)
MCHC RBC AUTO-ENTMCNC: 33.4 G/DL (ref 31–37)
MCV RBC AUTO: 85.2 FL
MONOCYTES # BLD AUTO: 0.42 X10(3) UL (ref 0.1–1)
MONOCYTES NFR BLD AUTO: 6.1 %
NEUTROPHILS # BLD AUTO: 4.58 X10 (3) UL (ref 1.5–7.7)
NEUTROPHILS # BLD AUTO: 4.58 X10(3) UL (ref 1.5–7.7)
NEUTROPHILS NFR BLD AUTO: 66 %
NITRITE UR QL STRIP.AUTO: NEGATIVE
NONHDLC SERPL-MCNC: 89 MG/DL (ref ?–130)
OSMOLALITY SERPL CALC.SUM OF ELEC: 290 MOSM/KG (ref 275–295)
PH UR: 6 [PH] (ref 5–8)
PLATELET # BLD AUTO: 310 10(3)UL (ref 150–450)
POTASSIUM SERPL-SCNC: 3.8 MMOL/L (ref 3.5–5.1)
PROT SERPL-MCNC: 7.2 G/DL (ref 5.7–8.2)
PROT UR-MCNC: NEGATIVE MG/DL
RBC # BLD AUTO: 4.85 X10(6)UL
SODIUM SERPL-SCNC: 140 MMOL/L (ref 136–145)
SP GR UR STRIP: 1.02 (ref 1–1.03)
T4 FREE SERPL-MCNC: 1.4 NG/DL (ref 0.8–1.7)
TRIGL SERPL-MCNC: 83 MG/DL (ref 30–149)
TSI SER-ACNC: 4.84 UIU/ML (ref 0.55–4.78)
UROBILINOGEN UR STRIP-ACNC: NORMAL
VIT B12 SERPL-MCNC: 402 PG/ML (ref 211–911)
VIT D+METAB SERPL-MCNC: 46.6 NG/ML (ref 30–100)
VLDLC SERPL CALC-MCNC: 13 MG/DL (ref 0–30)
WBC # BLD AUTO: 6.9 X10(3) UL (ref 4–11)

## 2025-02-28 PROCEDURE — 84439 ASSAY OF FREE THYROXINE: CPT

## 2025-02-28 PROCEDURE — 80053 COMPREHEN METABOLIC PANEL: CPT

## 2025-02-28 PROCEDURE — 81003 URINALYSIS AUTO W/O SCOPE: CPT | Performed by: FAMILY MEDICINE

## 2025-02-28 PROCEDURE — 80061 LIPID PANEL: CPT

## 2025-02-28 PROCEDURE — 85025 COMPLETE CBC W/AUTO DIFF WBC: CPT

## 2025-02-28 PROCEDURE — 36415 COLL VENOUS BLD VENIPUNCTURE: CPT

## 2025-02-28 PROCEDURE — 82728 ASSAY OF FERRITIN: CPT

## 2025-02-28 PROCEDURE — 84443 ASSAY THYROID STIM HORMONE: CPT

## 2025-02-28 PROCEDURE — 82306 VITAMIN D 25 HYDROXY: CPT

## 2025-02-28 PROCEDURE — 82607 VITAMIN B-12: CPT

## 2025-03-04 ENCOUNTER — OFFICE VISIT (OUTPATIENT)
Dept: PAIN CLINIC | Facility: HOSPITAL | Age: 70
End: 2025-03-04
Attending: STUDENT IN AN ORGANIZED HEALTH CARE EDUCATION/TRAINING PROGRAM
Payer: COMMERCIAL

## 2025-03-04 VITALS
SYSTOLIC BLOOD PRESSURE: 105 MMHG | OXYGEN SATURATION: 92 % | HEART RATE: 79 BPM | WEIGHT: 212 LBS | BODY MASS INDEX: 35 KG/M2 | DIASTOLIC BLOOD PRESSURE: 68 MMHG

## 2025-03-04 DIAGNOSIS — M96.1 POST LAMINECTOMY SYNDROME: Primary | ICD-10-CM

## 2025-03-04 PROCEDURE — 99214 OFFICE O/P EST MOD 30 MIN: CPT | Performed by: STUDENT IN AN ORGANIZED HEALTH CARE EDUCATION/TRAINING PROGRAM

## 2025-03-04 NOTE — PROGRESS NOTES
Patient presents in office today with reported pain in lower back and bilateral buttocks      Current pain level reported = 2/10     Last reported dose of Gabapentin this morning         Narcotic Contract renewal NA     Increased pain starting about 45 days ago but is not in pain today.  Pain is worse in the morning and makes ADL's difficult. Pain can can 7-8/10 at its worst.  Would like to discuss options.

## 2025-03-04 NOTE — CHRONIC PAIN
[facility] Pain Consultation     History Of Present Illness    Vandana Jefferson is a 69 year old female with a past medical history of postlaminectomy syndrome, L4-5 fusion, fatigue cardiomyopathy presents to pain clinic for continued management of low back pain.  Patient is well-known to pain clinic and  has undergone many prior injections.  Most recently patient underwent a bilateral L3-4 as well as L5-S1 radiofrequency ablation with Dr. Santo.  Patient states this procedure took away more than 50% of her pain for over 6 months.  Patient states she continues to have low back pain worse in the morning as well as with activity.  She occasionally will also have radicular pain that goes down the posterior aspects of her legs, but the majority of her pain is in her low back.  MRI from 2024 showing postoperative changes of pedicle screw and posterior fusion of L4-5.  There is also degenerative disc and facet disease throughout the lumbar spine most prominent at L3-4 where there is moderate narrowing of the canal and bilateral neural foraminal narrowing.  Patient was recently in physical therapy however was not able to finish as she left to go out of town.  Currently her pain is at a 2 out of 10 but throughout the day can vary and go all the way up to an 8. The pain causes significant stress in the patient's life, specifically interferes with general activity, mood, walking ability, ability to perform tasks at home and/or work.  Patient participates in physical therapy and continues to perform physician directed exercises at home. Denies any bowel or bladder incontinen ce, saddle anesthesia, worsening pain, weakness or falls.     Past Medical History  She has a past medical history of Age-related nuclear cataract of both eyes (06/24/2021), Anxiety state, unspecified, Back pain, Cardiomyopathy (HCC), Cataract, Disorder of thyroid, Dry eye (06/24/2021), Essential hypertension, Fibromyalgia, Floaters, right  (2021), H/O varicose vein stripping, Headache (), High cholesterol, Hypothyroidism, Meibomian gland dysfunction (), Musculoskeletal problem, Musculoskeletal problem, Neck pain, Osteoarthritis, Radiation (10/23/2010), and Status post epidural steroid injection.    She has no past medical history of Anesthesia complication, Arrhythmia, Asthma (HCC), Cancer (HCC), Congestive heart disease (HCC), COPD (chronic obstructive pulmonary disease) (HCC), Deep vein thrombosis (HCC), Diabetes (HCC), Difficult intubation, Disorder of liver, Family history of malignant hyperthermia, Family history of pseudocholinesterase deficiency, Heart attack (HCC), History of adverse reaction to anesthesia, History of blood transfusion, motion sickness, Malignant hyperthermia, PONV (postoperative nausea and vomiting), Pseudocholinesterase deficiency, Pulmonary embolism (HCC), Renal disorder, Seizure disorder (HCC), Sleep apnea, Stroke (HCC), or Type 1 diabetes mellitus (HCC).      PAIN COURSE AND PREVIOUS INTERVENTIONS:  Medications:  N/A  Interventions:    21- right L4/5 TFESI- revicius  2023-RT TRANS L4/5-GUREVICIUS  2023-LESI L2/3-GUREVICIUS  2024-BILAT. L3/4 & L5/S1 MBB-FAM-#1  2024-BILAT. L3/4 & L5/S1 MBB-FAM--#2  2024-BILAT L3/4 & L5/S1 RFA-FAM  Adjuvants:    Diclofenac (no longer taking)    BLOOD THINNING MEDICATIONS:  None  Surgical History  She has a past surgical history that includes appendectomy; cholecystectomy;  delivery only; laminectomy; colonoscopy (10/2005);  (); cyst aspiration left (Left, 2007); diane localization wire 1 site left (cpt=19281) (2007); colonoscopy (N/A, 2018); back surgery (2016); egd; cyst aspiration right; Yag Iridotomy - OD - Right Eye (Right, 10/10/1997); Yag Iridotomy - OS - Left Eye (Left, 10/16/1997); other surgical history; other surgical history; Cataract extraction w/  intraocular lens implant (Left, 2023);  cataract (02/21/2023); Cataract extraction w/ intraocular lens implant (Right, 02/21/2023); colonoscopy (N/A, 6/12/2023); and colonoscopy (N/A, 9/17/2024).     Social History  She reports that she quit smoking about 20 years ago. Her smoking use included cigarettes. She started smoking about 47 years ago. She has a 27 pack-year smoking history. She has never used smokeless tobacco. She reports that she does not drink alcohol and does not use drugs.    Family History  Family History   Problem Relation Age of Onset    Depression Mother     Neurological Disorder Mother         Per NG: Hypothyrodism    Arthritis Mother         Per NG: Osteoarthritis    Other (Other) Mother     Skin cancer Mother     Heart Disorder Father 65    Depression Maternal Grandfather     Other (Other) Maternal Grandfather     Heart Attack Neg     Diabetes Neg         Per NG; No diabetes    Glaucoma Neg         Per NG: No glaucoma histroy    Cancer Neg     Hypertension Neg     Lipids Neg     Breast Cancer Neg     Ovarian Cancer Neg     Prostate Cancer Neg     Pancreatic Cancer Neg         Allergies  Demerol [meperidine], Hydrocodone, Pregabalin, Vicodin tuss [hydrocodone-guaifenesin], and Tramadol    Review of Symptoms:   Constitutional: Negative for chills, diaphoresis or fever  HENT: Negative for neck swelling  Eyes:.  Negative for eye pain  Respiratory:.  Negative for cough, shortness of breath or wheezing    Cardiovascular:.  Negative for chest pain or palpitations  Gastrointestinal:.  Negative for abdominal pain, nausea and vomiting  Genitourinary:.  Negative for urgency  Musculoskeletal:  Positive for back pain. Positive for joint pain. Denies falls within the past 3 months.  Skin: Negative for wounds or itching   Neurological: Negative for dizziness, seizures, loss of consciousness and weakness  Endo/Heme/Allergies: Does not bruise/bleed easily  Psychiatric/Behavioral: Negative for depression. The patient does not appear anxious.        PHYSICAL EXAM  Vitals signs reviewed  Constitutional:       General: Not in acute distress     Appearance: Normal appearance. Not ill-appearing.  HENT:     Head: Normocephalic and atraumatic  Eyes:     Conjunctiva/sclera: Conjunctivae normal  Cardiovascular:     Rate and Rhythm: Normal rate and regular rhythm  Pulmonary:     Effort: No respiratory distress  Abdominal:     Palpations: Abdomen is soft  Musculoskeletal: HURTADO  Skin:     General: Skin is warm and dry  Neurological:     General: No focal deficit present  Psychiatric:         Mood and Affect: Mood normal         Behavior: Behavior normal    Advanced Exam   Inspection: No gross deformities, no surgical scars  Palpation: Tenderness throughout paraspinal muscles as well as bilateral facets  ROM: Normal range of motion of the lumbar flexion extension  Motor: 5-5 strength upper and lower extremities  Sensory: Negative for sensory abnormalities in upper and lower extremities  Reflexes: 2+ reflexes bilateral upper and lower extremities  Lumbar: Negative straight leg raising bilaterally, negative for facet loading  Sacral: Negative Isis, negative Gaenslen's  Hip: Negative for pain with anterior, lateral, posterior palpation of hip joints, negative FADIR, negative for internal/external rotation of the hip, negative logroll     Last Recorded Vitals  Vitals:    03/04/25 0859   BP: 105/68   Pulse: 79         Relevant Results  Current Outpatient Medications   Medication Instructions    ARIPiprazole (ABILIFY) 5 mg, Oral, Nightly    azelastine 0.1 % Nasal Solution 1 spray, Nasal, 2 times daily    busPIRone (BUSPAR) 15 mg, Oral, 2 times daily    carvedilol 3.125 MG Oral Tab TAKE 1 TABLET(3.125 MG) BY MOUTH TWICE DAILY WITH MEALS    famotidine (PEPCID) 20 mg, Oral, Daily    fluticasone propionate 50 MCG/ACT Nasal Suspension SPRAY 2 SPRAYS BY EACH NARE ROUTE NIGHTLY.    gabapentin (NEURONTIN) 800 mg, Oral, 2 times daily    levothyroxine (SYNTHROID) 125 mcg, Oral, Before  breakfast    LORazepam 1 MG Oral Tab Take 1/2 to 1 tablet (1 mg) twice a day as needed for anxiety    Melatonin 5 mg, Nightly PRN    montelukast (SINGULAIR) 10 mg, Nightly    Omeprazole 40 mg, Oral, Daily    rosuvastatin (CRESTOR) 20 mg, Oral, Nightly    temazepam (RESTORIL) 30 mg, Oral, Nightly PRN      No valid procedures specified.   Narrative   PROCEDURE: MRI SPINE LUMBAR (CPT=72148)     COMPARISON: [facility], XR LUMBAR SPINE (MIN 4 VIEWS) (CPT=72110), 2/26/2024, 12:19 PM.  [facility], MRI SPINE LUMBAR (CPT=72148), 3/28/2022, 5:57 PM.     INDICATIONS: M96.1 Failed back syndrome, lumbar     TECHNIQUE: A variety of imaging planes and parameters were utilized for visualization of suspected pathology.     Counting Reference: Lumbosacral junction.     For the purposes of this exam, it will be assumed that there are 5 lumbar-type vertebral bodies. L4-L5 is at the level of the iliac crest.     FINDINGS:  PARASPINAL AREA: Normal with no visible mass.    BONES: There are hypertrophic changes of the lower lumbar facet joints. Small anterior endplate osteophytes seen within the lumbar vertebral bodies. There is no acute fracture, dislocation or marrow replacing lesion.  Postoperative changes of pedicle  screw and posterior fusion of L4-L5.  No periprosthetic collection.  CORD/CAUDA EQUINA: Normal caliber, contour, and signal intensity.    OTHER: Negative.     LUMBAR DISC LEVELS:  L1-L2: Mild hypertrophic changes of the facet joints and ligamentum flavum without significant canal or foraminal narrowing.  L2-L3: Disk desiccation with bulging disk, facet, and ligamentous hypertrophy results in mild central canal narrowing and mild bilateral neural foraminal narrowing. There is a posterior annular disc fissure.  L3-L4: Disk desiccation with bulging disk, facet, and ligamentous hypertrophy results in moderate central canal narrowing and moderate bilateral neural  foraminal narrowing. There is a posterior annular disc fissure.  L4-L5: Postoperative changes of pedicle screw and posterior fusion.  Hypertrophic changes seen involving the facet joints and ligamentum flavum.  There is mild narrowing of the canal and bilateral neural foramen.  No high-grade canal or foraminal  narrowing.  No significant epidural granulation tissue is visualized.  L5-S1: No significant disc/facet abnormality, spinal stenosis, or foraminal stenosis.                 Impression   CONCLUSION:     Postoperative changes of pedicle screw and posterior fusion of L4-L5.  No periprosthetic collection.     Degenerative disc and facet disease throughout the lumbar spine, most prominent at L3-L4, where there is moderate narrowing of the canal and bilateral neural foramen.  Overall appearance is not significantly changed since the prior exams.     No acute fracture or vertebral body height loss.           PROCEDURE: XR LUMBAR SPINE AP LAT FLEX EXT EM (CPT=72120)     COMPARISON: [facility], XR LUMBAR SPINE (MIN 4 VIEWS) (CPT=72110), 1/13/2020, 1:30 PM.     INDICATIONS: Patient here with chronic lower back pain for 33 years. Pain goes down the right leg. History of 2 surgeries.     TECHNIQUE: Lumbar spine radiographs with flexion and extension views       FINDINGS:     ALIGNMENT:   The normal lumbar lordosis and alignment is maintained.  VERTEBRAL BODIES:   Bilateral pedicle screw seen at L4 and L5 related to a posterior lumbar fusion.  Interbody device at L4-L5 disc space.  Vertebral body heights are preserved without evidence for acute fracture or traumatic subluxation.  DISC SPACES: Prosthetic disc device at L4-L5.  Mild posterior disc space narrowing at L2-L3 and L3-L4.  SACROILIAC JOINTS: Normal.    FLEXION/EXTENSION VIEWS:   Normal range of motion.  No positional subluxation during flexion and extension.    OTHER:   Surgical clips are noted in the right upper quadrant.               Impression    CONCLUSION:     Postsurgical changes of L4-L5 spinal fusion and prosthetic disc device.     Flexion extension views are negative for positional subluxation.     Mild degenerative disc changes of the mid to upper lumbar spine.  These appear similar to prior.       1. Post laminectomy syndrome        [place] [facility] MONITORING PROGRAM REVIEWED  Yes    ASSESSMENT/PLAN  Vandana Jefferson is a 69 year old female with a past medical history of postlaminectomy syndrome, L4-5 fusion, fatigue cardiomyopathy presents to pain clinic for continued management of low back pain.  Patient is well-known to pain clinic and most recently patient underwent a bilateral L3-4 as well as L5-S1 radiofrequency ablation with Dr. Santo on 5/3/24 that provided > 50% relief.  MRI from 2024 showing postoperative changes of pedicle screw and posterior fusion of L4-5.  There is also degenerative disc and facet disease throughout the lumbar spine most prominent at L3-4 where there is moderate narrowing of the canal and bilateral neural foraminal narrowing.  Patient likely has pain coming from adjacent level disease as well as facet arthropathy seen throughout the lumbar spine.  Given good relief with prior RFA we will repeat L3-4, L5-1 radiofrequency ablation.  Will also have patient restart physical therapy.       Our plan is as follows:  - Repeat bilateral L3-4, L5-1 radiofrequency ablation  - Continue to participate in physical therapy as well as physician directed home exercises  - Continue pain medications as prescribed, deferred starting adjuvant medications this visit    Comprehensive analgesic plan was formulated. Conservative vs. Aggressive measures were discussed at length including pharmacotherapy (eg. Anti- inflammatories, muscle relaxants, neuropathic medications, oral steroids, analgesics), injections, and further testing. Risks and benefits of all options were discussed at length to patients satisfaction during a comprehensive  interactive discussion. All questions were answered during extended questions and answer session. Patient agreeable to discussion plan. Greater than 50% of the time was spent with counseling (nature of discussion centered around pain, therapy, and treatment options), face to face time, time spent reviewing data, obtaining patient information and discussing the care with the patients health care providers.     Pt will return to clinic  for injection  Total time: 30 minutes       Joe Ramos MD

## 2025-03-04 NOTE — PATIENT INSTRUCTIONS
Refill policies:    Allow 2-3 business days for refills; controlled substances may take longer.  Contact your pharmacy at least 5 days prior to running out of medication and have them send an electronic request or submit request through the “request refill” option in your [email or web address] account.  Refills are not addressed on weekends; covering physicians do not authorize routine medications on weekends.  No narcotics or controlled substances are refilled after noon on Fridays or by on call physicians.  By law, narcotics must be electronically prescribed.  A 30 day supply with no refills is the maximum allowed.  If your prescription is due for a refill, you may be due for a follow up appointment.  To best provide you care, patients receiving routine medications need to be seen at least once a year.  Patients receiving narcotic/controlled substance medications need to be seen at least once every 3 months.  In the event that your preferred pharmacy does not have the requested medication in stock (e.g. Backordered), it is your responsibility to find another pharmacy that has the requested medication available.  We will gladly send a new prescription to that pharmacy at your request.    Scheduling Tests:    If your physician has ordered radiology tests such as MRI or CT scans, please contact Central Scheduling at [phone] right away to schedule the test.  Once scheduled, the [facility] Centralized Referral Team will work with your insurance carrier to obtain pre-certification or prior authorization.  Depending on your insurance carrier, approval may take 3-10 days.  It is highly recommended patients assure they have received an authorization before having a test performed.  If test is done without insurance authorization, patient may be responsible for the entire amount billed.      Precertification and Prior Authorizations:  If your physician has recommended that you have a procedure or additional testing performed the [facility]  Centralized Referral Team will contact your insurance carrier to obtain pre-certification or prior authorization.    You are strongly encouraged to contact your insurance carrier to verify that your procedure/test has been approved and is a COVERED benefit.  Although the [facility] Centralized Referral Team does its due diligence, the insurance carrier gives the disclaimer that \"Although the procedure is authorized, this does not guarantee payment.\"    Ultimately the patient is responsible for payment.   Thank you for your understanding in this matter.  Paperwork Completion:  If you require FMLA or disability paperwork for your recovery, please make sure to either drop it off or have it faxed to our office at [phone]. Be sure the form has your name and date of birth on it.  The form will be faxed to our Forms Department and they will complete it for you.  There is a 25$ fee for all forms that need to be filled out.  Please be aware there is a 10-14 day turnaround time.  You will need to sign a release of information (EFREN) form if your paperwork does not come with one.  You may call the Forms Department with any questions at [phone].  Their fax number is [phone].

## 2025-03-05 ENCOUNTER — TELEPHONE (OUTPATIENT)
Dept: PAIN CLINIC | Facility: HOSPITAL | Age: 70
End: 2025-03-05

## 2025-03-05 ENCOUNTER — TELEPHONE (OUTPATIENT)
Dept: FAMILY MEDICINE CLINIC | Facility: CLINIC | Age: 70
End: 2025-03-05

## 2025-03-05 DIAGNOSIS — I42.9 CARDIOMYOPATHY, UNSPECIFIED TYPE (HCC): ICD-10-CM

## 2025-03-05 DIAGNOSIS — M79.605 BILATERAL LEG PAIN: ICD-10-CM

## 2025-03-05 DIAGNOSIS — I83.90 VARICOSE VEIN: Primary | ICD-10-CM

## 2025-03-05 DIAGNOSIS — R06.02 SHORTNESS OF BREATH: ICD-10-CM

## 2025-03-05 DIAGNOSIS — I10 ESSENTIAL HYPERTENSION: ICD-10-CM

## 2025-03-05 DIAGNOSIS — M79.604 BILATERAL LEG PAIN: ICD-10-CM

## 2025-03-05 NOTE — TELEPHONE ENCOUNTER
Patient states she was seen by Dr. Villarreal today and requesting for Dr. Ocampo to order the following:  Echocardiogram, Lower extremity venous ultrasound-left leg?  Nuclear stress test.  Referral to see Dr. Moreno for Left leg varicose veins. Current [facility] referral diagnoses:cardiomyopathy and hypotension only.  Patient informed will contact [facility] for clarification of orders before Dr. Ocampo can place them.  Patient verbalized understanding.    Patient also mentioned Dr. Villarreal advised to hold carvedilol and to monitor blood pressure.  Medication list updated.    Called [facility] for verification of orders.  On hold for several minutes.

## 2025-03-05 NOTE — TELEPHONE ENCOUNTER
Order Information    Encounter Summary  Order Details  Procedure: [facility] PAIN NAVIGATOR   Associated diagnosis: M96.1 (ICD-10-CM) - 722.80 (ICD-9-CM) - Post laminectomy syndrome   Date: 3/4/2025   Provider: Joe Ramos MD   Department: [facility] PAIN CNTR   Progress Note  Office Visit with URSZULA VILLALOBOS     Order Questions    Question Answer Comment   Anesthesia Type Sedation    Provider  Ramos   Location [facility]    Procedure RFA    Laterality/Level Bilateral L3-4, L5-S1    CPT (Hit enter after each entry) DESTROY L/S FACET JNT ADDL [39502]    Medical clearance requested (will send to Pain Navigator) No    Patient has Medicare coverage? No        Please start Prior Auth

## 2025-03-05 NOTE — TELEPHONE ENCOUNTER
Tried calling [facility] again, on hold for several minutes.  Please try again later.  Consultation notes from [facility] not in epic at this time.

## 2025-03-06 NOTE — TELEPHONE ENCOUNTER
Left message to call back. Please transfer to triage. 1st attempt.    I called [facility] and was inform  ordered theses test for patient and she has scheduled to have them done with them on 3/25/25.

## 2025-03-06 NOTE — TELEPHONE ENCOUNTER
Spoke to patient. Then spoke to [facility]. Patient has an HMO. [facility] advised that Dr. Ocampo has to order a referral for cardiac tests from Dr. Villarreal to be approved. She also needs to sign a referral for Dr. Moreno to see him for her varicose vein consult. Referrals pending.     Future Appointments   Date Time Provider Department Center   3/11/2025  2:00 PM Dalton Mendoza APRN [facility] [place]   3/13/2025  2:00 PM Soraya Pope LCSW [facility] [place]   3/21/2025 12:00 PM Soraya Pope LCSW [facility] [place]   4/9/2025 10:40 AM [facility]   5/13/2025 11:00 AM Caity Ellison MD [facility]

## 2025-03-10 ENCOUNTER — TELEPHONE (OUTPATIENT)
Dept: ENDOCRINOLOGY CLINIC | Facility: CLINIC | Age: 70
End: 2025-03-10

## 2025-03-10 DIAGNOSIS — E03.9 HYPOTHYROIDISM, UNSPECIFIED TYPE: Primary | ICD-10-CM

## 2025-03-10 RX ORDER — LEVOTHYROXINE SODIUM 137 UG/1
137 TABLET ORAL
Qty: 90 TABLET | Refills: 1 | Status: SHIPPED | OUTPATIENT
Start: 2025-03-10

## 2025-03-10 NOTE — TELEPHONE ENCOUNTER
Spoke to pt. Provided results and recommendations written below by MD. She verbalized understanding. Lab order placed, RX sent to pharmacy on file, pt rescheduled for 5/28.

## 2025-03-10 NOTE — TELEPHONE ENCOUNTER
TSH is high which indicates need for increase in dose of LT4  She is on LT4 125 mcg daily , please confirm compliance  Please increase to 137 mcg daily   TSH with reflex in 2 months  Please change apt with Dr BROWN from April 2025 to mid to late May 2025   Thanks

## 2025-03-12 NOTE — TELEPHONE ENCOUNTER
Called Patient to schedule, she stated to me that she is going to complete her Physical therapy and then have her procedure done

## 2025-03-18 RX ORDER — OMEPRAZOLE 40 MG/1
40 CAPSULE, DELAYED RELEASE ORAL DAILY
Qty: 90 CAPSULE | Refills: 2 | Status: SHIPPED | OUTPATIENT
Start: 2025-03-18

## 2025-03-18 RX ORDER — FAMOTIDINE 20 MG/1
20 TABLET, FILM COATED ORAL DAILY
Qty: 90 TABLET | Refills: 2 | Status: SHIPPED | OUTPATIENT
Start: 2025-03-18

## 2025-03-18 NOTE — TELEPHONE ENCOUNTER
Dr Alejandra MENDOZA spoke with the patient due to her not being seen in office since 2023. She stated that you said she did not need any visits for the refilled of these medications. Please advise, thank you!        Requested Prescriptions     Pending Prescriptions Disp Refills    OMEPRAZOLE 40 MG Oral Capsule Delayed Release [Pharmacy Med Name: OMEPRAZOLE DR 40 MG CAPSULE] 90 capsule 2     Sig: TAKE 1 CAPSULE (40 MG TOTAL) BY MOUTH DAILY.    FAMOTIDINE 20 MG Oral Tab [Pharmacy Med Name: FAMOTIDINE 20 MG TABLET] 90 tablet 2     Sig: TAKE 1 TABLET BY MOUTH EVERY DAY     LOV: 03/22/2023  LR: 05/30/2024  Last Colonoscopy: 09/17/2024  gb    
 (normal spontaneous vaginal delivery)

## 2025-03-19 ENCOUNTER — TELEPHONE (OUTPATIENT)
Dept: FAMILY MEDICINE CLINIC | Facility: CLINIC | Age: 70
End: 2025-03-19

## 2025-03-24 ENCOUNTER — OFFICE VISIT (OUTPATIENT)
Dept: FAMILY MEDICINE CLINIC | Facility: CLINIC | Age: 70
End: 2025-03-24

## 2025-03-24 VITALS
HEART RATE: 75 BPM | BODY MASS INDEX: 35.32 KG/M2 | DIASTOLIC BLOOD PRESSURE: 58 MMHG | TEMPERATURE: 98 F | SYSTOLIC BLOOD PRESSURE: 93 MMHG | HEIGHT: 65 IN | OXYGEN SATURATION: 95 % | WEIGHT: 212 LBS

## 2025-03-24 DIAGNOSIS — I95.9 HYPOTENSION, UNSPECIFIED HYPOTENSION TYPE: ICD-10-CM

## 2025-03-24 DIAGNOSIS — E66.9 OBESITY (BMI 30-39.9): ICD-10-CM

## 2025-03-24 DIAGNOSIS — R53.83 FATIGUE, UNSPECIFIED TYPE: Primary | ICD-10-CM

## 2025-03-24 DIAGNOSIS — I10 ESSENTIAL HYPERTENSION: ICD-10-CM

## 2025-03-24 PROCEDURE — 99213 OFFICE O/P EST LOW 20 MIN: CPT | Performed by: FAMILY MEDICINE

## 2025-03-24 PROCEDURE — 3078F DIAST BP <80 MM HG: CPT | Performed by: FAMILY MEDICINE

## 2025-03-24 PROCEDURE — 3074F SYST BP LT 130 MM HG: CPT | Performed by: FAMILY MEDICINE

## 2025-03-24 PROCEDURE — 3008F BODY MASS INDEX DOCD: CPT | Performed by: FAMILY MEDICINE

## 2025-03-24 NOTE — PROGRESS NOTES
HPI:    Patient ID: Vandana Jefferson is a 69 year old female.      HPI    Chief Complaint   Patient presents with    Fatigue     Feeling pain on back of her head and left side of neck  4-5 days states she discontinued her abilify would like to discuss her thyroid levels          Wt Readings from Last 6 Encounters:   03/24/25 212 lb (96.2 kg)   03/04/25 212 lb (96.2 kg)   02/25/25 212 lb (96.2 kg)   12/09/24 208 lb (94.3 kg)   11/14/24 203 lb (92.1 kg)   10/01/24 205 lb (93 kg)     BP Readings from Last 3 Encounters:   03/24/25 93/58   03/04/25 105/68   02/25/25 90/70     C/o fatigue not going away  Saw cardiology  He took her of carvedilol as bps were low. 3 weeks ago  Feels unsteady sometimes.  Feels dizzy if she turns her head too fast.  Next appointment with cardiology in April 2025      5 days ago had pain left base of head and went to her neck  She used traction .  Hx of chronic neck pain  Had shots in her neck and head in past.    Taking 2 shots of expresso that helps,  Also has some salt and feels better.    Seeing cardiology, has stress test scheduled, us legs    Last TSH  4.842 2/28  Dose of thyroid medication was increased    She took off her self off abilify  Takes lorazepam as needed     Taking semaglutide injections (online) , lowest dose.    Review of Systems   Constitutional:  Positive for fatigue. Negative for chills and fever.   Eyes:  Negative for visual disturbance.   Respiratory:  Negative for cough, shortness of breath and wheezing.    Cardiovascular:  Negative for chest pain, palpitations and leg swelling.   Genitourinary:  Negative for decreased urine volume and difficulty urinating.   Neurological:  Positive for dizziness. Negative for tremors, seizures, weakness, light-headedness, numbness and headaches.       BP 93/58   Pulse 75   Temp 98.1 °F (36.7 °C) (Temporal)   Ht 5' 5\" (1.651 m)   Wt 212 lb (96.2 kg)   SpO2 95%   BMI 35.28 kg/m²          Current Outpatient Medications    Medication Sig Dispense Refill    semaglutide 2 MG/3ML Subcutaneous Solution Pen-injector Inject 0.25 mg into the skin once a week. From online      OMEPRAZOLE 40 MG Oral Capsule Delayed Release TAKE 1 CAPSULE (40 MG TOTAL) BY MOUTH DAILY. 90 capsule 2    FAMOTIDINE 20 MG Oral Tab TAKE 1 TABLET BY MOUTH EVERY DAY 90 tablet 2    levothyroxine 137 MCG Oral Tab Take 137 mcg by mouth before breakfast. 90 tablet 1    montelukast 10 MG Oral Tab Take 1 tablet (10 mg total) by mouth nightly.      busPIRone 15 MG Oral Tab Take 1 tablet (15 mg total) by mouth in the morning and 1 tablet (15 mg total) before bedtime. 180 tablet 0    LORazepam 1 MG Oral Tab Take 1/2 to 1 tablet (1 mg) twice a day as needed for anxiety 60 tablet 2    temazepam 30 MG Oral Cap Take 1 capsule (30 mg total) by mouth nightly as needed for Sleep. 30 capsule 2    azelastine 0.1 % Nasal Solution 1 spray by Nasal route 2 (two) times daily. 1 each 3    gabapentin 800 MG Oral Tab Take 1 tablet (800 mg total) by mouth 2 (two) times daily. 180 tablet 3    fluticasone propionate 50 MCG/ACT Nasal Suspension SPRAY 2 SPRAYS BY EACH NARE ROUTE NIGHTLY. 48 mL 1    Melatonin 10 MG Oral Cap Take 5 mg by mouth nightly as needed.      rosuvastatin (CRESTOR) 20 MG Oral Tab Take 1 tablet (20 mg total) by mouth nightly. 90 tablet 3    ARIPiprazole 5 MG Oral Tab Take 1 tablet (5 mg total) by mouth at bedtime. (Patient not taking: Reported on 3/24/2025) 30 tablet 2     Allergies:Allergies[1]   PHYSICAL EXAM:     Chief Complaint   Patient presents with    Fatigue     Feeling pain on back of her head and left side of neck  4-5 days states she discontinued her abilify would like to discuss her thyroid levels         Physical Exam  Vitals and nursing note reviewed.   Constitutional:       Appearance: She is well-developed.   Eyes:      Pupils: Pupils are equal, round, and reactive to light.   Neck:      Thyroid: No thyromegaly.   Cardiovascular:      Rate and Rhythm: Normal  rate and regular rhythm.      Heart sounds: No murmur heard.  Pulmonary:      Effort: Pulmonary effort is normal.      Breath sounds: Normal breath sounds. No wheezing.   Abdominal:      Palpations: There is no mass.      Tenderness: There is no abdominal tenderness. There is no right CVA tenderness or left CVA tenderness.   Musculoskeletal:      Cervical back: Normal range of motion and neck supple.      Right lower leg: No edema.      Left lower leg: No edema.   Skin:     General: Skin is warm and dry.      Findings: No rash.   Neurological:      Mental Status: She is alert and oriented to person, place, and time.                ASSESSMENT/PLAN:     Encounter Diagnoses   Name Primary?    Fatigue, unspecified type Yes    Essential hypertension     Hypotension, unspecified hypotension type     Obesity (BMI 30-39.9)        1. Fatigue, unspecified type  Suspect combination of tsh elevated and low bps.  Push fluids.  Avoid skipping meals.    2. Essential hypertension  Stable condition  Reviewed medications  Continue current medication management   Off coreg  Follow up cardilogy  Complete tests    3. Hypotension, unspecified hypotension type  Push fluids  Follow up cardiology, and complete tests      No orders of the defined types were placed in this encounter.        The above note was creating using Dragon speech recognition technology. Please excuse any typos    Meds This Visit:  Requested Prescriptions      No prescriptions requested or ordered in this encounter       Imaging & Referrals:  None       ID#[id number]       [1]   Allergies  Allergen Reactions    Demerol [Meperidine] NAUSEA AND VOMITING and NAUSEA ONLY     Other reaction(s): Vomiting    Hydrocodone NAUSEA AND VOMITING    Pregabalin      Other reaction(s): Stomach discomforts    Vicodin Tuss [Hydrocodone-Guaifenesin] NAUSEA AND VOMITING and NAUSEA ONLY     Other reaction(s): Vomiting    Tramadol NAUSEA ONLY

## 2025-03-31 ENCOUNTER — TELEPHONE (OUTPATIENT)
Dept: ENDOCRINOLOGY CLINIC | Facility: CLINIC | Age: 70
End: 2025-03-31

## 2025-04-02 ENCOUNTER — OFFICE VISIT (OUTPATIENT)
Age: 70
End: 2025-04-02
Payer: COMMERCIAL

## 2025-04-02 VITALS
DIASTOLIC BLOOD PRESSURE: 71 MMHG | BODY MASS INDEX: 35.32 KG/M2 | HEIGHT: 65 IN | HEART RATE: 71 BPM | WEIGHT: 212 LBS | SYSTOLIC BLOOD PRESSURE: 111 MMHG

## 2025-04-02 DIAGNOSIS — M17.0 PRIMARY OSTEOARTHRITIS OF BOTH KNEES: Primary | ICD-10-CM

## 2025-04-02 PROCEDURE — 3008F BODY MASS INDEX DOCD: CPT | Performed by: INTERNAL MEDICINE

## 2025-04-02 PROCEDURE — 20610 DRAIN/INJ JOINT/BURSA W/O US: CPT | Performed by: INTERNAL MEDICINE

## 2025-04-02 PROCEDURE — 3074F SYST BP LT 130 MM HG: CPT | Performed by: INTERNAL MEDICINE

## 2025-04-02 PROCEDURE — 99213 OFFICE O/P EST LOW 20 MIN: CPT | Performed by: INTERNAL MEDICINE

## 2025-04-02 PROCEDURE — 3078F DIAST BP <80 MM HG: CPT | Performed by: INTERNAL MEDICINE

## 2025-04-02 RX ORDER — TRIAMCINOLONE ACETONIDE 40 MG/ML
40 INJECTION, SUSPENSION INTRA-ARTICULAR; INTRAMUSCULAR ONCE
Status: COMPLETED | OUTPATIENT
Start: 2025-04-02 | End: 2025-04-02

## 2025-04-02 NOTE — PROGRESS NOTES
Vandana Jefferson is a 69 year old female.    HPI:     Chief Complaint   Patient presents with    Knee Pain     B/l Knee Pain and Injections        I had the pleasure of seeing Vandana Jefferson on 4/2/2025 for follow up Polyarthralgia's, OA knee's    Current Medications:  Advil as needed 2-6 pills a day  Blood work:  ESR 36, CRP 4.29--> repeat in March 2022 normal  Neg RF, CCP, HLA-B27  XR R knee: moderate OA, +choncrocalcinosis     Interval History:  This is a 65 yo F with hx of HTN, HLD, Hypothyroid, GERD, CBP s/p laminectomy 1989 and fusion 2016 presents with polyarthralgias.  She was seen by Dr. Gibbs in the past and received a cortisone injection in her left knee in 2018.  She also has chronic lower back pain.  After her fusion in 2016 she was doing well for about 2 years but then started to have a lot more back pain.  She reports the back pain is in the lower spine and radiates down her gluteal region and down to her knees.  She is now following with neurosurgeon Dr. Thrasher and will be getting MRI of her back next week.  She reports diffuse joint pain involving her hands, wrists, shoulders, knees.  She has bone spurs in her DIPs.  Again she was seen by Dr. Gibbs in the past for her osteoarthritis and received cortisone injections in her left knee.  Never received gel injections.  Currently takes tramadol once in the morning and then Advil 2 to 4 pills a day.  This is mostly for her back pain    3/30/2022:  Presents for f/u of polyarthralgia's and L knee pain  We injected her left knee with cortisone about a week ago  Blood work showed negative RF and CCP but her inflammation markers were high  Her left knee has been doing better after the injection.  She can walk up and down stairs with minimal pain  Continues to have chronic left shoulder pain.  At times is hard to raise it.  No pain in her right shoulder.  No pain in her hips  Has hand pain but mostly in her DIPs.  No pain or swelling in her PIPs or  MCPs.  She does have wrist swelling  She has chronic back pain  No temporal pain, headache, jaw pain or blurry vision    12/1/2022:  Presents for f/u of R knee pain  X-ray of her right knee did show moderate OA changes and chondrocalcinosis  Fell on left shoulder and broke R clavicle, following with orthopedic now  Having pain in both knees again, Left is worse than Right  Yesterday after shower could not walk due to pain in R knee. Also had some swelling in the R knee  Right knee is better today, was taking advil, icing and heat    9/13/2023:  Presents for f/u of knee pain  Also has diffuse joint pain, all joints hurt  Last year she fell and broke her left clavicle, has weakness in shoulders, wants to try physical therapy  Had lower back epidural injection about 2 mos ago but only helped 20%  Has been taking Advil 4 to 6 pills a day for her pain    4/3/2024:  Presents for f/u of knee pain  Having worsening left knee pain, navin the back of the knee  Went to urgent care due to the left knee pain, US showed 3.6 cm left bakers cyst. Also has pain behind the knee    5/14/2024:  Presents for f/u of knee pain  Last visit both knees were injected with cortisone April and helped but symptoms coming back now  She is here for gel injections    8/28/2024:  Presents for f/u of knee pain  Last visit in May 2024 both knees injected with Synvisc 1 but did not help  Both knees were injected with cortisone in April and helped her   She recently had COVID but now doing better    4/2/2025:  Presents for f/u of knee pain  Last visit in Aug 2024 both knees injected with cortisone  Having worsening pain in the knees  Having worsening pain in the hands, left shoulder and clavicle  She will take advil as needed         HISTORY:  Past Medical History:    Age-related nuclear cataract of both eyes    Anxiety state, unspecified    Back pain    chronic    Cardiomyopathy (HCC)    Cataract    both eyes    Disorder of thyroid    Dry eye     Essential hypertension    Fibromyalgia    Floaters, right    H/O varicose vein stripping    Per NG: Varicose vein ; vein stripping    Headache    Per NG: OU    High cholesterol    Hypothyroidism    Meibomian gland dysfunction    Musculoskeletal problem    Per NG: injection tendon origin/insertion    Musculoskeletal problem    Per NG: Arthrocentesis of the left knee joint    Neck pain    chronic    Osteoarthritis    Radiation    Per NG: Radiation to thyroid     Status post epidural steroid injection    Per NG: epidral injection x5 per pain mgmt      Social Hx Reviewed   Family Hx Reviewed     Medications (Active prior to today's visit):  Current Outpatient Medications   Medication Sig Dispense Refill    semaglutide 2 MG/3ML Subcutaneous Solution Pen-injector Inject 0.25 mg into the skin once a week. From online      OMEPRAZOLE 40 MG Oral Capsule Delayed Release TAKE 1 CAPSULE (40 MG TOTAL) BY MOUTH DAILY. 90 capsule 2    FAMOTIDINE 20 MG Oral Tab TAKE 1 TABLET BY MOUTH EVERY DAY 90 tablet 2    levothyroxine 137 MCG Oral Tab Take 137 mcg by mouth before breakfast. 90 tablet 1    montelukast 10 MG Oral Tab Take 1 tablet (10 mg total) by mouth nightly.      busPIRone 15 MG Oral Tab Take 1 tablet (15 mg total) by mouth in the morning and 1 tablet (15 mg total) before bedtime. 180 tablet 0    ARIPiprazole 5 MG Oral Tab Take 1 tablet (5 mg total) by mouth at bedtime. 30 tablet 2    LORazepam 1 MG Oral Tab Take 1/2 to 1 tablet (1 mg) twice a day as needed for anxiety 60 tablet 2    temazepam 30 MG Oral Cap Take 1 capsule (30 mg total) by mouth nightly as needed for Sleep. 30 capsule 2    azelastine 0.1 % Nasal Solution 1 spray by Nasal route 2 (two) times daily. 1 each 3    gabapentin 800 MG Oral Tab Take 1 tablet (800 mg total) by mouth 2 (two) times daily. 180 tablet 3    fluticasone propionate 50 MCG/ACT Nasal Suspension SPRAY 2 SPRAYS BY EACH NARE ROUTE NIGHTLY. 48 mL 1    Melatonin 10 MG Oral Cap Take 5 mg by mouth  nightly as needed.      rosuvastatin (CRESTOR) 20 MG Oral Tab Take 1 tablet (20 mg total) by mouth nightly. 90 tablet 3     .cmed  Allergies:  Allergies   Allergen Reactions    Demerol [Meperidine] NAUSEA AND VOMITING and NAUSEA ONLY     Other reaction(s): Vomiting    Hydrocodone NAUSEA AND VOMITING    Pregabalin      Other reaction(s): Stomach discomforts    Vicodin Tuss [Hydrocodone-Guaifenesin] NAUSEA AND VOMITING and NAUSEA ONLY     Other reaction(s): Vomiting    Tramadol NAUSEA ONLY         ROS:   All other ROS are negative.     PHYSICAL EXAM:   GEN: AAOx3, NAD  HEENT: EOMI, PERRLA, no injection or icterus, oral mucosa moist, no oral lesions. No lymphadenopathy. No facial rash  CVS: RRR, no murmurs rubs or gallops. Equal 2+ distal pulses.   LUNGS: CTAB, no increased work of breathing  ABDOMEN:  soft NT/ND, +BS, no HSM  SKIN: No rashes or skin lesions. No nail findings  MSK:  Cervical spine: FROM  Hands: + [place] nodes in R second DIP and L second and third DIP.  No swelling, able to make a fist  Wrist: FROM, no pain or swelling or warmth on palpation  Elbow: FROM, no pain or swelling or warmth on palpation  Shoulders: L shoulder abduction and flexion limited to about 150 degrees  Hip: normal log roll, no lateral hip pain, FRED test negative b/l  Knees: FROM, no warmth or effusion present. No pain with ROM.   Ankles: FROM, no pain or swelling or warmth on palpation  Feet: no pain with MTP squeeze, no toe swelling or pain or warmth on palpation with FROM  Spine: no lumbar or sacral pain on palpation.  NEURO: Cranial nerves II-XII intact grossly. 5/5 strength throughout in both upper and lower extremities, sensation intact.  PSYCH: normal mood       LABS:     Component      Latest Ref Rng & Units 3/23/2022   C-REACTIVE PROTEIN      <0.30 mg/dL 4.29 (H)   C-Citrullinated Peptide IgG AB      0.0 - 6.9 U/mL 1.3   SED RATE      0 - 30 mm/Hr 36 (H)   RHEUMATOID FACTOR      <15 IU/mL <10     Imaging:     XR L  knee:  CONCLUSION:   1. Demineralization.   2. Osteoarthritis showing progression from October 29, 2010.   3. Chondrocalcinosis a new finding.   4. Postop changes in the soft tissues of the thigh.    XR R knee:  CONCLUSION:   1. Demineralization.   2. Mild to moderate osteoarthritis.   3. Chondrocalcinosis.     ASSESSMENT/PLAN:     Bilateral knee pain secondary to OA  - X-rays do show evidence of moderate osteoarthritis and chondrocalcinosis  - No evidence of swelling to suggest pseudogout  - B/L injected with cortisone in March and Sept 2023, 4/2024, 8/2024, Synvisc 5/2024  - Ultrasound showed a 3.6 cm Baker's cyst  - Both knees injected with 1 cc of lidocaine and 40 mg of Kenalog in sterile fashion.  Patient tolerated procedure well    Generalized OA, chronic pain  - She will try Celebrex 200 mg twice a day for her pain.  Advised to take with food and not with any other NSAID    Left clavicle fracture due to fall- stable  -Still having some pain, I will send to physical therapy    Left shoulder pain  - Left shoulder was injected back in March 2023, it helped her symptoms but then she fell now having left clavicle and shoulder pain  - will send to PT    Elevated inflammatory markers  - Repeat blood work shows normal ESR and CRP    Chronic lower back pain s/p laminectomy and fusion  - She is also going to be following with neurosurgeon Dr. Thrasher for her chronic LBP  - MRI lower spine done 4/2024 showing degenerative disc disease throughout the lumbar spine and moderate narrowing of the bilateral neural foramin    Pt will f/u in 4-6 mos    There is a longitudinal care relationship with me, the care plan reflects the ongoing nature of the continuous relationship of care, and the medical record indicates that there is ongoing treatment of a serious/complex medical condition which I am currently managing.  is Applicable.     Caity Ellison MD  4/2/2025  10:00 AM

## 2025-05-22 ENCOUNTER — LAB ENCOUNTER (OUTPATIENT)
Dept: LAB | Age: 70
End: 2025-05-22
Attending: INTERNAL MEDICINE
Payer: COMMERCIAL

## 2025-05-22 DIAGNOSIS — E03.9 HYPOTHYROIDISM, UNSPECIFIED TYPE: ICD-10-CM

## 2025-05-22 LAB
T4 FREE SERPL-MCNC: 2.5 NG/DL (ref 0.8–1.7)
TSI SER-ACNC: 0.02 UIU/ML (ref 0.55–4.78)

## 2025-05-22 PROCEDURE — 84439 ASSAY OF FREE THYROXINE: CPT

## 2025-05-22 PROCEDURE — 84443 ASSAY THYROID STIM HORMONE: CPT

## 2025-05-22 PROCEDURE — 36415 COLL VENOUS BLD VENIPUNCTURE: CPT

## 2025-05-23 ENCOUNTER — TELEPHONE (OUTPATIENT)
Dept: FAMILY MEDICINE CLINIC | Facility: CLINIC | Age: 70
End: 2025-05-23

## 2025-05-23 ENCOUNTER — TELEPHONE (OUTPATIENT)
Dept: ENDOCRINOLOGY CLINIC | Facility: CLINIC | Age: 70
End: 2025-05-23

## 2025-05-23 NOTE — TELEPHONE ENCOUNTER
Patient confirms she has been taking Levothyroxine 137 mcg. She states understanding of all Doctor Kyle's note information stated and repeated to her as well as to call us of any time of any day if she has any issues/symptoms from holding medication.

## 2025-05-23 NOTE — TELEPHONE ENCOUNTER
Received labs for Dr Rosario's patient   T4 is very high   She is on LT4 137 mcg daily and has apt with Dr BROWN on 5/28  Please hold LT4 now    Dr BROWN: do you have any further recs  Thanks

## 2025-05-23 NOTE — TELEPHONE ENCOUNTER
Patient called, verified Name and . States she has an appointment with Dr. Rosario on Wednesday. She does not think she has referral and needs one. Chart reviewed. Relayed an Endocrinology referral was placed on 24 for 3 visits. She has not seen Dr. Rosario in the office this year. Advised that referral is still good and does not need a new one. Patient verbalized understanding and had no further questions at this time.     Future Appointments   Date Time Provider Department Center   2025  2:00 PM Dalton Mendoza APRN [facility] [place]   2025  4:00 PM [facility]

## 2025-05-28 ENCOUNTER — OFFICE VISIT (OUTPATIENT)
Dept: ENDOCRINOLOGY CLINIC | Facility: CLINIC | Age: 70
End: 2025-05-28
Payer: COMMERCIAL

## 2025-05-28 VITALS
HEART RATE: 72 BPM | HEIGHT: 65 IN | BODY MASS INDEX: 33.49 KG/M2 | WEIGHT: 201 LBS | DIASTOLIC BLOOD PRESSURE: 81 MMHG | SYSTOLIC BLOOD PRESSURE: 128 MMHG

## 2025-05-28 DIAGNOSIS — E03.9 HYPOTHYROIDISM, UNSPECIFIED TYPE: Primary | ICD-10-CM

## 2025-05-28 PROCEDURE — 3079F DIAST BP 80-89 MM HG: CPT | Performed by: INTERNAL MEDICINE

## 2025-05-28 PROCEDURE — 99214 OFFICE O/P EST MOD 30 MIN: CPT | Performed by: INTERNAL MEDICINE

## 2025-05-28 PROCEDURE — 3074F SYST BP LT 130 MM HG: CPT | Performed by: INTERNAL MEDICINE

## 2025-05-28 PROCEDURE — 3008F BODY MASS INDEX DOCD: CPT | Performed by: INTERNAL MEDICINE

## 2025-05-28 RX ORDER — LEVOTHYROXINE SODIUM 125 UG/1
125 TABLET ORAL
Qty: 90 TABLET | Refills: 2 | Status: SHIPPED | OUTPATIENT
Start: 2025-05-28

## 2025-05-28 NOTE — PROGRESS NOTES
Name: Vandana Jefferson  Date: 5/28/25    Referring Physician: Nica Ocampo    Chief Complaint   Patient presents with    Hypothyroidism     Pt is in for a Thyroid follow up       HISTORY OF PRESENT ILLNESS   Vandana Jefferson is a 69 year old female who presents for   Chief Complaint   Patient presents with    Hypothyroidism     Pt is in for a Thyroid follow up     At the initial visit I had evaluated the patient for increased sweating at night. I had proposed that it may be due to her being on too much levothyroxine, but also checked metanephrines as well tried to rule out carcinoid. When we checked metanephrines, we saw that the normetanephrine was elevated above the upper limit of normal.     We had agreed to recheck her plasma metanephrines as well as decrease her levothyroxine to 112mcg.     In April 2023, she came in follow up and had been feeling well on the 112mcg. She was no longer having any night sweats and felt that she was doing well on her current dose of levothyroxine.     She was concerned about her blood sugars and also about weight gain. She states that her joint pains had been getting worse and she has been trying very hard to lose weight to help with this.     She has also been trying to watch her diet as well, but has increased appetite towards end of day.    We had continued her levothyroxine 112mcg.     Recently, in December 2024, we saw that TSH was elevated and so increased the levothyroxine dose to 125mcg. 3 months later, the TSH had improved but did not normalize. Her dose was increased to 137mcg.     A few days ago, she labs drawn again, and was also having symptoms similar to menopause, hot flashes, sweating.    Medical History:   Past Medical History:    Age-related nuclear cataract of both eyes    Anxiety state, unspecified    Back pain    chronic    Cardiomyopathy (HCC)    Cataract    both eyes    Disorder of thyroid    Dry eye    Essential hypertension    Fibromyalgia    Floaters,  right    H/O varicose vein stripping    Per NG: Varicose vein ; vein stripping    Headache    Per NG: OU    High cholesterol    Hypothyroidism    Meibomian gland dysfunction    Musculoskeletal problem    Per NG: injection tendon origin/insertion    Musculoskeletal problem    Per NG: Arthrocentesis of the left knee joint    Neck pain    chronic    Osteoarthritis    Radiation    Per NG: Radiation to thyroid     Status post epidural steroid injection    Per NG: epidral injection x5 per pain mgmt       Surgical History:   Past Surgical History:   Procedure Laterality Date    Appendectomy      Back surgery  2016    lumbar L4-L5 fusion      1979    1    Cataract  2023    Right eye    Cataract extraction w/  intraocular lens implant Left 2023    Dr. Windy Barnett @ [facility]    Cataract extraction w/ intraocular lens implant Right 2023    Dr. Windy Barnett @[facility]     delivery only      Cholecystectomy      Colonoscopy  10/2005    normal, non precancerous polyps    Colonoscopy N/A 2018    Procedure: COLONOSCOPY;  Surgeon: Sean Hubbard MD;  Location: [facility] ENDOSCOPY    Colonoscopy N/A 2023    Procedure: COLONOSCOPY;  Surgeon: Sean Hubbard MD;  Location: [facility] ENDO    Colonoscopy N/A 2024    Procedure: COLONOSCOPY;  Surgeon: Sean Hubbard MD;  Location: [facility] ENDO    Cyst aspiration left Left 2007    FNA    Cyst aspiration right      Egd      Laminectomy      Per NG: Disc  displacement x2 and spinal fusion 2016    [facility] localization wire 1 site left (cpt=19281)  2007    Other surgical history      Per NG: Arthrocentesis    Other surgical history      Per NG: arthrocentesis of the knee joint    Yag iridotomy - od - right eye Right 10/10/1997    [facility]    Yag iridotomy - os - left eye Left 10/16/1997    [facility]       Family History:  Family History   Problem Relation Age of Onset    Depression Mother     Neurological Disorder Mother         Per NG: Hypothyrodism     Arthritis Mother         Per NG: Osteoarthritis    Other (Other) Mother     Skin cancer Mother     Heart Disorder Father 65    Depression Maternal Grandfather     Other (Other) Maternal Grandfather     Heart Attack Neg     Diabetes Neg         Per NG; No diabetes    Glaucoma Neg         Per NG: No glaucoma histroy    Cancer Neg     Hypertension Neg     Lipids Neg     Breast Cancer Neg     Ovarian Cancer Neg     Prostate Cancer Neg     Pancreatic Cancer Neg        Social History:   Social History     Socioeconomic History    Marital status:    Tobacco Use    Smoking status: Former     Current packs/day: 0.00     Average packs/day: 1 pack/day for 27.0 years (27.0 ttl pk-yrs)     Types: Cigarettes     Start date: 1978     Quit date: 2005     Years since quittin.4    Smokeless tobacco: Never    Tobacco comments:     7 years ago   Vaping Use    Vaping status: Never Used   Substance and Sexual Activity    Alcohol use: No    Drug use: No   Other Topics Concern    Caffeine Concern Yes     Comment: 3 cups of coffee daily    Exercise No    Pt has a pacemaker No    Pt has a defibrillator No    Breast feeding No    Reaction to local anesthetic No     Social Drivers of Health      Received from [facility]    Housing Stability       Medications:     Current Outpatient Medications:     sertraline 50 MG Oral Tab, Take 1 tablet (50 mg total) by mouth daily with dinner., Disp: 90 tablet, Rfl: 0    [START ON 6/15/2025] temazepam 30 MG Oral Cap, Take 1 capsule (30 mg total) by mouth nightly as needed for Sleep., Disp: 30 capsule, Rfl: 2    busPIRone 15 MG Oral Tab, Take 1 tablet (15 mg total) by mouth in the morning and 1 tablet (15 mg total) before bedtime., Disp: 180 tablet, Rfl: 0    LORazepam 1 MG Oral Tab, Take 1/2 to 1 tablet (1 mg) twice a day as needed for anxiety, Disp: 60 tablet, Rfl: 2    semaglutide 2 MG/3ML Subcutaneous Solution Pen-injector, Inject 0.25 mg into the skin once a week.  From online, Disp: , Rfl:     OMEPRAZOLE 40 MG Oral Capsule Delayed Release, TAKE 1 CAPSULE (40 MG TOTAL) BY MOUTH DAILY., Disp: 90 capsule, Rfl: 2    FAMOTIDINE 20 MG Oral Tab, TAKE 1 TABLET BY MOUTH EVERY DAY, Disp: 90 tablet, Rfl: 2    levothyroxine 137 MCG Oral Tab, Take 137 mcg by mouth before breakfast. (Patient not taking: Reported on 5/28/2025), Disp: 90 tablet, Rfl: 1    montelukast 10 MG Oral Tab, Take 1 tablet (10 mg total) by mouth nightly., Disp: , Rfl:     azelastine 0.1 % Nasal Solution, 1 spray by Nasal route 2 (two) times daily., Disp: 1 each, Rfl: 3    gabapentin 800 MG Oral Tab, Take 1 tablet (800 mg total) by mouth 2 (two) times daily., Disp: 180 tablet, Rfl: 3    fluticasone propionate 50 MCG/ACT Nasal Suspension, SPRAY 2 SPRAYS BY EACH NARE ROUTE NIGHTLY., Disp: 48 mL, Rfl: 1    Melatonin 10 MG Oral Cap, Take 5 mg by mouth nightly as needed., Disp: , Rfl:     rosuvastatin (CRESTOR) 20 MG Oral Tab, Take 1 tablet (20 mg total) by mouth nightly., Disp: 90 tablet, Rfl: 3     Allergies:   Allergies   Allergen Reactions    Demerol [Meperidine] NAUSEA AND VOMITING and NAUSEA ONLY     Other reaction(s): Vomiting    Hydrocodone NAUSEA AND VOMITING    Pregabalin      Other reaction(s): Stomach discomforts    Vicodin Tuss [Hydrocodone-Guaifenesin] NAUSEA AND VOMITING and NAUSEA ONLY     Other reaction(s): Vomiting    Tramadol NAUSEA ONLY       REVIEW OF SYSTEMS  Eyes: no change in vision  Neurologic: no headache, generalized or focal weakness or numbness.  Head: normal  ENT: normal  Lungs: no shortness of breath, wheezing or SERNA  Cardiovascular:  no chest pain or palpitations  Gastrointestinal:  no abdominal pain, bowel movement problems  Musculoskeletal: no muscle pain or arthralgia  /Gyne: no frequency or discomfort while urinating  Psychiatric:  no acute distress, anxiety  or depression  Skin: normal moisturized skin    PHYSICAL EXAMINATION:  Vitals:    05/28/25 1603   BP: 128/81   Pulse: 72        CONSTITUTIONAL:  awake, alert, cooperative, no apparent distress, and appears stated age  PSYCH: normal affect  SKIN:  no bruising or bleeding, no rashes and no lesions  EXTREMITIES: no edema      Labs:  Date  TSH  FT4  LT4  06/29/21 4.280  0.9  112  09/20/21 6.480  1.1  112  10/28/21 0.828  1.2  125  11/14/21 0.699    112  02/16/22 1.330  1.1  112   05/17/22 2.190  1.6  112  09/13/22 1.270  1.3  112  08/07/23 1.780  1.3  112  12/10/24 8.544  1.3  112  02/28/25 4.842  1.4  125  05/22/25 0.019  2.5  137             Imaging:  3/03/21  PROCEDURE: US THYROID (CPT= 61992)       COMPARISON: [facility], US HEAD/NECK (SDV=99399), 3/21/2019, 10:12 AM.       INDICATIONS: Neck pain on left side       TECHNIQUE: High-resolution ultrasound was performed of the thyroid gland.       FINDINGS:   RIGHT LOBE: 2.65 x 1.02 x 0.63 cm, 0.81 ml. Heterogeneous echogenicity. Cameron shaped nodule slightly heterogeneous in appearance well demarcated from the surrounding tissues without echogenic foci measuring 0.33 x 0.24 x 0.23 cm.  Rounded nodule   slightly heterogeneous in appearance well demarcated from the surrounding tissues without echogenic foci measuring 0.30 x 0.36 x 0.24 cm.       LEFT LOBE: 2.16 x 1.11 x 0.55 cm, 0.76 ml. Heterogeneous echogenicity. Cameron shaped nodule slightly heterogeneous in appearance well demarcated from the surrounding tissues without echogenic foci measuring 0.53 x 0.36 x 0.39 cm.     ISTHMUS: 0.10 cm. AP diameter in the midline. Heterogeneous echogenicity. No masses.       OTHER: No masses or significant adenopathy.       Impression   CONCLUSION:   1. Multinodular atrophic thyroid in a heterogeneous appearing gland showing little change from March 21, 2019.          ASSESSMENT/PLAN:-This is a 69 year-old female patient with a many year history of hypothyroidism most likely from autoimmune disease. The 137mcg dose is clearly a higher dose for her than what is necessary. I would like  her to hold her levothyroxine for a total of 2 weeks, and then resume the levothyroxine at 125mcg.     - We discussed the diagnosis of hypothyroidism.  - We discussed the nonspecific nature of the symptoms of thyroid disease.  - We discussed that occasionally we require optimization of thyroid levels to TSH 1.0  - We also discussed that if symptoms do not improve on optimized levels then we can always consider combination treatment with T4 and T3  - I reminded the patient about the proper way in which to take the medication (an hour before any medications or food and that they may take two the next day if they forget to take a dose today) and they acknowledged understanding.    - Decrease dose to 125mcg  - Check TSH and FT4 in 3 months    Return to clinic in 6 months    Prior to this encounter, I spent over 15 minutes with preparing for the visit, including reviewing documents from other specialties as well as from PCP and going over test results and imaging studies. During the face to face encounter, I spent an additional 15 minutes which were determined for follow-up. Greater than 50% of the time was spent in counseling, anticipatory guidance, and coordination of care. Patient concerns were answered to the best of my knowledge.         5/28/25  Estefanía Rosario MD

## 2025-06-26 ENCOUNTER — OFFICE VISIT (OUTPATIENT)
Dept: FAMILY MEDICINE CLINIC | Facility: CLINIC | Age: 70
End: 2025-06-26
Payer: COMMERCIAL

## 2025-06-26 VITALS
TEMPERATURE: 98 F | DIASTOLIC BLOOD PRESSURE: 71 MMHG | HEART RATE: 67 BPM | SYSTOLIC BLOOD PRESSURE: 103 MMHG | WEIGHT: 201 LBS | BODY MASS INDEX: 33.49 KG/M2 | HEIGHT: 65 IN

## 2025-06-26 DIAGNOSIS — M15.0 PRIMARY OSTEOARTHRITIS INVOLVING MULTIPLE JOINTS: ICD-10-CM

## 2025-06-26 DIAGNOSIS — M79.642 PAIN IN BOTH HANDS: ICD-10-CM

## 2025-06-26 DIAGNOSIS — M79.641 PAIN IN BOTH HANDS: ICD-10-CM

## 2025-06-26 DIAGNOSIS — J32.9 SINUSITIS, UNSPECIFIED CHRONICITY, UNSPECIFIED LOCATION: Primary | ICD-10-CM

## 2025-06-26 DIAGNOSIS — J30.9 ALLERGIC RHINITIS, UNSPECIFIED SEASONALITY, UNSPECIFIED TRIGGER: ICD-10-CM

## 2025-06-26 DIAGNOSIS — R59.0 LEFT CERVICAL LYMPHADENOPATHY: ICD-10-CM

## 2025-06-26 PROCEDURE — 3078F DIAST BP <80 MM HG: CPT | Performed by: FAMILY MEDICINE

## 2025-06-26 PROCEDURE — 3008F BODY MASS INDEX DOCD: CPT | Performed by: FAMILY MEDICINE

## 2025-06-26 PROCEDURE — 3074F SYST BP LT 130 MM HG: CPT | Performed by: FAMILY MEDICINE

## 2025-06-26 PROCEDURE — 99214 OFFICE O/P EST MOD 30 MIN: CPT | Performed by: FAMILY MEDICINE

## 2025-06-26 RX ORDER — AZITHROMYCIN 250 MG/1
TABLET, FILM COATED ORAL
Qty: 6 TABLET | Refills: 0 | Status: SHIPPED | OUTPATIENT
Start: 2025-06-26 | End: 2025-07-01

## 2025-06-26 RX ORDER — LEVOCETIRIZINE DIHYDROCHLORIDE 5 MG/1
5 TABLET, FILM COATED ORAL EVERY EVENING
Qty: 30 TABLET | Refills: 1 | Status: SHIPPED | OUTPATIENT
Start: 2025-06-26

## 2025-06-26 NOTE — PROGRESS NOTES
HPI:    Patient ID: Vandana Jefferson is a 69 year old female.       The following individual(s) verbally consented to be recorded using ambient AI listening technology and understand that they can each withdraw their consent to this listening technology at any point by asking the clinician to turn off or pause the recording:    Patient name: Vandana Jefferson    History of Present Illness  Vandana Jefferson is a 69 year old female who presents with left neck pain and swelling.    She has had a calcification on the left side of her neck for over ten years, which has recently increased in size and become more painful. The pain is intense, especially when straining during bowel movements. She uses a strong Chinese oil for pain relief. No ear pain or difficulty swallowing, but she notes sinus pressure and a runny nose.    Approximately ten years ago, a CT scan confirmed the presence of a calcification. Recently, she has noticed worsening swelling over the past few months.    Her current medications include azelastine nasal spray and montelukast, which she takes infrequently. She recently completed a prednisone pack for back pain. She has a history of C. diff infection over twenty years ago and takes probiotics only when on antibiotics.    She experiences occasional coughing when lying down and has a persistent runny nose, which she attributes to sinus issues. She has not been using any allergy medications regularly but mentions having used Xyzal and Zyrtec in the past.     HPI    Chief Complaint   Patient presents with    Bump     On left side of neck was told she had a calcification also gets pain on right temporal and swelling     Referral     For rheumatologist        Wt Readings from Last 6 Encounters:   06/26/25 201 lb (91.2 kg)   05/28/25 201 lb (91.2 kg)   04/02/25 212 lb (96.2 kg)   03/24/25 212 lb (96.2 kg)   03/04/25 212 lb (96.2 kg)   02/25/25 212 lb (96.2 kg)     BP Readings from Last 3 Encounters:   06/26/25  103/71   05/28/25 128/81   04/02/25 111/71         Review of Systems   Constitutional:  Negative for chills and fever.   HENT:  Positive for congestion, rhinorrhea, sinus pressure and sinus pain. Negative for trouble swallowing and voice change.    Respiratory:  Positive for cough. Negative for shortness of breath.    Gastrointestinal:  Negative for abdominal pain, constipation, diarrhea, nausea and vomiting.   Neurological:  Negative for dizziness.       /71   Pulse 67   Temp 97.7 °F (36.5 °C) (Temporal)   Ht 5' 5\" (1.651 m)   Wt 201 lb (91.2 kg)   BMI 33.45 kg/m²        Current Medications[1]  Allergies:Allergies[2]   PHYSICAL EXAM:     Chief Complaint   Patient presents with    Bump     On left side of neck was told she had a calcification also gets pain on right temporal and swelling     Referral     For rheumatologist       Physical Exam  HENT:      Right Ear: Tympanic membrane and ear canal normal.      Left Ear: Tympanic membrane and ear canal normal.      Ears:      Comments: Tenderness left temporal area as well as left maxilla     Nose: Rhinorrhea present.   Neck:      Comments: No definite mass but tender left side neck ? Fullness, ? Cervical lymphadenopathy   Cardiovascular:      Rate and Rhythm: Normal rate and regular rhythm.      Pulses: Normal pulses.      Heart sounds: Normal heart sounds.   Musculoskeletal:      Cervical back: Normal range of motion and neck supple. Tenderness present.   Neurological:      Mental Status: She is alert.                ASSESSMENT/PLAN:   There are no diagnoses linked to this encounter.     Assessment & Plan  Neck pain with possible lymphadenopathy  Chronic left-sided neck pain with an enlarged lymph node on a 2012 CT scan. Reports increased pain and swelling over the last few months, particularly during bowel movements. No ear pain or dysphagia. Pain may be exacerbated by sinus issues. Differential diagnosis includes lymphadenopathy due to sinusitis or  other causes. A CT scan is warranted to assess for changes in lymph node size, given the recent increase in symptoms.  Has seen ent in past for this   - Order CT scan of the neck to assess for changes in lymph node size  - Prescribe azithromycin for potential sinusitis  - Advise resuming azelastine nasal spray  - Recommend taking montelukast regularly  - Prescribe Xyzal for allergy management  - Advise taking probiotics with antibiotics due to history of C. diff    Sinusitis  Symptoms include sinus pressure, rhinorrhea, and occasional cough when supine. Recently completed a prednisone pack for back pain, which may have reduced inflammation but not resolved sinus issues. Weather may be contributing to symptoms. Azithromycin is prescribed to address potential bacterial sinusitis, and resuming allergy medications is advised to manage symptoms.  - Prescribe azithromycin for sinusitis  - Advise resuming azelastine nasal spray  - Recommend taking montelukast regularly  - Prescribe Xyzal for allergy management    Rheumatologic concerns  Reports hand and knee pain and has been under rheumatologic care. Concerned about potential joint damage and requires further evaluation. Referral to rheumatologist Dr. Ellison is provided for comprehensive assessment of hand and knee pain.  - Provide referral to rheumatologist Dr. Ellison for evaluation of hand and knee pain    Dermatologic care  Desires to change dermatologists due to difficulty contacting the current provider. Previously referred to Dr. Wilfredo Oliva, who has since left the practice. Provided contact information for [facility] for continued care.  - Provide contact information for [facility] for dermatologic care    Recording duration: 13 minutes       No orders of the defined types were placed in this encounter.        The above note was creating using Dragon speech recognition technology. Please excuse any typos    Meds This Visit:  Requested Prescriptions      Signed Prescriptions Disp Refills    azithromycin 250 MG Oral Tab 6 tablet 0     Sig: Take 2 tablets (500 mg total) by mouth daily for 1 day, THEN 1 tablet (250 mg total) daily for 4 days.    levocetirizine 5 MG Oral Tab 30 tablet 1     Sig: Take 1 tablet (5 mg total) by mouth every evening.       Imaging & Referrals:  RHEUMATOLOGY - INTERNAL  CT SOFT TISSUE OF NECK(CONTRAST ONLY) (CPT=70491)       ID#[id number]       [1]   Current Outpatient Medications   Medication Sig Dispense Refill    azithromycin 250 MG Oral Tab Take 2 tablets (500 mg total) by mouth daily for 1 day, THEN 1 tablet (250 mg total) daily for 4 days. 6 tablet 0    levocetirizine 5 MG Oral Tab Take 1 tablet (5 mg total) by mouth every evening. 30 tablet 1    levothyroxine 125 MCG Oral Tab Take 1 tablet (125 mcg total) by mouth before breakfast. 90 tablet 2    sertraline 50 MG Oral Tab Take 1 tablet (50 mg total) by mouth daily with dinner. 90 tablet 0    temazepam 30 MG Oral Cap Take 1 capsule (30 mg total) by mouth nightly as needed for Sleep. 30 capsule 2    busPIRone 15 MG Oral Tab Take 1 tablet (15 mg total) by mouth in the morning and 1 tablet (15 mg total) before bedtime. 180 tablet 0    LORazepam 1 MG Oral Tab Take 1/2 to 1 tablet (1 mg) twice a day as needed for anxiety 60 tablet 2    OMEPRAZOLE 40 MG Oral Capsule Delayed Release TAKE 1 CAPSULE (40 MG TOTAL) BY MOUTH DAILY. 90 capsule 2    FAMOTIDINE 20 MG Oral Tab TAKE 1 TABLET BY MOUTH EVERY DAY 90 tablet 2    montelukast 10 MG Oral Tab Take 1 tablet (10 mg total) by mouth nightly.      gabapentin 800 MG Oral Tab Take 1 tablet (800 mg total) by mouth 2 (two) times daily. 180 tablet 3    fluticasone propionate 50 MCG/ACT Nasal Suspension SPRAY 2 SPRAYS BY EACH NARE ROUTE NIGHTLY. 48 mL 1    Melatonin 10 MG Oral Cap Take 5 mg by mouth nightly as needed.      rosuvastatin (CRESTOR) 20 MG Oral Tab Take 1 tablet (20 mg total) by mouth nightly. 90 tablet 3    semaglutide 2 MG/3ML Subcutaneous  Solution Pen-injector Inject 0.25 mg into the skin once a week. From online      levothyroxine 137 MCG Oral Tab Take 137 mcg by mouth before breakfast. (Patient not taking: Reported on 5/28/2025) 90 tablet 1    azelastine 0.1 % Nasal Solution 1 spray by Nasal route 2 (two) times daily. 1 each 3   [2]   Allergies  Allergen Reactions    Demerol [Meperidine] NAUSEA AND VOMITING and NAUSEA ONLY     Other reaction(s): Vomiting    Hydrocodone NAUSEA AND VOMITING    Pregabalin      Other reaction(s): Stomach discomforts    Vicodin Tuss [Hydrocodone-Guaifenesin] NAUSEA AND VOMITING and NAUSEA ONLY     Other reaction(s): Vomiting    Tramadol NAUSEA ONLY

## 2025-06-27 ENCOUNTER — TELEPHONE (OUTPATIENT)
Dept: FAMILY MEDICINE CLINIC | Facility: CLINIC | Age: 70
End: 2025-06-27

## 2025-06-27 NOTE — TELEPHONE ENCOUNTER
Pharmacy: this med not covered, $80 without insurance    Medications - Current[1]    Rx: Levocetirizine 5mg tablet       [1]   Current Outpatient Medications:     azithromycin 250 MG Oral Tab, Take 2 tablets (500 mg total) by mouth daily for 1 day, THEN 1 tablet (250 mg total) daily for 4 days., Disp: 6 tablet, Rfl: 0    levocetirizine 5 MG Oral Tab, Take 1 tablet (5 mg total) by mouth every evening., Disp: 30 tablet, Rfl: 1    levothyroxine 125 MCG Oral Tab, Take 1 tablet (125 mcg total) by mouth before breakfast., Disp: 90 tablet, Rfl: 2    sertraline 50 MG Oral Tab, Take 1 tablet (50 mg total) by mouth daily with dinner., Disp: 90 tablet, Rfl: 0    temazepam 30 MG Oral Cap, Take 1 capsule (30 mg total) by mouth nightly as needed for Sleep., Disp: 30 capsule, Rfl: 2    busPIRone 15 MG Oral Tab, Take 1 tablet (15 mg total) by mouth in the morning and 1 tablet (15 mg total) before bedtime., Disp: 180 tablet, Rfl: 0    LORazepam 1 MG Oral Tab, Take 1/2 to 1 tablet (1 mg) twice a day as needed for anxiety, Disp: 60 tablet, Rfl: 2    OMEPRAZOLE 40 MG Oral Capsule Delayed Release, TAKE 1 CAPSULE (40 MG TOTAL) BY MOUTH DAILY., Disp: 90 capsule, Rfl: 2    FAMOTIDINE 20 MG Oral Tab, TAKE 1 TABLET BY MOUTH EVERY DAY, Disp: 90 tablet, Rfl: 2    montelukast 10 MG Oral Tab, Take 1 tablet (10 mg total) by mouth nightly., Disp: , Rfl:     azelastine 0.1 % Nasal Solution, 1 spray by Nasal route 2 (two) times daily., Disp: 1 each, Rfl: 3    gabapentin 800 MG Oral Tab, Take 1 tablet (800 mg total) by mouth 2 (two) times daily., Disp: 180 tablet, Rfl: 3    fluticasone propionate 50 MCG/ACT Nasal Suspension, SPRAY 2 SPRAYS BY EACH NARE ROUTE NIGHTLY., Disp: 48 mL, Rfl: 1    Melatonin 10 MG Oral Cap, Take 5 mg by mouth nightly as needed., Disp: , Rfl:     rosuvastatin (CRESTOR) 20 MG Oral Tab, Take 1 tablet (20 mg total) by mouth nightly., Disp: 90 tablet, Rfl: 3

## 2025-08-07 ENCOUNTER — TELEPHONE (OUTPATIENT)
Dept: RHEUMATOLOGY | Facility: CLINIC | Age: 70
End: 2025-08-07

## 2025-08-07 ENCOUNTER — OFFICE VISIT (OUTPATIENT)
Dept: PAIN CLINIC | Facility: HOSPITAL | Age: 70
End: 2025-08-07
Attending: ANESTHESIOLOGY

## 2025-08-07 VITALS — OXYGEN SATURATION: 93 % | SYSTOLIC BLOOD PRESSURE: 91 MMHG | DIASTOLIC BLOOD PRESSURE: 62 MMHG | HEART RATE: 70 BPM

## 2025-08-07 DIAGNOSIS — M96.1 FAILED BACK SYNDROME, LUMBAR: ICD-10-CM

## 2025-08-07 DIAGNOSIS — M50.30 DDD (DEGENERATIVE DISC DISEASE), CERVICAL: Primary | ICD-10-CM

## 2025-08-07 PROCEDURE — 99214 OFFICE O/P EST MOD 30 MIN: CPT | Performed by: ANESTHESIOLOGY

## 2025-08-07 RX ORDER — CELECOXIB 100 MG/1
100 CAPSULE ORAL 2 TIMES DAILY
Qty: 60 CAPSULE | Refills: 0 | Status: SHIPPED | OUTPATIENT
Start: 2025-08-07 | End: 2025-09-06

## 2025-08-11 ENCOUNTER — OFFICE VISIT (OUTPATIENT)
Age: 70
End: 2025-08-11

## 2025-08-11 VITALS
SYSTOLIC BLOOD PRESSURE: 97 MMHG | HEART RATE: 75 BPM | BODY MASS INDEX: 33 KG/M2 | HEIGHT: 65 IN | DIASTOLIC BLOOD PRESSURE: 62 MMHG

## 2025-08-11 DIAGNOSIS — M79.641 BILATERAL HAND PAIN: ICD-10-CM

## 2025-08-11 DIAGNOSIS — M17.0 PRIMARY OSTEOARTHRITIS OF BOTH KNEES: Primary | ICD-10-CM

## 2025-08-11 DIAGNOSIS — M79.642 BILATERAL HAND PAIN: ICD-10-CM

## 2025-08-11 PROCEDURE — 3074F SYST BP LT 130 MM HG: CPT | Performed by: INTERNAL MEDICINE

## 2025-08-11 PROCEDURE — 99214 OFFICE O/P EST MOD 30 MIN: CPT | Performed by: INTERNAL MEDICINE

## 2025-08-11 PROCEDURE — 3078F DIAST BP <80 MM HG: CPT | Performed by: INTERNAL MEDICINE

## 2025-08-11 PROCEDURE — 20610 DRAIN/INJ JOINT/BURSA W/O US: CPT | Performed by: INTERNAL MEDICINE

## 2025-08-11 RX ORDER — TRIAMCINOLONE ACETONIDE 40 MG/ML
40 INJECTION, SUSPENSION INTRA-ARTICULAR; INTRAMUSCULAR ONCE
Status: COMPLETED | OUTPATIENT
Start: 2025-08-11 | End: 2025-08-11

## 2025-08-13 DIAGNOSIS — R09.82 POSTNASAL DRIP: ICD-10-CM

## 2025-08-15 ENCOUNTER — TELEPHONE (OUTPATIENT)
Dept: PAIN CLINIC | Facility: HOSPITAL | Age: 70
End: 2025-08-15

## 2025-08-16 RX ORDER — AZELASTINE HYDROCHLORIDE 137 UG/1
1 SPRAY, METERED NASAL 2 TIMES DAILY
Qty: 90 ML | Refills: 3 | Status: SHIPPED | OUTPATIENT
Start: 2025-08-16

## 2025-08-21 ENCOUNTER — APPOINTMENT (OUTPATIENT)
Dept: GENERAL RADIOLOGY | Facility: HOSPITAL | Age: 70
End: 2025-08-21
Attending: EMERGENCY MEDICINE

## 2025-08-21 ENCOUNTER — HOSPITAL ENCOUNTER (INPATIENT)
Facility: HOSPITAL | Age: 70
LOS: 2 days | Discharge: HOME | End: 2025-08-23
Attending: EMERGENCY MEDICINE | Admitting: HOSPITALIST

## 2025-08-21 ENCOUNTER — APPOINTMENT (OUTPATIENT)
Dept: CT IMAGING | Facility: HOSPITAL | Age: 70
End: 2025-08-21
Attending: EMERGENCY MEDICINE

## 2025-08-21 ENCOUNTER — APPOINTMENT (OUTPATIENT)
Dept: INTERVENTIONAL RADIOLOGY/VASCULAR | Facility: HOSPITAL | Age: 70
End: 2025-08-21
Attending: NURSE PRACTITIONER

## 2025-08-21 DIAGNOSIS — R09.82 POSTNASAL DRIP: ICD-10-CM

## 2025-08-21 DIAGNOSIS — I24.9 ACUTE CORONARY SYNDROME (HCC): Primary | ICD-10-CM

## 2025-08-21 PROBLEM — I21.4 NSTEMI (NON-ST ELEVATED MYOCARDIAL INFARCTION) (HCC): Status: ACTIVE | Noted: 2025-08-21

## 2025-08-21 LAB
ALBUMIN SERPL-MCNC: 4.6 G/DL (ref 3.2–4.8)
ALBUMIN/GLOB SERPL: 1.8 (ref 1–2)
ALP LIVER SERPL-CCNC: 94 U/L (ref 55–142)
ALT SERPL-CCNC: 20 U/L (ref 10–49)
ANION GAP SERPL CALC-SCNC: 8 MMOL/L (ref 0–18)
APTT PPP: 24.9 SECONDS (ref 23–36)
AST SERPL-CCNC: 22 U/L (ref ?–34)
ATRIAL RATE: 63 BPM
BASOPHILS # BLD AUTO: 0.07 X10(3) UL (ref 0–0.2)
BASOPHILS NFR BLD AUTO: 0.8 %
BILIRUB SERPL-MCNC: 0.5 MG/DL (ref 0.2–1.1)
BUN BLD-MCNC: 11 MG/DL (ref 9–23)
BUN/CREAT SERPL: 12.6 (ref 10–20)
CALCIUM BLD-MCNC: 9.9 MG/DL (ref 8.7–10.4)
CHLORIDE SERPL-SCNC: 100 MMOL/L (ref 98–112)
CHOLEST SERPL-MCNC: 169 MG/DL (ref ?–200)
CO2 SERPL-SCNC: 29 MMOL/L (ref 21–32)
CREAT BLD-MCNC: 0.87 MG/DL (ref 0.55–1.02)
D DIMER PPP FEU-MCNC: 0.91 UG/ML FEU (ref ?–0.69)
DEPRECATED RDW RBC AUTO: 42.7 FL (ref 35.1–46.3)
EGFRCR SERPLBLD CKD-EPI 2021: 72 ML/MIN/1.73M2 (ref 60–?)
EOSINOPHIL # BLD AUTO: 0.09 X10(3) UL (ref 0–0.7)
EOSINOPHIL NFR BLD AUTO: 1 %
ERYTHROCYTE [DISTWIDTH] IN BLOOD BY AUTOMATED COUNT: 14.1 % (ref 11–15)
GLOBULIN PLAS-MCNC: 2.6 G/DL (ref 2–3.5)
GLUCOSE BLD-MCNC: 98 MG/DL (ref 70–99)
HCT VFR BLD AUTO: 42.1 % (ref 35–48)
HDLC SERPL-MCNC: 72 MG/DL (ref 40–59)
HGB BLD-MCNC: 14.2 G/DL (ref 12–16)
IMM GRANULOCYTES # BLD AUTO: 0.05 X10(3) UL (ref 0–1)
IMM GRANULOCYTES NFR BLD: 0.6 %
LDLC SERPL CALC-MCNC: 83 MG/DL (ref ?–100)
LIPASE SERPL-CCNC: 43 U/L (ref 12–53)
LYMPHOCYTES # BLD AUTO: 2.05 X10(3) UL (ref 1–4)
LYMPHOCYTES NFR BLD AUTO: 22.7 %
MCH RBC QN AUTO: 28.2 PG (ref 26–34)
MCHC RBC AUTO-ENTMCNC: 33.7 G/DL (ref 31–37)
MCV RBC AUTO: 83.7 FL (ref 80–100)
MONOCYTES # BLD AUTO: 0.69 X10(3) UL (ref 0.1–1)
MONOCYTES NFR BLD AUTO: 7.6 %
NEUTROPHILS # BLD AUTO: 6.07 X10 (3) UL (ref 1.5–7.7)
NEUTROPHILS # BLD AUTO: 6.07 X10(3) UL (ref 1.5–7.7)
NEUTROPHILS NFR BLD AUTO: 67.3 %
NONHDLC SERPL-MCNC: 97 MG/DL (ref ?–130)
OSMOLALITY SERPL CALC.SUM OF ELEC: 283 MOSM/KG (ref 275–295)
P AXIS: 54 DEGREES
P-R INTERVAL: 202 MS
PLATELET # BLD AUTO: 329 10(3)UL (ref 150–450)
POTASSIUM SERPL-SCNC: 3.8 MMOL/L (ref 3.5–5.1)
PROT SERPL-MCNC: 7.2 G/DL (ref 5.7–8.2)
Q-T INTERVAL: 380 MS
QRS DURATION: 84 MS
QTC CALCULATION (BEZET): 388 MS
R AXIS: -69 DEGREES
RBC # BLD AUTO: 5.03 X10(6)UL (ref 3.8–5.3)
SODIUM SERPL-SCNC: 137 MMOL/L (ref 136–145)
T AXIS: 44 DEGREES
TRIGL SERPL-MCNC: 76 MG/DL (ref 30–149)
TROPONIN I SERPL HS-MCNC: 5421 NG/L (ref ?–34)
TROPONIN I SERPL HS-MCNC: 902 NG/L (ref ?–34)
VENTRICULAR RATE: 63 BPM
VLDLC SERPL CALC-MCNC: 12 MG/DL (ref 0–30)
WBC # BLD AUTO: 9 X10(3) UL (ref 4–11)

## 2025-08-21 PROCEDURE — 99223 1ST HOSP IP/OBS HIGH 75: CPT | Performed by: HOSPITALIST

## 2025-08-21 PROCEDURE — 71045 X-RAY EXAM CHEST 1 VIEW: CPT | Performed by: EMERGENCY MEDICINE

## 2025-08-21 PROCEDURE — 71260 CT THORAX DX C+: CPT | Performed by: EMERGENCY MEDICINE

## 2025-08-21 RX ORDER — MORPHINE SULFATE 2 MG/ML
2 INJECTION, SOLUTION INTRAMUSCULAR; INTRAVENOUS ONCE
Status: COMPLETED | OUTPATIENT
Start: 2025-08-21 | End: 2025-08-21

## 2025-08-21 RX ORDER — ASPIRIN 81 MG/1
324 TABLET, CHEWABLE ORAL ONCE
Status: COMPLETED | OUTPATIENT
Start: 2025-08-21 | End: 2025-08-21

## 2025-08-21 RX ORDER — AZELASTINE HYDROCHLORIDE 137 UG/1
1 SPRAY, METERED NASAL 2 TIMES DAILY PRN
Status: DISCONTINUED | OUTPATIENT
Start: 2025-08-21 | End: 2025-08-21

## 2025-08-21 RX ORDER — LEVOTHYROXINE SODIUM 125 UG/1
125 TABLET ORAL
Status: DISCONTINUED | OUTPATIENT
Start: 2025-08-22 | End: 2025-08-23

## 2025-08-21 RX ORDER — HEPARIN SODIUM 1000 [USP'U]/ML
INJECTION, SOLUTION INTRAVENOUS; SUBCUTANEOUS
Status: DISCONTINUED
Start: 2025-08-21 | End: 2025-08-21 | Stop reason: WASHOUT

## 2025-08-21 RX ORDER — HEPARIN SODIUM AND DEXTROSE 10000; 5 [USP'U]/100ML; G/100ML
INJECTION INTRAVENOUS CONTINUOUS
Status: DISCONTINUED | OUTPATIENT
Start: 2025-08-21 | End: 2025-08-21

## 2025-08-21 RX ORDER — SODIUM CHLORIDE 9 MG/ML
INJECTION, SOLUTION INTRAVENOUS
Status: ACTIVE | OUTPATIENT
Start: 2025-08-22 | End: 2025-08-22

## 2025-08-21 RX ORDER — MORPHINE SULFATE 2 MG/ML
1 INJECTION, SOLUTION INTRAMUSCULAR; INTRAVENOUS EVERY 2 HOUR PRN
Status: DISCONTINUED | OUTPATIENT
Start: 2025-08-21 | End: 2025-08-21

## 2025-08-21 RX ORDER — GABAPENTIN 400 MG/1
800 CAPSULE ORAL 2 TIMES DAILY
Status: DISCONTINUED | OUTPATIENT
Start: 2025-08-21 | End: 2025-08-23

## 2025-08-21 RX ORDER — ACETAMINOPHEN 500 MG
1000 TABLET ORAL ONCE
Status: COMPLETED | OUTPATIENT
Start: 2025-08-21 | End: 2025-08-21

## 2025-08-21 RX ORDER — MORPHINE SULFATE 4 MG/ML
4 INJECTION, SOLUTION INTRAMUSCULAR; INTRAVENOUS EVERY 2 HOUR PRN
Status: DISCONTINUED | OUTPATIENT
Start: 2025-08-21 | End: 2025-08-21

## 2025-08-21 RX ORDER — CHLORHEXIDINE GLUCONATE 40 MG/ML
SOLUTION TOPICAL
Status: ACTIVE | OUTPATIENT
Start: 2025-08-22 | End: 2025-08-22

## 2025-08-21 RX ORDER — NITROGLYCERIN 0.4 MG/1
0.4 TABLET SUBLINGUAL EVERY 5 MIN PRN
Status: DISCONTINUED | OUTPATIENT
Start: 2025-08-21 | End: 2025-08-23

## 2025-08-21 RX ORDER — TEMAZEPAM 15 MG/1
15 CAPSULE ORAL NIGHTLY PRN
Status: DISCONTINUED | OUTPATIENT
Start: 2025-08-21 | End: 2025-08-23

## 2025-08-21 RX ORDER — TEMAZEPAM 15 MG/1
30 CAPSULE ORAL NIGHTLY
Status: DISCONTINUED | OUTPATIENT
Start: 2025-08-21 | End: 2025-08-23

## 2025-08-21 RX ORDER — FAMOTIDINE 20 MG/1
20 TABLET, FILM COATED ORAL
Status: DISCONTINUED | OUTPATIENT
Start: 2025-08-21 | End: 2025-08-23

## 2025-08-21 RX ORDER — MIDAZOLAM HYDROCHLORIDE 1 MG/ML
INJECTION INTRAMUSCULAR; INTRAVENOUS
Status: COMPLETED
Start: 2025-08-21 | End: 2025-08-21

## 2025-08-21 RX ORDER — MORPHINE SULFATE 2 MG/ML
2 INJECTION, SOLUTION INTRAMUSCULAR; INTRAVENOUS EVERY 2 HOUR PRN
Status: DISCONTINUED | OUTPATIENT
Start: 2025-08-21 | End: 2025-08-23

## 2025-08-21 RX ORDER — BUSPIRONE HYDROCHLORIDE 15 MG/1
15 TABLET ORAL 2 TIMES DAILY
Status: DISCONTINUED | OUTPATIENT
Start: 2025-08-21 | End: 2025-08-23

## 2025-08-21 RX ORDER — ROSUVASTATIN CALCIUM 20 MG/1
40 TABLET, COATED ORAL NIGHTLY
Status: DISCONTINUED | OUTPATIENT
Start: 2025-08-21 | End: 2025-08-23

## 2025-08-21 RX ORDER — ASPIRIN 325 MG
325 TABLET ORAL DAILY
Status: DISCONTINUED | OUTPATIENT
Start: 2025-08-21 | End: 2025-08-23

## 2025-08-21 RX ORDER — LORAZEPAM 0.5 MG/1
0.5 TABLET ORAL 2 TIMES DAILY PRN
Status: DISCONTINUED | OUTPATIENT
Start: 2025-08-21 | End: 2025-08-23

## 2025-08-21 RX ORDER — FAMOTIDINE 10 MG/ML
20 INJECTION, SOLUTION INTRAVENOUS ONCE
Status: COMPLETED | OUTPATIENT
Start: 2025-08-21 | End: 2025-08-21

## 2025-08-21 RX ORDER — MORPHINE SULFATE 2 MG/ML
2 INJECTION, SOLUTION INTRAMUSCULAR; INTRAVENOUS EVERY 2 HOUR PRN
Status: DISCONTINUED | OUTPATIENT
Start: 2025-08-21 | End: 2025-08-21

## 2025-08-21 RX ORDER — NITROGLYCERIN 20 MG/100ML
INJECTION INTRAVENOUS
Status: COMPLETED
Start: 2025-08-21 | End: 2025-08-21

## 2025-08-21 RX ORDER — ONDANSETRON 2 MG/ML
INJECTION INTRAMUSCULAR; INTRAVENOUS
Status: COMPLETED
Start: 2025-08-21 | End: 2025-08-21

## 2025-08-21 RX ORDER — HEPARIN SODIUM 1000 [USP'U]/ML
5000 INJECTION, SOLUTION INTRAVENOUS; SUBCUTANEOUS ONCE
Status: COMPLETED | OUTPATIENT
Start: 2025-08-21 | End: 2025-08-21

## 2025-08-21 RX ORDER — ONDANSETRON 2 MG/ML
4 INJECTION INTRAMUSCULAR; INTRAVENOUS EVERY 6 HOURS PRN
Status: DISCONTINUED | OUTPATIENT
Start: 2025-08-21 | End: 2025-08-23

## 2025-08-21 RX ORDER — ONDANSETRON 2 MG/ML
4 INJECTION INTRAMUSCULAR; INTRAVENOUS ONCE
Status: COMPLETED | OUTPATIENT
Start: 2025-08-21 | End: 2025-08-21

## 2025-08-21 RX ORDER — ROSUVASTATIN CALCIUM 20 MG/1
20 TABLET, COATED ORAL NIGHTLY
Status: DISCONTINUED | OUTPATIENT
Start: 2025-08-21 | End: 2025-08-21

## 2025-08-21 RX ORDER — LIDOCAINE HYDROCHLORIDE 20 MG/ML
INJECTION, SOLUTION EPIDURAL; INFILTRATION; INTRACAUDAL; PERINEURAL
Status: COMPLETED
Start: 2025-08-21 | End: 2025-08-21

## 2025-08-21 RX ORDER — IOPAMIDOL 612 MG/ML
115 INJECTION, SOLUTION INTRAVASCULAR
Status: DISCONTINUED | OUTPATIENT
Start: 2025-08-21 | End: 2025-08-23

## 2025-08-21 RX ORDER — MORPHINE SULFATE 4 MG/ML
4 INJECTION, SOLUTION INTRAMUSCULAR; INTRAVENOUS EVERY 2 HOUR PRN
Status: DISCONTINUED | OUTPATIENT
Start: 2025-08-21 | End: 2025-08-23

## 2025-08-21 RX ORDER — METOCLOPRAMIDE HYDROCHLORIDE 5 MG/ML
5 INJECTION INTRAMUSCULAR; INTRAVENOUS EVERY 8 HOURS PRN
Status: DISCONTINUED | OUTPATIENT
Start: 2025-08-21 | End: 2025-08-23

## 2025-08-21 RX ORDER — MORPHINE SULFATE 2 MG/ML
1 INJECTION, SOLUTION INTRAMUSCULAR; INTRAVENOUS EVERY 2 HOUR PRN
Status: DISCONTINUED | OUTPATIENT
Start: 2025-08-21 | End: 2025-08-23

## 2025-08-21 RX ORDER — ACETAMINOPHEN 500 MG
500 TABLET ORAL EVERY 4 HOURS PRN
Status: DISCONTINUED | OUTPATIENT
Start: 2025-08-21 | End: 2025-08-23

## 2025-08-21 RX ORDER — MAGNESIUM HYDROXIDE/ALUMINUM HYDROXICE/SIMETHICONE 120; 1200; 1200 MG/30ML; MG/30ML; MG/30ML
30 SUSPENSION ORAL ONCE
Status: COMPLETED | OUTPATIENT
Start: 2025-08-21 | End: 2025-08-21

## 2025-08-21 RX ORDER — DIPHENHYDRAMINE HYDROCHLORIDE 50 MG/ML
INJECTION, SOLUTION INTRAMUSCULAR; INTRAVENOUS
Status: COMPLETED
Start: 2025-08-21 | End: 2025-08-21

## 2025-08-21 RX ORDER — HEPARIN SODIUM AND DEXTROSE 10000; 5 [USP'U]/100ML; G/100ML
1000 INJECTION INTRAVENOUS ONCE
Status: DISCONTINUED | OUTPATIENT
Start: 2025-08-21 | End: 2025-08-21

## 2025-08-22 ENCOUNTER — APPOINTMENT (OUTPATIENT)
Dept: CV DIAGNOSTICS | Facility: HOSPITAL | Age: 70
End: 2025-08-22
Attending: INTERNAL MEDICINE

## 2025-08-22 ENCOUNTER — APPOINTMENT (OUTPATIENT)
Dept: MRI IMAGING | Facility: HOSPITAL | Age: 70
End: 2025-08-22
Attending: STUDENT IN AN ORGANIZED HEALTH CARE EDUCATION/TRAINING PROGRAM

## 2025-08-22 ENCOUNTER — APPOINTMENT (OUTPATIENT)
Dept: CV DIAGNOSTICS | Facility: HOSPITAL | Age: 70
End: 2025-08-22
Attending: HOSPITALIST

## 2025-08-22 LAB
ANION GAP SERPL CALC-SCNC: 9 MMOL/L (ref 0–18)
BASOPHILS # BLD AUTO: 0.06 X10(3) UL (ref 0–0.2)
BASOPHILS NFR BLD AUTO: 0.9 %
BUN BLD-MCNC: 10 MG/DL (ref 9–23)
BUN/CREAT SERPL: 12.7 (ref 10–20)
CALCIUM BLD-MCNC: 9.3 MG/DL (ref 8.7–10.4)
CHLORIDE SERPL-SCNC: 104 MMOL/L (ref 98–112)
CO2 SERPL-SCNC: 28 MMOL/L (ref 21–32)
CREAT BLD-MCNC: 0.79 MG/DL (ref 0.55–1.02)
DEPRECATED RDW RBC AUTO: 45.7 FL (ref 35.1–46.3)
EGFRCR SERPLBLD CKD-EPI 2021: 81 ML/MIN/1.73M2 (ref 60–?)
EOSINOPHIL # BLD AUTO: 0.1 X10(3) UL (ref 0–0.7)
EOSINOPHIL NFR BLD AUTO: 1.5 %
ERYTHROCYTE [DISTWIDTH] IN BLOOD BY AUTOMATED COUNT: 16.5 % (ref 11–15)
GLUCOSE BLD-MCNC: 89 MG/DL (ref 70–99)
HCT VFR BLD AUTO: 37.5 % (ref 35–48)
HGB BLD-MCNC: 13.3 G/DL (ref 12–16)
IMM GRANULOCYTES # BLD AUTO: 0.03 X10(3) UL (ref 0–1)
IMM GRANULOCYTES NFR BLD: 0.4 %
ISTAT ACTIVATED CLOTTING TIME: 158 SECONDS (ref 125–137)
LYMPHOCYTES # BLD AUTO: 1.76 X10(3) UL (ref 1–4)
LYMPHOCYTES NFR BLD AUTO: 26.2 %
MCH RBC QN AUTO: 31 PG (ref 26–34)
MCHC RBC AUTO-ENTMCNC: 35.5 G/DL (ref 31–37)
MCV RBC AUTO: 87.4 FL (ref 80–100)
MONOCYTES # BLD AUTO: 0.5 X10(3) UL (ref 0.1–1)
MONOCYTES NFR BLD AUTO: 7.5 %
NEUTROPHILS # BLD AUTO: 4.26 X10 (3) UL (ref 1.5–7.7)
NEUTROPHILS # BLD AUTO: 4.26 X10(3) UL (ref 1.5–7.7)
NEUTROPHILS NFR BLD AUTO: 63.5 %
OSMOLALITY SERPL CALC.SUM OF ELEC: 291 MOSM/KG (ref 275–295)
PLATELET # BLD AUTO: 233 10(3)UL (ref 150–450)
POTASSIUM SERPL-SCNC: 4 MMOL/L (ref 3.5–5.1)
RBC # BLD AUTO: 4.29 X10(6)UL (ref 3.8–5.3)
SODIUM SERPL-SCNC: 141 MMOL/L (ref 136–145)
TROPONIN I SERPL HS-MCNC: 3515 NG/L (ref ?–34)
TROPONIN I SERPL HS-MCNC: 5494 NG/L (ref ?–34)
WBC # BLD AUTO: 6.7 X10(3) UL (ref 4–11)

## 2025-08-22 PROCEDURE — 93306 TTE W/DOPPLER COMPLETE: CPT | Performed by: INTERNAL MEDICINE

## 2025-08-22 PROCEDURE — 75561 CARDIAC MRI FOR MORPH W/DYE: CPT | Performed by: STUDENT IN AN ORGANIZED HEALTH CARE EDUCATION/TRAINING PROGRAM

## 2025-08-22 PROCEDURE — 3078F DIAST BP <80 MM HG: CPT | Performed by: HOSPITALIST

## 2025-08-22 PROCEDURE — 3074F SYST BP LT 130 MM HG: CPT | Performed by: HOSPITALIST

## 2025-08-22 PROCEDURE — 99233 SBSQ HOSP IP/OBS HIGH 50: CPT | Performed by: HOSPITALIST

## 2025-08-22 RX ORDER — FAMOTIDINE 20 MG/1
20 TABLET, FILM COATED ORAL
Status: SHIPPED | COMMUNITY
Start: 2025-08-22

## 2025-08-22 RX ORDER — GADOTERATE MEGLUMINE 376.9 MG/ML
20 INJECTION INTRAVENOUS
Status: COMPLETED | OUTPATIENT
Start: 2025-08-22 | End: 2025-08-22

## 2025-08-22 RX ORDER — GADOTERATE MEGLUMINE 376.9 MG/ML
15 INJECTION INTRAVENOUS
Status: COMPLETED | OUTPATIENT
Start: 2025-08-22 | End: 2025-08-22

## 2025-08-22 RX ORDER — OMEPRAZOLE 40 MG/1
40 CAPSULE, DELAYED RELEASE ORAL
Status: SHIPPED | COMMUNITY
Start: 2025-08-22

## 2025-08-22 RX ORDER — AZELASTINE HYDROCHLORIDE 137 UG/1
1 SPRAY, METERED NASAL 2 TIMES DAILY PRN
Status: SHIPPED | COMMUNITY
Start: 2025-08-22

## 2025-08-23 VITALS
WEIGHT: 198.31 LBS | OXYGEN SATURATION: 94 % | TEMPERATURE: 98 F | BODY MASS INDEX: 33 KG/M2 | HEART RATE: 79 BPM | SYSTOLIC BLOOD PRESSURE: 121 MMHG | RESPIRATION RATE: 18 BRPM | DIASTOLIC BLOOD PRESSURE: 61 MMHG

## 2025-08-23 PROCEDURE — 3074F SYST BP LT 130 MM HG: CPT | Performed by: HOSPITALIST

## 2025-08-23 PROCEDURE — 3078F DIAST BP <80 MM HG: CPT | Performed by: HOSPITALIST

## 2025-08-23 PROCEDURE — 99239 HOSP IP/OBS DSCHRG MGMT >30: CPT | Performed by: HOSPITALIST

## 2025-08-23 RX ORDER — LOSARTAN POTASSIUM 25 MG/1
12.5 TABLET ORAL DAILY
Qty: 30 TABLET | Refills: 0 | Status: SHIPPED | OUTPATIENT
Start: 2025-08-23

## 2025-08-23 RX ORDER — LOSARTAN POTASSIUM 25 MG/1
12.5 TABLET ORAL DAILY
Status: DISCONTINUED | OUTPATIENT
Start: 2025-08-23 | End: 2025-08-23

## 2025-08-23 RX ORDER — ASPIRIN 81 MG/1
81 TABLET ORAL DAILY
Qty: 30 TABLET | Refills: 0 | Status: SHIPPED | OUTPATIENT
Start: 2025-08-23

## 2025-08-25 ENCOUNTER — PATIENT OUTREACH (OUTPATIENT)
Dept: CASE MANAGEMENT | Age: 70
End: 2025-08-25

## 2025-08-25 ENCOUNTER — PATIENT OUTREACH (OUTPATIENT)
Age: 70
End: 2025-08-25

## 2025-08-27 ENCOUNTER — TELEPHONE (OUTPATIENT)
Dept: FAMILY MEDICINE CLINIC | Facility: CLINIC | Age: 70
End: 2025-08-27

## 2025-08-30 ENCOUNTER — LAB ENCOUNTER (OUTPATIENT)
Dept: LAB | Facility: HOSPITAL | Age: 70
End: 2025-08-30
Attending: INTERNAL MEDICINE

## 2025-08-30 ENCOUNTER — HOSPITAL ENCOUNTER (OUTPATIENT)
Dept: GENERAL RADIOLOGY | Facility: HOSPITAL | Age: 70
Discharge: HOME | End: 2025-08-30
Attending: INTERNAL MEDICINE

## 2025-08-30 DIAGNOSIS — M79.642 BILATERAL HAND PAIN: ICD-10-CM

## 2025-08-30 DIAGNOSIS — M79.641 BILATERAL HAND PAIN: ICD-10-CM

## 2025-08-30 DIAGNOSIS — E03.9 HYPOTHYROIDISM, UNSPECIFIED TYPE: ICD-10-CM

## 2025-08-30 LAB
T4 FREE SERPL-MCNC: 1.8 NG/DL (ref 0.8–1.7)
TSI SER-ACNC: 0.59 UIU/ML (ref 0.55–4.78)

## 2025-08-30 PROCEDURE — 73130 X-RAY EXAM OF HAND: CPT | Performed by: INTERNAL MEDICINE

## 2025-08-30 PROCEDURE — 36415 COLL VENOUS BLD VENIPUNCTURE: CPT

## 2025-08-30 PROCEDURE — 84439 ASSAY OF FREE THYROXINE: CPT

## 2025-08-30 PROCEDURE — 84443 ASSAY THYROID STIM HORMONE: CPT

## (undated) DIAGNOSIS — M25.50 POLYARTHRALGIA: Primary | ICD-10-CM

## (undated) DEVICE — MEDI-VAC NON-CONDUCTIVE SUCTION TUBING 6MM X 1.8M (6FT.) L: Brand: CARDINAL HEALTH

## (undated) DEVICE — CONMED SCOPE SAVER BITE BLOCK, 20X27 MM: Brand: SCOPE SAVER

## (undated) DEVICE — 35 ML SYRINGE REGULAR TIP: Brand: MONOJECT

## (undated) DEVICE — KIT ENDO ORCAPOD 160/180/190

## (undated) DEVICE — FORCEP RADIAL JAW 4

## (undated) DEVICE — GIJAW SINGLE-USE BIOPSY FORCEPS WITH NEEDLE: Brand: GIJAW

## (undated) DEVICE — FIAPC® PROBE W/ FILTER 2200 A OD 2.3MM/6.9FR; L 2.2M/7.2FT: Brand: ERBE

## (undated) DEVICE — SYRINGE, LUER SLIP, STERILE, 60ML: Brand: MEDLINE

## (undated) DEVICE — LINE MNTR ADLT SET O2 INTMD

## (undated) DEVICE — YANKAUER SUCTION INSTRUMENT NO CONTROL VENT, BULB TIP, CLEAR: Brand: YANKAUER

## (undated) DEVICE — SNARE OPTMZ PLPCTM TRP

## (undated) DEVICE — 60 ML SYRINGE REGULAR TIP: Brand: MONOJECT

## (undated) DEVICE — V2 SPECIMEN COLLECTION TRAY: Brand: NEPTUNE

## (undated) DEVICE — KIT CLEAN ENDOKIT 1.1OZ GOWNX2

## (undated) DEVICE — Device: Brand: DEFENDO AIR/WATER/SUCTION AND BIOPSY VALVE

## (undated) DEVICE — LASSO POLYPECTOMY SNARE: Brand: LASSO

## (undated) DEVICE — SNARE ENDOSCOPIC 10MM ROUND

## (undated) DEVICE — KIT MFLD FOR SPEC COLL

## (undated) DEVICE — Device: Brand: CUSTOM PROCEDURE KIT

## (undated) DEVICE — ENDOSCOPY PACK - LOWER: Brand: MEDLINE INDUSTRIES, INC.

## (undated) DEVICE — MEDI-VAC NON-CONDUCTIVE SUCTION TUBING: Brand: CARDINAL HEALTH

## (undated) DEVICE — SNARE CAPTI HEX STIFF MEDIUM

## (undated) DEVICE — REM POLYHESIVE ADULT PATIENT RETURN ELECTRODE: Brand: VALLEYLAB

## (undated) DEVICE — Device: Brand: DUAL NARE NASAL CANNULAE FEMALE LUER CON 7FT O2 TUBE

## (undated) DEVICE — CLIP LGT 11MM OPEN 2.8MM 235CM

## (undated) DEVICE — CO2 CANNULA,SSOFT,ADLT,7O2,4CO2,FEMALE: Brand: MEDLINE

## (undated) NOTE — LETTER
[address], [address] [facility], [facility] [address]  [address] [facility] [address] [facility], [address]   [phone]    [address] [facility], [address] [place] [address], [address] [place] [address]   [phone]       June 29, 2017    Halley Hanna

## (undated) NOTE — MR AVS SNAPSHOT
[facility] SPECIALTY [facility] [place] [address] [place] [address]  [phone]               Thank you for choosing us for your health care visit with Benny Buckner.  MD Damaris.  We are glad to serve you and happy to provide you with this summary of yo Commonly known as:  COLACE           ergocalciferol 24396 units Caps   TAKE 1 CAPSULE BY MOUTH EVERY WEEK   Commonly known as:  DRISDOL/VITAMIN D2           gabapentin 800 MG Tabs   Take one twice daily   Commonly known as:  NEURONTIN           ibuprofen [address] [place], [address] [facility] ([address] [facility])  [address] [facility] eDrek Barnard, [address] [facility]  [address]  [facility] [address] [facility], . [place]  HOW TO GET STARTED: HOW TO STAY MOTIVATED:   Start activities slowly and build up over time Do what you like   Get your heart pumping – brisk walking, biking, swimming Even 10 minute increments are effective and add up over the week   2 ½ hours per week –

## (undated) NOTE — LETTER
03/14/19        Manny Sampson  [address] [facility] [address]      Dear Mark Alvarez,    [address] [facility] records indicate that you have outstanding lab work and or testing that was ordered for you and has not yet been completed:  Orders Placed This Encounte

## (undated) NOTE — LETTER
2/24/2025          Vandana Jefferson    [address] [place] [address]         Dear Vandana,    Our records indicate that the tests ordered for you by Sean Hubbard MD  have not been done.  If you have, in fact, already completed the tests or you do not wish to have the tests done, please contact our office at THE NUMBER LISTED BELOW.  Otherwise, please proceed with the testing.  Enclosed is a duplicate order for your convenience.    XR VIDEO SWALLOW (CPT=74230) (Order #[id number]) on 1/24/25   To schedule a test, call Central Scheduling at [phone]    Sincerely,    Sean Hubbard MD  [facility]  [address] [address] [phone]

## (undated) NOTE — LETTER
[facility]  Administration of Anesthesia  IVandana agree to be cared for by a physician anesthesiologist alone and/or with a nurse anesthetist, who is specially trained to monitor me and give me medicine to put me to sleep or keep me comfortable during my procedure    I understand that my anesthesiologist and/or anesthetist is not an employee or agent of [facility] or [employer]. He or she works for [facility] [facility]    As the patient asking for anesthesia services, I agree to:  Allow the anesthesiologist (anesthesia doctor) to give me medicine and do additional procedures as necessary. Some examples are: Starting or using an “IV” to give me medicine, fluids or blood during my procedure, and having a breathing tube placed to help me breathe when I’m asleep (intubation). In the event that my heart stops working properly, I understand that my anesthesiologist will make every effort to sustain my life, unless otherwise directed by [facility] Do Not Resuscitate documents.  Tell my anesthesia doctor before my procedure:  If I am pregnant.  The last time that I ate or drank.  iii. All of the medicines I take (including prescriptions, herbal supplements, and pills I can buy without a prescription (including street drugs/illegal medications). Failure to inform my anesthesiologist about these medicines may increase my risk of anesthetic complications.  iv.If I am allergic to anything or have had a reaction to anesthesia before.  I understand how the anesthesia medicine will help me (benefits).  I understand that with any type of anesthesia medicine there are risks:  The most common risks are: nausea, vomiting, sore throat, muscle soreness, damage to my eyes, mouth, or teeth (from breathing tube placement).  Rare risks include: remembering what happened during my procedure, allergic reactions to medications, injury to my airway, heart, lungs, vision, nerves, or  muscles and in extremely rare instances death.  My doctor has explained to me other choices available to me for my care (alternatives).  Pregnant Patients (“epidural”):  I understand that the risks of having an epidural (medicine given into my back to help control pain during labor), include itching, low blood pressure, difficulty urinating, headache or slowing of the baby’s heart. Very rare risks include infection, bleeding, seizure, irregular heart rhythms and nerve injury.  Regional Anesthesia (“spinal”, “epidural”, & “nerve blocks”):  I understand that rare but potential complications include headache, bleeding, infection, seizure, irregular heart rhythms, and nerve injury.    _____________________________________________________________________________  Patient (or Representative) Signature/Relationship to Patient  Date   Time    _____________________________________________________________________________   Name (if used)    Language/Organization   Time    _____________________________________________________________________________  Nurse Anesthetist Signature     Date   Time  _____________________________________________________________________________  Anesthesiologist Signature     Date   Time  I have discussed the procedure and information above with the patient (or patient’s representative) and answered their questions. The patient or their representative has agreed to have anesthesia services.    _____________________________________________________________________________  Witness        Date   Time  I have verified that the signature is that of the patient or patient’s representative, and that it was signed before the procedure  Patient Name: Vandana Jefferson     : 10/30/1955                 Printed: 2024 at 7:19 AM    Medical Record #: [id number]                                            Page 1 of 1  ----------ANESTHESIA CONSENT----------

## (undated) NOTE — LETTER
03/12/19        [facility]  [address] [place] [id number]      Dear John Pena,    [address] [facility] records indicate that you have outstanding lab work and or testing that was ordered for you and has not yet been completed:  Orders Placed This Encounte

## (undated) NOTE — LETTER
[facility]  [address], [place], [place]    Authorization for Surgical Operation and Procedure                               I hereby authorize Sean Hubbard MD, my physician and his/her assistants (if applicable), which may include medical students, residents, and/or fellows, to perform the following surgical operation/ procedure and administer such anesthesia as may be determined necessary by my physician: Operation/Procedure name (s) COLONOSCOPY on Vandana Jefferson   2.   I recognize that during the surgical operation/procedure, unforeseen conditions may necessitate additional or different procedures than those listed above.  I, therefore, further authorize and request that the above-named surgeon, assistants, or designees perform such procedures as are, in their judgment, necessary and desirable.    3.   My surgeon/physician has discussed prior to my surgery the potential benefits, risks and side effects of this procedure; the likelihood of achieving goals; and potential problems that might occur during recuperation.  They also discussed reasonable alternatives to the procedure, including risks, benefits, and side effects related to the alternatives and risks related to not receiving this procedure.  I have had all my questions answered and I acknowledge that no guarantee has been made as to the result that may be obtained.    4.   Should the need arise during my operation/procedure, which includes change of level of care prior to discharge, I also consent to the administration of blood and/or blood products.  Further, I understand that despite careful testing and screening of blood or blood products by collecting agencies, I may still be subject to ill effects as a result of receiving a blood transfusion and/or blood products.  The following are some, but not all, of the potential risks that can occur: fever and allergic reactions, hemolytic reactions, transmission of diseases such as  Hepatitis, AIDS and Cytomegalovirus (CMV) and fluid overload.  In the event that I wish to have an autologous transfusion of my own blood, or a directed donor transfusion, I will discuss this with my physician.  Check only if Refusing Blood or Blood Products  I understand refusal of blood or blood products as deemed necessary by my physician may have serious consequences to my condition to include possible death. I hereby assume responsibility for my refusal and release the hospital, its personnel, and my physicians from any responsibility for the consequences of my refusal.    o  Refuse   5.   I authorize the use of any specimen, organs, tissues, body parts or foreign objects that may be removed from my body during the operation/procedure for diagnosis, research or teaching purposes and their subsequent disposal by hospital authorities.  I also authorize the release of specimen test results and/or written reports to my treating physician on the hospital medical staff or other referring or consulting physicians involved in my care, at the discretion of the Pathologist or my treating physician.    6.   I consent to the photographing or videotaping of the operations or procedures to be performed, including appropriate portions of my body for medical, scientific, or educational purposes, provided my identity is not revealed by the pictures or by descriptive texts accompanying them.  If the procedure has been photographed/videotaped, the surgeon will obtain the original picture, image, videotape or CD.  The hospital will not be responsible for storage, release or maintenance of the picture, image, tape or CD.    7.   I consent to the presence of a  or observers in the operating room as deemed necessary by my physician or their designees.    8.   I recognize that in the event my procedure results in extended X-Ray/fluoroscopy time, I may develop a skin reaction.    9. If I have a Do Not Attempt  Resuscitation (DNAR) order in place, that status will be suspended while in the operating room, procedural suite, and during the recovery period unless otherwise explicitly stated by me (or a person authorized to consent on my behalf). The surgeon or my attending physician will determine when the applicable recovery period ends for purposes of reinstating the DNAR order.  10. Patients having a sterilization procedure: I understand that if the procedure is successful the results will be permanent and it will therefore be impossible for me to inseminate, conceive, or bear children.  I also understand that the procedure is intended to result in sterility, although the result has not been guaranteed.   11. I acknowledge that my physician has explained sedation/analgesia administration to me including the risk and benefits I consent to the administration of sedation/analgesia as may be necessary or desirable in the judgment of my physician.    I CERTIFY THAT I HAVE READ AND FULLY UNDERSTAND THE ABOVE CONSENT TO OPERATION and/or OTHER PROCEDURE.     ____________________________________  _________________________________        ______________________________  Signature of Patient    Signature of Responsible Person                Printed Name of Responsible Person                                      ____________________________________  _____________________________                ________________________________  Signature of Witness        Date  Time         Relationship to Patient    STATEMENT OF PHYSICIAN My signature below affirms that prior to the time of the procedure; I have explained to the patient and/or his/her legal representative, the risks and benefits involved in the proposed treatment and any reasonable alternative to the proposed treatment. I have also explained the risks and benefits involved in refusal of the proposed treatment and alternatives to the proposed treatment and have answered the patient's  questions. If I have a significant financial interest in a co-management agreement or a significant financial interest in any product or implant, or other significant relationship used in this procedure/surgery, I have disclosed this and had a discussion with my patient.     _____________________________________________________              _____________________________  (Signature of Physician)                                                                                         (Date)                                   (Time)  Patient Name: Vandana Jefferson      : 10/30/1955      Printed: 2024     Medical Record #: [id number]                                      Page 1 of 1

## (undated) NOTE — MR AVS SNAPSHOT
[address]  [facility] [address] [address]  [phone]  [phone]               Thank you for choosing us for your health care visit with Arturo Hendricks MD.  We are glad to serve you and happy to provide you w Commonly known as:  PRISTIQ           diazepam 10 MG Tabs   as needed.    Commonly known as:  VALIUM           ergocalciferol 36703 units Caps   TAKE 1 CAPSULE BY MOUTH EVERY WEEK   Commonly known as:  DRISDOL/VITAMIN D2           gabapentin 800 MG Tabs   T

## (undated) NOTE — MR AVS SNAPSHOT
[facility] SPECIALTY [facility] - [place] [address] [place]  [address] [phone]               Thank you for choosing us for your health care visit with Carol Ocampo MD.  We are glad to serve you and happy to provide you with this summary of yo to have these services done at another facility or to obtain an approved referral. Services ordered by your physician may not be covered unless prior authorization is obtained in accordance with your insurance company's guidelines.  Unauthorized care may be 95/66 mmHg 70 97.8 °F (36.6 °C) (Oral) 5' 5\" (1.651 m) 180 lb (81.647 kg) 29.95 kg/m2         Current Medications          This list is accurate as of: 6/14/17  1:25 PM.  Always use your most recent med list.                carvedilol 3.125 MG Tabs   Ellen Downing [phone]  [address]     Phone:  [phone]    - Omeprazole 40 MG Cpdr  - Rosuvastatin Calcium 40 MG Tabs            MyChart                  Visit [facility] online at  [facility]

## (undated) NOTE — Clinical Note
Thank you for the consult. I saw  Ms. Preston Boles in the endocrine/diabetes clinic today. Please see attached my note. Please feel free to contact me with any questions. Thanks!

## (undated) NOTE — LETTER
12/12/18        [address] [address]      Dear John Pena,    [address] [facility] records indicate that you have outstanding lab work and or testing that was ordered for you and has not yet been completed:  Orders Placed This Encounte

## (undated) NOTE — LETTER
Elizabeth Guaman, [address]  [place],  [place]       09/30/21        Patient: Charity Hameed   YOB: 1955   Date of Visit: 9/30/2021       Dear  Dr. Shreya Burns MD,      Thank you for referring Charity Hameed to my practice.

## (undated) NOTE — LETTER
2/22/2024    Vandana Jefferson        [address] [place] [address]            Dear Vandana Jefferson,      Our records indicate that you are due for an appointment for a Colonoscopy in June 2024 with Sean Hubbard MD. Our doctors are booking out about 3-6 months in advance for procedures.     Please call our office to schedule a phone screening appointment to plan for the procedure(s).   Your medical well-being is important to us.    If your insurance requires a referral, please call your primary care office to request one.      Thank you,      The Physicians and Staff at [facility]

## (undated) NOTE — LETTER
12/5/2022    Twyla Hu        [address] [facility] [address]            Dear Twyla Hu,      Our records indicate that you are due for an appointment for a Colonoscopy with Carmela Cheng MD. Our doctors are booking out about 3-5 months for procedures. Please call our office to schedule a phone screening appointment to plan for the procedure(s). Your medical well-being is important to us. If your insurance requires a referral, please call your primary care office to request one.       Thank you,      The Physicians and Staff at [facility]

## (undated) NOTE — LETTER
12/13/17        Jeannie Simmons  \[facility] [place] [facility]\"" [address]      Dear Cori Allen,    [address] [facility] records indicate that you have outstanding lab work and or testing that was ordered for you and has not yet been completed:          Comp Metabolic Panel (14

## (undated) NOTE — MR AVS SNAPSHOT
[facility] SPECIALTY [facility] - [place] [address] [place]  [id number] [phone]               Thank you for choosing us for your health care visit with Elmer French.  MD Damaris.  We are glad to serve you and happy to provide you with this summary of yo Neck pain on left side   Order:  Op Referral Pain Mangement    Rosa Renee MD   [address] [facility] [address]   Phone:  [phone]   Fax:  [phone], Evelyne Adams MD   [facility] [place] [address]   Phone:  [phone] requirements for authorization, please wait 5-7 days and then contact your physician's   office. At that time, you will be provided with any authorization numbers or be assured that none are required. You can then schedule your appointment.  Failure to obta ergocalciferol 58507 units Caps   TAKE 1 CAPSULE BY MOUTH EVERY WEEK   What changed:  Another medication with the same name was removed. Continue taking this medication, and follow the directions you see here.    Commonly known as:  DRISDOL/VITAMIN D2 Call the [employer] for assistance with your inactive [employer] account    If you have questions, you can call [phone] to talk to our [facility] Staff. Remember, [employer] is NOT to be used for urgent needs. For medical emergencies, dial 911.     Vi

## (undated) NOTE — MR AVS SNAPSHOT
[address]  [phone]               Thank you for choosing us for your health care visit with Tali Jung MD.  We are glad to serve you and happy to provide you with this summary of your visit. Current Medications          This list is accurate as of: 3/31/17 11:17 AM.  Always use your most recent med list.                carvedilol 3.125 MG Tabs   Take 1 tablet (3.125 mg total) by mouth 2 (two) times daily.    Commonly known as:  [employer] Support Staff. Remember, [employer] is NOT to be used for urgent needs. For medical emergencies, dial 911. Visit https://[employer]. [facility]. org to learn more.         Educational Information     TOP FALL PREVENTION TIPS    INSIDE YOUR HOME   KITCHEN:  ? Use

## (undated) NOTE — LETTER
[facility] [address] [facility] [place]  Authorization for Surgical Operation and Procedure                                                                                           I hereby authorize Cecy Tamayo MD, my physician and his/her assistants (if applicable), which may include medical students, residents, and/or fellows, to perform the following surgical operation/ procedure and administer such anesthesia as may be determined necessary by my physician: Operation/Procedure name (s) COLONOSCOPY /ESOPHAGOGASTRODUODENOSCOPY on Jaspal Parker   2. I recognize that during the surgical operation/procedure, unforeseen conditions may necessitate additional or different procedures than those listed above. I, therefore, further authorize and request that the above-named surgeon, assistants, or designees perform such procedures as are, in their judgment, necessary and desirable. 3.   My surgeon/physician has discussed prior to my surgery the potential benefits, risks and side effects of this procedure; the likelihood of achieving goals; and potential problems that might occur during recuperation. They also discussed reasonable alternatives to the procedure, including risks, benefits, and side effects related to the alternatives and risks related to not receiving this procedure. I have had all my questions answered and I acknowledge that no guarantee has been made as to the result that may be obtained. 4.   Should the need arise during my operation/procedure, which includes change of level of care prior to discharge, I also consent to the administration of blood and/or blood products. Further, I understand that despite careful testing and screening of blood or blood products by collecting agencies, I may still be subject to ill effects as a result of receiving a blood transfusion and/or blood products.   The following are some, but not all, of the potential risks that can occur: fever and allergic reactions, hemolytic reactions, transmission of diseases such as Hepatitis, AIDS and Cytomegalovirus (CMV) and fluid overload. In the event that I wish to have an autologous transfusion of my own blood, or a directed donor transfusion, I will discuss this with my physician. Check only if Refusing Blood or Blood Products  I understand refusal of blood or blood products as deemed necessary by my physician may have serious consequences to my condition to include possible death. I hereby assume responsibility for my refusal and release the hospital, its personnel, and my physicians from any responsibility for the consequences of my refusal.    o  Refuse   5. I authorize the use of any specimen, organs, tissues, body parts or foreign objects that may be removed from my body during the operation/procedure for diagnosis, research or teaching purposes and their subsequent disposal by hospital authorities. I also authorize the release of specimen test results and/or written reports to my treating physician on the hospital medical staff or other referring or consulting physicians involved in my care, at the discretion of the Pathologist or my treating physician. 6.   I consent to the photographing or videotaping of the operations or procedures to be performed, including appropriate portions of my body for medical, scientific, or educational purposes, provided my identity is not revealed by the pictures or by descriptive texts accompanying them. If the procedure has been photographed/videotaped, the surgeon will obtain the original picture, image, videotape or CD. The hospital will not be responsible for storage, release or maintenance of the picture, image, tape or CD.    7.   I consent to the presence of a  or observers in the operating room as deemed necessary by my physician or their designees.     8.   I recognize that in the event my procedure results in extended X-Ray/fluoroscopy time, I may develop a skin reaction. 9. If I have a Do Not Attempt Resuscitation (DNAR) order in place, that status will be suspended while in the operating room, procedural suite, and during the recovery period unless otherwise explicitly stated by me (or a person authorized to consent on my behalf). The surgeon or my attending physician will determine when the applicable recovery period ends for purposes of reinstating the DNAR order. 10. Patients having a sterilization procedure: I understand that if the procedure is successful the results will be permanent and it will therefore be impossible for me to inseminate, conceive, or bear children. I also understand that the procedure is intended to result in sterility, although the result has not been guaranteed. 11. I acknowledge that my physician has explained sedation/analgesia administration to me including the risk and benefits I consent to the administration of sedation/analgesia as may be necessary or desirable in the judgment of my physician. I CERTIFY THAT I HAVE READ AND FULLY UNDERSTAND THE ABOVE CONSENT TO OPERATION and/or OTHER PROCEDURE.     _________________________________________ _________________________________     ___________________________________  Signature of Patient     Signature of Responsible Person                   Printed Name of Responsible Person                              _________________________________________ ______________________________        ___________________________________  Signature of Witness         Date  Time         Relationship to Patient    STATEMENT OF PHYSICIAN My signature below affirms that prior to the time of the procedure; I have explained to the patient and/or his/her legal representative, the risks and benefits involved in the proposed treatment and any reasonable alternative to the proposed treatment.  I have also explained the risks and benefits involved in refusal of the proposed treatment and alternatives to the proposed treatment and have answered the patient's questions.  If I have a significant financial interest in a co-management agreement or a significant financial interest in any product or implant, or other significant relationship used in this procedure/surgery, I have disclosed this and had a discussion with my patient.     _______________________________________________________________ _____________________________  Sayda Catalino of Physician)                                                                                         (Date)                                   (Time)  Patient Name: Vijaya Benitez    : 10/30/1955   Printed: 2023      Medical Record #: [id number]                                              Page 1 of 1

## (undated) NOTE — LETTER
June 24, 2021    [facility], [address] [address]     Patient: Manny Sampson   YOB: 1955   Date of Visit: 6/24/2021       Dear Dr. Michelle Noe MD:    Thank you for referring Malgorzata Lam to me for evaluation.  Here Arthrocentesis); other surgical history (Per NG: arthrocentesis of the knee joint); colonoscopy (10/2005) (normal, non precancerous polyps);  () (1); cyst aspiration left (Left, 2007) (FNA); diane localization wire 1 site left (cpt=19281) (03 total) by mouth before breakfast. 90 tablet 3   • carvedilol 3.125 MG Oral Tab TAKE 1 TABLET(3.125 MG) BY MOUTH TWICE DAILY WITH MEALS 180 tablet 3   • Rosuvastatin Calcium (CRESTOR) 20 MG Oral Tab Take 1 tablet (20 mg total) by mouth nightly.  90 tablet 3 Left Right     Full Full          Extraocular Movement       Right Left     Full, Ortho Full, Ortho          Dilation     Both eyes: 1.0% Mydriacyl and 2.5% Doyle Synephrine @ 1:15 PM            Slit Lamp and Fundus Exam     Slit Lamp Exam       Right Left morning and every night. After 5 minutes of holding the warm compresses on the eyelids, patient should gently rub the eyelashes and then rinse thoroughly with warm water.      Patient should also use artificial tears (any over the counter brand is okay) up